# Patient Record
Sex: MALE | Race: BLACK OR AFRICAN AMERICAN | Employment: PART TIME | URBAN - METROPOLITAN AREA
[De-identification: names, ages, dates, MRNs, and addresses within clinical notes are randomized per-mention and may not be internally consistent; named-entity substitution may affect disease eponyms.]

---

## 2017-01-04 ENCOUNTER — TELEPHONE (OUTPATIENT)
Dept: INTERNAL MEDICINE CLINIC | Age: 57
End: 2017-01-04

## 2017-01-04 NOTE — TELEPHONE ENCOUNTER
Natalia Hay with [facility] office called and stated that they need a referral placed.  Please fax completed referral.  Dr. Hernandez Rang [phone]  Appointment January 11, 2017  DX: N52.9, E29.1, E11.9, I10  CPT 60323  Fax: [phone]  Phone: [phone]

## 2017-01-11 RX ORDER — BLOOD SUGAR DIAGNOSTIC
STRIP MISCELLANEOUS
Qty: 750 STRIP | Refills: 3 | Status: SHIPPED | OUTPATIENT
Start: 2017-01-11 | End: 2022-03-21 | Stop reason: ALTCHOICE

## 2017-01-11 RX ORDER — TRAMADOL HYDROCHLORIDE 50 MG/1
TABLET ORAL
Qty: 180 TAB | Refills: 2 | Status: SHIPPED | OUTPATIENT
Start: 2017-01-11 | End: 2017-07-12 | Stop reason: SDUPTHER

## 2017-01-17 RX ORDER — TRAMADOL HYDROCHLORIDE 50 MG/1
TABLET ORAL
Qty: 180 TAB | Refills: 2 | OUTPATIENT
Start: 2017-01-17

## 2017-01-17 NOTE — TELEPHONE ENCOUNTER
Spoke with patient he states [facility], and he needs a prior authorization for [employer]. PA submitted and [facility].

## 2017-01-17 NOTE — TELEPHONE ENCOUNTER
Script was printed a week ago. Do I need to reprint it ?     He has to change to strips that [facility] cover

## 2017-01-20 ENCOUNTER — TELEPHONE (OUTPATIENT)
Dept: INTERNAL MEDICINE CLINIC | Age: 57
End: 2017-01-20

## 2017-01-20 NOTE — TELEPHONE ENCOUNTER
Spoke with patient he is aware Contour Test strips are not covered by [facility] any longer. Patient states he needs to have this brand because they are linked with his insulin pump. Patient states he will call [employer]. Patient was advised to call [facility] [employer] and make them aware. Patient was advised have [employer] start an appeal.  Patient verbalizes understanding.

## 2017-02-13 ENCOUNTER — TELEPHONE (OUTPATIENT)
Dept: INTERNAL MEDICINE CLINIC | Age: 57
End: 2017-02-13

## 2017-02-17 ENCOUNTER — OFFICE VISIT (OUTPATIENT)
Dept: INTERNAL MEDICINE CLINIC | Age: 57
End: 2017-02-17

## 2017-02-17 ENCOUNTER — HOSPITAL ENCOUNTER (EMERGENCY)
Age: 57
Discharge: HOME | End: 2017-02-17
Attending: EMERGENCY MEDICINE | Admitting: EMERGENCY MEDICINE
Payer: MEDICARE

## 2017-02-17 VITALS
SYSTOLIC BLOOD PRESSURE: 143 MMHG | TEMPERATURE: 97.2 F | OXYGEN SATURATION: 98 % | HEIGHT: 73 IN | BODY MASS INDEX: 24.78 KG/M2 | WEIGHT: 187 LBS | RESPIRATION RATE: 16 BRPM | DIASTOLIC BLOOD PRESSURE: 82 MMHG | HEART RATE: 78 BPM

## 2017-02-17 VITALS
RESPIRATION RATE: 16 BRPM | TEMPERATURE: 97.6 F | OXYGEN SATURATION: 100 % | HEIGHT: 73 IN | WEIGHT: 187 LBS | DIASTOLIC BLOOD PRESSURE: 72 MMHG | HEART RATE: 90 BPM | SYSTOLIC BLOOD PRESSURE: 155 MMHG | BODY MASS INDEX: 24.78 KG/M2

## 2017-02-17 DIAGNOSIS — H11.31 SUBCONJUNCTIVAL HEMORRHAGE OF RIGHT EYE: Primary | ICD-10-CM

## 2017-02-17 DIAGNOSIS — S05.01XA CORNEAL ABRASION, RIGHT, INITIAL ENCOUNTER: ICD-10-CM

## 2017-02-17 PROCEDURE — 99283 EMERGENCY DEPT VISIT LOW MDM: CPT

## 2017-02-17 PROCEDURE — 74011000250 HC RX REV CODE- 250: Performed by: EMERGENCY MEDICINE

## 2017-02-17 RX ORDER — TOBRAMYCIN 3 MG/ML
1 SOLUTION/ DROPS OPHTHALMIC EVERY 4 HOURS
Qty: 1 BOTTLE | Refills: 0 | Status: SHIPPED | OUTPATIENT
Start: 2017-02-17 | End: 2017-02-22

## 2017-02-17 RX ORDER — TOBRAMYCIN 3 MG/ML
1 SOLUTION/ DROPS OPHTHALMIC EVERY 4 HOURS
Qty: 1 BOTTLE | Refills: 0 | Status: SHIPPED | OUTPATIENT
Start: 2017-02-17 | End: 2017-02-17

## 2017-02-17 RX ORDER — PROPARACAINE HYDROCHLORIDE 5 MG/ML
1 SOLUTION/ DROPS OPHTHALMIC
Status: COMPLETED | OUTPATIENT
Start: 2017-02-17 | End: 2017-02-17

## 2017-02-17 RX ORDER — TETRACAINE HYDROCHLORIDE 5 MG/ML
1 SOLUTION OPHTHALMIC
Status: DISCONTINUED | OUTPATIENT
Start: 2017-02-17 | End: 2017-02-17 | Stop reason: SDUPTHER

## 2017-02-17 RX ORDER — CIPROFLOXACIN 500 MG/1
500 TABLET ORAL 2 TIMES DAILY
COMMUNITY
Start: 2017-02-13 | End: 2017-03-22 | Stop reason: ALTCHOICE

## 2017-02-17 RX ADMIN — FLUORESCEIN SODIUM 1 STRIP: 1 STRIP OPHTHALMIC at 21:18

## 2017-02-17 RX ADMIN — PROPARACAINE HYDROCHLORIDE 1 DROP: 5 SOLUTION/ DROPS OPHTHALMIC at 21:26

## 2017-02-17 NOTE — MR AVS SNAPSHOT
Visit Information Date & Time Provider Department Dept. Phone Encounter #  
 2/17/2017  1:45 PM Damaris Saenz DO [facility]  Follow-up Instructions Return if symptoms worsen or fail to improve. Your Appointments 3/15/2017  8:10 AM  
LAB with Rubina Goldstein MD  
[facility] [employer] (--) Appt Note: 3 mo f/u lab [address] [phone]  
  
   
 [address] [phone]  
  
    
 3/22/2017  8:15 AM  
ROUTINE CARE with Rubina Goldstein MD  
[facility] [employer] (--) Appt Note: 3 mo f/u  
 [address] [phone]  
  
   
 [address] [phone]  
  
    
 2/1/2018  8:30 AM  
Nurse Visit with [facility] DARRYL NURSE [facility] ([facility]) Appt Note: PSA  
 [address] 
[address] [place] [address]  
[address] [facility] [address] [facility] [address]  
  
    
  
 2/9/2018  9:30 AM  
Any with Walt Odonnell MD  
[facility] ([facility]) Appt Note: 1 yr fu  
 [address] 
[address] [place] [address]  
[address] [facility] [address] [facility] [address] [facility] [address] Upcoming Health Maintenance Date Due Hepatitis C Screening 1960 EYE EXAM RETINAL OR DILATED Q1 12/8/2016 HEMOGLOBIN A1C Q6M 6/7/2017 MICROALBUMIN Q1 12/7/2017 LIPID PANEL Q1 12/7/2017 FOOT EXAM Q1 1/30/2018 COLONOSCOPY 2/24/2019 DTaP/Tdap/Td series (2 - Td) 10/25/2025 Allergies as of 2/17/2017  Review Complete On: 2/17/2017 By: Alejandro Munoz LPN No Known Allergies Current Immunizations  Reviewed on 11/13/2015 Name Date Influenza Vaccine (Quad) PF 12/21/2016, 11/13/2015 Pneumococcal Polysaccharide (PPSV-23) 11/13/2015  Tdap 10/25/2015  7:48 PM  
  
 Not reviewed this visit You Were Diagnosed With   
  
 Codes Comments Subconjunctival hemorrhage of right eye    -  Primary ICD-10-CM: H11.31 
ICD-9-CM: 372.72 Vitals BP Pulse Temp Resp Height(growth percentile) Weight(growth percentile) 143/82 (BP 1 Location: Left arm) 78 97.2 °F (36.2 °C) (Oral) 16 6' 1\" (1.854 m) 187 lb (84.8 kg) SpO2 BMI Smoking Status 98% 24.67 kg/m2 Never Smoker Vitals History BMI and BSA Data Body Mass Index Body Surface Area  
 24.67 kg/m 2 2.09 m 2 Preferred Pharmacy Pharmacy Name Phone [facility] [address] [phone] Your Updated Medication List  
  
   
This list is accurate as of: 2/17/17  2:58 PM.  Always use your most recent med list.  
  
  
  
  
 aspirin delayed-release 81 mg tablet Take  by mouth daily. atorvastatin 20 mg tablet Commonly known as:  LIPITOR Take 1 Tab by mouth daily. avanafil 200 mg Tab tablet Commonly known as:  ERMTMIC Take 1 Tab by mouth as needed for Other. Indications: Erectile Dysfunction  
  
 ciprofloxacin HCl 500 mg tablet Commonly known as:  CIPRO Take 500 mg by mouth two (2) times a day. 20 tabs      2/13/17 CONTOUR NEXT STRIPS strip Generic drug:  glucose blood VI test strips CHECK BLOOD SUGAR 8 TIMES A DAY DUE TO INSULIN PUMP AND LABILE BLOOD SUGAR  
  
 FISH OIL 1,000 mg Cap Generic drug:  omega-3 fatty acids-vitamin e Take 1 Cap by mouth. HUMALOG SC  
by SubCUTAneous route. Patient has insulin pump  
  
 ketoconazole 2 % topical cream  
Commonly known as:  NIZORAL  
  
 lisinopril 5 mg tablet Commonly known as:  Iknjal Snyder Take 1 Tab by mouth daily. [facility] Generic drug:  Lancets  
  
 multivitamin tablet Commonly known as:  ONE A DAY Take 1 Tab by mouth daily. omeprazole 20 mg capsule Commonly known as:  PRILOSEC Take 20 mg by mouth daily. oxybutynin 5 mg tablet Commonly known as:  HCMNNKIQ Take 1 Tab by mouth two (2) times a day. sildenafil citrate 100 mg tablet Commonly known as:  VIAGRA Take 1 tablet by mouth as needed. traMADol 50 mg tablet Commonly known as:  ULTRAM  
TAKE 1 TABLET EVERY 6 HOURS AS NEEDED FOR PAIN Follow-up Instructions Return if symptoms worsen or fail to improve. Patient Instructions Subconjunctival Hemorrhage: Care Instructions Your Care Instructions Sometimes small blood vessels in the white of the eye can break, causing a red spot or speck. This is called a subconjunctival hemorrhage. The blood vessels may break when you sneeze, cough, vomit, strain, or bend over. Sometimes there is no clear cause. The blood may look alarming, especially if the spot is large. If there is no pain or vision change, there is usually no reason to worry, and the blood slowly will go away on its own in 2 to 3 weeks. Follow-up care is a key part of your treatment and safety. Be sure to make and go to all appointments, and call your doctor if you are having problems. Its also a good idea to know your test results and keep a list of the medicines you take. How can you care for yourself at home? · Watch for changes in your eye. It is normal for the red spot on your eyeball to change color as it heals. Just like a bruise on your skin, it may change from red to brown to purple to yellow. · Do not take aspirin or products that contain aspirin, which can increase bleeding. Use acetaminophen (Tylenol) if you need pain relief for another problem. · Do not take two or more pain medicines at the same time unless the doctor told you to. Many pain medicines have acetaminophen, which is Tylenol. Too much acetaminophen (Tylenol) can be harmful. When should you call for help? Call your doctor now or seek immediate medical care if: 
· You see blood over the black part of your eye (pupil). · You have any changes or problems in your vision. · You have any pain in your eye. · You have any discharge from your eye. Watch closely for changes in your health, and be sure to contact your doctor if: 
· Your eye color is not steadily returning to normal. 
· The blood has not gone away after 2 to 3 weeks. · You develop bruising or bleeding elsewhere, such as the gums or the skin, or you have nosebleeds. Where can you learn more? Go to http://freida-khloe.info/. Enter R891 in the search box to learn more about \"Subconjunctival Hemorrhage: Care Instructions. \" Current as of: May 23, 2016 Content Version: 11.1 © 5334-5038 [employer]. Care instructions adapted under license by [employer] (which disclaims liability or warranty for this information). If you have questions about a medical condition or this instruction, always ask your healthcare professional. [address] any warranty or liability for your use of this information. Introducing [facility] HE[facility]! Dear Paty Mayberry: Thank you for requesting a Evolver account. Our records indicate that you already have an active Evolver account. You can access your account anytime at [email or web address] Did you know that you can access your hospital and ER discharge instructions at any time in Evolver? You can also review all of your test results from your hospital stay or ER visit. Additional Information If you have questions, please visit the Frequently Asked Questions section of the Evolver website at [email or web address] Remember, Evolver is NOT to be used for urgent needs. For medical emergencies, dial 911. Now available from your iPhone and Android! Please provide this summary of care documentation to your next provider. Your primary care clinician is listed as EMORY ZLEAYA.  If you have any questions after today's visit, please call [phone].

## 2017-02-17 NOTE — PROGRESS NOTES
Pt is here for eye problem. Pt woke up this morning with R eye red. Pt started taking cipro on 2/13/17 given by Dr Keyur Mcclelland and took a sample tablet of avanafil on 2/15/17    Do you have an advance directive no  Request Pt bring a copy of advance directive for scanning. Do you want information on an advance directive no      1. Have you been to the ER, urgent care clinic since your last visit? Hospitalized since your last visit? No    2. Have you seen or consulted any other health care providers outside of the [address] [place] [address] since your last visit? Include any pap smears or colon screening.  Yes Dr Yasmin Robles

## 2017-02-17 NOTE — PATIENT INSTRUCTIONS
Subconjunctival Hemorrhage: Care Instructions  Your Care Instructions    Sometimes small blood vessels in the white of the eye can break, causing a red spot or speck. This is called a subconjunctival hemorrhage. The blood vessels may break when you sneeze, cough, vomit, strain, or bend over. Sometimes there is no clear cause. The blood may look alarming, especially if the spot is large. If there is no pain or vision change, there is usually no reason to worry, and the blood slowly will go away on its own in 2 to 3 weeks. Follow-up care is a key part of your treatment and safety. Be sure to make and go to all appointments, and call your doctor if you are having problems. Its also a good idea to know your test results and keep a list of the medicines you take. How can you care for yourself at home? · Watch for changes in your eye. It is normal for the red spot on your eyeball to change color as it heals. Just like a bruise on your skin, it may change from red to brown to purple to yellow. · Do not take aspirin or products that contain aspirin, which can increase bleeding. Use acetaminophen (Tylenol) if you need pain relief for another problem. · Do not take two or more pain medicines at the same time unless the doctor told you to. Many pain medicines have acetaminophen, which is Tylenol. Too much acetaminophen (Tylenol) can be harmful. When should you call for help? Call your doctor now or seek immediate medical care if:  · You see blood over the black part of your eye (pupil). · You have any changes or problems in your vision. · You have any pain in your eye. · You have any discharge from your eye. Watch closely for changes in your health, and be sure to contact your doctor if:  · Your eye color is not steadily returning to normal.  · The blood has not gone away after 2 to 3 weeks. · You develop bruising or bleeding elsewhere, such as the gums or the skin, or you have nosebleeds.   Where can you learn more?  Go to http://freida-khloe.info/. Enter I569 in the search box to learn more about \"Subconjunctival Hemorrhage: Care Instructions. \"  Current as of: May 23, 2016  Content Version: 11.1  © 5782-4593 [employer]. Care instructions adapted under license by [employer] (which disclaims liability or warranty for this information). If you have questions about a medical condition or this instruction, always ask your healthcare professional. [address] any warranty or liability for your use of this information.

## 2017-02-18 NOTE — ED TRIAGE NOTES
Reports he saw PCP today for redness in R eye and was dx with \"broken blood vessel. \"  Pt states his PCP stated if eye redness got worse he should go to ER. Denies blurred vision. Denies injury.
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
## 2017-02-18 NOTE — DISCHARGE INSTRUCTIONS
Subconjunctival Hemorrhage: Care Instructions  Your Care Instructions    Sometimes small blood vessels in the white of the eye can break, causing a red spot or speck. This is called a subconjunctival hemorrhage. The blood vessels may break when you sneeze, cough, vomit, strain, or bend over. Sometimes there is no clear cause. The blood may look alarming, especially if the spot is large. If there is no pain or vision change, there is usually no reason to worry, and the blood slowly will go away on its own in 2 to 3 weeks. Follow-up care is a key part of your treatment and safety. Be sure to make and go to all appointments, and call your doctor if you are having problems. Its also a good idea to know your test results and keep a list of the medicines you take. How can you care for yourself at home? · Watch for changes in your eye. It is normal for the red spot on your eyeball to change color as it heals. Just like a bruise on your skin, it may change from red to brown to purple to yellow. · Do not take aspirin or products that contain aspirin, which can increase bleeding. Use acetaminophen (Tylenol) if you need pain relief for another problem. · Do not take two or more pain medicines at the same time unless the doctor told you to. Many pain medicines have acetaminophen, which is Tylenol. Too much acetaminophen (Tylenol) can be harmful. When should you call for help? Call your doctor now or seek immediate medical care if:  · You see blood over the black part of your eye (pupil). · You have any changes or problems in your vision. · You have any pain in your eye. · You have any discharge from your eye. Watch closely for changes in your health, and be sure to contact your doctor if:  · Your eye color is not steadily returning to normal.  · The blood has not gone away after 2 to 3 weeks. · You develop bruising or bleeding elsewhere, such as the gums or the skin, or you have nosebleeds.   Where can you learn more? Go to http://freida-khloe.info/. Enter S669 in the search box to learn more about \"Subconjunctival Hemorrhage: Care Instructions. \"  Current as of: May 23, 2016  Content Version: 11.1  © 7639-4797 [employer]. Care instructions adapted under license by [employer] (which disclaims liability or warranty for this information). If you have questions about a medical condition or this instruction, always ask your healthcare professional. [place] [address] any warranty or liability for your use of this information. Corneal Scratches: Care Instructions  Your Care Instructions    The cornea is the clear surface that covers the front of the eye. When a speck of dirt, a wood chip, an insect, or another object flies into your eye, it can cause a painful scratch on the cornea. Wearing contact lenses too long or rubbing your eyes can also scratch the cornea. Small scratches usually heal in a day or two. Deeper scratches may take longer. If you have had a foreign object removed from your eye or you have a corneal scratch, you will need to watch for infection and vision problems while your eye heals. Follow-up care is a key part of your treatment and safety. Be sure to make and go to all appointments, and call your doctor if you are having problems. Its also a good idea to know your test results and keep a list of the medicines you take. How can you care for yourself at home? · The doctor probably used a medicine during your exam to numb your eye. When it wears off in 30 to 60 minutes, your eye pain may come back. Take pain medicines exactly as directed. ¨ If the doctor gave you a prescription medicine for pain, take it as prescribed. ¨ If you are not taking a prescription pain medicine, ask your doctor if you can take an over-the-counter medicine. ¨ Do not take two or more pain medicines at the same time unless the doctor told you to.  Many pain medicines have acetaminophen, which is Tylenol. Too much acetaminophen (Tylenol) can be harmful. · Do not rub your injured eye. Rubbing can make it worse. · Use the prescribed eyedrops or ointment as directed. Be sure the dropper or bottle tip is clean. To put in eyedrops or ointment:  ¨ Tilt your head back, and pull your lower eyelid down with one finger. ¨ Drop or squirt the medicine inside the lower lid. ¨ Close your eye for 30 to 60 seconds to let the drops or ointment move around. ¨ Do not touch the ointment or dropper tip to your eyelashes or any other surface. · Do not use your contact lens in your hurt eye until your doctor says you can. Also, do not wear eye makeup until your eye has healed. · Do not drive if you have blurred vision. · Bright light may hurt. Sunglasses can help. · To prevent eye injuries in the future, wear safety glasses or goggles when you work with machines or tools, mow the lawn, or ride a bike or motorcycle. When should you call for help? Call your doctor now or seek immediate medical care if:  · You have any changes in your vision, flashes of light, or floaters (shadows or dark objects that float across your field of vision). · Pain or redness gets worse, or there is yellow, green, or bloody pus coming from the eye. · You have a fever. Watch closely for changes in your health, and be sure to contact your doctor if you have any problems. Where can you learn more? Go to http://freida-khloe.info/. Enter Y051 in the search box to learn more about \"Corneal Scratches: Care Instructions. \"  Current as of: May 23, 2016  Content Version: 11.1  © 7710-9202 [employer]. Care instructions adapted under license by [employer] (which disclaims liability or warranty for this information).  If you have questions about a medical condition or this instruction, always ask your healthcare professional. [place] [address] any warranty or liability for your use of this information.

## 2017-02-18 NOTE — ED NOTES
I have reviewed discharge instructions with the patient. The patient verbalized understanding. Medication teaching given, to include name, dose, action, and side effects. Patient verbalized understanding of medications. Encouraged patient to voice any concerns with reassurance provided. Instructed not to drive or use heavy machinery while taking this medication. Patient states they have someone to drive them home. Patient armband removed and shredded    Patient Discharged in stable condition. Patient is awake, alert and oriented x 4.

## 2017-02-20 ENCOUNTER — TELEPHONE (OUTPATIENT)
Dept: INTERNAL MEDICINE CLINIC | Age: 57
End: 2017-02-20

## 2017-02-20 NOTE — TELEPHONE ENCOUNTER
Patient called in and stated that he need to have a referral placed. Please fax referral.  Appointment: 02/21/2107  [facility]  Dr. Ashley Owusu  DX: Busted blood vessel in eye per patient.
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
## 2017-02-23 ENCOUNTER — TELEPHONE (OUTPATIENT)
Dept: INTERNAL MEDICINE CLINIC | Age: 57
End: 2017-02-23

## 2017-02-23 NOTE — TELEPHONE ENCOUNTER
Marianne with [facility] request referral     appt 02/27/2017    Dr Charis Garcia NPI [id number]  [address], [address]  Ph [phone]  Fax [phone]  DX H26.40  [place] [address] [phone]

## 2017-02-27 RX ORDER — INSULIN LISPRO 100 [IU]/ML
INJECTION, SOLUTION INTRAVENOUS; SUBCUTANEOUS
Qty: 1 VIAL | Status: CANCELLED
Start: 2017-02-27

## 2017-02-27 NOTE — TELEPHONE ENCOUNTER
Patient is aware he will need to get this from his Endocrinologist.  Patient verbalizes understanding.

## 2017-02-27 NOTE — TELEPHONE ENCOUNTER
Requested Prescriptions     Pending Prescriptions Disp Refills    insulin lispro (HUMALOG) 100 unit/mL injection 1 Vial        Last office visit was  2/17/17  Next office visit is     3/22/17    Please assist.

## 2017-02-27 NOTE — TELEPHONE ENCOUNTER
Patient stated that he need two vials for one month. Please advise.     Pt called in requesting refill of his   Requested Prescriptions     Pending Prescriptions Disp Refills    insulin lispro (HUMALOG) 100 unit/mL injection 1 Vial    .

## 2017-03-15 ENCOUNTER — HOSPITAL ENCOUNTER (OUTPATIENT)
Dept: LAB | Age: 57
Discharge: HOME | End: 2017-03-15

## 2017-03-15 PROCEDURE — 99001 SPECIMEN HANDLING PT-LAB: CPT | Performed by: INTERNAL MEDICINE

## 2017-03-22 ENCOUNTER — OFFICE VISIT (OUTPATIENT)
Dept: INTERNAL MEDICINE CLINIC | Age: 57
End: 2017-03-22

## 2017-03-22 VITALS
OXYGEN SATURATION: 100 % | SYSTOLIC BLOOD PRESSURE: 105 MMHG | BODY MASS INDEX: 24.52 KG/M2 | HEIGHT: 73 IN | DIASTOLIC BLOOD PRESSURE: 58 MMHG | HEART RATE: 79 BPM | WEIGHT: 185 LBS | RESPIRATION RATE: 18 BRPM | TEMPERATURE: 98.1 F

## 2017-03-22 DIAGNOSIS — E10.621: ICD-10-CM

## 2017-03-22 DIAGNOSIS — I11.9 BENIGN HYPERTENSIVE HEART DISEASE WITHOUT HEART FAILURE: ICD-10-CM

## 2017-03-22 DIAGNOSIS — E78.5 DYSLIPIDEMIA: ICD-10-CM

## 2017-03-22 DIAGNOSIS — L97.509: ICD-10-CM

## 2017-03-22 DIAGNOSIS — E10.42 TYPE 1 DIABETES MELLITUS WITH DIABETIC POLYNEUROPATHY (HCC): Primary | ICD-10-CM

## 2017-03-22 RX ORDER — INSULIN LISPRO 100 [IU]/ML
INJECTION, SOLUTION INTRAVENOUS; SUBCUTANEOUS
Qty: 3 VIAL | Refills: 2 | Status: SHIPPED | OUTPATIENT
Start: 2017-03-22 | End: 2017-03-27 | Stop reason: SDUPTHER

## 2017-03-22 NOTE — PROGRESS NOTES
Subjective:       Chief Complaint  The patient presents for follow up of diabetes, hypertension and high cholesterol. HPI  Dane Brooks. is a 64 y.o. male seen for follow up of diabetes. Healso has hypertension and hyperlipidemia. Diabetes no significant medication side effects noted, poorly controlled, pt continues on insulin pump and is followed by Endo for management he has not been sen recently however, he does not bolus insulin before he eats which is one reason it is uncontrolled, he is afraid of getting low BS and his insurance does not pay for continuous BS monitoring currently, will refer him to diabetic teaching/nutrition,  hypertension well controlled on lisinopril 5 mg, hyperlipidemia well controlled, no significant medication side effects noted, on Lipitor     Diet and Lifestyle: generally follows a low fat low cholesterol diet, does not rigorously follow a diabetic diet, exercises sporadically    Home BP Monitoring: is not measured at home. Diabetic Review of Systems - home glucose monitoring: is performed regularly, 6x/day. Other symptoms and concerns: pt will try to exercise more. He has a diabetic foot ulcer that is slowly healing. Pt is candidate for diabetic shoes due to neuropathy and ulcerative callus. Current Outpatient Prescriptions   Medication Sig    insulin lispro (HUMALOG) 100 unit/mL injection For insulin pump    aspirin delayed-release 81 mg tablet Take  by mouth daily.  CONTOUR NEXT STRIPS strip CHECK BLOOD SUGAR 8 TIMES A DAY DUE TO INSULIN PUMP AND LABILE BLOOD SUGAR    atorvastatin (LIPITOR) 20 mg tablet Take 1 Tab by mouth daily.  oxybutynin (DITROPAN) 5 mg tablet Take 1 Tab by mouth two (2) times a day.  MICROLET LANCET misc     lisinopril (PRINIVIL, ZESTRIL) 5 mg tablet Take 1 Tab by mouth daily.  omeprazole (PRILOSEC) 20 mg capsule Take 20 mg by mouth daily.     omega-3 fatty acids-vitamin e (FISH OIL) 1,000 mg cap Take 1 Cap by mouth.    multivitamin (ONE A DAY) tablet Take 1 Tab by mouth daily.  sildenafil citrate (VIAGRA) 100 mg tablet Take 1/2 tab as needed 2 hrs after last solid food.  traMADol (ULTRAM) 50 mg tablet TAKE 1 TABLET EVERY 6 HOURS AS NEEDED FOR PAIN    ketoconazole (NIZORAL) 2 % topical cream      No current facility-administered medications for this visit.               Review of Systems  Respiratory: negative for dyspnea on exertion  Cardiovascular: negative for chest pain    Objective:     Visit Vitals    /58 (BP 1 Location: Left arm, BP Patient Position: Sitting)    Pulse 79    Temp 98.1 °F (36.7 °C) (Oral)    Resp 18    Ht 6' 1\" (1.854 m)    Wt 185 lb (83.9 kg)    SpO2 100%    BMI 24.41 kg/m2        General appearance - alert, well appearing, and in no distress  Chest - clear to auscultation, no wheezes, rales or rhonchi, symmetric air entry  Heart - normal rate, regular rhythm, normal S1, S2, no murmurs, rubs, clicks or gallops  Ext contracture of the lesser toes is noted on the right foot and left foot    Labs:   Lab Results   Component Value Date/Time    Hemoglobin A1c 9.6 03/15/2017 08:14 AM    Hemoglobin A1c 10.8 12/07/2016 07:59 AM    Hemoglobin A1c 9.8 06/03/2016 08:13 AM    Glucose 118 03/15/2017 08:14 AM    Glucose (POC) 133 02/24/2016 12:46 PM    Microalbumin/Creat ratio (mg/g creat) 46 12/04/2015 08:40 AM    Microalb/Creat ratio (ug/mg creat.) 23.4 12/07/2016 07:59 AM    Microalbumin,urine random 4.10 12/04/2015 08:40 AM    LDL, calculated 66 03/15/2017 08:14 AM    Creatinine 1.11 03/15/2017 08:14 AM    Hemoglobin A1c, External 9.3 12/10/2015    Hemoglobin A1c, External 8.7% 08/20/2015    Hemoglobin A1c, External 9.5 05/22/2015 07:43 PM      Lab Results   Component Value Date/Time    Cholesterol, total 126 03/15/2017 08:14 AM    HDL Cholesterol 50 03/15/2017 08:14 AM    LDL, calculated 66 03/15/2017 08:14 AM    Triglyceride 48 03/15/2017 08:14 AM    CHOL/HDL Ratio 1.7 12/04/2015 08:40 AM     Lab Results   Component Value Date/Time    ALT (SGPT) 16 03/15/2017 08:14 AM    AST (SGOT) 11 03/15/2017 08:14 AM    Alk. phosphatase 85 03/15/2017 08:14 AM    Bilirubin, total 0.6 03/15/2017 08:14 AM     Lab Results   Component Value Date/Time    GFR est AA 85 03/15/2017 08:14 AM    GFR est non-AA 74 03/15/2017 08:14 AM    Creatinine 1.11 03/15/2017 08:14 AM    BUN 16 03/15/2017 08:14 AM    Sodium 141 03/15/2017 08:14 AM    Potassium 4.1 03/15/2017 08:14 AM    Chloride 102 03/15/2017 08:14 AM    CO2 26 03/15/2017 08:14 AM      Lab Results   Component Value Date/Time    Prostate Specific Ag 0.3 12/21/2016 09:54 AM    Prostate Specific Ag 0.293 03/11/2015 11:12 AM    Prostate Specific Ag 0.3 10/03/2014 02:32 PM    Prostate Specific Ag 0.251 12/17/2012 03:42 PM    PSA 0.3 09/30/2010 10:09 AM    PSA, FREE 0.2 09/30/2010 10:09 AM    % FREE PSA 67 09/30/2010 10:09 AM     Lab Results   Component Value Date/Time    Glucose 118 03/15/2017 08:14 AM    Glucose (POC) 133 02/24/2016 12:46 PM            Assessment / Plan     Diabetes poorly controlled, pt remains on insulin pump and will f/u with Endo/nutritionist  for further management of diet and bolusing insulin  Hypertension well controlled, on lisinopril 5 mg  Hyperlipidemia well controlled on Lipitor       ICD-10-CM ICD-9-CM    1. Type 1 diabetes mellitus with diabetic polyneuropathy (HCC) E10.42 250.61 HEMOGLOBIN A1C W/O EAG     357.2    2. Hypertension P85.2 790.92 METABOLIC PANEL, COMPREHENSIVE   3. Dyslipidemia E78.5 272.4 LIPID PANEL   4. Diabetic foot ulcer associated with type 1 diabetes mellitus, unspecified laterality ([facility] E10.621 250.81 Pt is candidate for diabetic shoes     L97.509 707.15                 Diabetic issues reviewed with him: diabetic diet discussed in detail, and low cholesterol diet, weight control and daily exercise discussed. Follow-up Disposition:  Return in about 3 months (around 6/22/2017) for labs 1 week before.       Reviewed plan of care. Patient has provided input and agrees with goals.

## 2017-03-22 NOTE — PATIENT INSTRUCTIONS

## 2017-03-22 NOTE — MR AVS SNAPSHOT
Visit Information Date & Time Provider Department Dept. Phone Encounter #  
 3/22/2017  8:15 AM Leonidas Middleton MD [facility] at [facility] [id number] Follow-up Instructions Return in about 3 months (around 6/22/2017) for labs 1 week before. Your Appointments 2/1/2018  8:30 AM  
Nurse Visit with [facility] NURSE [facility] ([facility]) Appt Note: PSA  
 [address] 
[address] [facility] [address]  
[address] [facility] [address] [facility] [address]  
  
    
  
 2/9/2018  9:30 AM  
Any with Zhang Middleton MD  
[facility] ([facility]) Appt Note: 1 yr fu  
 [address] 
[address] [facility] [address]  
[address] [facility] [address] [facility] [address] [facility] [address] Upcoming Health Maintenance Date Due Hepatitis C Screening 1960 EYE EXAM RETINAL OR DILATED Q1 12/8/2016 HEMOGLOBIN A1C Q6M 9/15/2017 MICROALBUMIN Q1 12/7/2017 FOOT EXAM Q1 1/30/2018 LIPID PANEL Q1 3/15/2018 COLONOSCOPY 2/24/2019 DTaP/Tdap/Td series (2 - Td) 10/25/2025 Allergies as of 3/22/2017  Review Complete On: 3/22/2017 By: Adán Snyder LPN No Known Allergies Current Immunizations  Reviewed on 11/13/2015 Name Date Influenza Vaccine (Quad) PF 12/21/2016, 11/13/2015 Pneumococcal Polysaccharide (PPSV-23) 11/13/2015 Tdap 10/25/2015  7:48 PM  
  
 Not reviewed this visit You Were Diagnosed With   
  
 Codes Comments Type 1 diabetes mellitus with diabetic polyneuropathy (HCC)    -  Primary ICD-10-CM: E10.42 
ICD-9-CM: 250.61, 357.2 Benign hypertensive heart disease without heart failure     ICD-10-CM: I11.9 ICD-9-CM: 402.10 Dyslipidemia     ICD-10-CM: E78.5 ICD-9-CM: 272.4 Vitals BP Pulse Temp Resp Height(growth percentile) Weight(growth percentile)  105/58 (BP 1 Location: Left arm, BP Patient Position: Sitting) 79 98.1 °F (36.7 °C) (Oral) 18 6' 1\" (1.854 m) 185 lb (83.9 kg) SpO2 BMI Smoking Status 100% 24.41 kg/m2 Never Smoker Vitals History BMI and BSA Data Body Mass Index Body Surface Area  
 24.41 kg/m 2 2.08 m 2 Preferred Pharmacy Pharmacy Name Phone [facility] [address] [phone] Your Updated Medication List  
  
   
This list is accurate as of: 3/22/17  8:38 AM.  Always use your most recent med list.  
  
  
  
  
 aspirin delayed-release 81 mg tablet Take  by mouth daily. atorvastatin 20 mg tablet Commonly known as:  LIPITOR Take 1 Tab by mouth daily. CONTOUR NEXT STRIPS strip Generic drug:  glucose blood VI test strips CHECK BLOOD SUGAR 8 TIMES A DAY DUE TO INSULIN PUMP AND LABILE BLOOD SUGAR  
  
 FISH OIL 1,000 mg Cap Generic drug:  omega-3 fatty acids-vitamin e Take 1 Cap by mouth. insulin lispro 100 unit/mL injection Commonly known as:  HumaLOG For insulin pump  
  
 ketoconazole 2 % topical cream  
Commonly known as:  NIZORAL  
  
 lisinopril 5 mg tablet Commonly known as:  Annalisa Prieto Take 1 Tab by mouth daily. [facility] Generic drug:  Lancets  
  
 multivitamin tablet Commonly known as:  ONE A DAY Take 1 Tab by mouth daily. omeprazole 20 mg capsule Commonly known as:  PRILOSEC Take 20 mg by mouth daily. oxybutynin 5 mg tablet Commonly known as:  EUDHTFGH Take 1 Tab by mouth two (2) times a day. sildenafil citrate 100 mg tablet Commonly known as:  VIAGRA Take 1/2 tab as needed 2 hrs after last solid food. traMADol 50 mg tablet Commonly known as:  ULTRAM  
TAKE 1 TABLET EVERY 6 HOURS AS NEEDED FOR PAIN Prescriptions Sent to Pharmacy Refills  
 insulin lispro (HUMALOG) 100 unit/mL injection 2 Sig: For insulin pump  Class: Normal  
 Pharmacy: [address] [place], [address] [place] [address] #: [phone] Follow-up Instructions Return in about 3 months (around 6/22/2017) for labs 1 week before. Patient Instructions Nutrition Tips for Diabetes: After Your Visit Your Care Instructions A healthy diet is important to manage diabetes. It helps you lose weight (if you need to) and keep it off. It gives you the nutrition and energy your body needs and helps prevent heart disease. But a diet for diabetes does not mean that you have to eat special foods. You can eat what your family eats, including occasional sweets and other favorites. But you do have to pay attention to how often you eat and how much you eat of certain foods. The right plan for you will give you meals that help you keep your blood sugar at healthy levels. Try to eat a variety of foods and to spread carbohydrate throughout the day. Carbohydrate raises blood sugar higher and more quickly than any other nutrient does. Carbohydrate is found in sugar, breads and cereals, fruit, starchy vegetables such as potatoes and corn, and milk and yogurt. You may want to work with a dietitian or diabetes educator to help you plan meals and snacks. A dietitian or diabetes educator also can help you lose weight if that is one of your goals. The following tips can help you enjoy your meals and stay healthy. Follow-up care is a key part of your treatment and safety. Be sure to make and go to all appointments, and call your doctor if you are having problems. Its also a good idea to know your test results and keep a list of the medicines you take. How can you care for yourself at home? · Learn which foods have carbohydrate and how much carbohydrate to eat. A dietitian or diabetes educator can help you learn to keep track of how much carbohydrate you eat. · Spread carbohydrate throughout the day.  Eat some carbohydrate at all meals, but do not eat too much at any one time. · Plan meals to include food from all the food groups. These are the food groups and some example portion sizes: ¨ Grains: 1 slice of bread (1 ounce), ½ cup of cooked cereal, and 1/3 cup of cooked pasta or rice. These have about 15 grams of carbohydrate in a serving. Choose whole grains such as whole wheat bread or crackers, oatmeal, and brown rice more often than refined grains. ¨ Fruit: 1 small fresh fruit, such as an apple or orange; ½ of a banana; ½ cup of chopped, cooked, or canned fruit; ½ cup of fruit juice; 1 cup of melon or raspberries; and 2 tablespoons of dried fruit. These have about 15 grams of carbohydrate in a serving. ¨ Dairy: 1 cup of nonfat or low-fat milk and 2/3 cup of plain yogurt. These have about 15 grams of carbohydrate in a serving. ¨ Protein foods: Beef, chicken, turkey, fish, eggs, tofu, cheese, cottage cheese, and peanut butter. A serving size of meat is 3 ounces, which is about the size of a deck of cards. Examples of meat substitute serving sizes (equal to 1 ounce of meat) are 1/4 cup of cottage cheese, 1 egg, 1 tablespoon of peanut butter, and ½ cup of tofu. These have very little or no carbohydrate per serving. ¨ Vegetables: Starchy vegetables such as ½ cup of cooked dried beans, peas, potatoes, or corn have about 15 grams of carbohydrate. Nonstarchy vegetables have very little carbohydrate, such as 1 cup of raw leafy vegetables (such as spinach), ½ cup of other vegetables (cooked or chopped), and 3/4 cup of vegetable juice. · Use the plate format to plan meals. It is a good, quick way to make sure that you have a balanced meal. It also helps you spread carbohydrate throughout the day. You divide your plate by types of foods.  Put vegetables on half the plate, meat or meat substitutes on one-quarter of the plate, and a grain or starchy vegetable (such as brown rice or a potato) in the final quarter of the plate. To this you can add a small piece of fruit and 1 cup of milk or yogurt, depending on how much carbohydrate you are supposed to eat at a meal. 
· Talk to your dietitian or diabetes educator about ways to add limited amounts of sweets into your meal plan. You can eat these foods now and then, as long as you include the amount of carbohydrate they have in your daily carbohydrate allowance. · If you drink alcohol, limit it to no more than 1 drink a day for women and 2 drinks a day for men. If you are pregnant, no amount of alcohol is known to be safe. · Protein, fat, and fiber do not raise blood sugar as much as carbohydrate does. If you eat a lot of these nutrients in a meal, your blood sugar will rise more slowly than it would otherwise. · Limit saturated fats, such as those from meat and dairy products. Try to replace it with monounsaturated fat, such as olive oil. This is a healthier choice because people who have diabetes are at higher-than-average risk of heart disease. But use a modest amount of olive oil. A tablespoon of olive oil has 14 grams of fat and 120 calories. · Exercise lowers blood sugar. If you take insulin by shots or pump, you can use less than you would if you were not exercising. Keep in mind that timing matters. If you exercise within 1 hour after a meal, your body may need less insulin for that meal than it would if you exercised 3 hours after the meal. Test your blood sugar to find out how exercise affects your need for insulin. · Exercise on most days of the week. Aim for at least 30 minutes. Exercise helps you stay at a healthy weight and helps your body use insulin. Walking is an easy way to get exercise. Gradually increase the amount you walk every day. You also may want to swim, bike, or do other activities. When you eat out · Learn to estimate the serving sizes of foods that have carbohydrate.  If you measure food at home, it will be easier to estimate the amount in a serving of restaurant food. · If the meal you order has too much carbohydrate (such as potatoes, corn, or baked beans), ask to have a low-carbohydrate food instead. Ask for a salad or green vegetables. · If you use insulin, check your blood sugar before and after eating out to help you plan how much to eat in the future. · If you eat more carbohydrate at a meal than you had planned, take a walk or do other exercise. This will help lower your blood sugar. Where can you learn more? Go to [address].be Enter T262 in the search box to learn more about \"Nutrition Tips for Diabetes: After Your Visit. \"  
© 7646-0012 Healthwise, Incorporated. Care instructions adapted under license by Christiana Kaminski (which disclaims liability or warranty for this information). This care instruction is for use with your licensed healthcare professional. If you have questions about a medical condition or this instruction, always ask your healthcare professional. [address] any warranty or liability for your use of this information. Content Version: 72.0.514940; Current as of: June 4, 2014 Introducing [facility]! Dear Frances Neri: Thank you for requesting a News in Shorts account. Our records indicate that you already have an active News in Shorts account. You can access your account anytime at [email or web address] Did you know that you can access your hospital and ER discharge instructions at any time in News in Shorts? You can also review all of your test results from your hospital stay or ER visit. Additional Information If you have questions, please visit the Frequently Asked Questions section of the News in Shorts website at [email or web address] Remember, News in Shorts is NOT to be used for urgent needs. For medical emergencies, dial 911. Now available from your iPhone and Android! Please provide this summary of care documentation to your next provider. Your primary care clinician is listed as EMORY ZELAYA. If you have any questions after today's visit, please call [phone].

## 2017-03-22 NOTE — PROGRESS NOTES
Patient is in the office today for a 3 month follow up. Do you have an Advance Directive no  Do you want more information declined    1. Have you been to the ER, urgent care clinic since your last visit? Hospitalized since your last visit? Yes, HV    2. Have you seen or consulted any other health care providers outside of the [address] [place] [address] since your last visit? Include any pap smears or colon screening. yes, Dr. Aure Putnam.

## 2017-03-27 RX ORDER — INSULIN LISPRO 100 [IU]/ML
INJECTION, SOLUTION INTRAVENOUS; SUBCUTANEOUS
Qty: 3 VIAL | Refills: 2 | Status: SHIPPED | OUTPATIENT
Start: 2017-03-27 | End: 2017-05-03 | Stop reason: SDUPTHER

## 2017-03-27 NOTE — TELEPHONE ENCOUNTER
Called the pt to ask him how many units of humalog he uses in a 24 hr period. No answer. Left him a message requesting a call back.

## 2017-03-30 RX ORDER — LISINOPRIL 5 MG/1
TABLET ORAL
Qty: 90 TAB | Refills: 3 | Status: SHIPPED | OUTPATIENT
Start: 2017-03-30 | End: 2017-12-27 | Stop reason: SDUPTHER

## 2017-04-05 ENCOUNTER — TELEPHONE (OUTPATIENT)
Dept: INTERNAL MEDICINE CLINIC | Age: 57
End: 2017-04-05

## 2017-04-05 RX ORDER — PANTOPRAZOLE SODIUM 40 MG/1
40 TABLET, DELAYED RELEASE ORAL DAILY
Qty: 30 TAB | Refills: 2 | Status: SHIPPED | OUTPATIENT
Start: 2017-04-05 | End: 2017-10-23 | Stop reason: SDUPTHER

## 2017-04-05 NOTE — TELEPHONE ENCOUNTER
Pt calling re: 2 items    1. Request medication for acid reflux, said Prilosec is no helping. Or referral to gastroenterologist    Pharmacy is [address]    2. Per pt [facility] was to call or fax request for Quick Set yesterday.  Please advise of status

## 2017-04-05 NOTE — TELEPHONE ENCOUNTER
Left message with patients wife she is aware medication was sent to pharmacy and Diabetic form was faxed. Ms. Broderick Ramos states she will let her  know.

## 2017-05-03 ENCOUNTER — TELEPHONE (OUTPATIENT)
Dept: INTERNAL MEDICINE CLINIC | Age: 57
End: 2017-05-03

## 2017-05-03 RX ORDER — INSULIN LISPRO 100 [IU]/ML
INJECTION, SOLUTION INTRAVENOUS; SUBCUTANEOUS
Qty: 3 VIAL | Refills: 3 | Status: SHIPPED | OUTPATIENT
Start: 2017-05-03 | End: 2017-06-26 | Stop reason: CLARIF

## 2017-05-03 NOTE — TELEPHONE ENCOUNTER
Hunter Console with [address] [facility] request ins referral    appt 05/04/2017 (she said they will have to cancel pts appt if they don't have referral by today    She said pt was referred 03/23/2017.  She also said she left voice mail yesterday requesting referral    [facility]  [address] [facility], [address] [place] [address] [phone]  Fax [phone]  DX pt is diabetic (nutrition referral)  [facility] [id number]

## 2017-05-03 NOTE — TELEPHONE ENCOUNTER
Pt request change pharmacy, also request 3 month supply with refills:     insulin lispro (HUMALOG) 100 unit/mL injection         Pt request change to [facility]

## 2017-05-03 NOTE — TELEPHONE ENCOUNTER
Referral faxed. Spoke with [facility] office is not in [facility] records. Spoke with [facility] rep she stated to fax referral to PT. Referral Faxed.

## 2017-05-08 ENCOUNTER — TELEPHONE (OUTPATIENT)
Dept: INTERNAL MEDICINE CLINIC | Age: 57
End: 2017-05-08

## 2017-05-08 DIAGNOSIS — E10.42 TYPE 1 DIABETES MELLITUS WITH DIABETIC POLYNEUROPATHY (HCC): Primary | ICD-10-CM

## 2017-05-08 NOTE — TELEPHONE ENCOUNTER
Sheila with Dr. Celina nUger office called and would like to get a referral placed.  Please fax referral.    Dr. Rowan Nugent  Appointment: 05/08/2017  Dx: Diabetes E11.9  Phone: [phone]  Fax: [phone]

## 2017-05-09 ENCOUNTER — TELEPHONE (OUTPATIENT)
Dept: INTERNAL MEDICINE CLINIC | Age: 57
End: 2017-05-09

## 2017-05-09 NOTE — TELEPHONE ENCOUNTER
[facility] referral faxed. Auth # [id number]  Expires 5/8/2018  99 visits.

## 2017-05-09 NOTE — TELEPHONE ENCOUNTER
Jad Cancer with Dr Nicole Latif office request [facility] authorization for diabetic shoes and inserts.  Dina Knowles comes in units    Request diabetic shoes:   2 units of extra depth shoes #     Request inserts:  6 units heat molded inserts # [id number]    DX E10.42     [phone]  Fax [phone]

## 2017-06-15 ENCOUNTER — LAB ONLY (OUTPATIENT)
Dept: INTERNAL MEDICINE CLINIC | Age: 57
End: 2017-06-15

## 2017-06-15 ENCOUNTER — HOSPITAL ENCOUNTER (OUTPATIENT)
Dept: LAB | Age: 57
Discharge: HOME | End: 2017-06-15

## 2017-06-15 DIAGNOSIS — E11.9 TYPE 2 DIABETES MELLITUS WITHOUT COMPLICATION, UNSPECIFIED LONG TERM INSULIN USE STATUS: Primary | ICD-10-CM

## 2017-06-15 PROCEDURE — 99001 SPECIMEN HANDLING PT-LAB: CPT | Performed by: INTERNAL MEDICINE

## 2017-06-16 LAB
ALBUMIN SERPL-MCNC: 3.9 G/DL (ref 3.5–5.5)
ALBUMIN/GLOB SERPL: 1.4 {RATIO} (ref 1.2–2.2)
ALP SERPL-CCNC: 103 IU/L (ref 39–117)
ALT SERPL-CCNC: 16 IU/L (ref 0–44)
AST SERPL-CCNC: 10 IU/L (ref 0–40)
BILIRUB SERPL-MCNC: 0.7 MG/DL (ref 0–1.2)
BUN SERPL-MCNC: 24 MG/DL (ref 6–24)
BUN/CREAT SERPL: 19 (ref 9–20)
CALCIUM SERPL-MCNC: 9.8 MG/DL (ref 8.7–10.2)
CHLORIDE SERPL-SCNC: 98 MMOL/L (ref 96–106)
CHOLEST SERPL-MCNC: 114 MG/DL (ref 100–199)
CO2 SERPL-SCNC: 25 MMOL/L (ref 18–29)
CREAT SERPL-MCNC: 1.24 MG/DL (ref 0.76–1.27)
GLOBULIN SER CALC-MCNC: 2.7 G/DL (ref 1.5–4.5)
GLUCOSE SERPL-MCNC: 106 MG/DL (ref 65–99)
HBA1C MFR BLD: 10.4 % (ref 4.8–5.6)
HDLC SERPL-MCNC: 45 MG/DL
INTERPRETATION, 910389: NORMAL
LDLC SERPL CALC-MCNC: 58 MG/DL (ref 0–99)
Lab: NORMAL
POTASSIUM SERPL-SCNC: 4.7 MMOL/L (ref 3.5–5.2)
PROT SERPL-MCNC: 6.6 G/DL (ref 6–8.5)
SODIUM SERPL-SCNC: 138 MMOL/L (ref 134–144)
TRIGL SERPL-MCNC: 57 MG/DL (ref 0–149)
VLDLC SERPL CALC-MCNC: 11 MG/DL (ref 5–40)

## 2017-06-21 ENCOUNTER — TELEPHONE (OUTPATIENT)
Dept: INTERNAL MEDICINE CLINIC | Age: 57
End: 2017-06-21

## 2017-06-21 NOTE — TELEPHONE ENCOUNTER
Pt request referral for 2nd opinion   re: surgery and differing opinions (said Dr Stephanie Motta is aware)   1.  [address] [facility] suggesting possible bone shaved  2. Dr Jose Alvares cut half the bone or whole bone     Requesting appt with Dr Chiquis Clark Specialist  Wants to go to the:[facility] [address]  [address] [address] [facility], [address]   phone [phone].   [facility] [employer] [id number]

## 2017-06-26 ENCOUNTER — TELEPHONE (OUTPATIENT)
Dept: INTERNAL MEDICINE CLINIC | Age: 57
End: 2017-06-26

## 2017-06-26 RX ORDER — INSULIN ASPART 100 [IU]/ML
INJECTION, SOLUTION INTRAVENOUS; SUBCUTANEOUS
Qty: 3 VIAL | Refills: 5 | Status: SHIPPED | OUTPATIENT
Start: 2017-06-26 | End: 2017-12-27 | Stop reason: SDUPTHER

## 2017-06-26 NOTE — TELEPHONE ENCOUNTER
Patient called in and stated that he would like to get the medication Humalog changed to novolog. Insurance will no longer cover Humalog. Please call patient when this is done.

## 2017-06-26 NOTE — TELEPHONE ENCOUNTER
I called Dr Elin Connors office to schedule referral. Pt has an appt already on July 10th at 9:50 am [address]. I called Pt to inform Pt about his appt. LM on  in regards to appt. Date/Time and Location.

## 2017-06-27 NOTE — TELEPHONE ENCOUNTER
I called Pt in regards to Medication. Informed pt that the Novolog was sent to the pharmacy. Pt verbalized understanding.

## 2017-06-28 ENCOUNTER — OFFICE VISIT (OUTPATIENT)
Dept: INTERNAL MEDICINE CLINIC | Age: 57
End: 2017-06-28

## 2017-06-28 VITALS
OXYGEN SATURATION: 98 % | HEART RATE: 76 BPM | RESPIRATION RATE: 16 BRPM | BODY MASS INDEX: 24.78 KG/M2 | DIASTOLIC BLOOD PRESSURE: 79 MMHG | WEIGHT: 187 LBS | HEIGHT: 73 IN | TEMPERATURE: 98 F | SYSTOLIC BLOOD PRESSURE: 134 MMHG

## 2017-06-28 DIAGNOSIS — E10.42 TYPE 1 DIABETES MELLITUS WITH DIABETIC POLYNEUROPATHY (HCC): Primary | ICD-10-CM

## 2017-06-28 DIAGNOSIS — Z11.59 NEED FOR HEPATITIS C SCREENING TEST: ICD-10-CM

## 2017-06-28 DIAGNOSIS — I11.9 BENIGN HYPERTENSIVE HEART DISEASE WITHOUT HEART FAILURE: ICD-10-CM

## 2017-06-28 DIAGNOSIS — L97.512 RIGHT FOOT ULCER, WITH FAT LAYER EXPOSED (HCC): ICD-10-CM

## 2017-06-28 DIAGNOSIS — E78.5 DYSLIPIDEMIA: ICD-10-CM

## 2017-06-28 DIAGNOSIS — M79.671 RIGHT FOOT PAIN: ICD-10-CM

## 2017-06-28 RX ORDER — OMEPRAZOLE 20 MG/1
CAPSULE, DELAYED RELEASE ORAL 2 TIMES DAILY
COMMUNITY
Start: 2017-06-02 | End: 2018-01-17 | Stop reason: ALTCHOICE

## 2017-06-28 NOTE — PATIENT INSTRUCTIONS
Hemoglobin A1c: About This Test  What is it? Hemoglobin A1c is a blood test that checks your average blood sugar level over the past 2 to 3 months. This test also is called a glycohemoglobin test or an A1c test.  Why is this test done? The A1c test is done to check how well your diabetes has been controlled over the past 2 to 3 months. Your doctor can use this information to adjust your medicine and diabetes treatment, if needed. How can you prepare for the test?  You do not need to stop eating before you have an A1c test. This test can be done at any time during the day, even after a meal.  What happens during the test?  The health professional taking a sample of your blood will:  · Wrap an elastic band around your upper arm. This makes the veins below the band larger so it is easier to put a needle into the vein. · Clean the needle site with alcohol. · Put the needle into the vein. · Attach a tube to the needle to fill it with blood. · Remove the band from your arm when enough blood is collected. · Put a gauze pad or cotton ball over the needle site as the needle is removed. · Put pressure on the site and then put on a bandage. What else should you know about the test?  The test result is usually given as a percentage. The normal A1c is less than 5.7%. The A1c test result also can be used to find your estimated average glucose, or eAG. Your eAG and A1c show the same thing in two different ways. They both help you learn more about your average blood sugar range over the past 2 to 3 months. Where can you learn more? Go to http://freida-khloe.info/. Enter U216 in the search box to learn more about \"Hemoglobin A1c: About This Test.\"  Current as of: March 13, 2017  Content Version: 11.3  © 0673-2516 [employer]. Care instructions adapted under license by [facility] (which disclaims liability or warranty for this information).  If you have questions about a medical condition or this instruction, always ask your healthcare professional. [place] [address] any warranty or liability for your use of this information.

## 2017-06-28 NOTE — MR AVS SNAPSHOT
Visit Information Date & Time Provider Department Dept. Phone Encounter #  
 6/28/2017  3:30 PM Kevin Boudreaux MD [facility] at [facility] 8 [id number] Follow-up Instructions Return in about 3 months (around 9/28/2017) for labs 1 week before. Your Appointments 2/1/2018  8:30 AM  
Nurse Visit with [facility]SE [facility] ([address]) Appt Note: PSA  
 [address] 
[address] [place] [address]  
14[address] [facility] 3[address] [facility] [address]  
  
    
  
 2/9/2018  9:30 AM  
Any with Clover Gu MD  
[facility] ([address]) Appt Note: 1 yr fu  
 [address] 
[address] [place] [address]  
[address] [facility] 301 [facility] [address] [place] [address] Upcoming Health Maintenance Date Due Hepatitis C Screening 1960 INFLUENZA AGE 9 TO ADULT 8/1/2017 MICROALBUMIN Q1 12/7/2017 HEMOGLOBIN A1C Q6M 12/15/2017 FOOT EXAM Q1 5/8/2018 EYE EXAM RETINAL OR DILATED Q1 6/15/2018 LIPID PANEL Q1 6/15/2018 COLONOSCOPY 2/24/2019 DTaP/Tdap/Td series (2 - Td) 10/25/2025 Allergies as of 6/28/2017  Review Complete On: 6/28/2017 By: Kevin Boudreaux MD  
 No Known Allergies Current Immunizations  Reviewed on 11/13/2015 Name Date Influenza Vaccine (Quad) PF 12/21/2016, 11/13/2015 Pneumococcal Polysaccharide (PPSV-23) 11/13/2015 Tdap 10/25/2015  7:48 PM  
  
 Not reviewed this visit You Were Diagnosed With   
  
 Codes Comments Type 1 diabetes mellitus with diabetic polyneuropathy (HCC)    -  Primary ICD-10-CM: E10.42 
ICD-9-CM: 250.61, 357.2 Benign hypertensive heart disease without heart failure     ICD-10-CM: I11.9 ICD-9-CM: 402.10 Right foot pain     ICD-10-CM: M79.671 ICD-9-CM: 729.5 Dyslipidemia     ICD-10-CM: E78.5 ICD-9-CM: 272.4 Vitals BP Pulse Temp Resp Height(growth percentile) Weight(growth percentile) 134/79 76 98 °F (36.7 °C) (Oral) 16 6' 1\" (1.854 m) 187 lb (84.8 kg) SpO2 BMI Smoking Status 98% 24.67 kg/m2 Never Smoker Vitals History BMI and BSA Data Body Mass Index Body Surface Area  
 24.67 kg/m 2 2.09 m 2 Preferred Pharmacy Pharmacy Name Phone [facility] [address] [place] [address] [phone] Your Updated Medication List  
  
   
This list is accurate as of: 6/28/17  4:20 PM.  Always use your most recent med list.  
  
  
  
  
 aspirin delayed-release 81 mg tablet Take  by mouth daily. atorvastatin 20 mg tablet Commonly known as:  LIPITOR Take 1 Tab by mouth daily. CONTOUR NEXT STRIPS strip Generic drug:  glucose blood VI test strips CHECK BLOOD SUGAR 8 TIMES A DAY DUE TO INSULIN PUMP AND LABILE BLOOD SUGAR  
  
 FISH OIL 1,000 mg Cap Generic drug:  omega-3 fatty acids-vitamin e Take 1 Cap by mouth. insulin aspart 100 unit/mL injection Commonly known as:  Wilma Germán 38 units daily for insulin pump  
  
 ketoconazole 2 % topical cream  
Commonly known as:  NIZORAL  
  
 lisinopril 5 mg tablet Commonly known as:  PRINIVIL, ZESTRIL  
TAKE 1 [address] Generic drug:  Lancets  
  
 multivitamin tablet Commonly known as:  ONE A DAY Take 1 Tab by mouth daily. omeprazole 20 mg capsule Commonly known as:  PRILOSEC Take  by mouth two (2) times a day. oxybutynin 5 mg tablet Commonly known as:  SYPZFEFQ Take 1 Tab by mouth two (2) times a day. pantoprazole 40 mg tablet Commonly known as:  PROTONIX Take 1 Tab by mouth daily. sildenafil citrate 100 mg tablet Commonly known as:  VIAGRA Take 1/2 tab as needed 2 hrs after last solid food. traMADol 50 mg tablet Commonly known as:  ULTRAM  
TAKE 1 TABLET EVERY 6 HOURS AS NEEDED FOR PAIN  
  
  
  
  
 We Performed the Following REFERRAL TO ORTHOPEDICS [MOL049 Custom] Comments:  
 Please evaluate for right foot pain Follow-up Instructions Return in about 3 months (around 9/28/2017) for labs 1 week before. Referral Information Referral ID Referred By Referred To  
  
 [id number] ROGELIO ZELAYAEvelio Hunt [address], [address] Phone: [phone] Fax: [phone] Visits Status Start Date End Date 1 New Request 6/28/17 6/28/18 If your referral has a status of pending review or denied, additional information will be sent to support the outcome of this decision. Patient Instructions Hemoglobin A1c: About This Test 
What is it? Hemoglobin A1c is a blood test that checks your average blood sugar level over the past 2 to 3 months. This test also is called a glycohemoglobin test or an A1c test. 
Why is this test done? The A1c test is done to check how well your diabetes has been controlled over the past 2 to 3 months. Your doctor can use this information to adjust your medicine and diabetes treatment, if needed. How can you prepare for the test? 
You do not need to stop eating before you have an A1c test. This test can be done at any time during the day, even after a meal. 
What happens during the test? 
The health professional taking a sample of your blood will: · Wrap an elastic band around your upper arm. This makes the veins below the band larger so it is easier to put a needle into the vein. · Clean the needle site with alcohol. · Put the needle into the vein. · Attach a tube to the needle to fill it with blood. · Remove the band from your arm when enough blood is collected. · Put a gauze pad or cotton ball over the needle site as the needle is removed. · Put pressure on the site and then put on a bandage.  
What else should you know about the test? 
 The test result is usually given as a percentage. The normal A1c is less than 5.7%. The A1c test result also can be used to find your estimated average glucose, or eAG. Your eAG and A1c show the same thing in two different ways. They both help you learn more about your average blood sugar range over the past 2 to 3 months. Where can you learn more? Go to http://freida-khloe.info/. Enter U216 in the search box to learn more about \"Hemoglobin A1c: About This Test.\" Current as of: March 13, 2017 Content Version: 11.3 © 7530-0990 Nellix. Care instructions adapted under license by [employer] (which disclaims liability or warranty for this information). If you have questions about a medical condition or this instruction, always ask your healthcare professional. [address] any warranty or liability for your use of this information. Introducing hospitals & HEALTH SERVICES! Dear Janeen Sterling: Thank you for requesting a ElectraTherm account. Our records indicate that you already have an active ElectraTherm account. You can access your account anytime at [email or web address] Did you know that you can access your hospital and ER discharge instructions at any time in ElectraTherm? You can also review all of your test results from your hospital stay or ER visit. Additional Information If you have questions, please visit the Frequently Asked Questions section of the ElectraTherm website at [email or web address] Remember, ElectraTherm is NOT to be used for urgent needs. For medical emergencies, dial 911. Now available from your iPhone and Android! Please provide this summary of care documentation to your next provider. Your primary care clinician is listed as EMORY ZELAYA. If you have any questions after today's visit, please call [phone].

## 2017-06-28 NOTE — PROGRESS NOTES
Pt is here for 3 month  follow up HTN, Diabetes , Cholesterol. Had labs    . 1. Have you been to the ER, urgent care clinic since your last visit? Hospitalized since your last visit? No    2. Have you seen or consulted any other health care providers outside of the [address] [place] [address] since your last visit? Include any pap smears or colon screening.  No

## 2017-06-29 RX ORDER — INSULIN LISPRO 100 [IU]/ML
INJECTION, SOLUTION INTRAVENOUS; SUBCUTANEOUS
Qty: 4 VIAL | Refills: 0 | Status: SHIPPED | COMMUNITY
Start: 2017-06-29 | End: 2017-06-29 | Stop reason: SDUPTHER

## 2017-06-29 RX ORDER — INSULIN LISPRO 100 [IU]/ML
INJECTION, SOLUTION INTRAVENOUS; SUBCUTANEOUS
Qty: 4 VIAL | Refills: 0 | Status: SHIPPED | COMMUNITY
Start: 2017-06-29 | End: 2017-12-27

## 2017-06-30 NOTE — PROGRESS NOTES
Subjective:       Chief Complaint  The patient presents for follow up of diabetes, hypertension and high cholesterol. HPI  Karyna Urbina. is a 64 y.o. male seen for follow up of diabetes. Healso has hypertension and hyperlipidemia. Diabetes no significant medication side effects noted, poorly controlled, pt continues on insulin pump and is followed by Endo for management he has not been seen in the last year however due to financial reasons, he does not bolus insulin before he eats which is one reason it is uncontrolled, he is afraid of getting low BS and his insurance does not pay for continuous BS monitoring currently, will refer him to diabetic teaching/nutrition,  hypertension well controlled on lisinopril 5 mg, hyperlipidemia well controlled, no significant medication side effects noted, on Lipitor     Diet and Lifestyle: generally follows a low fat low cholesterol diet, does not rigorously follow a diabetic diet, exercises sporadically    Home BP Monitoring: is not measured at home. Diabetic Review of Systems - home glucose monitoring: is performed regularly, 6x/day. Other symptoms and concerns: pt will try to exercise more. He has a diabetic foot ulcer that is slowly healing. Pt is candidate for diabetic shoes due to neuropathy and ulcerative callus. He will be referred to orthopedic foot and ankle for further evaluation       Current Outpatient Prescriptions   Medication Sig    omeprazole (PRILOSEC) 20 mg capsule Take  by mouth two (2) times a day.  insulin aspart (NOVOLOG) 100 unit/mL injection 38 units daily for insulin pump    pantoprazole (PROTONIX) 40 mg tablet Take 1 Tab by mouth daily.  lisinopril (PRINIVIL, ZESTRIL) 5 mg tablet TAKE 1 TABLET EVERY DAY    aspirin delayed-release 81 mg tablet Take  by mouth daily.     CONTOUR NEXT STRIPS strip CHECK BLOOD SUGAR 8 TIMES A DAY DUE TO INSULIN PUMP AND LABILE BLOOD SUGAR    atorvastatin (LIPITOR) 20 mg tablet Take 1 Tab by mouth daily.  oxybutynin (DITROPAN) 5 mg tablet Take 1 Tab by mouth two (2) times a day.  ketoconazole (NIZORAL) 2 % topical cream     MICROLET LANCET misc     omega-3 fatty acids-vitamin e (FISH OIL) 1,000 mg cap Take 1 Cap by mouth.  multivitamin (ONE A DAY) tablet Take 1 Tab by mouth daily.  insulin lispro (HUMALOG) 100 unit/mL injection Patient given samples until he can get his Novalog    sildenafil citrate (VIAGRA) 100 mg tablet Take 1/2 tab as needed 2 hrs after last solid food.  traMADol (ULTRAM) 50 mg tablet TAKE 1 TABLET EVERY 6 HOURS AS NEEDED FOR PAIN     No current facility-administered medications for this visit.               Review of Systems  Respiratory: negative for dyspnea on exertion  Cardiovascular: negative for chest pain    Objective:     Visit Vitals    /79    Pulse 76    Temp 98 °F (36.7 °C) (Oral)    Resp 16    Ht 6' 1\" (1.854 m)    Wt 187 lb (84.8 kg)    SpO2 98%    BMI 24.67 kg/m2        General appearance - alert, well appearing, and in no distress  Chest - clear to auscultation, no wheezes, rales or rhonchi, symmetric air entry  Heart - normal rate, regular rhythm, normal S1, S2, no murmurs, rubs, clicks or gallops  Ext contracture of the lesser toes is noted on the right foot and left foot    Labs:   Lab Results   Component Value Date/Time    Hemoglobin A1c 10.4 06/15/2017 08:05 AM    Hemoglobin A1c 9.6 03/15/2017 08:14 AM    Hemoglobin A1c 10.8 12/07/2016 07:59 AM    Glucose 106 06/15/2017 08:05 AM    Glucose (POC) 133 02/24/2016 12:46 PM    Microalbumin/Creat ratio (mg/g creat) 46 12/04/2015 08:40 AM    Microalb/Creat ratio (ug/mg creat.) 23.4 12/07/2016 07:59 AM    Microalbumin,urine random 4.10 12/04/2015 08:40 AM    LDL, calculated 58 06/15/2017 08:05 AM    Creatinine 1.24 06/15/2017 08:05 AM    Hemoglobin A1c, External 9.3 12/10/2015    Hemoglobin A1c, External 8.7% 08/20/2015    Hemoglobin A1c, External 9.5 05/22/2015 07:43 PM      Lab Results Component Value Date/Time    Cholesterol, total 114 06/15/2017 08:05 AM    HDL Cholesterol 45 06/15/2017 08:05 AM    LDL, calculated 58 06/15/2017 08:05 AM    Triglyceride 57 06/15/2017 08:05 AM    CHOL/HDL Ratio 1.7 12/04/2015 08:40 AM     Lab Results   Component Value Date/Time    ALT (SGPT) 16 06/15/2017 08:05 AM    AST (SGOT) 10 06/15/2017 08:05 AM    Alk. phosphatase 103 06/15/2017 08:05 AM    Bilirubin, total 0.7 06/15/2017 08:05 AM     Lab Results   Component Value Date/Time    GFR est AA 75 06/15/2017 08:05 AM    GFR est non-AA 65 06/15/2017 08:05 AM    Creatinine 1.24 06/15/2017 08:05 AM    BUN 24 06/15/2017 08:05 AM    Sodium 138 06/15/2017 08:05 AM    Potassium 4.7 06/15/2017 08:05 AM    Chloride 98 06/15/2017 08:05 AM    CO2 25 06/15/2017 08:05 AM      Lab Results   Component Value Date/Time    Prostate Specific Ag 0.3 12/21/2016 09:54 AM    Prostate Specific Ag 0.293 03/11/2015 11:12 AM    Prostate Specific Ag 0.3 10/03/2014 02:32 PM    Prostate Specific Ag 0.251 12/17/2012 03:42 PM    PSA 0.3 09/30/2010 10:09 AM    PSA, FREE 0.2 09/30/2010 10:09 AM    % FREE PSA 67 09/30/2010 10:09 AM     Lab Results   Component Value Date/Time    Glucose 106 06/15/2017 08:05 AM    Glucose (POC) 133 02/24/2016 12:46 PM            Assessment / Plan     Diabetes poorly controlled, pt remains on insulin pump and will f/u with Endo/nutritionist  for further management of diet and bolusing insulin  Hypertension well controlled, on lisinopril 5 mg  Hyperlipidemia well controlled on Lipitor       ICD-10-CM ICD-9-CM    1. Type 1 diabetes mellitus with diabetic polyneuropathy (HCC) E10.42 250.61 HEMOGLOBIN A1C W/O EAG     357.2    2. Hypertension B65.2 017.60 METABOLIC PANEL, COMPREHENSIVE   3. Dyslipidemia E78.5 272.4 LIPID PANEL   4. Right foot pain M79.671 729.5 REFERRAL TO ORTHOPEDICS   5. Need for hepatitis C screening test Z11.59 V73.89 HEPATITIS C AB   6.  Right foot ulcer, with fat layer exposed L97.152 707.28 Diabetic issues reviewed with him: diabetic diet discussed in detail, and low cholesterol diet, weight control and daily exercise discussed. Follow-up Disposition:  Return in about 3 months (around 9/28/2017) for labs 1 week before. Reviewed plan of care. Patient has provided input and agrees with goals.

## 2017-07-10 NOTE — TELEPHONE ENCOUNTER
Pt calling from Dr Herve Reyna office re: they don't have referral. He is at the appt now. Need asap    See notes below    Pt has [address] ins.   Dr Xavier Francis fax [phone]

## 2017-07-10 NOTE — TELEPHONE ENCOUNTER
Spoke with  at Dr. Eva Tamayo office she states ok to use [place] office address. Referral faxed.

## 2017-07-12 ENCOUNTER — OFFICE VISIT (OUTPATIENT)
Dept: INTERNAL MEDICINE CLINIC | Age: 57
End: 2017-07-12

## 2017-07-12 VITALS
HEIGHT: 73 IN | TEMPERATURE: 98 F | BODY MASS INDEX: 24.65 KG/M2 | HEART RATE: 77 BPM | RESPIRATION RATE: 18 BRPM | OXYGEN SATURATION: 99 % | DIASTOLIC BLOOD PRESSURE: 56 MMHG | WEIGHT: 186 LBS | SYSTOLIC BLOOD PRESSURE: 104 MMHG

## 2017-07-12 DIAGNOSIS — E10.42 TYPE 1 DIABETES MELLITUS WITH DIABETIC POLYNEUROPATHY (HCC): Primary | ICD-10-CM

## 2017-07-12 DIAGNOSIS — L97.512 RIGHT FOOT ULCER, WITH FAT LAYER EXPOSED (HCC): ICD-10-CM

## 2017-07-12 DIAGNOSIS — I11.9 BENIGN HYPERTENSIVE HEART DISEASE WITHOUT HEART FAILURE: ICD-10-CM

## 2017-07-12 RX ORDER — TRAMADOL HYDROCHLORIDE 50 MG/1
TABLET ORAL
Qty: 180 TAB | Refills: 2 | Status: SHIPPED | OUTPATIENT
Start: 2017-07-12 | End: 2017-12-12 | Stop reason: SDUPTHER

## 2017-07-12 NOTE — MR AVS SNAPSHOT
Visit Information Date & Time Provider Department Dept. Phone Encounter #  
 7/12/2017  4:30 PM Breanne Luna MD [facility] at [facility] 8 [id number] Follow-up Instructions Return if symptoms worsen or fail to improve. Your Appointments 7/25/2017  7:50 AM  
New Patient with Claudia Simon MD  
[address]14 [facility], [address] and [facility] [place] ([address]) Appt Note: rt foot pain, ref'd by [facility] [place] [facility], arrive early w/ins & id  
 [address] [facility], [address] [facility] [address]  
[phone] 23[address] [facility], [facility] [address]  
  
    
 9/20/2017  8:05 AM  
LAB with Breanne Luna MD  
[facility]ists at [facility] (--) Appt Note: 3 mo f/u lab [address] [id number] [phone]  
  
   
 [address],[facility] [address] [phone]  
  
    
 9/27/2017  8:30 AM  
ROUTINE CARE with Breanne Luna MD  
[facility]ists at [facility] (--) Appt Note: 3 mo f/u  
 [address] [id number] [phone]  
  
   
 [address],[facility] [address] [phone]  
  
    
 2/1/2018  8:30 AM  
Nurse Visit with [facility] NURSE [facility] ([address] [place] [address]) Appt Note: PSA  
 [address] 
[address] [place] [address]  
1400 [facility] [address] [facility] [address]  
  
    
  
 2/9/2018  9:30 AM  
Any with Ladonna Canales MD  
[facility] ([address] [place] [address]) Appt Note: 1 yr fu  
 [address] 
[address] [place] [address]  
[address] [facility] [address] [place] [address] Upcoming Health Maintenance Date Due Hepatitis C Screening 1960 INFLUENZA AGE 9 TO ADULT 8/1/2017 MICROALBUMIN Q1 12/7/2017 HEMOGLOBIN A1C Q6M 12/15/2017 FOOT EXAM Q1 5/8/2018 EYE EXAM RETINAL OR DILATED Q1 6/15/2018 LIPID PANEL Q1 6/15/2018 COLONOSCOPY 2/24/2019 DTaP/Tdap/Td series (2 - Td) 10/25/2025 Allergies as of 7/12/2017  Review Complete On: 7/12/2017 By: Maxi Diaz MD  
 No Known Allergies Current Immunizations  Reviewed on 11/13/2015 Name Date Influenza Vaccine (Quad) PF 12/21/2016, 11/13/2015 Pneumococcal Polysaccharide (PPSV-23) 11/13/2015 Tdap 10/25/2015  7:48 PM  
  
 Not reviewed this visit You Were Diagnosed With   
  
 Codes Comments Type 1 diabetes mellitus with diabetic polyneuropathy (HCC)    -  Primary ICD-10-CM: E10.42 
ICD-9-CM: 250.61, 357.2 Benign hypertensive heart disease without heart failure     ICD-10-CM: I11.9 ICD-9-CM: 402.10 Right foot ulcer, with fat layer exposed ([facility]     ICD-10-CM: K73.493 ICD-9-CM: 707.15 Vitals BP Pulse Temp Resp Height(growth percentile) Weight(growth percentile) 104/56 (BP 1 Location: Left arm, BP Patient Position: Sitting) 77 98 °F (36.7 °C) (Oral) 18 6' 1\" (1.854 m) 186 lb (84.4 kg) SpO2 BMI Smoking Status 99% 24.54 kg/m2 Never Smoker Vitals History BMI and BSA Data Body Mass Index Body Surface Area 24.54 kg/m 2 2.08 m 2 Preferred Pharmacy Pharmacy Name Phone [address] [phone] Your Updated Medication List  
  
   
This list is accurate as of: 7/12/17  4:57 PM.  Always use your most recent med list.  
  
  
  
  
 aspirin delayed-release 81 mg tablet Take  by mouth daily. atorvastatin 20 mg tablet Commonly known as:  LIPITOR Take 1 Tab by mouth daily. CONTOUR NEXT STRIPS strip Generic drug:  glucose blood VI test strips CHECK BLOOD SUGAR 8 TIMES A DAY DUE TO INSULIN PUMP AND LABILE BLOOD SUGAR  
  
 FISH OIL 1,000 mg Cap Generic drug:  omega-3 fatty acids-vitamin e Take 1 Cap by mouth. insulin aspart 100 unit/mL injection Commonly known as:  Christie Ferrari 38 units daily for insulin pump insulin lispro 100 unit/mL injection Commonly known as:  HUMALOG Patient given samples until he can get his Novalog  
  
 ketoconazole 2 % topical cream  
Commonly known as:  NIZORAL  
  
 lisinopril 5 mg tablet Commonly known as:  PRINIVIL, ZESTRIL  
TAKE 1 [address] Generic drug:  Lancets  
  
 multivitamin tablet Commonly known as:  ONE A DAY Take 1 Tab by mouth daily. omeprazole 20 mg capsule Commonly known as:  PRILOSEC Take  by mouth two (2) times a day. oxybutynin 5 mg tablet Commonly known as:  JHECHEBX Take 1 Tab by mouth two (2) times a day. pantoprazole 40 mg tablet Commonly known as:  PROTONIX Take 1 Tab by mouth daily. sildenafil citrate 100 mg tablet Commonly known as:  VIAGRA Take 1/2 tab as needed 2 hrs after last solid food. traMADol 50 mg tablet Commonly known as:  ULTRAM  
TAKE 1 TABLET EVERY 6 HOURS AS NEEDED FOR PAIN Prescriptions Printed Refills  
 traMADol (ULTRAM) 50 mg tablet 2 Sig: TAKE 1 TABLET EVERY 6 HOURS AS NEEDED FOR PAIN Class: Print Follow-up Instructions Return if symptoms worsen or fail to improve. Patient Instructions Advance Directives: Care Instructions Your Care Instructions An advance directive is a legal way to state your wishes at the end of your life. It tells your family and your doctor what to do if you can no longer say what you want. There are two main types of advance directives. You can change them any time that your wishes change. · A living will tells your family and your doctor your wishes about life support and other treatment. · A durable power of  for health care lets you name a person to make treatment decisions for you when you can't speak for yourself. This person is called a health care agent.  
If you do not have an advance directive, decisions about your medical care may be made by a doctor or a  who doesn't know you. It may help to think of an advance directive as a gift to the people who care for you. If you have one, they won't have to make tough decisions by themselves. Follow-up care is a key part of your treatment and safety. Be sure to make and go to all appointments, and call your doctor if you are having problems. It's also a good idea to know your test results and keep a list of the medicines you take. How can you care for yourself at home? · Discuss your wishes with your loved ones and your doctor. This way, there are no surprises. · Many states have a unique form. Or you might use a universal form that has been approved by many states. This kind of form can sometimes be completed and stored online. Your electronic copy will then be available wherever you have a connection to the Internet. In most cases, doctors will respect your wishes even if you have a form from a different state. · You don't need a  to do an advance directive. But you may want to get legal advice. · Think about these questions when you prepare an advance directive: ¨ Who do you want to make decisions about your medical care if you are not able to? Many people choose a family member or close friend. ¨ Do you know enough about life support methods that might be used? If not, talk to your doctor so you understand. ¨ What are you most afraid of that might happen? You might be afraid of having pain, losing your independence, or being kept alive by machines. ¨ Where would you prefer to die? Choices include your home, a hospital, or a nursing home. ¨ Would you like to have information about hospice care to support you and your family? ¨ Do you want to donate organs when you die? ¨ Do you want certain Muslim practices performed before you die? If so, put your wishes in the advance directive. · Read your advance directive every year, and make changes as needed. When should you call for help? Be sure to contact your doctor if you have any questions. Where can you learn more? Go to http://freida-khloe.info/. Enter R264 in the search box to learn more about \"Advance Directives: Care Instructions. \" Current as of: November 17, 2016 Content Version: 11.3 © 5456-0931 RxCost Containment. Care instructions adapted under license by [employer] (which disclaims liability or warranty for this information). If you have questions about a medical condition or this instruction, always ask your healthcare professional. [address] any warranty or liability for your use of this information. Introducing [facility] [place] & HEALTH SERVICES! Dear Rox Garland: Thank you for requesting a Nutmeg Education account. Our records indicate that you already have an active Nutmeg Education account. You can access your account anytime at [email or web address] Did you know that you can access your hospital and ER discharge instructions at any time in Nutmeg Education? You can also review all of your test results from your hospital stay or ER visit. Additional Information If you have questions, please visit the Frequently Asked Questions section of the Nutmeg Education website at [email or web address] Remember, Nutmeg Education is NOT to be used for urgent needs. For medical emergencies, dial 911. Now available from your iPhone and Android! Please provide this summary of care documentation to your next provider. Your primary care clinician is listed as EMORY ZELAYA. If you have any questions after today's visit, please call [phone].

## 2017-07-12 NOTE — PROGRESS NOTES
Patient is in the office today for a pre op clearance. 1. Have you been to the ER, urgent care clinic since your last visit? Hospitalized since your last visit? No    2. Have you seen or consulted any other health care providers outside of the [address] [place] [address] since your last visit? Include any pap smears or colon screening. yes, Dr. Delores Ruiz.      Surgeon : Dr. Delores Ruiz  Procedure : Debridement of right toe with gastrocnemius lengthening   Location : [facility]  Surgery Date: July 19,2017

## 2017-07-12 NOTE — PATIENT INSTRUCTIONS
Advance Directives: Care Instructions  Your Care Instructions  An advance directive is a legal way to state your wishes at the end of your life. It tells your family and your doctor what to do if you can no longer say what you want. There are two main types of advance directives. You can change them any time that your wishes change. · A living will tells your family and your doctor your wishes about life support and other treatment. · A durable power of  for health care lets you name a person to make treatment decisions for you when you can't speak for yourself. This person is called a health care agent. If you do not have an advance directive, decisions about your medical care may be made by a doctor or a  who doesn't know you. It may help to think of an advance directive as a gift to the people who care for you. If you have one, they won't have to make tough decisions by themselves. Follow-up care is a key part of your treatment and safety. Be sure to make and go to all appointments, and call your doctor if you are having problems. It's also a good idea to know your test results and keep a list of the medicines you take. How can you care for yourself at home? · Discuss your wishes with your loved ones and your doctor. This way, there are no surprises. · Many states have a unique form. Or you might use a universal form that has been approved by many states. This kind of form can sometimes be completed and stored online. Your electronic copy will then be available wherever you have a connection to the Internet. In most cases, doctors will respect your wishes even if you have a form from a different state. · You don't need a  to do an advance directive. But you may want to get legal advice. · Think about these questions when you prepare an advance directive:  ¨ Who do you want to make decisions about your medical care if you are not able to?  Many people choose a family member or close friend. ¨ Do you know enough about life support methods that might be used? If not, talk to your doctor so you understand. ¨ What are you most afraid of that might happen? You might be afraid of having pain, losing your independence, or being kept alive by machines. ¨ Where would you prefer to die? Choices include your home, a hospital, or a nursing home. ¨ Would you like to have information about hospice care to support you and your family? ¨ Do you want to donate organs when you die? ¨ Do you want certain Yazdanism practices performed before you die? If so, put your wishes in the advance directive. · Read your advance directive every year, and make changes as needed. When should you call for help? Be sure to contact your doctor if you have any questions. Where can you learn more? Go to http://freida-khloe.info/. Enter R264 in the search box to learn more about \"Advance Directives: Care Instructions. \"  Current as of: November 17, 2016  Content Version: 11.3  © 6770-9947 [employer], [employer]. Care instructions adapted under license by [employer] (which disclaims liability or warranty for this information). If you have questions about a medical condition or this instruction, always ask your healthcare professional. [address] any warranty or liability for your use of this information.

## 2017-07-13 ENCOUNTER — TELEPHONE (OUTPATIENT)
Dept: INTERNAL MEDICINE CLINIC | Age: 57
End: 2017-07-13

## 2017-07-13 NOTE — TELEPHONE ENCOUNTER
Ceil from Dr. Aimee Wilson ([facility]) office is calling to see if the clearance notes for patient upcoming surgery is complete. Please complete and fax to [phone]    If the note will be completed today or tomorrow please fax to  [phone], this will allow Ceil to get the note directly.
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
## 2017-09-20 ENCOUNTER — HOSPITAL ENCOUNTER (OUTPATIENT)
Dept: PHYSICAL THERAPY | Age: 57
Discharge: HOME | End: 2017-09-20
Payer: MEDICARE

## 2017-09-20 PROCEDURE — 97162 PT EVAL MOD COMPLEX 30 MIN: CPT

## 2017-09-20 PROCEDURE — 97112 NEUROMUSCULAR REEDUCATION: CPT

## 2017-09-20 PROCEDURE — G8979 MOBILITY GOAL STATUS: HCPCS

## 2017-09-20 PROCEDURE — G8978 MOBILITY CURRENT STATUS: HCPCS

## 2017-09-20 NOTE — PROGRESS NOTES
[facility]  [address]  [place], [address]  [phone] fax    Plan of Care/ Statement of Necessity for Physical Therapy Services    Patient name: Joi Chapman Start of Care: 2017   Referral source: Kimberlyn Collins : 1960    Medical Diagnosis: Right leg pain [M79.604]   Onset Date:2017    Treatment Diagnosis: right gastroc ressection   Prior Hospitalization: see medical history Provider#: [id number]   Medications: Verified on Patient summary List    Comorbidities: DM, multiple foot surgeries secondary to complication from diabetic neuropathy   Prior Level of Function: Able to ambulate without gait deficits or balance difficulty despite having neuropathy. The Plan of Care and following information is based on the information from the initial evaluation. Assessment/ key information: Pt presents s/p right gastroc resection performed to improve gastroc length and improve weight bearing over right hallux which was causing a chronic ulcer. Continue PT to address effusion, ROM, strength, gait and balance to increase ease of ADLs  Evaluation Complexity History MEDIUM  Complexity : 1-2 comorbidities / personal factors will impact the outcome/ POC ; Examination MEDIUM Complexity : 3 Standardized tests and measures addressing body structure, function, activity limitation and / or participation in recreation  ;Presentation MEDIUM Complexity : Evolving with changing characteristics  ; Clinical Decision Making MEDIUM Complexity : FOTO score of 26-74  Overall Complexity Rating: MEDIUM  Problem List: pain affecting function, decrease ROM, decrease strength, impaired gait/ balance, decrease ADL/ functional abilitiies and decrease activity tolerance   Treatment Plan may include any combination of the following: Therapeutic exercise, Therapeutic activities, Neuromuscular re-education, Physical agent/modality, Gait/balance training, Manual therapy and Patient education  Patient / Family readiness to learn indicated by: asking questions, trying to perform skills and interest  Persons(s) to be included in education: patient (P)  Barriers to Learning/Limitations: None  Patient Goal (s): To be able to walk  Patient Self Reported Health Status: fair  Rehabilitation Potential: good    Short Term Goals: To be accomplished in 2 weeks:  1. Pt will be compliant with HEP to increase ankle ROM for ADLs  2. Pt will improve right ankle figure 8 measurement to <60 cm to improve effusion for better ankle ROM  Long Term Goals: To be accomplished in 4 weeks:  1. Pt will improve FOTO to 58 to increase ease of ADLs  2. Pt will increase right ankle DF, EV and PF strength to 3+/5 to improve gait and balance  3. Pt will hold rhomberg on foam with EC for 30 seconds without LOB outside of [place]O to increase ease of ADLs    Frequency / Duration: Patient to be seen 2 times per week for 4 weeks. Patient/ Caregiver education and instruction: Diagnosis, prognosis, self care, activity modification and exercises   [x]  Plan of care has been reviewed with PTA    G-Codes (GP)  Mobility   Current  CL= 60-79%   Goal  CK= 40-59%    The severity rating is based on clinical judgment and the FOTO score. Certification Period: 9/20/2017 - 11/19/2017  [place], PT 9/20/2017 12:58 PM    ________________________________________________________________________    I certify that the above Therapy Services are being furnished while the patient is under my care. I agree with the treatment plan and certify that this therapy is necessary.     [de-identified] Signature:____________________  Date:____________Time: _________    Please sign and return to [facility]  [address]  [place], [address]  [phone] fax

## 2017-09-20 NOTE — PROGRESS NOTES
PT DAILY TREATMENT NOTE - [facility] 3-16    Patient Name: Rox Marroquin. Date:2017  : 1960  [x]  Patient  Verified  Payor: Abimbola Lainez / Plan: [address] [place] [address] HMO / Product Type: Managed Care Medicare /    In time:1212  Out time:1246  Total Treatment Time (min): 34  Total Timed Codes (min): 8  1:1 Treatment Time ( [address] only): 34   Visit #: 1 of 8    Treatment Area: Right leg pain [M79.604]    SUBJECTIVE  Pain Level (0-10 scale): 3  Any medication changes, allergies to medications, adverse drug reactions, diagnosis change, or new procedure performed?: [x] No    [] Yes (see summary sheet for update)  Subjective functional status/changes:   [] No changes reported  S/p gastroc resection and complaining of a foot slap and decreased balance. OBJECTIVE  8 min Neuromuscular Re-education:  [x]  See flow sheet :   Rationale: increase strength, improve coordination, improve balance and increase proprioception  to improve the patients ability to improve gait           With   [] TE   [] TA   [] neuro   [] other: Patient Education: [x] Review HEP    [] Progressed/Changed HEP based on:   [] positioning   [] body mechanics   [] transfers   [] heat/ice application    [] other:      Other Objective/Functional Measures: refer to eval     Pain Level (0-10 scale) post treatment: 0    ASSESSMENT/Changes in Function: Siginificant ankle weakness and proprioceptive loss    Patient will continue to benefit from skilled PT services to modify and progress therapeutic interventions, address functional mobility deficits, address ROM deficits, address strength deficits, analyze and address soft tissue restrictions, analyze and cue movement patterns, analyze and modify body mechanics/ergonomics and assess and modify postural abnormalities to attain remaining goals.      []  See Plan of Care  []  See progress note/recertification  []  See Discharge Summary         Progress towards goals / Updated goals:  Short Term Goals: To be accomplished in 2 weeks:  1. Pt will be compliant with HEP to increase ankle ROM for ADLs  2. Pt will improve right ankle figure 8 measurement to <60 cm to improve effusion for better ankle ROM  Long Term Goals: To be accomplished in 4 weeks:  1. Pt will improve FOTO to 58 to increase ease of ADLs  2. Pt will increase right ankle DF, EV and PF strength to 3+/5 to improve gait and balance  3.   Pt will hold rhomberg on foam with EC for 30 seconds without LOB outside of NAVARRO to increase ease of ADLs    PLAN  []  Upgrade activities as tolerated     [x]  Continue plan of care  []  Update interventions per flow sheet       []  Discharge due to:_  []  Other:_      Antonella Polanco, PT 9/20/2017  1:54 PM    Future Appointments  Date Time Provider Enriqueta Mylesi   9/27/2017 8:30 AM Eva Sampson MD [facility] None   10/4/2017 8:30 AM Zully Fna MD [address]   2/1/2018 8:30 AM [facility]  [facility]   2/9/2018 9:30 AM Lisandra Macias MD [address] [place] [address]

## 2017-09-21 ENCOUNTER — HOSPITAL ENCOUNTER (OUTPATIENT)
Dept: LAB | Age: 57
Discharge: HOME | End: 2017-09-21

## 2017-09-21 LAB
ALBUMIN SERPL-MCNC: 3.6 G/DL (ref 3.5–5.5)
ALBUMIN/GLOB SERPL: 1.2 {RATIO} (ref 1.2–2.2)
ALP SERPL-CCNC: 135 IU/L (ref 39–117)
ALT SERPL-CCNC: 16 IU/L (ref 0–44)
AST SERPL-CCNC: 15 IU/L (ref 0–40)
BILIRUB SERPL-MCNC: 0.4 MG/DL (ref 0–1.2)
BUN SERPL-MCNC: 11 MG/DL (ref 6–24)
BUN/CREAT SERPL: 10 (ref 9–20)
CALCIUM SERPL-MCNC: 9.7 MG/DL (ref 8.7–10.2)
CHLORIDE SERPL-SCNC: 101 MMOL/L (ref 96–106)
CHOLEST SERPL-MCNC: 133 MG/DL (ref 100–199)
CO2 SERPL-SCNC: 25 MMOL/L (ref 18–29)
CREAT SERPL-MCNC: 1.12 MG/DL (ref 0.76–1.27)
GLOBULIN SER CALC-MCNC: 2.9 G/DL (ref 1.5–4.5)
GLUCOSE SERPL-MCNC: 159 MG/DL (ref 65–99)
HBA1C MFR BLD: 10.7 % (ref 4.8–5.6)
HCV AB S/CO SERPL IA: <0.1 S/CO RATIO (ref 0–0.9)
HDLC SERPL-MCNC: 49 MG/DL
INTERPRETATION, 910389: NORMAL
LDLC SERPL CALC-MCNC: 71 MG/DL (ref 0–99)
Lab: NORMAL
POTASSIUM SERPL-SCNC: 4.5 MMOL/L (ref 3.5–5.2)
PROT SERPL-MCNC: 6.5 G/DL (ref 6–8.5)
SODIUM SERPL-SCNC: 141 MMOL/L (ref 134–144)
TRIGL SERPL-MCNC: 66 MG/DL (ref 0–149)
VLDLC SERPL CALC-MCNC: 13 MG/DL (ref 5–40)

## 2017-09-27 ENCOUNTER — OFFICE VISIT (OUTPATIENT)
Dept: INTERNAL MEDICINE CLINIC | Age: 57
End: 2017-09-27

## 2017-09-27 VITALS
BODY MASS INDEX: 24.25 KG/M2 | WEIGHT: 183 LBS | TEMPERATURE: 98.9 F | RESPIRATION RATE: 20 BRPM | DIASTOLIC BLOOD PRESSURE: 56 MMHG | SYSTOLIC BLOOD PRESSURE: 97 MMHG | HEART RATE: 75 BPM | OXYGEN SATURATION: 99 % | HEIGHT: 73 IN

## 2017-09-27 DIAGNOSIS — E78.5 DYSLIPIDEMIA: ICD-10-CM

## 2017-09-27 DIAGNOSIS — E10.42 TYPE 1 DIABETES MELLITUS WITH DIABETIC POLYNEUROPATHY (HCC): Primary | ICD-10-CM

## 2017-09-27 DIAGNOSIS — I11.9 BENIGN HYPERTENSIVE HEART DISEASE WITHOUT HEART FAILURE: ICD-10-CM

## 2017-09-27 DIAGNOSIS — Z23 ENCOUNTER FOR IMMUNIZATION: ICD-10-CM

## 2017-09-27 NOTE — PATIENT INSTRUCTIONS
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
## 2017-09-27 NOTE — PROGRESS NOTES
Subjective:       Chief Complaint  The patient presents for follow up of diabetes, hypertension and high cholesterol. JANESSA Andujar is a 62 y.o. male seen for follow up of diabetes. Healso has hypertension and hyperlipidemia. Diabetes no significant medication side effects noted, poorly controlled, pt continues on insulin pump and is followed by Endo for management he has not been seen in the last year however due to financial reasons, he does not bolus insulin before he eats which is one reason it is uncontrolled, he has low BS because he skips meals, he is afraid of getting low BS and his insurance does not pay for continuous BS monitoring currently, will refer him to diabetic teaching/nutrition,  hypertension well controlled on lisinopril 5 mg, hyperlipidemia well controlled, no significant medication side effects noted, on Lipitor     Diet and Lifestyle: generally follows a low fat low cholesterol diet, does not rigorously follow a diabetic diet, exercises sporadically    Home BP Monitoring: is not measured at home. Diabetic Review of Systems - home glucose monitoring: is performed regularly, 6x/day. Other symptoms and concerns: pt will try to exercise more. He has a diabetic foot ulcer that is slowly healing. He had left foot surgery a few  Months ago. He continues to slowly heal ad will f/u with PT which he was referred to by Dr Kimberly Medrano. Pt is candidate for diabetic shoes due to neuropathy and ulcerative callus. He is followed by Podiatry     Current Outpatient Prescriptions   Medication Sig    sildenafil (REVATIO) 20 mg tablet Take 2 to  5 tablets 1 hour prior to intercourse    omeprazole (PRILOSEC) 20 mg capsule Take  by mouth two (2) times a day.  insulin aspart (NOVOLOG) 100 unit/mL injection 38 units daily for insulin pump    pantoprazole (PROTONIX) 40 mg tablet Take 1 Tab by mouth daily.     lisinopril (PRINIVIL, ZESTRIL) 5 mg tablet TAKE 1 TABLET EVERY DAY    aspirin delayed-release 81 mg tablet Take  by mouth daily.  CONTOUR NEXT STRIPS strip CHECK BLOOD SUGAR 8 TIMES A DAY DUE TO INSULIN PUMP AND LABILE BLOOD SUGAR    atorvastatin (LIPITOR) 20 mg tablet Take 1 Tab by mouth daily.  oxybutynin (DITROPAN) 5 mg tablet Take 1 Tab by mouth two (2) times a day.  MICROLET LANCET misc     omega-3 fatty acids-vitamin e (FISH OIL) 1,000 mg cap Take 1 Cap by mouth.  multivitamin (ONE A DAY) tablet Take 1 Tab by mouth daily.  traMADol (ULTRAM) 50 mg tablet TAKE 1 TABLET EVERY 6 HOURS AS NEEDED FOR PAIN    insulin lispro (HUMALOG) 100 unit/mL injection Patient given samples until he can get his Novalog    ketoconazole (NIZORAL) 2 % topical cream      No current facility-administered medications for this visit.               Review of Systems  Respiratory: negative for dyspnea on exertion  Cardiovascular: negative for chest pain    Objective:     Visit Vitals    BP 97/56 (BP 1 Location: Left arm, BP Patient Position: Sitting)    Pulse 75    Temp 98.9 °F (37.2 °C) (Oral)    Resp 20    Ht 6' 1\" (1.854 m)    Wt 183 lb (83 kg)    SpO2 99%    BMI 24.14 kg/m2        General appearance - alert, well appearing, and in no distress  Chest - clear to auscultation, no wheezes, rales or rhonchi, symmetric air entry  Heart - normal rate, regular rhythm, normal S1, S2, no murmurs, rubs, clicks or gallops      Labs:   Lab Results   Component Value Date/Time    Hemoglobin A1c 10.7 09/20/2017 08:27 AM    Hemoglobin A1c 10.4 06/15/2017 08:05 AM    Hemoglobin A1c 9.6 03/15/2017 08:14 AM    Glucose 159 09/20/2017 08:27 AM    Glucose (POC) 133 02/24/2016 12:46 PM    Microalbumin/Creat ratio (mg/g creat) 46 12/04/2015 08:40 AM    Microalb/Creat ratio (ug/mg creat.) 23.4 12/07/2016 07:59 AM    Microalbumin,urine random 4.10 12/04/2015 08:40 AM    LDL, calculated 71 09/20/2017 08:27 AM    Creatinine 1.12 09/20/2017 08:27 AM    Hemoglobin A1c, External 9.3 12/10/2015    Hemoglobin A1c, External 8.7% 08/20/2015    Hemoglobin A1c, External 9.5 05/22/2015 07:43 PM      Lab Results   Component Value Date/Time    Cholesterol, total 133 09/20/2017 08:27 AM    HDL Cholesterol 49 09/20/2017 08:27 AM    LDL, calculated 71 09/20/2017 08:27 AM    Triglyceride 66 09/20/2017 08:27 AM    CHOL/HDL Ratio 1.7 12/04/2015 08:40 AM     Lab Results   Component Value Date/Time    ALT (SGPT) 16 09/20/2017 08:27 AM    AST (SGOT) 15 09/20/2017 08:27 AM    Alk. phosphatase 135 09/20/2017 08:27 AM    Bilirubin, total 0.4 09/20/2017 08:27 AM     Lab Results   Component Value Date/Time    GFR est AA 84 09/20/2017 08:27 AM    GFR est non-AA 73 09/20/2017 08:27 AM    Creatinine 1.12 09/20/2017 08:27 AM    BUN 11 09/20/2017 08:27 AM    Sodium 141 09/20/2017 08:27 AM    Potassium 4.5 09/20/2017 08:27 AM    Chloride 101 09/20/2017 08:27 AM    CO2 25 09/20/2017 08:27 AM      Lab Results   Component Value Date/Time    Prostate Specific Ag 0.3 12/21/2016 09:54 AM    Prostate Specific Ag 0.293 03/11/2015 11:12 AM    Prostate Specific Ag 0.3 10/03/2014 02:32 PM    Prostate Specific Ag 0.251 12/17/2012 03:42 PM    PSA 0.3 09/30/2010 10:09 AM    PSA, FREE 0.2 09/30/2010 10:09 AM    % FREE PSA 67 09/30/2010 10:09 AM     Lab Results   Component Value Date/Time    Glucose 159 09/20/2017 08:27 AM    Glucose (POC) 133 02/24/2016 12:46 PM            Assessment / Plan     Diabetes poorly controlled, pt remains on insulin pump and will be referred to nutritionist  for further management of diet and bolusing insulin  Hypertension well controlled, on lisinopril 5 mg  Hyperlipidemia well controlled on Lipitor       ICD-10-CM ICD-9-CM    1. Type 1 diabetes mellitus with diabetic polyneuropathy (HCC) E10.42 250.61 REFERRAL TO NUTRITION     357.2 MICROALBUMIN, UR, RAND W/ MICROALBUMIN/CREA RATIO      HEMOGLOBIN A1C W/O EAG. Management as above    2. Hypertension S35.6 514.65 METABOLIC PANEL, COMPREHENSIVE   3. Dyslipidemia E78.5 272.4 LIPID PANEL   4. Encounter for immunization Z23 V03.89 INFLUENZA VIRUS VAC QUAD,SPLIT,PRESV FREE SYRINGE IM      ADMIN INFLUENZA VIRUS VAC              Diabetic issues reviewed with him: diabetic diet discussed in detail, and low cholesterol diet, weight control and daily exercise discussed. Follow-up Disposition:  Return in about 3 months (around 12/27/2017) for OV, and Medicare Wellness Visit, labs 1 week before. Reviewed plan of care. Patient has provided input and agrees with goals.

## 2017-09-27 NOTE — MR AVS SNAPSHOT
Visit Information Date & Time Provider Department Dept. Phone Encounter #  
 9/27/2017  8:30 AM Ramses Barba MD [facility] 8 [id number] Follow-up Instructions Return in about 3 months (around 12/27/2017) for OV, and Medicare Wellness Visit, labs 1 week before. Your Appointments 10/4/2017  8:30 AM  
New Patient with Ml Denise MD  
[address] [facility], [address] and [facility] ([address]) Appt Note: t foot pain, ref'd by Harmony Lara adv HV office, arrive early w/ins & id  
 [address], [address] [facility]  
[phone] [address], [address]  
  
    
 2/1/2018  8:30 AM  
Nurse Visit with [facility] NURSE [facility] ([address]) Appt Note: PSA  
 [address] 
[address] [place] [address]  
[address] [facility] [address] [facility] [address]  
  
    
  
 2/9/2018  9:30 AM  
Any with Patel Ayala MD  
[facility] ([address]) Appt Note: 1 yr fu  
 [address] 
[address] [place] [address]  
[address] [place] [address] [facility] [address] Upcoming Health Maintenance Date Due INFLUENZA AGE 9 TO ADULT 8/1/2017 MICROALBUMIN Q1 12/7/2017 HEMOGLOBIN A1C Q6M 3/20/2018 FOOT EXAM Q1 5/8/2018 EYE EXAM RETINAL OR DILATED Q1 6/15/2018 LIPID PANEL Q1 9/20/2018 COLONOSCOPY 2/24/2019 DTaP/Tdap/Td series (2 - Td) 10/25/2025 Allergies as of 9/27/2017  Review Complete On: 9/27/2017 By: Ramses Barba MD  
 No Known Allergies Current Immunizations  Reviewed on 9/27/2017 Name Date Influenza Vaccine (Quad) PF 9/27/2017, 12/21/2016, 11/13/2015 Pneumococcal Polysaccharide (PPSV-23) 11/13/2015 Tdap 10/25/2015  7:48 PM  
  
 Reviewed by Jose Juan Vigil LPN on 5/39/4277 at  8:57 AM  
You Were Diagnosed With   
  
 Codes Comments Type 1 diabetes mellitus with diabetic polyneuropathy (HCC)    -  Primary ICD-10-CM: E10.42 
ICD-9-CM: 250.61, 357.2 Benign hypertensive heart disease without heart failure     ICD-10-CM: I11.9 ICD-9-CM: 402.10 Dyslipidemia     ICD-10-CM: E78.5 ICD-9-CM: 272.4 Encounter for immunization     ICD-10-CM: N27 ICD-9-CM: V03.89 Vitals BP Pulse Temp Resp Height(growth percentile) Weight(growth percentile) 97/56 (BP 1 Location: Left arm, BP Patient Position: Sitting) 75 98.9 °F (37.2 °C) (Oral) 20 6' 1\" (1.854 m) 183 lb (83 kg) SpO2 BMI Smoking Status 99% 24.14 kg/m2 Never Smoker Vitals History BMI and BSA Data Body Mass Index Body Surface Area  
 24.14 kg/m 2 2.07 m 2 Preferred Pharmacy Pharmacy Name Phone Lesley [facility] [address] [facility], [address] [place] [address] [phone] Your Updated Medication List  
  
   
This list is accurate as of: 9/27/17  9:01 AM.  Always use your most recent med list.  
  
  
  
  
 aspirin delayed-release 81 mg tablet Take  by mouth daily. atorvastatin 20 mg tablet Commonly known as:  LIPITOR Take 1 Tab by mouth daily. CONTOUR NEXT STRIPS strip Generic drug:  glucose blood VI test strips CHECK BLOOD SUGAR 8 TIMES A DAY DUE TO INSULIN PUMP AND LABILE BLOOD SUGAR  
  
 FISH OIL 1,000 mg Cap Generic drug:  omega-3 fatty acids-vitamin e Take 1 Cap by mouth. insulin aspart 100 unit/mL injection Commonly known as:  Seng Peer 38 units daily for insulin pump  
  
 insulin lispro 100 unit/mL injection Commonly known as:  HUMALOG Patient given samples until he can get his Novalog  
  
 ketoconazole 2 % topical cream  
Commonly known as:  NIZORAL  
  
 lisinopril 5 mg tablet Commonly known as:  PRINIVIL, ZESTRIL  
TAKE 1 [address] Generic drug:  Lancets  
  
 multivitamin tablet Commonly known as:  ONE A DAY Take 1 Tab by mouth daily. omeprazole 20 mg capsule Commonly known as:  PRILOSEC Take  by mouth two (2) times a day. oxybutynin 5 mg tablet Commonly known as:  HAQECTYP Take 1 Tab by mouth two (2) times a day. pantoprazole 40 mg tablet Commonly known as:  PROTONIX Take 1 Tab by mouth daily. sildenafil 20 mg tablet Commonly known as:  REVATIO Take 2 to  5 tablets 1 hour prior to intercourse  
  
 traMADol 50 mg tablet Commonly known as:  ULTRAM  
TAKE 1 TABLET EVERY 6 HOURS AS NEEDED FOR PAIN We Performed the Following ADMIN INFLUENZA VIRUS VAC [ HCP] INFLUENZA VIRUS VAC QUAD,SPLIT,PRESV FREE SYRINGE IM [id number] CPT(R)] REFERRAL TO NUTRITION [REF50 Custom] Follow-up Instructions Return in about 3 months (around 12/27/2017) for OV, and Medicare Wellness Visit, labs 1 week before. Referral Information Referral ID Referred By Referred To  
  
 [id number] EMORY ZELAYA Not Available Visits Status Start Date End Date 1 New Request 9/27/17 9/27/18 If your referral has a status of pending review or denied, additional information will be sent to support the outcome of this decision. Patient Instructions Learning About Diabetes Food Guidelines Your Care Instructions Meal planning is important to manage diabetes. It helps keep your blood sugar at a target level (which you set with your doctor). You don't have to eat special foods. You can eat what your family eats, including sweets once in a while. But you do have to pay attention to how often you eat and how much you eat of certain foods. You may want to work with a dietitian or a certified diabetes educator (CDE) to help you plan meals and snacks. A dietitian or CDE can also help you lose weight if that is one of your goals. What should you know about eating carbs?  
Managing the amount of carbohydrate (carbs) you eat is an important part of healthy meals when you have diabetes. Carbohydrate is found in many foods. · Learn which foods have carbs. And learn the amounts of carbs in different foods. ¨ Bread, cereal, pasta, and rice have about 15 grams of carbs in a serving. A serving is 1 slice of bread (1 ounce), ½ cup of cooked cereal, or 1/3 cup of cooked pasta or rice. ¨ Fruits have 15 grams of carbs in a serving. A serving is 1 small fresh fruit, such as an apple or orange; ½ of a banana; ½ cup of cooked or canned fruit; ½ cup of fruit juice; 1 cup of melon or raspberries; or 2 tablespoons of dried fruit. ¨ Milk and no-sugar-added yogurt have 15 grams of carbs in a serving. A serving is 1 cup of milk or 2/3 cup of no-sugar-added yogurt. ¨ Starchy vegetables have 15 grams of carbs in a serving. A serving is ½ cup of mashed potatoes or sweet potato; 1 cup winter squash; ½ of a small baked potato; ½ cup of cooked beans; or ½ cup cooked corn or green peas. · Learn how much carbs to eat each day and at each meal. A dietitian or CDE can teach you how to keep track of the amount of carbs you eat. This is called carbohydrate counting. · If you are not sure how to count carbohydrate grams, use the Plate Method to plan meals. It is a good, quick way to make sure that you have a balanced meal. It also helps you spread carbs throughout the day. ¨ Divide your plate by types of foods. Put non-starchy vegetables on half the plate, meat or other protein food on one-quarter of the plate, and a grain or starchy vegetable in the final quarter of the plate. To this you can add a small piece of fruit and 1 cup of milk or yogurt, depending on how many carbs you are supposed to eat at a meal. 
· Try to eat about the same amount of carbs at each meal. Do not \"save up\" your daily allowance of carbs to eat at one meal. 
· Proteins have very little or no carbs per serving.  Examples of proteins are beef, chicken, turkey, fish, eggs, tofu, cheese, cottage cheese, and peanut butter. A serving size of meat is 3 ounces, which is about the size of a deck of cards. Examples of meat substitute serving sizes (equal to 1 ounce of meat) are 1/4 cup of cottage cheese, 1 egg, 1 tablespoon of peanut butter, and ½ cup of tofu. How can you eat out and still eat healthy? · Learn to estimate the serving sizes of foods that have carbohydrate. If you measure food at home, it will be easier to estimate the amount in a serving of restaurant food. · If the meal you order has too much carbohydrate (such as potatoes, corn, or baked beans), ask to have a low-carbohydrate food instead. Ask for a salad or green vegetables. · If you use insulin, check your blood sugar before and after eating out to help you plan how much to eat in the future. · If you eat more carbohydrate at a meal than you had planned, take a walk or do other exercise. This will help lower your blood sugar. What else should you know? · Limit saturated fat, such as the fat from meat and dairy products. This is a healthy choice because people who have diabetes are at higher risk of heart disease. So choose lean cuts of meat and nonfat or low-fat dairy products. Use olive or canola oil instead of butter or shortening when cooking. · Don't skip meals. Your blood sugar may drop too low if you skip meals and take insulin or certain medicines for diabetes. · Check with your doctor before you drink alcohol. Alcohol can cause your blood sugar to drop too low. Alcohol can also cause a bad reaction if you take certain diabetes medicines. Follow-up care is a key part of your treatment and safety. Be sure to make and go to all appointments, and call your doctor if you are having problems. It's also a good idea to know your test results and keep a list of the medicines you take. Where can you learn more? Go to http://freida-khloe.info/.  
Enter K918 in the search box to learn more about \"Learning About Diabetes Food Guidelines. \" Current as of: March 13, 2017 Content Version: 11.3 © 7322-5995 [employer]. Care instructions adapted under license by [employer] (which disclaims liability or warranty for this information). If you have questions about a medical condition or this instruction, always ask your healthcare professional. [address] any warranty or liability for your use of this information. Introducing [facility]! Dear Josee Whyte: Thank you for requesting a [employer] account. Our records indicate that you already have an active [employer] account. You can access your account anytime at [email or web address] Did you know that you can access your hospital and ER discharge instructions at any time in [employer]? You can also review all of your test results from your hospital stay or ER visit. Additional Information If you have questions, please visit the Frequently Asked Questions section of the [employer] website at [email or web address] Remember, [employer] is NOT to be used for urgent needs. For medical emergencies, dial 911. Now available from your iPhone and Android! Please provide this summary of care documentation to your next provider. Your primary care clinician is listed as EMORY ZELAYA. If you have any questions after today's visit, please call [phone].

## 2017-10-03 ENCOUNTER — HOSPITAL ENCOUNTER (OUTPATIENT)
Dept: PHYSICAL THERAPY | Age: 57
Discharge: HOME | End: 2017-10-03
Payer: MEDICARE

## 2017-10-03 PROCEDURE — 97110 THERAPEUTIC EXERCISES: CPT

## 2017-10-03 PROCEDURE — 97016 VASOPNEUMATIC DEVICE THERAPY: CPT

## 2017-10-03 PROCEDURE — 97112 NEUROMUSCULAR REEDUCATION: CPT

## 2017-10-03 NOTE — PROGRESS NOTES
PT DAILY TREATMENT NOTE - [facility]     Patient Name: Bennie Ward. Date:10/3/2017  : 1960  [x]  Patient  Verified  Payor: Vania Avelar / Plan: [address] [place] [address] HMO / Product Type: Managed Care Medicare /    In time:9:00am  Out time:9:43am  Total Treatment Time (min): 43  Total Timed Codes (min): 43  1:1 Treatment Time (Sayda Ruffer only): 28   Visit #: 2 of 8    Treatment Area: Right leg pain [M79.604]    SUBJECTIVE  Pain Level (0-10 scale): 3  Any medication changes, allergies to medications, adverse drug reactions, diagnosis change, or new procedure performed?: [x] No    [] Yes (see summary sheet for update)  Subjective functional status/changes:   [] No changes reported  Pt reports difficulty \"walking\". Reports some HEP performance. OBJECTIVE  21 min Therapeutic Exercise:  [x] See flow sheet :   Rationale: increase ROM and increase strength to improve the patients ability to improve ease of ambulation. 12 min Neuromuscular Re-education:  [x]  See flow sheet :   Rationale: increase strength, improve coordination, improve balance and increase proprioception  to improve the patients ability to improve stability during ambulation. Modality rationale: decrease edema, decrease inflammation and decrease pain to improve the patients ability to improve ease of ADLs.    Min Type Additional Details    [] Estim:  []Unatt       []IFC  []Premod                        []Other:  []w/ice   []w/heat  Position:  Location:    [] Estim: []Att    []TENS instruct  []NMES                    []Other:  []w/US   []w/ice   []w/heat  Position:  Location:    []  Traction: [] Cervical       []Lumbar                       [] Prone          []Supine                       []Intermittent   []Continuous Lbs:  [] before manual  [] after manual    []  Ultrasound: []Continuous   [] Pulsed                           []1MHz   []3MHz Location:  W/cm2:    []  Iontophoresis with dexamethasone         Location: [] Take home patch   [] In clinic    []  Ice     []  heat  []  Ice massage  []  Laser   []  Anodyne Position:  Location:    []  Laser with stim  []  Other: Position:  Location:   10 [x]  Vasopneumatic Device Pressure:       [x] lo [] med [] hi   Temperature: [x] lo [] med [] hi   [] Skin assessment post-treatment:  []intact []redness- no adverse reaction    []redness - adverse reaction:              With   [] TE   [] TA   [] neuro   [] other: Patient Education: [x] Review HEP    [] Progressed/Changed HEP based on:   [] positioning   [] body mechanics   [] transfers   [] heat/ice application    [] other:      Other Objective/Functional Measures:      Pain Level (0-10 scale) post treatment: 4    ASSESSMENT/Changes in Function: Pt demonstrates instability with dynamic gait activities on the involved extremity, but demonstrates fair static balance. Unable to perform PF in WB with ease. Continue per POC. Patient will continue to benefit from skilled PT services to modify and progress therapeutic interventions, address functional mobility deficits, address ROM deficits, address strength deficits, analyze and address soft tissue restrictions, analyze and cue movement patterns, analyze and modify body mechanics/ergonomics and assess and modify postural abnormalities to attain remaining goals. []  See Plan of Care  []  See progress note/recertification  []  See Discharge Summary         Progress towards goals / Updated goals:  Short Term Goals: To be accomplished in 2 weeks:  1.  Pt will be compliant with HEP to increase ankle ROM for ADLs  2.  Pt will improve right ankle figure 8 measurement to <60 cm to improve effusion for better ankle ROM  Long Term Goals: To be accomplished in 4 weeks:  1.  Pt will improve FOTO to 58 to increase ease of ADLs  2.  Pt will increase right ankle DF, EV and PF strength to 3+/5 to improve gait and balance  3.  Pt will hold rhomberg on foam with EC for 30 seconds without LOB outside of NAVARRO to increase ease of ADLs    PLAN  []  Upgrade activities as tolerated     [x]  Continue plan of care  []  Update interventions per flow sheet       []  Discharge due to:_  []  Other:_      Jaycee Sevilla, PT, DPT, ATC, CSCS 10/3/2017  12:30 PM    Future Appointments  Date Time Provider Enriqueta Quynh   10/4/2017 8:30 AM Jamarcus Arredondo MD [facility] 69   12/27/2017 9:30 AM Trinidad Arteaga MD [facility] KARLA SCHED   2/1/2018 8:30 AM [facility] NURSE [facility] KARLA SCHED   2/9/2018 9:30 AM Jessica Renteria MD [facility]

## 2017-10-10 ENCOUNTER — HOSPITAL ENCOUNTER (OUTPATIENT)
Dept: PHYSICAL THERAPY | Age: 57
Discharge: HOME | End: 2017-10-10
Payer: MEDICARE

## 2017-10-10 PROCEDURE — 97110 THERAPEUTIC EXERCISES: CPT

## 2017-10-10 PROCEDURE — 97112 NEUROMUSCULAR REEDUCATION: CPT

## 2017-10-10 NOTE — PROGRESS NOTES
PT DAILY TREATMENT NOTE - [facility]    Patient Name: Parish Griggs. Date:10/10/2017  : 1960  [x]  Patient  Verified  Payor: Kimberlyn Maria Eugenia / Plan: [address] [place] [address] HMO / Product Type: Managed Care Medicare /    In time:9:32  Out time:10:20  Total Treatment Time (min): 48  Total Timed Codes (min): 38  1:1 Treatment Time ( [address] only): 38   Visit #: 3 of 8    Treatment Area: Right leg pain [M79.604]    SUBJECTIVE  Pain Level (0-10 scale): 0  Any medication changes, allergies to medications, adverse drug reactions, diagnosis change, or new procedure performed?: [x] No    [] Yes (see summary sheet for update)  Subjective functional status/changes:   [] No changes reported  \"I have a hard time going up on my right toes. \"    OBJECTIVE  Modality rationale: decrease edema and decrease pain to improve the patients ability to ease ADL tolerance   Min Type Additional Details    [] Estim:  []Unatt       []IFC  []Premod                        []Other:  []w/ice   []w/heat  Position:  Location:    [] Estim: []Att    []TENS instruct  []NMES                    []Other:  []w/US   []w/ice   []w/heat  Position:  Location:    []  Traction: [] Cervical       []Lumbar                       [] Prone          []Supine                       []Intermittent   []Continuous Lbs:  [] before manual  [] after manual    []  Ultrasound: []Continuous   [] Pulsed                           []1MHz   []3MHz Location:  W/cm2:    []  Iontophoresis with dexamethasone         Location: [] Take home patch   [] In clinic    []  Ice     []  heat  []  Ice massage  []  Laser   []  Anodyne Position:  Location:    []  Laser with stim  []  Other: Position:  Location:   10 [x]  Vasopneumatic Device Pressure:       [x] lo [] med [] hi   Temperature: [x] lo [] med [] hi   [x] Skin assessment post-treatment:  [x]intact []redness- no adverse reaction    []redness - adverse reaction:     23 min Therapeutic Exercise:  [x] See flow sheet :   Rationale: increase ROM, increase strength and improve coordination to improve the patients ability to improve ease with ambulation    15 min Neuromuscular Re-education:  [x]  See flow sheet : dynamic gait activities, balance activities   Rationale: increase strength, improve coordination, improve balance and increase proprioception  to improve the patients ability to improve stability with ADLs     With   [] TE   [] TA   [] neuro   [] other: Patient Education: [x] Review HEP    [] Progressed/Changed HEP based on:   [] positioning   [] body mechanics   [] transfers   [] heat/ice application    [] other:      Other Objective/Functional Measures:   Cues to slow catia with tandem walk--patient presents with improved stability    Cues to widen NAVARRO to avoid scissoring with retro ambulation    Minimal ankle instability with all static activities     Pain Level (0-10 scale) post treatment: 0    ASSESSMENT/Changes in Function:   Patient continues to be challenged mostly with dynamic gait activities, requiring Tory for trunk stability occasionally. Patient with decreased intrinsic foot strength as evidenced by only able to  large marble. Patient education provided on wearing seamless socks and to elevate RLE throughout his day to decrease effusion at right ankle. Continue to practice balance activities for improved stability and ease with ambulation. Patient will continue to benefit from skilled PT services to modify and progress therapeutic interventions, address functional mobility deficits, address ROM deficits, address strength deficits, analyze and address soft tissue restrictions, analyze and cue movement patterns, analyze and modify body mechanics/ergonomics and assess and modify postural abnormalities to attain remaining goals.      []  See Plan of Care  []  See progress note/recertification  []  See Discharge Summary         Progress towards goals / Updated goals:  Short Term Goals: To be accomplished in 2 weeks:  1.  Pt will be compliant with HEP to increase ankle ROM for ADLs  2.  Pt will improve right ankle figure 8 measurement to <60 cm to improve effusion for better ankle ROM  Long Term Goals: To be accomplished in 4 weeks:  1.  Pt will improve FOTO to 58 to increase ease of ADLs  2.  Pt will increase right ankle DF, EV and PF strength to 3+/5 to improve gait and balance  3.  Pt will hold rhomberg on foam with EC for 30 seconds without LOB outside of NAVARRO to increase ease of ADLs    PLAN  []  Upgrade activities as tolerated     [x]  Continue plan of care  []  Update interventions per flow sheet       []  Discharge due to:_  []  Other:_      Rayleen Nones 10/10/2017  9:35 AM    Future Appointments  Date Time Provider Enriqueta Beauchamp   12/20/2017 7:25 AM [facility] 65 22   12/27/2017 9:30 AM Boo Gutiérrez MD [facility]   2/1/2018 8:30 AM [facility] NURSE [place]   2/9/2018 9:30 AM Dot Guevara  hospitals

## 2017-10-12 ENCOUNTER — APPOINTMENT (OUTPATIENT)
Dept: PHYSICAL THERAPY | Age: 57
End: 2017-10-12
Payer: MEDICARE

## 2017-10-18 ENCOUNTER — HOSPITAL ENCOUNTER (OUTPATIENT)
Dept: PHYSICAL THERAPY | Age: 57
Discharge: HOME | End: 2017-10-18
Payer: MEDICARE

## 2017-10-18 PROCEDURE — 97112 NEUROMUSCULAR REEDUCATION: CPT

## 2017-10-18 PROCEDURE — 97110 THERAPEUTIC EXERCISES: CPT

## 2017-10-18 PROCEDURE — 97016 VASOPNEUMATIC DEVICE THERAPY: CPT

## 2017-10-18 NOTE — PROGRESS NOTES
PT DAILY TREATMENT NOTE - [facility]     Patient Name: Lisa Francisco. Date:10/18/2017  : 1960  [x]  Patient  Verified  Payor: Dung Chon / Plan: [address] [place] [address] HMO / Product Type: Managed Care Medicare /    In time:12:00  Out time:12:42  Total Treatment Time (min): 42  Total Timed Codes (min): 32  1:1 Treatment Time ( only): 32   Visit #: 4 of 8    Treatment Area: Right leg pain [M79.604]    SUBJECTIVE  Pain Level (0-10 scale): 3/10  Any medication changes, allergies to medications, adverse drug reactions, diagnosis change, or new procedure performed?: [x] No    [] Yes (see summary sheet for update)  Subjective functional status/changes:   [] No changes reported  \"Still can't go on my toes on my right leg. \"    OBJECTIVE    Modality rationale: decrease inflammation and decrease pain to improve the patients ability to perform ADL's.    Min Type Additional Details    [] Estim:  []Unatt       []IFC  []Premod                        []Other:  []w/ice   []w/heat  Position:  Location:    [] Estim: []Att    []TENS instruct  []NMES                    []Other:  []w/US   []w/ice   []w/heat  Position:  Location:    []  Traction: [] Cervical       []Lumbar                       [] Prone          []Supine                       []Intermittent   []Continuous Lbs:  [] before manual  [] after manual    []  Ultrasound: []Continuous   [] Pulsed                           []1MHz   []3MHz W/cm2:  Location:    []  Iontophoresis with dexamethasone         Location: [] Take home patch   [] In clinic    []  Ice     []  heat  []  Ice massage  []  Laser   []  Anodyne Position:  Location:    []  Laser with stim  []  Other:  Position:  Location:   10 [x]  Vasopneumatic Device Pressure:       [x] lo [] med [] hi   Temperature: [x] lo [] med [] hi   [] Skin assessment post-treatment:  []intact []redness- no adverse reaction    []redness - adverse reaction:     22 min Therapeutic Exercise:  [x] See flow sheet : Rationale: increase ROM and increase strength to improve the patients ability to perform ADL's. 10 min Neuromuscular Re-education:  [x]  See flow sheet :   Rationale: increase strength, improve balance and increase proprioception  to improve the patients ability to perform functional activities. With   [x] TE   [] TA   [] neuro   [] other: Patient Education: [x] Review HEP    [] Progressed/Changed HEP based on:   [] positioning   [] body mechanics   [] transfers   [] heat/ice application    [] other:      Other Objective/Functional Measures: Rhomberg on airex foam EC for 30\" without LOB. Pain Level (0-10 scale) post treatment: 0/10    ASSESSMENT/Changes in Function: Cueing to decrease speed of ambulation during tandem and marching gait. Pt given orange t-band for home use. Patient will continue to benefit from skilled PT services to modify and progress therapeutic interventions, address functional mobility deficits, address ROM deficits, address strength deficits, analyze and cue movement patterns and address imbalance/dizziness to attain remaining goals. [x]  See Plan of Care  []  See progress note/recertification  []  See Discharge Summary         Progress towards goals / Updated goals:  Short Term Goals: To be accomplished in 2 weeks:  1. Pt will be compliant with HEP to increase ankle ROM for ADLs - Pt reports HEP compliance. 10/18/2017  2.  Pt will improve right ankle figure 8 measurement to <60 cm to improve effusion for better ankle ROM  Long Term Goals: To be accomplished in 4 weeks:  1.  Pt will improve FOTO to 58 to increase ease of ADLs  2.  Pt will increase right ankle DF, EV and PF strength to 3+/5 to improve gait and balance  3.  Pt will hold rhomberg on foam with EC for 30 seconds without LOB outside of NAVARRO to increase ease of ADLs - Rhomberg on airex foam EC for 30\" without LOB.  10/18/2017    PLAN  []  Upgrade activities as tolerated     [x]  Continue plan of care  []  Update interventions per flow sheet       []  Discharge due to:_  []  Other:_      Shilpa Haile, PTA 10/18/2017  12:00 PM    Future Appointments  Date Time Provider Enriqueta Beauchamp   10/20/2017 12:00 PM Mateus Null PTA [facility]   10/25/2017 10:30 AM Mateus Null PTA [facility]   10/27/2017 10:30 AM Mateus Null PTA [facility]   12/20/2017 7:25 AM [facility] NURSE [facility] SCHED   12/27/2017 9:30 AM Raegan Adorno MD [facility] SCHED   2/1/2018 8:30 AM [facility]URSE [facility] SCHED   2/9/2018 9:30 AM Richard Harris MD [facility]

## 2017-10-27 ENCOUNTER — APPOINTMENT (OUTPATIENT)
Dept: PHYSICAL THERAPY | Age: 57
End: 2017-10-27
Payer: MEDICARE

## 2017-11-17 ENCOUNTER — APPOINTMENT (OUTPATIENT)
Dept: PHYSICAL THERAPY | Age: 57
End: 2017-11-17
Payer: MEDICARE

## 2017-11-22 ENCOUNTER — HOSPITAL ENCOUNTER (OUTPATIENT)
Dept: PHYSICAL THERAPY | Age: 57
Discharge: HOME | End: 2017-11-22
Payer: MEDICARE

## 2017-11-22 PROCEDURE — G8979 MOBILITY GOAL STATUS: HCPCS

## 2017-11-22 PROCEDURE — 97016 VASOPNEUMATIC DEVICE THERAPY: CPT

## 2017-11-22 PROCEDURE — 97112 NEUROMUSCULAR REEDUCATION: CPT

## 2017-11-22 PROCEDURE — G8980 MOBILITY D/C STATUS: HCPCS

## 2017-11-22 NOTE — PROGRESS NOTES
PT DAILY TREATMENT NOTE - [facility]     Patient Name: Henry Varela.   Date:2017  : 1960  [x]  Patient  Verified  Payor: Anjelica Claude / Plan: [address] [place] [address] HMO / Product Type: Managed Care Medicare /    In time:9:42  Out time:10:12  Total Treatment Time (min): 40  Total Timed Codes (min): 30  1:1 Treatment Time ( only): 18  Visit #: 5 of 8    Treatment Area: Right leg pain [M79.604]    SUBJECTIVE  Pain Level (0-10 scale): 0  Any medication changes, allergies to medications, adverse drug reactions, diagnosis change, or new procedure performed?: [x] No    [] Yes (see summary sheet for update)  Subjective functional status/changes:   [] No changes reported  Pt reports still having difficulty performing heel raises    OBJECTIVE    Modality rationale: decrease inflammation and decrease pain to improve the patients ability to perform daily tasks   Min Type Additional Details    [] Estim:  []Unatt       []IFC  []Premod                        []Other:  []w/ice   []w/heat  Position:  Location:    [] Estim: []Att    []TENS instruct  []NMES                    []Other:  []w/US   []w/ice   []w/heat  Position:  Location:    []  Traction: [] Cervical       []Lumbar                       [] Prone          []Supine                       []Intermittent   []Continuous Lbs:  [] before manual  [] after manual    []  Ultrasound: []Continuous   [] Pulsed                           []1MHz   []3MHz W/cm2:  Location:    []  Iontophoresis with dexamethasone         Location: [] Take home patch   [] In clinic    []  Ice     []  heat  []  Ice massage  []  Laser   []  Anodyne Position:  Location:    []  Laser with stim  []  Other:  Position:  Location:   10 [x]  Vasopneumatic Device Pressure:       [x] lo [] med [] hi   Temperature: [x] lo [] med [] hi   [] Skin assessment post-treatment:  []intact []redness- no adverse reaction    []redness  adverse reaction:       20 min Therapeutic Exercise:  [] See flow sheet :   Rationale: increase ROM and increase strength to improve the patients ability to perform daily tasks and ADLs     10 min Neuromuscular Re-education:  []  See flow sheet :   Rationale: improve coordination, improve balance and increase proprioception  to improve the patients ability to perform daily tasks with improved stability            With   [] TE   [] TA   [] neuro   [] other: Patient Education: [x] Review HEP    [] Progressed/Changed HEP based on:   [] positioning   [] body mechanics   [] transfers   [] heat/ice application    [] other:      Other Objective/Functional Measures: added heel raises with 1/2 foam today with good tolerance, limited gastroc strength     Pain Level (0-10 scale) post treatment: 0    ASSESSMENT/Changes in Function: Pt still reports poor gastroc strength at this time. He has good pain tolerance at this time and attempted to gradually progress standing activities. Limited by pt arriving late today, continue per POC    Patient will continue to benefit from skilled PT services to modify and progress therapeutic interventions, address functional mobility deficits, address ROM deficits, address strength deficits, analyze and address soft tissue restrictions, analyze and cue movement patterns, analyze and modify body mechanics/ergonomics and address imbalance/dizziness to attain remaining goals. []  See Plan of Care  []  See progress note/recertification  []  See Discharge Summary         Progress towards goals / Updated goals:  Short Term Goals: To be accomplished in 2 weeks:  1. Pt will be compliant with HEP to increase ankle ROM for ADLs - Pt reports HEP compliance.  10/18/2017  2.  Pt will improve right ankle figure 8 measurement to <60 cm to improve effusion for better ankle ROM  Long Term Goals: To be accomplished in 4 weeks:  1.  Pt will improve FOTO to 58 to increase ease of ADLs progressed to 56 11/22/17  2.  Pt will increase right ankle DF, EV and PF strength to 3+/5 to improve gait and balance  3.  Pt will hold rhomberg on foam with EC for 30 seconds without LOB outside of NAVARRO to increase ease of ADLs - Rhomberg on airex foam EC for 30\" without LOB.  10/18/2017    PLAN  [x]  Upgrade activities as tolerated     []  Continue plan of care  []  Update interventions per flow sheet       []  Discharge due to:_  []  Other:_      Nadir Payment, DPT, CMTPT 11/22/2017  9:51 AM    Future Appointments  Date Time Provider Enriqueta Beauchamp   12/20/2017 7:25 AM [facility] NURSE [facility] SCHED   12/27/2017 9:30 AM Carlos Eduardo Rangel MD [facility] SCHED   2/1/2018 8:30 AM [facility] NURSE [facility] SCHED   2/9/2018 9:30 AM Kisha Pacheco MD [address] [place] [address]

## 2017-12-04 ENCOUNTER — APPOINTMENT (OUTPATIENT)
Dept: PHYSICAL THERAPY | Age: 57
End: 2017-12-04

## 2017-12-12 RX ORDER — TRAMADOL HYDROCHLORIDE 50 MG/1
TABLET ORAL
Qty: 90 TAB | Refills: 0 | Status: SHIPPED | OUTPATIENT
Start: 2017-12-12 | End: 2018-01-19 | Stop reason: SDUPTHER

## 2017-12-12 NOTE — TELEPHONE ENCOUNTER
Please notify patient that the refill has been approved and script is available in the office for .      check, NRF

## 2017-12-23 LAB
ALBUMIN SERPL-MCNC: 4.1 G/DL (ref 3.5–5.5)
ALBUMIN/CREAT UR: 17.9 MG/G CREAT (ref 0–30)
ALBUMIN/GLOB SERPL: 1.6 {RATIO} (ref 1.2–2.2)
ALP SERPL-CCNC: 110 IU/L (ref 39–117)
ALT SERPL-CCNC: 19 IU/L (ref 0–44)
AST SERPL-CCNC: 13 IU/L (ref 0–40)
BILIRUB SERPL-MCNC: 0.4 MG/DL (ref 0–1.2)
BUN SERPL-MCNC: 10 MG/DL (ref 6–24)
BUN/CREAT SERPL: 9 (ref 9–20)
CALCIUM SERPL-MCNC: 10.3 MG/DL (ref 8.7–10.2)
CHLORIDE SERPL-SCNC: 103 MMOL/L (ref 96–106)
CHOLEST SERPL-MCNC: 132 MG/DL (ref 100–199)
CO2 SERPL-SCNC: 29 MMOL/L (ref 18–29)
CREAT SERPL-MCNC: 1.12 MG/DL (ref 0.76–1.27)
CREAT UR-MCNC: 105.1 MG/DL
GLOBULIN SER CALC-MCNC: 2.5 G/DL (ref 1.5–4.5)
GLUCOSE SERPL-MCNC: 158 MG/DL (ref 65–99)
HBA1C MFR BLD: 10.3 % (ref 4.8–5.6)
HDLC SERPL-MCNC: 51 MG/DL
LDLC SERPL CALC-MCNC: 67 MG/DL (ref 0–99)
MICROALBUMIN UR-MCNC: 18.8 UG/ML
POTASSIUM SERPL-SCNC: 5.2 MMOL/L (ref 3.5–5.2)
PROT SERPL-MCNC: 6.6 G/DL (ref 6–8.5)
SODIUM SERPL-SCNC: 143 MMOL/L (ref 134–144)
TRIGL SERPL-MCNC: 68 MG/DL (ref 0–149)
VLDLC SERPL CALC-MCNC: 14 MG/DL (ref 5–40)

## 2017-12-27 ENCOUNTER — OFFICE VISIT (OUTPATIENT)
Dept: FAMILY MEDICINE CLINIC | Age: 57
End: 2017-12-27

## 2017-12-27 VITALS
TEMPERATURE: 97.4 F | RESPIRATION RATE: 18 BRPM | HEIGHT: 73 IN | OXYGEN SATURATION: 100 % | BODY MASS INDEX: 26.16 KG/M2 | SYSTOLIC BLOOD PRESSURE: 161 MMHG | WEIGHT: 197.4 LBS | HEART RATE: 75 BPM | DIASTOLIC BLOOD PRESSURE: 85 MMHG

## 2017-12-27 DIAGNOSIS — Z00.00 MEDICARE ANNUAL WELLNESS VISIT, SUBSEQUENT: Primary | ICD-10-CM

## 2017-12-27 DIAGNOSIS — Z12.5 SCREENING PSA (PROSTATE SPECIFIC ANTIGEN): ICD-10-CM

## 2017-12-27 DIAGNOSIS — E78.5 DYSLIPIDEMIA: ICD-10-CM

## 2017-12-27 DIAGNOSIS — I11.9 BENIGN HYPERTENSIVE HEART DISEASE WITHOUT HEART FAILURE: ICD-10-CM

## 2017-12-27 DIAGNOSIS — E10.42 TYPE 1 DIABETES MELLITUS WITH DIABETIC POLYNEUROPATHY (HCC): ICD-10-CM

## 2017-12-27 RX ORDER — LISINOPRIL 5 MG/1
TABLET ORAL
Qty: 90 TAB | Refills: 3 | Status: SHIPPED | COMMUNITY
Start: 2017-12-27 | End: 2018-01-17 | Stop reason: SDUPTHER

## 2017-12-27 RX ORDER — INSULIN ASPART 100 [IU]/ML
INJECTION, SOLUTION INTRAVENOUS; SUBCUTANEOUS
Qty: 1 VIAL | Refills: 5 | Status: SHIPPED | OUTPATIENT
Start: 2017-12-27 | End: 2018-01-17 | Stop reason: SDUPTHER

## 2017-12-27 RX ORDER — INSULIN PUMP SYRINGE, 3 ML
EACH MISCELLANEOUS
Qty: 1 KIT | Refills: 0 | Status: SHIPPED | OUTPATIENT
Start: 2017-12-27 | End: 2018-03-07 | Stop reason: SDUPTHER

## 2017-12-27 NOTE — PATIENT INSTRUCTIONS
Learning About Diabetes Food Guidelines  Your Care Instructions    Meal planning is important to manage diabetes. It helps keep your blood sugar at a target level (which you set with your doctor). You don't have to eat special foods. You can eat what your family eats, including sweets once in a while. But you do have to pay attention to how often you eat and how much you eat of certain foods. You may want to work with a dietitian or a certified diabetes educator (CDE) to help you plan meals and snacks. A dietitian or CDE can also help you lose weight if that is one of your goals. What should you know about eating carbs? Managing the amount of carbohydrate (carbs) you eat is an important part of healthy meals when you have diabetes. Carbohydrate is found in many foods. · Learn which foods have carbs. And learn the amounts of carbs in different foods. ¨ Bread, cereal, pasta, and rice have about 15 grams of carbs in a serving. A serving is 1 slice of bread (1 ounce), ½ cup of cooked cereal, or 1/3 cup of cooked pasta or rice. ¨ Fruits have 15 grams of carbs in a serving. A serving is 1 small fresh fruit, such as an apple or orange; ½ of a banana; ½ cup of cooked or canned fruit; ½ cup of fruit juice; 1 cup of melon or raspberries; or 2 tablespoons of dried fruit. ¨ Milk and no-sugar-added yogurt have 15 grams of carbs in a serving. A serving is 1 cup of milk or 2/3 cup of no-sugar-added yogurt. ¨ Starchy vegetables have 15 grams of carbs in a serving. A serving is ½ cup of mashed potatoes or sweet potato; 1 cup winter squash; ½ of a small baked potato; ½ cup of cooked beans; or ½ cup cooked corn or green peas. · Learn how much carbs to eat each day and at each meal. A dietitian or CDE can teach you how to keep track of the amount of carbs you eat. This is called carbohydrate counting. · If you are not sure how to count carbohydrate grams, use the Plate Method to plan meals.  It is a good, quick way to make sure that you have a balanced meal. It also helps you spread carbs throughout the day. ¨ Divide your plate by types of foods. Put non-starchy vegetables on half the plate, meat or other protein food on one-quarter of the plate, and a grain or starchy vegetable in the final quarter of the plate. To this you can add a small piece of fruit and 1 cup of milk or yogurt, depending on how many carbs you are supposed to eat at a meal.  · Try to eat about the same amount of carbs at each meal. Do not \"save up\" your daily allowance of carbs to eat at one meal.  · Proteins have very little or no carbs per serving. Examples of proteins are beef, chicken, turkey, fish, eggs, tofu, cheese, cottage cheese, and peanut butter. A serving size of meat is 3 ounces, which is about the size of a deck of cards. Examples of meat substitute serving sizes (equal to 1 ounce of meat) are 1/4 cup of cottage cheese, 1 egg, 1 tablespoon of peanut butter, and ½ cup of tofu. How can you eat out and still eat healthy? · Learn to estimate the serving sizes of foods that have carbohydrate. If you measure food at home, it will be easier to estimate the amount in a serving of restaurant food. · If the meal you order has too much carbohydrate (such as potatoes, corn, or baked beans), ask to have a low-carbohydrate food instead. Ask for a salad or green vegetables. · If you use insulin, check your blood sugar before and after eating out to help you plan how much to eat in the future. · If you eat more carbohydrate at a meal than you had planned, take a walk or do other exercise. This will help lower your blood sugar. What else should you know? · Limit saturated fat, such as the fat from meat and dairy products. This is a healthy choice because people who have diabetes are at higher risk of heart disease. So choose lean cuts of meat and nonfat or low-fat dairy products.  Use olive or canola oil instead of butter or shortening when cooking. · Don't skip meals. Your blood sugar may drop too low if you skip meals and take insulin or certain medicines for diabetes. · Check with your doctor before you drink alcohol. Alcohol can cause your blood sugar to drop too low. Alcohol can also cause a bad reaction if you take certain diabetes medicines. Follow-up care is a key part of your treatment and safety. Be sure to make and go to all appointments, and call your doctor if you are having problems. It's also a good idea to know your test results and keep a list of the medicines you take. Where can you learn more? Go to http://freida-khloe.info/. Enter C086 in the search box to learn more about \"Learning About Diabetes Food Guidelines. \"  Current as of: March 13, 2017  Content Version: 11.4  © 3621-8621 [employer]. Care instructions adapted under license by [employer] (which disclaims liability or warranty for this information). If you have questions about a medical condition or this instruction, always ask your healthcare professional. [place] [address] any warranty or liability for your use of this information. Medicare Wellness Visit, Male    The best way to live healthy is to have a healthy lifestyle by eating a well-balanced diet, exercising regularly, limiting alcohol and stopping smoking. Regular physical exams and screening tests are another way to keep healthy. Preventive exams provided by your health care provider can find health problems before they become diseases or illnesses. Preventive services including immunizations, screening tests, monitoring and exams can help you take care of your own health. All people over age 72 should have a pneumovax  and and a prevnar shot to prevent pneumonia. These are once in a lifetime unless you and your provider decide differently.     All people over 65 should have a yearly flu shot and a tetanus vaccine every 10 years.    Screening for diabetes mellitus with a blood sugar test should be done every year. Glaucoma is a disease of the eye due to increased ocular pressure that can lead to blindness and it should be done every year by an eye professional.    Cardiovascular screening tests that check for elevated lipids (fatty part of blood) which can lead to heart disease and strokes should be done every 5 years. Colorectal screening that evaluates for blood or polyps in your colon should be done yearly as a stool test or every five years as a flexible sigmoidoscope or every 10 years as a colonoscopy up to age 76. Men up to age 76 may need a screening blood test for prostate cancer at certain intervals, depending on their personal and family history. This decision is between the patient and his provider. If you have been a smoker or had family history of abdominal aortic aneurysms, you and your provider may decide to schedule an ultrasound test of your aorta. Hepatitis C screening is also recommended for anyone born between [birth year] through [address]. A shingles vaccine is also recommended once in a lifetime after age 61. Your Medicare Wellness Exam is recommended annually. Here is a list of your current Health Maintenance items with a due date:  Health Maintenance Due   Topic Date Due    Annual Well Visit  12/22/2017       Medicare Part B Preventive Services Limitations Recommendation Scheduled   Bone Mass Measurement  (age 72 & older, biennial) Requires diagnosis related to osteoporosis or estrogen deficiency.  Biennial benefit unless patient has history of long-term glucocorticoid tx or baseline is needed because initial test was by other method  NA   Cardiovascular Screening Blood Tests (every 5 years)  Total cholesterol, HDL, Triglycerides Order as a panel if possible  12/2017   Colorectal Cancer Screening  -Fecal occult blood test (annual)  -Flexible sigmoidoscopy (5y)  -Screening colonoscopy (10y)  -Barium Enema   2/2016   Counseling to Prevent Tobacco Use (up to 8 sessions per year)  - Counseling greater than 3 and up to 10 minutes  - Counseling greater than 10 minutes Patients must be asymptomatic of tobacco-related conditions to receive as preventive service  NA   Diabetes Screening Tests (at least every 3 years, Medicare covers annually or at 6-month intervals for prediabetic patients)    Fasting blood sugar (FBS) or glucose tolerance test (GTT) Patient must be diagnosed with one of the following:  -Hypertension, Dyslipidemia, obesity, previous impaired FBS or GTT  Or any two of the following: overweight, FH of diabetes, age ? 72, history of gestational diabetes, birth of baby weighing more than 9 pounds  12/2017   Diabetes Self-Management Training (DSMT) (no USPSTF recommendation) Requires referral by treating physician for patient with diabetes or renal disease. 10 hours of initial DSMT session of no less than 30 minutes each in a continuous 12-month period. 2 hours of follow-up DSMT in subsequent years. 9/2017   Glaucoma Screening (no USPSTF recommendation) Diabetes mellitus, family history, , age 48 or over,  American, age 72 or over  Dr. Pablo Hardwick (HIV) Screening (annually for increased risk patients)  HIV-1 and HIV-2 by EIA, BERNADINE, rapid antibody test, or oral mucosa transudate Patient must be at increased risk for HIV infection per USPSTF guidelines or pregnant. Tests covered annually for patients at increased risk. Pregnant patients may receive up to 3 test during pregnancy. NA   Medical Nutrition Therapy (MNT) (for diabetes or renal disease not recommended schedule) Requires referral by treating physician for patient with diabetes or renal disease. Can be provided in same year as diabetes self-management training (DSMT), and CMS recommends medical nutrition therapy take place after DSMT.   Up to 3 hours for initial year and 2 hours in subsequent years.  NA   Prostate Cancer Screening (annually up to age 76)  - Digital rectal exam (KRISTOPHER)  - Prostate specific antigen (PSA) Annually (age 48 or over), KRISTOPHER not paid separately when covered E/M service is provided on same date  Men up to age 76 may need a screening blood test for prostate cancer at certain intervals, depending on their personal and family history. This decision is between the patient and his provider. Dr. Bradley Travis Vaccination (annually)   Done      Pneumococcal Vaccination (once after 72)      Hepatitis B Vaccinations (if medium/high risk) Medium/high risk factors:  End-stage renal disease,  Hemophiliacs who received Factor VIII or IX concentrates, Clients of institutions for the mentally retarded, Persons who live in the same house as a HepB virus carrier, Homosexual men, Illicit injectable drug abusers. NA   Shingles Vaccination A shingles vaccine is also recommended once in a lifetime after age 61  Deferred     Ultrasound Screening for Abdominal Aortic Aneurysm (AAA) (once) Patient must be referred through [facility] and not have had a screening for abdominal aortic aneurysm before under Medicare.   Limited to patients who meet one of the following criteria:  - Men who are 73-68 years old and have smoked more than 100 cigarettes in their lifetime.  -Anyone with a FH of AAA  -Anyone recommended for screening by USPSTF  YULY

## 2017-12-27 NOTE — PROGRESS NOTES
Subjective:       Chief Complaint  The patient presents for follow up of diabetes, hypertension and high cholesterol. HPI  Leslie Hurt. is a 62 y.o. male seen for follow up of diabetes. Hunter has hypertension and hyperlipidemia. Diabetes no significant medication side effects noted, poorly controlled, pt continues on insulin pump and will f/u with Endo for better management he has not been seen in the last year however due to financial reasons, he does not bolus insulin before he eats which is one reason it is uncontrolled, he has low BS because he skips meals, he is afraid of getting low BS and his insurance does not pay for continuous BS monitoring currently, will try again to refer to diabetic teaching/nutrition,  hypertension well controlled on lisinopril 5 mg, hyperlipidemia well controlled, no significant medication side effects noted, on Lipitor     Diet and Lifestyle: generally follows a low fat low cholesterol diet, does not rigorously follow a diabetic diet, exercises sporadically    Home BP Monitoring: is not measured at home. Diabetic Review of Systems - home glucose monitoring: is performed regularly, 6x/day. Other symptoms and concerns: pt will try to exercise more. He has a diabetic foot ulcer that is slowly healing. He had left foot surgery a few  Months ago. He continues to slowly heal and will f/u with Dr Gardenia Hatfield. Pt is candidate for diabetic shoes due to neuropathy and ulcerative callus.  He is followed by Podiatry     Current Outpatient Prescriptions   Medication Sig    insulin aspart (NOVOLOG) 100 unit/mL injection 38 units daily for insulin pump    lisinopril (PRINIVIL, ZESTRIL) 5 mg tablet TAKE 1 TABLET EVERY DAY    Blood-Glucose Meter monitoring kit Glucometer of choice E10.42    sildenafil (REVATIO) 20 mg tablet TAKE TWO TO FIVE TABLETS BY MOUTH 1 HOUR PRIOR TO INTERCOURSE    pantoprazole (PROTONIX) 40 mg tablet TAKE ONE TABLET BY MOUTH ONCE DAILY    omeprazole (PRILOSEC) 20 mg capsule Take  by mouth two (2) times a day.  aspirin delayed-release 81 mg tablet Take  by mouth daily.  CONTOUR NEXT STRIPS strip CHECK BLOOD SUGAR 8 TIMES A DAY DUE TO INSULIN PUMP AND LABILE BLOOD SUGAR    atorvastatin (LIPITOR) 20 mg tablet Take 1 Tab by mouth daily.  oxybutynin (DITROPAN) 5 mg tablet Take 1 Tab by mouth two (2) times a day.  MICROLET LANCET misc     omega-3 fatty acids-vitamin e (FISH OIL) 1,000 mg cap Take 1 Cap by mouth.  multivitamin (ONE A DAY) tablet Take 1 Tab by mouth daily.  traMADol (ULTRAM) 50 mg tablet TAKE 1 TABLET EVERY 6 HOURS AS NEEDED FOR PAIN     No current facility-administered medications for this visit.               Review of Systems  Respiratory: negative for dyspnea on exertion  Cardiovascular: negative for chest pain    Objective:     Visit Vitals    /85 (BP 1 Location: Left arm, BP Patient Position: Sitting)    Pulse 75    Temp 97.4 °F (36.3 °C) (Oral)    Resp 18    Ht 6' 1\" (1.854 m)    Wt 197 lb 6.4 oz (89.5 kg)    SpO2 100%    BMI 26.04 kg/m2        General appearance - alert, well appearing, and in no distress  Chest - clear to auscultation, no wheezes, rales or rhonchi, symmetric air entry  Heart - normal rate, regular rhythm, normal S1, S2, no murmurs, rubs, clicks or gallops      Labs:   Lab Results   Component Value Date/Time    Hemoglobin A1c 10.3 12/22/2017 12:00 AM    Hemoglobin A1c 10.7 09/20/2017 08:27 AM    Hemoglobin A1c 10.4 06/15/2017 08:05 AM    Glucose 158 12/22/2017 12:00 AM    Glucose (POC) 133 02/24/2016 12:46 PM    Microalbumin/Creat ratio (mg/g creat) 46 12/04/2015 08:40 AM    Microalb/Creat ratio (ug/mg creat.) 17.9 12/22/2017 12:00 AM    Microalbumin,urine random 4.10 12/04/2015 08:40 AM    LDL, calculated 67 12/22/2017 12:00 AM    Creatinine 1.12 12/22/2017 12:00 AM    Hemoglobin A1c, External 9.3 12/10/2015    Hemoglobin A1c, External 8.7% 08/20/2015    Hemoglobin A1c, External 9.5 05/22/2015 07:43 PM      Lab Results   Component Value Date/Time    Cholesterol, total 132 12/22/2017 12:00 AM    HDL Cholesterol 51 12/22/2017 12:00 AM    LDL, calculated 67 12/22/2017 12:00 AM    Triglyceride 68 12/22/2017 12:00 AM    CHOL/HDL Ratio 1.7 12/04/2015 08:40 AM     Lab Results   Component Value Date/Time    ALT (SGPT) 19 12/22/2017 12:00 AM    AST (SGOT) 13 12/22/2017 12:00 AM    Alk. phosphatase 110 12/22/2017 12:00 AM    Bilirubin, total 0.4 12/22/2017 12:00 AM     Lab Results   Component Value Date/Time    GFR est AA 84 12/22/2017 12:00 AM    GFR est non-AA 73 12/22/2017 12:00 AM    Creatinine 1.12 12/22/2017 12:00 AM    BUN 10 12/22/2017 12:00 AM    Sodium 143 12/22/2017 12:00 AM    Potassium 5.2 12/22/2017 12:00 AM    Chloride 103 12/22/2017 12:00 AM    CO2 29 12/22/2017 12:00 AM      Lab Results   Component Value Date/Time    Prostate Specific Ag 0.3 12/21/2016 09:54 AM    Prostate Specific Ag 0.293 03/11/2015 11:12 AM    Prostate Specific Ag 0.3 10/03/2014 02:32 PM    Prostate Specific Ag 0.251 12/17/2012 03:42 PM    PSA 0.3 09/30/2010 10:09 AM    PSA, FREE 0.2 09/30/2010 10:09 AM    % FREE PSA 67 09/30/2010 10:09 AM     Lab Results   Component Value Date/Time    Glucose 158 12/22/2017 12:00 AM    Glucose (POC) 133 02/24/2016 12:46 PM            Assessment / Plan     Diabetes poorly controlled, pt remains on insulin pump and will be referred to nutritionist  for further management of diet and bolusing insulin  Hypertension well controlled, on lisinopril 5 mg  Hyperlipidemia well controlled on Lipitor       ICD-10-CM ICD-9-CM    1. Medicare annual wellness visit, subsequent Z00.00 V70.0    2. Type 1 diabetes mellitus with diabetic polyneuropathy (HCC) E10.42 250.61 REFERRAL TO NUTRITION     357.2 HEMOGLOBIN A1C W/O EAG   3. Hypertension W26.3 535.50 METABOLIC PANEL, COMPREHENSIVE   4.  Dyslipidemia E78.5 272.4 LIPID PANEL   5. Screening PSA (prostate specific antigen) Z12.5 V76.44 PSA SCREENING (SCREENING)              Diabetic issues reviewed with him: diabetic diet discussed in detail, and low cholesterol diet, weight control and daily exercise discussed. Follow-up Disposition:  Return in about 3 months (around 3/27/2018) for labs 1 week before. Reviewed plan of care. Patient has provided input and agrees with goals.

## 2017-12-27 NOTE — MR AVS SNAPSHOT
Visit Information Date & Time Provider Department Dept. Phone Encounter #  
 12/27/2017  9:30 AM Boo Gutiérrez, [address] [place] [address] [facility] [id number] Follow-up Instructions Return in about 3 months (around 3/27/2018) for labs 1 week before. Your Appointments 2/1/2018  8:30 AM  
Nurse Visit with [facility] NURSE [facility] ([facility] Appt Note: PSA  
 [address] 
[address] [facility] [address]  
[address] [facility] [address] [facility] [address]  
  
    
  
 2/9/2018  9:30 AM  
Any with Dot Guevara MD  
[facility] ([facility]) Appt Note: 1 yr fu  
 [address] 
[address] [facility] [address]  
[address] [facility] [address] [facility] [address] Upcoming Health Maintenance Date Due  
 MEDICARE YEARLY EXAM 12/22/2017 FOOT EXAM Q1 5/8/2018 EYE EXAM RETINAL OR DILATED Q1 6/15/2018 HEMOGLOBIN A1C Q6M 6/22/2018 MICROALBUMIN Q1 12/22/2018 LIPID PANEL Q1 12/22/2018 COLONOSCOPY 2/24/2019 DTaP/Tdap/Td series (2 - Td) 10/25/2025 Allergies as of 12/27/2017  Review Complete On: 12/27/2017 By: Boo Gutiérrez MD  
 No Known Allergies Current Immunizations  Reviewed on 9/27/2017 Name Date Influenza Vaccine (Quad) PF 9/27/2017, 12/21/2016, 11/13/2015 Pneumococcal Polysaccharide (PPSV-23) 11/13/2015 Tdap 10/25/2015  7:48 PM  
  
 Not reviewed this visit You Were Diagnosed With   
  
 Codes Comments Medicare annual wellness visit, subsequent    -  Primary ICD-10-CM: Z00.00 ICD-9-CM: V70.0 Type 1 diabetes mellitus with diabetic polyneuropathy (HCC)     ICD-10-CM: E10.42 
ICD-9-CM: 250.61, 357.2 Benign hypertensive heart disease without heart failure     ICD-10-CM: I11.9 ICD-9-CM: 402.10 Dyslipidemia     ICD-10-CM: E78.5 ICD-9-CM: 272.4 Vitals BP Pulse Temp Resp Height(growth percentile) Weight(growth percentile) 161/85 (BP 1 Location: Left arm, BP Patient Position: Sitting) 75 97.4 °F (36.3 °C) (Oral) 18 6' 1\" (1.854 m) 197 lb 6.4 oz (89.5 kg) SpO2 BMI Smoking Status 100% 26.04 kg/m2 Never Smoker Vitals History BMI and BSA Data Body Mass Index Body Surface Area 26.04 kg/m 2 2.15 m 2 Preferred Pharmacy Pharmacy Name Phone [facility] [address] [place] [address] Avenue [phone] Your Updated Medication List  
  
   
This list is accurate as of: 12/27/17 10:24 AM.  Always use your most recent med list.  
  
  
  
  
 aspirin delayed-release 81 mg tablet Take  by mouth daily. atorvastatin 20 mg tablet Commonly known as:  LIPITOR Take 1 Tab by mouth daily. Blood-Glucose Meter monitoring kit Glucometer of choice E10.42 CONTOUR NEXT STRIPS strip Generic drug:  glucose blood VI test strips CHECK BLOOD SUGAR 8 TIMES A DAY DUE TO INSULIN PUMP AND LABILE BLOOD SUGAR  
  
 FISH OIL 1,000 mg Cap Generic drug:  omega-3 fatty acids-vitamin e Take 1 Cap by mouth. insulin aspart 100 unit/mL injection Commonly known as:  Ayesha Merrick 38 units daily for insulin pump  
  
 lisinopril 5 mg tablet Commonly known as:  PRINIVIL, ZESTRIL  
TAKE 1 [address] Generic drug:  Lancets  
  
 multivitamin tablet Commonly known as:  ONE A DAY Take 1 Tab by mouth daily. omeprazole 20 mg capsule Commonly known as:  PRILOSEC Take  by mouth two (2) times a day. oxybutynin 5 mg tablet Commonly known as:  HWGILNNZ Take 1 Tab by mouth two (2) times a day. pantoprazole 40 mg tablet Commonly known as:  PROTONIX  
TAKE ONE TABLET BY MOUTH ONCE DAILY  
  
 sildenafil (antihypertensive) 20 mg tablet Commonly known as:  REVATIO  
TAKE TWO TO FIVE TABLETS BY MOUTH 1 HOUR PRIOR TO INTERCOURSE  
  
 traMADol 50 mg tablet Commonly known as:  ULTRAM  
TAKE 1 TABLET EVERY 6 HOURS AS NEEDED FOR PAIN Prescriptions Sent to Mail Order Refills  
 lisinopril (PRINIVIL, ZESTRIL) 5 mg tablet 3 Sig: TAKE 1 TABLET EVERY DAY Class: Mail Order Pharmacy: [address] [place] [address], [address] Ph #: [phone] Prescriptions Sent to Pharmacy Refills  
 insulin aspart (NOVOLOG) 100 unit/mL injection 5 Si units daily for insulin pump Class: Normal  
 Pharmacy: [facility] [address] Ph #: [phone] Blood-Glucose Meter monitoring kit 0 Sig: Glucometer of choice E10.42 Class: Normal  
 Pharmacy: [facility] [address] Ph #: [phone] Follow-up Instructions Return in about 3 months (around 3/27/2018) for labs 1 week before. Patient Instructions Learning About Diabetes Food Guidelines Your Care Instructions Meal planning is important to manage diabetes. It helps keep your blood sugar at a target level (which you set with your doctor). You don't have to eat special foods. You can eat what your family eats, including sweets once in a while. But you do have to pay attention to how often you eat and how much you eat of certain foods. You may want to work with a dietitian or a certified diabetes educator (CDE) to help you plan meals and snacks. A dietitian or CDE can also help you lose weight if that is one of your goals. What should you know about eating carbs? Managing the amount of carbohydrate (carbs) you eat is an important part of healthy meals when you have diabetes. Carbohydrate is found in many foods. · Learn which foods have carbs. And learn the amounts of carbs in different foods. ¨ Bread, cereal, pasta, and rice have about 15 grams of carbs in a serving.  A serving is 1 slice of bread (1 ounce), ½ cup of cooked cereal, or 1/3 cup of cooked pasta or rice. ¨ Fruits have 15 grams of carbs in a serving. A serving is 1 small fresh fruit, such as an apple or orange; ½ of a banana; ½ cup of cooked or canned fruit; ½ cup of fruit juice; 1 cup of melon or raspberries; or 2 tablespoons of dried fruit. ¨ Milk and no-sugar-added yogurt have 15 grams of carbs in a serving. A serving is 1 cup of milk or 2/3 cup of no-sugar-added yogurt. ¨ Starchy vegetables have 15 grams of carbs in a serving. A serving is ½ cup of mashed potatoes or sweet potato; 1 cup winter squash; ½ of a small baked potato; ½ cup of cooked beans; or ½ cup cooked corn or green peas. · Learn how much carbs to eat each day and at each meal. A dietitian or CDE can teach you how to keep track of the amount of carbs you eat. This is called carbohydrate counting. · If you are not sure how to count carbohydrate grams, use the Plate Method to plan meals. It is a good, quick way to make sure that you have a balanced meal. It also helps you spread carbs throughout the day. ¨ Divide your plate by types of foods. Put non-starchy vegetables on half the plate, meat or other protein food on one-quarter of the plate, and a grain or starchy vegetable in the final quarter of the plate. To this you can add a small piece of fruit and 1 cup of milk or yogurt, depending on how many carbs you are supposed to eat at a meal. 
· Try to eat about the same amount of carbs at each meal. Do not \"save up\" your daily allowance of carbs to eat at one meal. 
· Proteins have very little or no carbs per serving. Examples of proteins are beef, chicken, turkey, fish, eggs, tofu, cheese, cottage cheese, and peanut butter. A serving size of meat is 3 ounces, which is about the size of a deck of cards. Examples of meat substitute serving sizes (equal to 1 ounce of meat) are 1/4 cup of cottage cheese, 1 egg, 1 tablespoon of peanut butter, and ½ cup of tofu. How can you eat out and still eat healthy? · Learn to estimate the serving sizes of foods that have carbohydrate. If you measure food at home, it will be easier to estimate the amount in a serving of restaurant food. · If the meal you order has too much carbohydrate (such as potatoes, corn, or baked beans), ask to have a low-carbohydrate food instead. Ask for a salad or green vegetables. · If you use insulin, check your blood sugar before and after eating out to help you plan how much to eat in the future. · If you eat more carbohydrate at a meal than you had planned, take a walk or do other exercise. This will help lower your blood sugar. What else should you know? · Limit saturated fat, such as the fat from meat and dairy products. This is a healthy choice because people who have diabetes are at higher risk of heart disease. So choose lean cuts of meat and nonfat or low-fat dairy products. Use olive or canola oil instead of butter or shortening when cooking. · Don't skip meals. Your blood sugar may drop too low if you skip meals and take insulin or certain medicines for diabetes. · Check with your doctor before you drink alcohol. Alcohol can cause your blood sugar to drop too low. Alcohol can also cause a bad reaction if you take certain diabetes medicines. Follow-up care is a key part of your treatment and safety. Be sure to make and go to all appointments, and call your doctor if you are having problems. It's also a good idea to know your test results and keep a list of the medicines you take. Where can you learn more? Go to http://freida-khloe.info/. Enter Z037 in the search box to learn more about \"Learning About Diabetes Food Guidelines. \" Current as of: March 13, 2017 Content Version: 11.4 © 9016-5939 [employer]. Care instructions adapted under license by [employer] (which disclaims liability or warranty for this information).  If you have questions about a medical condition or this instruction, always ask your healthcare professional. [place] [address] any warranty or liability for your use of this information. Medicare Wellness Visit, Male The best way to live healthy is to have a healthy lifestyle by eating a well-balanced diet, exercising regularly, limiting alcohol and stopping smoking. Regular physical exams and screening tests are another way to keep healthy. Preventive exams provided by your health care provider can find health problems before they become diseases or illnesses. Preventive services including immunizations, screening tests, monitoring and exams can help you take care of your own health. All people over age 72 should have a pneumovax  and and a prevnar shot to prevent pneumonia. These are once in a lifetime unless you and your provider decide differently. All people over 65 should have a yearly flu shot and a tetanus vaccine every 10 years. Screening for diabetes mellitus with a blood sugar test should be done every year. Glaucoma is a disease of the eye due to increased ocular pressure that can lead to blindness and it should be done every year by an eye professional. 
 
Cardiovascular screening tests that check for elevated lipids (fatty part of blood) which can lead to heart disease and strokes should be done every 5 years. Colorectal screening that evaluates for blood or polyps in your colon should be done yearly as a stool test or every five years as a flexible sigmoidoscope or every 10 years as a colonoscopy up to age 76. Men up to age 76 may need a screening blood test for prostate cancer at certain intervals, depending on their personal and family history. This decision is between the patient and his provider. If you have been a smoker or had family history of abdominal aortic aneurysms, you and your provider may decide to schedule an ultrasound test of your aorta. Hepatitis C screening is also recommended for anyone born between [birth year] through [address]. A shingles vaccine is also recommended once in a lifetime after age 61. Your Medicare Wellness Exam is recommended annually. Here is a list of your current Health Maintenance items with a due date: 
Health Maintenance Due Topic Date Due  
 Annual Well Visit  12/22/2017 Medicare Part B Preventive Services Limitations Recommendation Scheduled Bone Mass Measurement 
(age 72 & older, biennial) Requires diagnosis related to osteoporosis or estrogen deficiency. Biennial benefit unless patient has history of long-term glucocorticoid tx or baseline is needed because initial test was by other method  NA Cardiovascular Screening Blood Tests (every 5 years) Total cholesterol, HDL, Triglycerides Order as a panel if possible  12/2017 Colorectal Cancer Screening 
-Fecal occult blood test (annual) -Flexible sigmoidoscopy (5y) 
-Screening colonoscopy (10y) -Barium Enema   2/2016 Counseling to Prevent Tobacco Use (up to 8 sessions per year) - Counseling greater than 3 and up to 10 minutes - Counseling greater than 10 minutes Patients must be asymptomatic of tobacco-related conditions to receive as preventive service  NA Diabetes Screening Tests (at least every 3 years, Medicare covers annually or at 6-month intervals for prediabetic patients) Fasting blood sugar (FBS) or glucose tolerance test (GTT) Patient must be diagnosed with one of the following: 
-Hypertension, Dyslipidemia, obesity, previous impaired FBS or GTT 
Or any two of the following: overweight, FH of diabetes, age ? 72, history of gestational diabetes, birth of baby weighing more than 9 pounds  12/2017 Diabetes Self-Management Training (DSMT) (no USPSTF recommendation) Requires referral by treating physician for patient with diabetes or renal disease.  10 hours of initial DSMT session of no less than 30 minutes each in a continuous 12-month period. 2 hours of follow-up DSMT in subsequent years. 9/2017 Glaucoma Screening (no USPSTF recommendation) Diabetes mellitus, family history, , age 48 or over,  American, age 72 or over  Dr. Jessee Blankenship Human Immunodeficiency Virus (HIV) Screening (annually for increased risk patients) HIV-1 and HIV-2 by EIA, BERNADINE, rapid antibody test, or oral mucosa transudate Patient must be at increased risk for HIV infection per USPSTF guidelines or pregnant. Tests covered annually for patients at increased risk. Pregnant patients may receive up to 3 test during pregnancy. NA Medical Nutrition Therapy (MNT) (for diabetes or renal disease not recommended schedule) Requires referral by treating physician for patient with diabetes or renal disease. Can be provided in same year as diabetes self-management training (DSMT), and CMS recommends medical nutrition therapy take place after DSMT. Up to 3 hours for initial year and 2 hours in subsequent years. NA Prostate Cancer Screening (annually up to age 76) - Digital rectal exam (KRISTOPHER) - Prostate specific antigen (PSA) Annually (age 48 or over), KRISTOPHER not paid separately when covered E/M service is provided on same date Men up to age 76 may need a screening blood test for prostate cancer at certain intervals, depending on their personal and family history. This decision is between the patient and his provider. Dr. Luc Hylton Seasonal Influenza Vaccination (annually)   Done Pneumococcal Vaccination (once after 65) Hepatitis B Vaccinations (if medium/high risk) Medium/high risk factors:  End-stage renal disease, Hemophiliacs who received Factor VIII or IX concentrates, Clients of institutions for the mentally retarded, Persons who live in the same house as a HepB virus carrier, Homosexual men, Illicit injectable drug abusers. NA Shingles Vaccination A shingles vaccine is also recommended once in a lifetime after age 61  Deferred Ultrasound Screening for Abdominal Aortic Aneurysm (AAA) (once) Patient must be referred through IPPE and not have had a screening for abdominal aortic aneurysm before under Medicare. Limited to patients who meet one of the following criteria: 
- Men who are 73-68 years old and have smoked more than 100 cigarettes in their lifetime. 
-Anyone with a FH of AAA 
-Anyone recommended for screening by USPSTF  NA [facility] SERVICES! Dear Paty Mayberry: Thank you for requesting a [employer] account. Our records indicate that you already have an active [employer] account. You can access your account anytime at [email or web address] Did you know that you can access your hospital and ER discharge instructions at any time in [employer]? You can also review all of your test results from your hospital stay or ER visit. Additional Information If you have questions, please visit the Frequently Asked Questions section of the [employer] website at [email or web address] Remember, [employer] is NOT to be used for urgent needs. For medical emergencies, dial 911. Now available from your iPhone and Android! Please provide this summary of care documentation to your next provider. Your primary care clinician is listed as EMORY ZELAYA. If you have any questions after today's visit, please call [phone].
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
## 2017-12-29 ENCOUNTER — TELEPHONE (OUTPATIENT)
Dept: FAMILY MEDICINE CLINIC | Age: 57
End: 2017-12-29

## 2017-12-29 DIAGNOSIS — E78.00 HIGH CHOLESTEROL: ICD-10-CM

## 2017-12-29 RX ORDER — OXYBUTYNIN CHLORIDE 5 MG/1
5 TABLET ORAL 2 TIMES DAILY
Qty: 180 TAB | Refills: 3 | Status: SHIPPED | OUTPATIENT
Start: 2017-12-29 | End: 2018-01-17 | Stop reason: SDUPTHER

## 2017-12-29 RX ORDER — ATORVASTATIN CALCIUM 20 MG/1
20 TABLET, FILM COATED ORAL DAILY
Qty: 90 TAB | Refills: 3 | Status: SHIPPED | OUTPATIENT
Start: 2017-12-29 | End: 2018-01-17 | Stop reason: SDUPTHER

## 2017-12-29 NOTE — TELEPHONE ENCOUNTER
Pt states that his [facility] said that they faxed over a form yesterday in regards to four of his medications. The form is hindering pt from getting his medication and the form need to be completed today because his insurance will not be active in 2018.

## 2017-12-29 NOTE — TELEPHONE ENCOUNTER
Pt spouse was notified that the form was faxed to [facility] yesterday.

## 2017-12-29 NOTE — TELEPHONE ENCOUNTER
LM for Pt that [facility] faxed a form asking for only atorvastion & oxybutynn & these were was sent to [facility] by Dr Sin Ortega today.

## 2017-12-29 NOTE — TELEPHONE ENCOUNTER
Pt called back to state that he talked with z[employer] this morning and they stated that they have not received the form and that the office could call them to make it a faster process.

## 2018-01-03 DIAGNOSIS — G63 POLYNEUROPATHY ASSOCIATED WITH UNDERLYING DISEASE (HCC): Primary | ICD-10-CM

## 2018-01-03 RX ORDER — TRAMADOL HYDROCHLORIDE 50 MG/1
TABLET ORAL
Qty: 180 TAB | Refills: 2 | Status: SHIPPED | OUTPATIENT
Start: 2018-01-03 | End: 2018-01-19 | Stop reason: SDUPTHER

## 2018-01-08 RX ORDER — TRAMADOL HYDROCHLORIDE 50 MG/1
TABLET ORAL
Qty: 90 TAB | Refills: 0 | Status: CANCELLED | OUTPATIENT
Start: 2018-01-08

## 2018-01-16 ENCOUNTER — TELEPHONE (OUTPATIENT)
Dept: FAMILY MEDICINE CLINIC | Age: 58
End: 2018-01-16

## 2018-01-16 NOTE — TELEPHONE ENCOUNTER
Kennedy with [facility], pt ins co re: Novolog     Said this is being billed by the pharmacy under pt part B ins and they need a DX code to process the refill request    Request is to contact [employer] with DX

## 2018-01-17 ENCOUNTER — TELEPHONE (OUTPATIENT)
Dept: FAMILY MEDICINE CLINIC | Age: 58
End: 2018-01-17

## 2018-01-17 DIAGNOSIS — E78.00 HIGH CHOLESTEROL: ICD-10-CM

## 2018-01-17 RX ORDER — ATORVASTATIN CALCIUM 20 MG/1
20 TABLET, FILM COATED ORAL DAILY
Qty: 90 TAB | Refills: 3 | Status: SHIPPED | OUTPATIENT
Start: 2018-01-17 | End: 2018-09-11 | Stop reason: SDUPTHER

## 2018-01-17 RX ORDER — INSULIN LISPRO 100 [IU]/ML
INJECTION, SOLUTION INTRAVENOUS; SUBCUTANEOUS
Qty: 1 VIAL | Refills: 5 | Status: SHIPPED | OUTPATIENT
Start: 2018-01-17 | End: 2019-06-27 | Stop reason: SDUPTHER

## 2018-01-17 RX ORDER — LISINOPRIL 5 MG/1
TABLET ORAL
Qty: 90 TAB | Refills: 3 | Status: SHIPPED | OUTPATIENT
Start: 2018-01-17 | End: 2018-03-07 | Stop reason: SDUPTHER

## 2018-01-17 RX ORDER — PANTOPRAZOLE SODIUM 40 MG/1
TABLET, DELAYED RELEASE ORAL
Qty: 90 TAB | Refills: 3 | Status: SHIPPED | OUTPATIENT
Start: 2018-01-17 | End: 2018-04-12 | Stop reason: SDUPTHER

## 2018-01-17 RX ORDER — OXYBUTYNIN CHLORIDE 5 MG/1
5 TABLET ORAL 2 TIMES DAILY
Qty: 180 TAB | Refills: 3 | Status: SHIPPED | OUTPATIENT
Start: 2018-01-17 | End: 2018-09-11 | Stop reason: SDUPTHER

## 2018-01-17 RX ORDER — INSULIN ASPART 100 [IU]/ML
INJECTION, SOLUTION INTRAVENOUS; SUBCUTANEOUS
Qty: 1 VIAL | Refills: 5 | Status: SHIPPED | OUTPATIENT
Start: 2018-01-17 | End: 2018-01-17 | Stop reason: ALTCHOICE

## 2018-01-17 NOTE — TELEPHONE ENCOUNTER
Pt calling re novolog and notes below. I called the pharmacy spoke with Lily and she said for part B the DX must be on the face of the prescription (cannot take verbally)    Request re send epresrcibe to include DX     Pt out of medication.  Need asap    Pt request return call when complete

## 2018-01-17 NOTE — TELEPHONE ENCOUNTER
Last OV 12/27/2017  Last OV 3/28/2018  Patient states he needs to have his medication sent tot [facility] because he now has [facility].

## 2018-01-17 NOTE — TELEPHONE ENCOUNTER
Pt is completely out of insulin he has none please advise [phone].  Pharmacy is [facility] dr. Pranay Pedro states he has a pump so he needs asap

## 2018-01-17 NOTE — TELEPHONE ENCOUNTER
Pharmacy called stating insurance will only cover Humalog for pt  They wont cover Novolog please advise

## 2018-01-17 NOTE — TELEPHONE ENCOUNTER
Insurance company called on this patient stating they don't do novolog they will cover humalog please send a rx over for that rx.  Pt wants it to [facility] to [facility]

## 2018-01-19 DIAGNOSIS — G63 POLYNEUROPATHY ASSOCIATED WITH UNDERLYING DISEASE (HCC): ICD-10-CM

## 2018-01-19 RX ORDER — TRAMADOL HYDROCHLORIDE 50 MG/1
TABLET ORAL
Qty: 180 TAB | Refills: 2 | Status: SHIPPED | OUTPATIENT
Start: 2018-01-19 | End: 2018-09-13 | Stop reason: SDUPTHER

## 2018-02-06 NOTE — PROGRESS NOTES
[facility]  [address]  [place], [address]  [phone] fax    Physical Therapy Discharge Summary    Patient name: Santa Alvares Start of Care: 2017   Referral source: Leonor Miranda : 1960                         Medical Diagnosis: Right leg pain [M79.604] Onset Date:2017                         Treatment Diagnosis: right gastroc ressection   Prior Hospitalization: see medical history Provider#: [id number]   Medications: Verified on Patient summary List    Comorbidities: DM, multiple foot surgeries secondary to complication from diabetic neuropathy   Prior Level of Function: Able to ambulate without gait deficits or balance difficulty despite having neuropathy. Visits from Start of Care: 5    Missed Visits: 3  Reporting Period : 2017 to 2017    Summary of Care:  Pt still reports poor gastroc strength at this time. He has good pain tolerance at this time and attempted to gradually progress standing activities    Progress towards goals  Short Term Goals: To be accomplished in 2 weeks:  1. Pt will be compliant with HEP to increase ankle ROM for ADLs - Pt reports HEP compliance. 10/18/2017  2.  Pt will improve right ankle figure 8 measurement to <60 cm to improve effusion for better ankle ROM  Long Term Goals: To be accomplished in 4 weeks:  1.  Pt will improve FOTO to 58 to increase ease of ADLs progressed to 56 17  2.  Pt will increase right ankle DF, EV and PF strength to 3+/5 to improve gait and balance  3.  Pt will hold rhomberg on foam with EC for 30 seconds without LOB outside of NAVARRO to increase ease of ADLs - Rhomberg on airex foam EC for 30\" without LOB. 10/18/2017    G-Codes (GP)  Mobility    Goal  CK= 40-59%  D/C  CK= 40-59%      The severity rating is based on clinical judgment and the FOTO score.     ASSESSMENT/RECOMMENDATIONS:  [x]Discontinue therapy: [x]Patient has reached or is progressing toward set goals      []Patient is non-compliant or has abdicated      []Due to lack of appreciable progress towards set [address] Car, PT 2/6/2018 12:57 PM

## 2018-02-08 ENCOUNTER — OFFICE VISIT (OUTPATIENT)
Dept: FAMILY MEDICINE CLINIC | Age: 58
End: 2018-02-08

## 2018-02-08 DIAGNOSIS — E10.42 TYPE 1 DIABETES MELLITUS WITH DIABETIC POLYNEUROPATHY (HCC): Primary | ICD-10-CM
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
## 2018-02-08 NOTE — Clinical Note
Thanks for the referral and let me know if I can be any help moving forward.  He mentioned he was going to follow up with his endocrinologist to hopefully work on adjusting the pump settings

## 2018-02-09 ENCOUNTER — HOSPITAL ENCOUNTER (OUTPATIENT)
Dept: NUTRITION | Age: 58
Discharge: HOME | End: 2018-02-09
Payer: MEDICARE

## 2018-02-09 PROCEDURE — 97802 MEDICAL NUTRITION INDIV IN: CPT

## 2018-02-09 NOTE — PROGRESS NOTES
[address] [place] [address], [address] [place] [address], [address] [facility] [address]  Phone: [phone]  Fax: [phone]   Nutrition Assessment  Medical Nutrition Therapy   Outpatient Initial Evaluation         Patient Name: Dionicio Randall : 1960   Treatment Diagnosis: T1DM   Referral Source: Kayla Salcedo MD [facility]): 2018     Gender: male Age: 62 y.o. Ht: 73 In Wt: 195 lb  kg   BMI: 25.7 BMR   Male 200 BMR Female    Anthropometrics Assessment: Moderate abdominal adiposity is evident based on visual observation     Past Medical History includes: Pt was diagnosed with T1DM at age 25. Pertinent Medications:   Humalog, Lipitor, MVI, Fish Oil      Biochemical Data:   Lab Results   Component Value Date/Time    Hemoglobin A1c 10.3 (H) 2017 12:00 AM    Hemoglobin A1c (POC) 9.1 2015 09:54 AM    Hemoglobin A1c, External 9.3 12/10/2015     Lab Results   Component Value Date/Time    Cholesterol, total 132 2017 12:00 AM    HDL Cholesterol 51 2017 12:00 AM    LDL, calculated 67 2017 12:00 AM    VLDL, calculated 14 2017 12:00 AM    Triglyceride 68 2017 12:00 AM    CHOL/HDL Ratio 1.7 2015 08:40 AM     Lab Results   Component Value Date/Time    ALT (SGPT) 19 2017 12:00 AM    AST (SGOT) 13 2017 12:00 AM    Alk. phosphatase 110 2017 12:00 AM    Bilirubin, total 0.4 2017 12:00 AM     Lab Results   Component Value Date/Time    Creatinine 1.12 2017 12:00 AM     Lab Results   Component Value Date/Time    BUN 10 2017 12:00 AM     Lab Results   Component Value Date/Time    Microalbumin/Creat ratio (mg/g creat) 46 2015 08:40 AM    Microalb/Creat ratio (ug/mg creat.) 17.9 2017 12:00 AM    Microalbumin,urine random 4.10 2015 08:40 AM        Subjective/Assessment:   Pt being seen today for BG management.  He is on an insulin pump, which matches his CHO intake to units of insulin. A1c 10.3 indicates uncontrolled BG. Pt wanted to discuss CHO counting and portion control. He has been on disability for the past 4 years and lives at home with his wife. Pt does the majority of the cooking/meal prep. He has not been consistent with accurately counting his CHO intake, which has effected his insulin dosage, causing it to be too high or low. No current exercise regime, although he has a gym membership. Pt was receptive to education; verbalized understanding, expect compliance. Will follow up in one month to address questions/concerns. Current Eating Patterns: Pt often eats out at fast-food restaurants, 3-4 times/week. He currently drinks regular soda but wants to cut this out completely, replacing it with either diet soda or water. He drinks 1 16 fl oz bottle of water daily. B: eggs, marlow, honey wheat toast w/ butter, coffee (creamer, agave syrup, Splenda)  L: fast-food, double stack burger and [employer] fries  D: varies, depending on how he feels; spaghetti, pizza, etc.      Estimate Needs   Calories: 1900  Protein: 171 Carbs: 162 Fat: 63   Kcal/day  g/day  g/day  g/day        percent: 36  34  30               Education & Recommendations provided: Reviewed carbohydrate food sources, counting carbohydrates, label reading, meal timing, and appropriate serving sizes. Encouraged pt to avoid sugary beverages and replace with non-caloric beverages.     Handouts Provided: [x]  Carbohydrates  []  Protein  []  Fiber  [x]  Serving Sizes  []  Meal and Snack Ideas  []  Food Journals []  Diabetes  []  Cholesterol  []  Sodium  []  Gen Nutr Guidelines  []  SBGM Guidelines  []  Others:   Information Reviewed with: Pt   Readiness to Change Stage: []  Pre-contemplative    []  Contemplative  [x]  Preparation               []  Action                  []  Maintenance   Potential Barriers to Learning: []  Decline in memory    []  Language barrier   []  Other:  []  Emotional                  []  Limited mobility  []  Lack of motivation     [] Vision, hearing or cognitive impairment   Expected Compliance: Good     Nutritional Goal - To promote lifestyle changes to result in:    []  Weight loss  [x]  Improved diabetic control  []  Decreased cholesterol levels  []  Decreased blood pressure  []  Weight maintenance []  Preventing any interactions associated with food allergies  []  Adequate weight gain toward goal weight  []  Other:        Patient Goals:  SMART goals 1. Carb Counting: Use Nutrition Facts Label, CHO Counting list, and online menus to track CHO amount for accuracy. 2. Replace regular soda with water or diet soda. Goal of drinking 3, 16 fl oz water bottles daily.       Dietitian Signature: Berenice Alvarez RDN Date: 2/9/2018   Follow-up: March 8 at 0830 Time: 8:27 AM

## 2018-02-13 ENCOUNTER — TELEPHONE (OUTPATIENT)
Dept: FAMILY MEDICINE CLINIC | Age: 58
End: 2018-02-13

## 2018-02-13 NOTE — TELEPHONE ENCOUNTER
Patient states that [facility] was sending over a script for his Insulin Pump Quik set. He wants to make sure they have and it has been sent back as he is running low. Please advis [phone].

## 2018-02-13 NOTE — TELEPHONE ENCOUNTER
Patient also needs to have the nurse call [facility] in regard to his reservoir size and his frequency of using the [facility] set. Per patient he changes it every 3 days. [facility] [phone].

## 2018-02-13 NOTE — TELEPHONE ENCOUNTER
Spoke with pharmacist and he is aware ok to fill order for reservoir and will send order for Insulin pump Quick set.

## 2018-02-14 DIAGNOSIS — G63 POLYNEUROPATHY ASSOCIATED WITH UNDERLYING DISEASE (HCC): ICD-10-CM

## 2018-02-14 PROBLEM — N52.9 ED (ERECTILE DYSFUNCTION) OF ORGANIC ORIGIN: Status: ACTIVE | Noted: 2018-02-14

## 2018-02-14 RX ORDER — TRAMADOL HYDROCHLORIDE 50 MG/1
TABLET ORAL
Qty: 180 TAB | Refills: 2 | Status: CANCELLED | OUTPATIENT
Start: 2018-02-14

## 2018-02-14 NOTE — TELEPHONE ENCOUNTER
Left message for patient prescription was sent to [facility]. Any questions call the office.

## 2018-02-14 NOTE — TELEPHONE ENCOUNTER
Pt called and said that he requested a refill on Tramadol which was confirmed to be completed on 1/19 but he refuses to  from office because Dr. Vj Walker always sends them through mail order. Pt would like to speak with Musa Sanchez.

## 2018-02-15 ENCOUNTER — TELEPHONE (OUTPATIENT)
Dept: FAMILY MEDICINE CLINIC | Age: 58
End: 2018-02-15

## 2018-02-15 NOTE — TELEPHONE ENCOUNTER
Received fax from [employer] asking for medical clarification on RX faxed 2/14/18 for Tramadol. Order# [id number]  Tramadol 50mg 1 tablet every 6 hours as needed for pain. 180tabs/2 refill.   Verified RX with [facility].

## 2018-02-16 ENCOUNTER — TELEPHONE (OUTPATIENT)
Dept: FAMILY MEDICINE CLINIC | Age: 58
End: 2018-02-16

## 2018-02-16 NOTE — TELEPHONE ENCOUNTER
Patient called in to check on the status of his quickset refill and stated that he really need this done today. Patient is asking that someone call him back. Please advise.

## 2018-03-05 ENCOUNTER — TELEPHONE (OUTPATIENT)
Dept: FAMILY MEDICINE CLINIC | Age: 58
End: 2018-03-05

## 2018-03-05 NOTE — TELEPHONE ENCOUNTER
Left message for patient an appointment is needed. Patient has been scheduled for 3/7/2018 at 0:30 am with Dr. Abel Banuelos if patient is unable to make the appointment please call the office.

## 2018-03-05 NOTE — TELEPHONE ENCOUNTER
He needs to come in to see Dr. Qasim Macias and then his BP meds can be adjusted if warranted during that visit.

## 2018-03-07 ENCOUNTER — OFFICE VISIT (OUTPATIENT)
Dept: FAMILY MEDICINE CLINIC | Age: 58
End: 2018-03-07

## 2018-03-07 VITALS
HEART RATE: 86 BPM | HEIGHT: 73 IN | DIASTOLIC BLOOD PRESSURE: 79 MMHG | OXYGEN SATURATION: 100 % | RESPIRATION RATE: 20 BRPM | SYSTOLIC BLOOD PRESSURE: 162 MMHG | WEIGHT: 193 LBS | TEMPERATURE: 98.7 F | BODY MASS INDEX: 25.58 KG/M2

## 2018-03-07 DIAGNOSIS — I10 ESSENTIAL HYPERTENSION: Primary | ICD-10-CM

## 2018-03-07 RX ORDER — INFUSION SET FOR INSULIN PUMP
INFUSION SETS-PARAPHERNALIA MISCELLANEOUS
COMMUNITY
Start: 2018-02-19 | End: 2018-09-11 | Stop reason: SDUPTHER

## 2018-03-07 RX ORDER — LISINOPRIL 20 MG/1
20 TABLET ORAL DAILY
Qty: 90 TAB | Refills: 3 | Status: SHIPPED | OUTPATIENT
Start: 2018-03-07 | End: 2019-01-02 | Stop reason: SDUPTHER

## 2018-03-07 NOTE — PATIENT INSTRUCTIONS
High Blood Pressure: Care Instructions  Your Care Instructions    If your blood pressure is usually above 140/90, you have high blood pressure, or hypertension. That means the top number is 140 or higher or the bottom number is 90 or higher, or both. Despite what a lot of people think, high blood pressure usually doesn't cause headaches or make you feel dizzy or lightheaded. It usually has no symptoms. But it does increase your risk for heart attack, stroke, and kidney or eye damage. The higher your blood pressure, the more your risk increases. Your doctor will give you a goal for your blood pressure. Your goal will be based on your health and your age. An example of a goal is to keep your blood pressure below 140/90. Lifestyle changes, such as eating healthy and being active, are always important to help lower blood pressure. You might also take medicine to reach your blood pressure goal.  Follow-up care is a key part of your treatment and safety. Be sure to make and go to all appointments, and call your doctor if you are having problems. It's also a good idea to know your test results and keep a list of the medicines you take. How can you care for yourself at home? Medical treatment  · If you stop taking your medicine, your blood pressure will go back up. You may take one or more types of medicine to lower your blood pressure. Be safe with medicines. Take your medicine exactly as prescribed. Call your doctor if you think you are having a problem with your medicine. · Talk to your doctor before you start taking aspirin every day. Aspirin can help certain people lower their risk of a heart attack or stroke. But taking aspirin isn't right for everyone, because it can cause serious bleeding. · See your doctor regularly. You may need to see the doctor more often at first or until your blood pressure comes down.   · If you are taking blood pressure medicine, talk to your doctor before you take decongestants or anti-inflammatory medicine, such as ibuprofen. Some of these medicines can raise blood pressure. · Learn how to check your blood pressure at home. Lifestyle changes  · Stay at a healthy weight. This is especially important if you put on weight around the waist. Losing even 10 pounds can help you lower your blood pressure. · If your doctor recommends it, get more exercise. Walking is a good choice. Bit by bit, increase the amount you walk every day. Try for at least 30 minutes on most days of the week. You also may want to swim, bike, or do other activities. · Avoid or limit alcohol. Talk to your doctor about whether you can drink any alcohol. · Try to limit how much sodium you eat to less than 2,300 milligrams (mg) a day. Your doctor may ask you to try to eat less than 1,500 mg a day. · Eat plenty of fruits (such as bananas and oranges), vegetables, legumes, whole grains, and low-fat dairy products. · Lower the amount of saturated fat in your diet. Saturated fat is found in animal products such as milk, cheese, and meat. Limiting these foods may help you lose weight and also lower your risk for heart disease. · Do not smoke. Smoking increases your risk for heart attack and stroke. If you need help quitting, talk to your doctor about stop-smoking programs and medicines. These can increase your chances of quitting for good. When should you call for help? Call 911 anytime you think you may need emergency care. This may mean having symptoms that suggest that your blood pressure is causing a serious heart or blood vessel problem. Your blood pressure may be over 180/110. ? For example, call 911 if:  ? · You have symptoms of a heart attack. These may include:  ¨ Chest pain or pressure, or a strange feeling in the chest.  ¨ Sweating. ¨ Shortness of breath. ¨ Nausea or vomiting.   ¨ Pain, pressure, or a strange feeling in the back, neck, jaw, or upper belly or in one or both shoulders or arms.  ¨ Lightheadedness or sudden weakness. ¨ A fast or irregular heartbeat. ? · You have symptoms of a stroke. These may include:  ¨ Sudden numbness, tingling, weakness, or loss of movement in your face, arm, or leg, especially on only one side of your body. ¨ Sudden vision changes. ¨ Sudden trouble speaking. ¨ Sudden confusion or trouble understanding simple statements. ¨ Sudden problems with walking or balance. ¨ A sudden, severe headache that is different from past headaches. ? · You have severe back or belly pain. ?Do not wait until your blood pressure comes down on its own. Get help right away. ?Call your doctor now or seek immediate care if:  ? · Your blood pressure is much higher than normal (such as 180/110 or higher), but you don't have symptoms. ? · You think high blood pressure is causing symptoms, such as:  ¨ Severe headache. ¨ Blurry vision. ? Watch closely for changes in your health, and be sure to contact your doctor if:  ? · Your blood pressure measures 140/90 or higher at least 2 times. That means the top number is 140 or higher or the bottom number is 90 or higher, or both. ? · You think you may be having side effects from your blood pressure medicine. ? · Your blood pressure is usually normal, but it goes above normal at least 2 times. Where can you learn more? Go to http://freida-khloe.info/. Enter D831 in the search box to learn more about \"High Blood Pressure: Care Instructions. \"  Current as of: September 21, 2016  Content Version: 11.4  © 8995-1707 [employer]. Care instructions adapted under license by [employer] (which disclaims liability or warranty for this information). If you have questions about a medical condition or this instruction, always ask your healthcare professional. [place] [address] any warranty or liability for your use of this information.

## 2018-03-07 NOTE — MR AVS SNAPSHOT
[facility] 
 
 
 [address] [place] [id number] [address] 
[phone] Patient: Bladimir Babin. MRN: [id number] Elidia Wyatt Visit Information Date & Time Provider Department Dept. Phone Encounter #  
 3/7/2018  1:45 PM Rachel Macias, [address] [place] [address] [phone] [id number] Follow-up Instructions Return if symptoms worsen or fail to improve. Your Appointments 3/7/2018  1:45 PM  
Office Visit with Rachel Macias MD  
[facility]) Appt Note: elevated blood pressure; .  
 [address], [address] [address]  
[phone]  
  
   
 [address], [facility] [place] [address]  
  
    
 3/21/2018  7:20 AM  
LAB with [facility] ([facility]) Appt Note: 3 mo f/u lab [address] [place] [address] [address] [address]  
[phone]  
  
   
 [address] [facility]  
  
    
 3/28/2018  9:15 AM  
Office Visit with Rachel Macias MD  
[facility]) Appt Note: 3 mo f/u  
 [address], [address] [address]  
[phone] Upcoming Health Maintenance Date Due  
 FOOT EXAM Q1 5/8/2018 EYE EXAM RETINAL OR DILATED Q1 6/15/2018 HEMOGLOBIN A1C Q6M 6/22/2018 MICROALBUMIN Q1 12/22/2018 LIPID PANEL Q1 12/22/2018 MEDICARE YEARLY EXAM 12/28/2018 COLONOSCOPY 2/24/2019 DTaP/Tdap/Td series (2 - Td) 10/25/2025 Allergies as of 3/7/2018  Review Complete On: 3/7/2018 By: Arturo Flores LPN No Known Allergies Current Immunizations  Reviewed on 9/27/2017 Name Date Influenza Vaccine (Quad) PF 9/27/2017, 12/21/2016, 11/13/2015 Pneumococcal Polysaccharide (PPSV-23) 11/13/2015 Tdap 10/25/2015  7:48 PM  
  
 Not reviewed this visit You Were Diagnosed With   
  
 Codes Comments Essential hypertension    -  Primary ICD-10-CM: I10 
ICD-9-CM: 401.9 Vitals BP Pulse Temp Resp Height(growth percentile) Weight(growth percentile) 162/79 (BP 1 Location: Left arm, BP Patient Position: Sitting) 86 98.7 °F (37.1 °C) (Oral) 20 6' 1\" (1.854 m) 193 lb (87.5 kg) SpO2 BMI Smoking Status 100% 25.46 kg/m2 Never Smoker Vitals History BMI and BSA Data Body Mass Index Body Surface Area  
 25.46 kg/m 2 2.12 m 2 Preferred Pharmacy Pharmacy Name Phone [facility], [address] [place] [address] [address]aires Your Updated Medication List  
  
   
This list is accurate as of 3/7/18  1:44 PM.  Always use your most recent med list.  
  
  
  
  
 aspirin delayed-release 81 mg tablet Take  by mouth daily. atorvastatin 20 mg tablet Commonly known as:  LIPITOR Take 1 Tab by mouth daily. CONTOUR NEXT STRIPS strip Generic drug:  glucose blood VI test strips CHECK BLOOD SUGAR 8 TIMES A DAY DUE TO INSULIN PUMP AND LABILE BLOOD SUGAR  
  
 FISH OIL 1,000 mg Cap Generic drug:  omega-3 fatty acids-vitamin e Take 1 Cap by mouth. insulin lispro 100 unit/mL injection Commonly known as:  HUMALOG 38 units daily for insulin pump. DX E10.42  
  
 lisinopril 20 mg tablet Commonly known as:  [employer] Take 1 Tab by mouth daily. [facility] Generic drug:  Lancets MINIMED INFUSION SET Iset Generic drug:  Infusion Set for Insulin Pump  
  
 multivitamin tablet Commonly known as:  ONE A DAY Take 1 Tab by mouth daily. oxybutynin 5 mg tablet Commonly known as:  GDTAIEWF Take 1 Tab by mouth two (2) times a day. pantoprazole 40 mg tablet Commonly known as:  PROTONIX  
TAKE ONE TABLET BY MOUTH ONCE DAILY PARADIGM RESERVOIR 3 mL Misc Generic drug:  Insulin Pump Syringe * sildenafil (antihypertensive) 20 mg tablet Commonly known as:  REVATIO  
TAKE TWO TO FIVE TABLETS BY MOUTH 1 HOUR PRIOR TO INTERCOURSE  
  
 * sildenafil (antihypertensive) 20 mg tablet Commonly known as:  REVATIO Take 1 Tab by mouth as needed. traMADol 50 mg tablet Commonly known as:  ULTRAM  
TAKE 1 TABLET EVERY 6 HOURS AS NEEDED FOR PAIN  
  
 * Notice: This list has 2 medication(s) that are the same as other medications prescribed for you. Read the directions carefully, and ask your doctor or other care provider to review them with you. Prescriptions Sent to Pharmacy Refills  
 lisinopril (PRINIVIL, ZESTRIL) 20 mg tablet 3 Sig: Take 1 Tab by mouth daily. Class: Normal  
 Pharmacy: [address]  #: [phone] Route: Oral  
  
Follow-up Instructions Return if symptoms worsen or fail to improve. To-Do List   
 03/08/2018 8:30 AM  
  Appointment with Jewel Delgadillo at [facility] Patient Instructions High Blood Pressure: Care Instructions Your Care Instructions If your blood pressure is usually above 140/90, you have high blood pressure, or hypertension. That means the top number is 140 or higher or the bottom number is 90 or higher, or both. Despite what a lot of people think, high blood pressure usually doesn't cause headaches or make you feel dizzy or lightheaded. It usually has no symptoms. But it does increase your risk for heart attack, stroke, and kidney or eye damage. The higher your blood pressure, the more your risk increases. Your doctor will give you a goal for your blood pressure. Your goal will be based on your health and your age. An example of a goal is to keep your blood pressure below 140/90. Lifestyle changes, such as eating healthy and being active, are always important to help lower blood pressure. You might also take medicine to reach your blood pressure goal. 
Follow-up care is a key part of your treatment and safety.  Be sure to make and go to all appointments, and call your doctor if you are having problems. It's also a good idea to know your test results and keep a list of the medicines you take. How can you care for yourself at home? Medical treatment · If you stop taking your medicine, your blood pressure will go back up. You may take one or more types of medicine to lower your blood pressure. Be safe with medicines. Take your medicine exactly as prescribed. Call your doctor if you think you are having a problem with your medicine. · Talk to your doctor before you start taking aspirin every day. Aspirin can help certain people lower their risk of a heart attack or stroke. But taking aspirin isn't right for everyone, because it can cause serious bleeding. · See your doctor regularly. You may need to see the doctor more often at first or until your blood pressure comes down. · If you are taking blood pressure medicine, talk to your doctor before you take decongestants or anti-inflammatory medicine, such as ibuprofen. Some of these medicines can raise blood pressure. · Learn how to check your blood pressure at home. Lifestyle changes · Stay at a healthy weight. This is especially important if you put on weight around the waist. Losing even 10 pounds can help you lower your blood pressure. · If your doctor recommends it, get more exercise. Walking is a good choice. Bit by bit, increase the amount you walk every day. Try for at least 30 minutes on most days of the week. You also may want to swim, bike, or do other activities. · Avoid or limit alcohol. Talk to your doctor about whether you can drink any alcohol. · Try to limit how much sodium you eat to less than 2,300 milligrams (mg) a day. Your doctor may ask you to try to eat less than 1,500 mg a day. · Eat plenty of fruits (such as bananas and oranges), vegetables, legumes, whole grains, and low-fat dairy products. · Lower the amount of saturated fat in your diet.  Saturated fat is found in animal products such as milk, cheese, and meat. Limiting these foods may help you lose weight and also lower your risk for heart disease. · Do not smoke. Smoking increases your risk for heart attack and stroke. If you need help quitting, talk to your doctor about stop-smoking programs and medicines. These can increase your chances of quitting for good. When should you call for help? Call 911 anytime you think you may need emergency care. This may mean having symptoms that suggest that your blood pressure is causing a serious heart or blood vessel problem. Your blood pressure may be over 180/110. ? For example, call 911 if: 
? · You have symptoms of a heart attack. These may include: ¨ Chest pain or pressure, or a strange feeling in the chest. 
¨ Sweating. ¨ Shortness of breath. ¨ Nausea or vomiting. ¨ Pain, pressure, or a strange feeling in the back, neck, jaw, or upper belly or in one or both shoulders or arms. ¨ Lightheadedness or sudden weakness. ¨ A fast or irregular heartbeat. ? · You have symptoms of a stroke. These may include: 
¨ Sudden numbness, tingling, weakness, or loss of movement in your face, arm, or leg, especially on only one side of your body. ¨ Sudden vision changes. ¨ Sudden trouble speaking. ¨ Sudden confusion or trouble understanding simple statements. ¨ Sudden problems with walking or balance. ¨ A sudden, severe headache that is different from past headaches. ? · You have severe back or belly pain. ?Do not wait until your blood pressure comes down on its own. Get help right away. ?Call your doctor now or seek immediate care if: 
? · Your blood pressure is much higher than normal (such as 180/110 or higher), but you don't have symptoms. ? · You think high blood pressure is causing symptoms, such as: ¨ Severe headache. ¨ Blurry vision. ? Watch closely for changes in your health, and be sure to contact your doctor if: ? · Your blood pressure measures 140/90 or higher at least 2 times. That means the top number is 140 or higher or the bottom number is 90 or higher, or both. ? · You think you may be having side effects from your blood pressure medicine. ? · Your blood pressure is usually normal, but it goes above normal at least 2 times. Where can you learn more? Go to http://freida-khloe.info/. Enter D094 in the search box to learn more about \"High Blood Pressure: Care Instructions. \" Current as of: September 21, 2016 Content Version: 11.4 © 6977-6201 [employer]. Care instructions adapted under license by [employer] (which disclaims liability or warranty for this information). If you have questions about a medical condition or this instruction, always ask your healthcare professional. [address] any warranty or liability for your use of this information. Introducing [facility]! Dear Noelle Zamoraon: Thank you for requesting a [employer] account. Our records indicate that you already have an active [employer]ions account. You can access your account anytime at [email or web address] Did you know that you can access your hospital and ER discharge instructions at any time in Legions? You can also review all of your test results from your hospital stay or ER visit. Additional Information If you have questions, please visit the Frequently Asked Questions section of the Legions website at [email or web address] Remember, Legions is NOT to be used for urgent needs. For medical emergencies, dial 911. Now available from your iPhone and Android! Please provide this summary of care documentation to your next provider. Your primary care clinician is listed as EMORY ZELAYA. If you have any questions after today's visit, please call [phone].

## 2018-03-07 NOTE — PROGRESS NOTES
Patient is in the office today for elevated blood pressure. 1. Have you been to the ER, urgent care clinic since your last visit? Hospitalized since your last visit? No    2. Have you seen or consulted any other health care providers outside of the [address] [place] [address] since your last visit? Include any pap smears or colon screening.  No

## 2018-03-18 NOTE — PROGRESS NOTES
Jaleesa García is a 62 y.o.  male and presents with Hypertension      SUBJECTIVE:  Pt's HTN has been uncontrolled over he last few weeks on lisinopril 5 mg. Pt was on Norvasc in the past. She denies any dizziness or headaches. Respiratory ROS: negative for - shortness of breath  Cardiovascular ROS: negative for - chest pain    Current Outpatient Prescriptions   Medication Sig    MINIMED INFUSION SET iset     lisinopril (PRINIVIL, ZESTRIL) 20 mg tablet Take 1 Tab by mouth daily.  PARADIGM RESERVOIR 3 mL misc     sildenafil, antihypertensive, (REVATIO) 20 mg tablet Take 1 Tab by mouth as needed.  atorvastatin (LIPITOR) 20 mg tablet Take 1 Tab by mouth daily.  oxybutynin (DITROPAN) 5 mg tablet Take 1 Tab by mouth two (2) times a day.  pantoprazole (PROTONIX) 40 mg tablet TAKE ONE TABLET BY MOUTH ONCE DAILY    insulin lispro (HUMALOG) 100 unit/mL injection 38 units daily for insulin pump. DX E10.42    sildenafil, antihypertensive, (REVATIO) 20 mg tablet TAKE TWO TO FIVE TABLETS BY MOUTH 1 HOUR PRIOR TO INTERCOURSE    aspirin delayed-release 81 mg tablet Take  by mouth daily.  CONTOUR NEXT STRIPS strip CHECK BLOOD SUGAR 8 TIMES A DAY DUE TO INSULIN PUMP AND LABILE BLOOD SUGAR    MICROLET LANCET misc     omega-3 fatty acids-vitamin e (FISH OIL) 1,000 mg cap Take 1 Cap by mouth.  multivitamin (ONE A DAY) tablet Take 1 Tab by mouth daily.  traMADol (ULTRAM) 50 mg tablet TAKE 1 TABLET EVERY 6 HOURS AS NEEDED FOR PAIN     No current facility-administered medications for this visit.           OBJECTIVE:  alert, well appearing, and in no distress  Visit Vitals    /79 (BP 1 Location: Left arm, BP Patient Position: Sitting)    Pulse 86    Temp 98.7 °F (37.1 °C) (Oral)    Resp 20    Ht 6' 1\" (1.854 m)    Wt 193 lb (87.5 kg)    SpO2 100%    BMI 25.46 kg/m2      well developed and well nourished      Labs:   Lab Results   Component Value Date/Time    Sodium 143 12/22/2017 12:00 AM    Potassium 5.2 12/22/2017 12:00 AM    Chloride 103 12/22/2017 12:00 AM    CO2 29 12/22/2017 12:00 AM    Anion gap 6 12/04/2015 08:40 AM    Glucose 158 (H) 12/22/2017 12:00 AM    BUN 10 12/22/2017 12:00 AM    Creatinine 1.12 12/22/2017 12:00 AM    BUN/Creatinine ratio 9 12/22/2017 12:00 AM    GFR est AA 84 12/22/2017 12:00 AM    GFR est non-AA 73 12/22/2017 12:00 AM    Calcium 10.3 (H) 12/22/2017 12:00 AM    Bilirubin, total 0.4 12/22/2017 12:00 AM    ALT (SGPT) 19 12/22/2017 12:00 AM    AST (SGOT) 13 12/22/2017 12:00 AM    Alk. phosphatase 110 12/22/2017 12:00 AM    Protein, total 6.6 12/22/2017 12:00 AM    Albumin 4.1 12/22/2017 12:00 AM    Globulin 3.2 12/04/2015 08:40 AM    A-G Ratio 1.6 12/22/2017 12:00 AM        Assessment/Plan      ICD-10-CM ICD-9-CM    1. Essential hypertension I10 401.9 Uncontrolled. Will increaseto lisinopril (PRINIVIL, ZESTRIL) 20 mg tablet     Follow-up Disposition:  Return if symptoms worsen or fail to improve. Reviewed plan of care. Patient has provided input and agrees with goals.

## 2018-03-21 ENCOUNTER — HOSPITAL ENCOUNTER (OUTPATIENT)
Dept: LAB | Age: 58
Discharge: HOME | End: 2018-03-21
Payer: MEDICARE

## 2018-03-21 DIAGNOSIS — E78.5 DYSLIPIDEMIA: ICD-10-CM

## 2018-03-21 DIAGNOSIS — I11.9 BENIGN HYPERTENSIVE HEART DISEASE WITHOUT HEART FAILURE: ICD-10-CM

## 2018-03-21 DIAGNOSIS — E10.42 TYPE 1 DIABETES MELLITUS WITH DIABETIC POLYNEUROPATHY (HCC): ICD-10-CM

## 2018-03-21 DIAGNOSIS — Z12.5 SCREENING PSA (PROSTATE SPECIFIC ANTIGEN): ICD-10-CM

## 2018-03-21 LAB
ALBUMIN SERPL-MCNC: 3.7 G/DL (ref 3.4–5)
ALBUMIN/GLOB SERPL: 1.2 {RATIO} (ref 0.8–1.7)
ALP SERPL-CCNC: 113 U/L (ref 45–117)
ALT SERPL-CCNC: 24 U/L (ref 16–61)
ANION GAP SERPL CALC-SCNC: 4 MMOL/L (ref 3–18)
AST SERPL-CCNC: 13 U/L (ref 15–37)
BILIRUB SERPL-MCNC: 0.5 MG/DL (ref 0.2–1)
BUN SERPL-MCNC: 15 MG/DL (ref 7–18)
BUN/CREAT SERPL: 12 (ref 12–20)
CALCIUM SERPL-MCNC: 10 MG/DL (ref 8.5–10.1)
CHLORIDE SERPL-SCNC: 106 MMOL/L (ref 100–108)
CHOLEST SERPL-MCNC: 136 MG/DL
CO2 SERPL-SCNC: 31 MMOL/L (ref 21–32)
CREAT SERPL-MCNC: 1.24 MG/DL (ref 0.6–1.3)
GLOBULIN SER CALC-MCNC: 3.2 G/DL (ref 2–4)
GLUCOSE SERPL-MCNC: 202 MG/DL (ref 74–99)
HBA1C MFR BLD: 9.7 % (ref 4.2–5.6)
HDLC SERPL-MCNC: 64 MG/DL (ref 40–60)
HDLC SERPL: 2.1 {RATIO} (ref 0–5)
LDLC SERPL CALC-MCNC: 61 MG/DL (ref 0–100)
LIPID PROFILE,FLP: ABNORMAL
POTASSIUM SERPL-SCNC: 5 MMOL/L (ref 3.5–5.5)
PROT SERPL-MCNC: 6.9 G/DL (ref 6.4–8.2)
PSA SERPL-MCNC: 0.3 NG/ML (ref 0–4)
SODIUM SERPL-SCNC: 141 MMOL/L (ref 136–145)
TRIGL SERPL-MCNC: 55 MG/DL (ref ?–150)
VLDLC SERPL CALC-MCNC: 11 MG/DL

## 2018-03-21 PROCEDURE — 80061 LIPID PANEL: CPT | Performed by: INTERNAL MEDICINE

## 2018-03-21 PROCEDURE — 36415 COLL VENOUS BLD VENIPUNCTURE: CPT | Performed by: INTERNAL MEDICINE

## 2018-03-21 PROCEDURE — 80053 COMPREHEN METABOLIC PANEL: CPT | Performed by: INTERNAL MEDICINE

## 2018-03-21 PROCEDURE — 83036 HEMOGLOBIN GLYCOSYLATED A1C: CPT | Performed by: INTERNAL MEDICINE

## 2018-03-21 PROCEDURE — 84153 ASSAY OF PSA TOTAL: CPT | Performed by: INTERNAL MEDICINE

## 2018-03-28 ENCOUNTER — OFFICE VISIT (OUTPATIENT)
Dept: FAMILY MEDICINE CLINIC | Age: 58
End: 2018-03-28

## 2018-03-28 ENCOUNTER — TELEPHONE (OUTPATIENT)
Dept: FAMILY MEDICINE CLINIC | Age: 58
End: 2018-03-28

## 2018-03-28 VITALS
OXYGEN SATURATION: 98 % | TEMPERATURE: 98.9 F | BODY MASS INDEX: 25.98 KG/M2 | HEIGHT: 73 IN | WEIGHT: 196 LBS | HEART RATE: 75 BPM | SYSTOLIC BLOOD PRESSURE: 171 MMHG | DIASTOLIC BLOOD PRESSURE: 68 MMHG | RESPIRATION RATE: 18 BRPM

## 2018-03-28 DIAGNOSIS — I11.9 BENIGN HYPERTENSIVE HEART DISEASE WITHOUT HEART FAILURE: ICD-10-CM

## 2018-03-28 DIAGNOSIS — E78.5 DYSLIPIDEMIA: ICD-10-CM

## 2018-03-28 DIAGNOSIS — E10.42 TYPE 1 DIABETES MELLITUS WITH DIABETIC POLYNEUROPATHY (HCC): Primary | ICD-10-CM

## 2018-03-28 RX ORDER — AMLODIPINE BESYLATE 5 MG/1
5 TABLET ORAL DAILY
Qty: 90 TAB | Refills: 3 | Status: SHIPPED | OUTPATIENT
Start: 2018-03-28 | End: 2019-01-02 | Stop reason: SDUPTHER

## 2018-03-28 NOTE — PROGRESS NOTES
Subjective:       Chief Complaint  The patient presents for follow up of diabetes, hypertension and high cholesterol. HPI  Dionicio Limon. is a 62 y.o. male seen for follow up of diabetes. Healso has hypertension and hyperlipidemia. Diabetes no significant medication side effects noted, poorly controlled, pt continues on insulin pump and will f/u with Endo for better management he has not been seen in the last year however due to financial reasons, he does not bolus insulin before he eats which is one reason it is uncontrolled, he has low BS because he skips meals, he is afraid of getting low BS and his insurance does not pay for continuous BS monitoring currently, will try again to refer to another Endo which will give diabetic teaching/nutrition,  hypertension borderline controlled on lisinopril 20 mg, will add Norvasc 5 mg to try and get better BP control,  hyperlipidemia well controlled, no significant medication side effects noted, on Lipitor     Diet and Lifestyle: generally follows a low fat low cholesterol diet, does not rigorously follow a diabetic diet, exercises sporadically    Home BP Monitoring: is not measured at home. Diabetic Review of Systems - home glucose monitoring: is performed regularly, 6x/day. Other symptoms and concerns: pt will try to exercise more. He has a diabetic foot ulcer that is slowly healing. He had left foot surgery a few  Months ago and needs o have further surgery. . Pt is candidate for diabetic shoes due to neuropathy and ulcerative callus. He is followed by Podiatry     Current Outpatient Prescriptions   Medication Sig    amLODIPine (NORVASC) 5 mg tablet Take 1 Tab by mouth daily.  MINIMED INFUSION SET iset     lisinopril (PRINIVIL, ZESTRIL) 20 mg tablet Take 1 Tab by mouth daily.  PARADIGM RESERVOIR 3 mL misc     atorvastatin (LIPITOR) 20 mg tablet Take 1 Tab by mouth daily.     oxybutynin (DITROPAN) 5 mg tablet Take 1 Tab by mouth two (2) times a day.  pantoprazole (PROTONIX) 40 mg tablet TAKE ONE TABLET BY MOUTH ONCE DAILY    insulin lispro (HUMALOG) 100 unit/mL injection 38 units daily for insulin pump. DX E10.42    sildenafil, antihypertensive, (REVATIO) 20 mg tablet TAKE TWO TO FIVE TABLETS BY MOUTH 1 HOUR PRIOR TO INTERCOURSE    aspirin delayed-release 81 mg tablet Take  by mouth daily.  CONTOUR NEXT STRIPS strip CHECK BLOOD SUGAR 8 TIMES A DAY DUE TO INSULIN PUMP AND LABILE BLOOD SUGAR    MICROLET LANCET misc     omega-3 fatty acids-vitamin e (FISH OIL) 1,000 mg cap Take 1 Cap by mouth.  multivitamin (ONE A DAY) tablet Take 1 Tab by mouth daily.  sildenafil, antihypertensive, (REVATIO) 20 mg tablet Take 1 Tab by mouth as needed.  traMADol (ULTRAM) 50 mg tablet TAKE 1 TABLET EVERY 6 HOURS AS NEEDED FOR PAIN     No current facility-administered medications for this visit.               Review of Systems  Respiratory: negative for dyspnea on exertion  Cardiovascular: negative for chest pain    Objective:     Visit Vitals    /68 (BP 1 Location: Left arm, BP Patient Position: Sitting)    Pulse 75    Temp 98.9 °F (37.2 °C) (Oral)    Resp 18    Ht 6' 1\" (1.854 m)    Wt 196 lb (88.9 kg)    SpO2 98%    BMI 25.86 kg/m2        General appearance - alert, well appearing, and in no distress  Chest - clear to auscultation, no wheezes, rales or rhonchi, symmetric air entry  Heart - normal rate, regular rhythm, normal S1, S2, no murmurs, rubs, clicks or gallops      Labs:   Lab Results   Component Value Date/Time    Hemoglobin A1c 9.7 (H) 03/21/2018 07:54 AM    Hemoglobin A1c 10.3 (H) 12/22/2017 12:00 AM    Hemoglobin A1c 10.7 (H) 09/20/2017 08:27 AM    Glucose 202 (H) 03/21/2018 07:54 AM    Glucose (POC) 133 (H) 02/24/2016 12:46 PM    Microalbumin/Creat ratio (mg/g creat) 46 12/04/2015 08:40 AM    Microalb/Creat ratio (ug/mg creat.) 17.9 12/22/2017 12:00 AM    Microalbumin,urine random 4.10 12/04/2015 08:40 AM    LDL, calculated 61 03/21/2018 07:54 AM    Creatinine 1.24 03/21/2018 07:54 AM    Hemoglobin A1c, External 9.3 12/10/2015    Hemoglobin A1c, External 8.7% 08/20/2015    Hemoglobin A1c, External 9.5 05/22/2015 07:43 PM      Lab Results   Component Value Date/Time    Cholesterol, total 136 03/21/2018 07:54 AM    HDL Cholesterol 64 (H) 03/21/2018 07:54 AM    LDL, calculated 61 03/21/2018 07:54 AM    Triglyceride 55 03/21/2018 07:54 AM    CHOL/HDL Ratio 2.1 03/21/2018 07:54 AM     Lab Results   Component Value Date/Time    ALT (SGPT) 24 03/21/2018 07:54 AM    AST (SGOT) 13 (L) 03/21/2018 07:54 AM    Alk. phosphatase 113 03/21/2018 07:54 AM    Bilirubin, total 0.5 03/21/2018 07:54 AM     Lab Results   Component Value Date/Time    GFR est AA >60 03/21/2018 07:54 AM    GFR est non-AA >60 03/21/2018 07:54 AM    Creatinine 1.24 03/21/2018 07:54 AM    BUN 15 03/21/2018 07:54 AM    Sodium 141 03/21/2018 07:54 AM    Potassium 5.0 03/21/2018 07:54 AM    Chloride 106 03/21/2018 07:54 AM    CO2 31 03/21/2018 07:54 AM      Lab Results   Component Value Date/Time    Prostate Specific Ag 0.3 03/21/2018 07:54 AM    Prostate Specific Ag 0.4 02/01/2018 10:00 AM    Prostate Specific Ag 0.3 12/21/2016 09:54 AM    Prostate Specific Ag 0.293 03/11/2015 11:12 AM    Prostate Specific Ag 0.251 12/17/2012 03:42 PM    PSA 0.3 09/30/2010 10:09 AM    PSA, FREE 0.2 09/30/2010 10:09 AM    % FREE PSA 67 09/30/2010 10:09 AM     Lab Results   Component Value Date/Time    Glucose 202 (H) 03/21/2018 07:54 AM    Glucose (POC) 133 (H) 02/24/2016 12:46 PM            Assessment / Plan     Diabetes poorly controlled, pt remains on insulin pump and will be referred to back to Endo  Hypertension poorly controlled, on lisinopril 20 mg, will add Norvasc 5 mg  Hyperlipidemia well controlled on Lipitor       ICD-10-CM ICD-9-CM    1.  Type 1 diabetes mellitus with diabetic polyneuropathy (HCC) E10.42 250.61 MICROALBUMIN, UR, RAND W/ MICROALBUMIN/CREA RATIO     357.2 HEMOGLOBIN A1C W/O EAG      REFERRAL TO ENDOCRINOLOGY      HEMOGLOBIN A1C W/O EAG   2. Hypertension N30.1 502.92 METABOLIC PANEL, COMPREHENSIVE   3. Dyslipidemia E78.5 272.4 LIPID PANEL              Diabetic issues reviewed with him: diabetic diet discussed in detail, and low cholesterol diet, weight control and daily exercise discussed. Follow-up Disposition:  Return in about 3 months (around 6/28/2018) for labs 1 week before. Reviewed plan of care. Patient has provided input and agrees with goals.

## 2018-03-28 NOTE — PATIENT INSTRUCTIONS
High Blood Pressure: Care Instructions  Your Care Instructions    If your blood pressure is usually above 140/90, you have high blood pressure, or hypertension. That means the top number is 140 or higher or the bottom number is 90 or higher, or both. Despite what a lot of people think, high blood pressure usually doesn't cause headaches or make you feel dizzy or lightheaded. It usually has no symptoms. But it does increase your risk for heart attack, stroke, and kidney or eye damage. The higher your blood pressure, the more your risk increases. Your doctor will give you a goal for your blood pressure. Your goal will be based on your health and your age. An example of a goal is to keep your blood pressure below 140/90. Lifestyle changes, such as eating healthy and being active, are always important to help lower blood pressure. You might also take medicine to reach your blood pressure goal.  Follow-up care is a key part of your treatment and safety. Be sure to make and go to all appointments, and call your doctor if you are having problems. It's also a good idea to know your test results and keep a list of the medicines you take. How can you care for yourself at home? Medical treatment  · If you stop taking your medicine, your blood pressure will go back up. You may take one or more types of medicine to lower your blood pressure. Be safe with medicines. Take your medicine exactly as prescribed. Call your doctor if you think you are having a problem with your medicine. · Talk to your doctor before you start taking aspirin every day. Aspirin can help certain people lower their risk of a heart attack or stroke. But taking aspirin isn't right for everyone, because it can cause serious bleeding. · See your doctor regularly. You may need to see the doctor more often at first or until your blood pressure comes down.   · If you are taking blood pressure medicine, talk to your doctor before you take decongestants or anti-inflammatory medicine, such as ibuprofen. Some of these medicines can raise blood pressure. · Learn how to check your blood pressure at home. Lifestyle changes  · Stay at a healthy weight. This is especially important if you put on weight around the waist. Losing even 10 pounds can help you lower your blood pressure. · If your doctor recommends it, get more exercise. Walking is a good choice. Bit by bit, increase the amount you walk every day. Try for at least 30 minutes on most days of the week. You also may want to swim, bike, or do other activities. · Avoid or limit alcohol. Talk to your doctor about whether you can drink any alcohol. · Try to limit how much sodium you eat to less than 2,300 milligrams (mg) a day. Your doctor may ask you to try to eat less than 1,500 mg a day. · Eat plenty of fruits (such as bananas and oranges), vegetables, legumes, whole grains, and low-fat dairy products. · Lower the amount of saturated fat in your diet. Saturated fat is found in animal products such as milk, cheese, and meat. Limiting these foods may help you lose weight and also lower your risk for heart disease. · Do not smoke. Smoking increases your risk for heart attack and stroke. If you need help quitting, talk to your doctor about stop-smoking programs and medicines. These can increase your chances of quitting for good. When should you call for help? Call 911 anytime you think you may need emergency care. This may mean having symptoms that suggest that your blood pressure is causing a serious heart or blood vessel problem. Your blood pressure may be over 180/110. ? For example, call 911 if:  ? · You have symptoms of a heart attack. These may include:  ¨ Chest pain or pressure, or a strange feeling in the chest.  ¨ Sweating. ¨ Shortness of breath. ¨ Nausea or vomiting.   ¨ Pain, pressure, or a strange feeling in the back, neck, jaw, or upper belly or in one or both shoulders or arms.  ¨ Lightheadedness or sudden weakness. ¨ A fast or irregular heartbeat. ? · You have symptoms of a stroke. These may include:  ¨ Sudden numbness, tingling, weakness, or loss of movement in your face, arm, or leg, especially on only one side of your body. ¨ Sudden vision changes. ¨ Sudden trouble speaking. ¨ Sudden confusion or trouble understanding simple statements. ¨ Sudden problems with walking or balance. ¨ A sudden, severe headache that is different from past headaches. ? · You have severe back or belly pain. ?Do not wait until your blood pressure comes down on its own. Get help right away. ?Call your doctor now or seek immediate care if:  ? · Your blood pressure is much higher than normal (such as 180/110 or higher), but you don't have symptoms. ? · You think high blood pressure is causing symptoms, such as:  ¨ Severe headache. ¨ Blurry vision. ? Watch closely for changes in your health, and be sure to contact your doctor if:  ? · Your blood pressure measures 140/90 or higher at least 2 times. That means the top number is 140 or higher or the bottom number is 90 or higher, or both. ? · You think you may be having side effects from your blood pressure medicine. ? · Your blood pressure is usually normal, but it goes above normal at least 2 times. Where can you learn more? Go to http://freida-khloe.info/. Enter R152 in the search box to learn more about \"High Blood Pressure: Care Instructions. \"  Current as of: September 21, 2016  Content Version: 11.4  © 6661-1592 [employer]. Care instructions adapted under license by [employer] (which disclaims liability or warranty for this information). If you have questions about a medical condition or this instruction, always ask your healthcare professional. [place] [id number] any warranty or liability for your use of this information.

## 2018-03-28 NOTE — MR AVS SNAPSHOT
Mandy Hamlin 
 
 
 [address] [address] [address] [facility] [address] 
[phone] Patient: Bladimir Babin. MRN: [id number] Gary Griggs Visit Information Date & Time Provider Department Dept. Phone Encounter #  
 3/28/2018  9:15 AM Rachel Macias, [facility] [address] [id number] Follow-up Instructions Return in about 3 months (around 6/28/2018) for labs 1 week before. Your Appointments 4/11/2018  2:45 PM  
Follow Up with Milana De Jesus MD  
Cardio Specialist at [facility]) Appt Note: Juris patient wants to get re-established [id number] [place] [address] [address] [facility] 83 [address] 1/14/2019 10:10 AM  
Nurse Visit with Elio Mccall [facility] ([facility]) Appt Note: PSA free and total 1 month prior. [address] [place] [address] [address]  
  
    
  
 2/14/2019 11:00 AM  
Any with Kaylin Pavon MD  
[facility] ([facility]) Appt Note: Return in about 1 year (around 2/14/2019) for follow up, PSA free and total 1 month prior. [address] [place] [address] [address]  
  
   
 [address] [facility] Isis Covert [id number] Upcoming Health Maintenance Date Due  
 FOOT EXAM Q1 5/8/2018 EYE EXAM RETINAL OR DILATED Q1 6/15/2018 HEMOGLOBIN A1C Q6M 9/21/2018 MICROALBUMIN Q1 12/22/2018 MEDICARE YEARLY EXAM 12/28/2018 COLONOSCOPY 2/24/2019 LIPID PANEL Q1 3/21/2019 DTaP/Tdap/Td series (2 - Td) 10/25/2025 Allergies as of 3/28/2018  Review Complete On: 3/28/2018 By: Rachel Macias MD  
 No Known Allergies Current Immunizations  Reviewed on 9/27/2017 Name Date Influenza Vaccine (Quad) PF 9/27/2017, 12/21/2016, 11/13/2015 Pneumococcal Polysaccharide (PPSV-23) 11/13/2015 Tdap 10/25/2015  7:48 PM  
  
 Not reviewed this visit You Were Diagnosed With   
  
 Codes Comments Type 1 diabetes mellitus with diabetic polyneuropathy (HCC)    -  Primary ICD-10-CM: E10.42 
ICD-9-CM: 250.61, 357.2 Benign hypertensive heart disease without heart failure     ICD-10-CM: I11.9 ICD-9-CM: 402.10 Dyslipidemia     ICD-10-CM: E78.5 ICD-9-CM: 272.4 Vitals BP Pulse Temp Resp Height(growth percentile) Weight(growth percentile) 171/68 (BP 1 Location: Left arm, BP Patient Position: Sitting) 75 98.9 °F (37.2 °C) (Oral) 18 6' 1\" (1.854 m) 196 lb (88.9 kg) SpO2 BMI Smoking Status 98% 25.86 kg/m2 Never Smoker BMI and BSA Data Body Mass Index Body Surface Area  
 25.86 kg/m 2 2.14 m 2 Preferred Pharmacy Pharmacy Name Phone 305 [facility], [address] [place] [address] [address] Barajas 134 Your Updated Medication List  
  
   
This list is accurate as of 3/28/18 10:31 AM.  Always use your most recent med list. amLODIPine 5 mg tablet Commonly known as:  Eleuterio Dobbs Take 1 Tab by mouth daily. aspirin delayed-release 81 mg tablet Take  by mouth daily. atorvastatin 20 mg tablet Commonly known as:  LIPITOR Take 1 Tab by mouth daily. CONTOUR NEXT STRIPS strip Generic drug:  glucose blood VI test strips CHECK BLOOD SUGAR 8 TIMES A DAY DUE TO INSULIN PUMP AND LABILE BLOOD SUGAR  
  
 FISH OIL 1,000 mg Cap Generic drug:  omega-3 fatty acids-vitamin e Take 1 Cap by mouth. insulin lispro 100 unit/mL injection Commonly known as:  HUMALOG 38 units daily for insulin pump. DX E10.42  
  
 lisinopril 20 mg tablet Commonly known as:  Ej Qureshi Take 1 Tab by mouth daily. [facility] Generic drug:  Lancets MINIMED INFUSION SET Iset Generic drug:  Infusion Set for Insulin Pump  
  
 multivitamin tablet Commonly known as:  ONE A DAY Take 1 Tab by mouth daily. oxybutynin 5 mg tablet Commonly known as:  CTCXLLWN Take 1 Tab by mouth two (2) times a day. pantoprazole 40 mg tablet Commonly known as:  PROTONIX  
TAKE ONE TABLET BY MOUTH ONCE DAILY PARADIGM RESERVOIR 3 mL Misc Generic drug:  Insulin Pump Syringe * sildenafil (antihypertensive) 20 mg tablet Commonly known as:  REVATIO  
TAKE TWO TO FIVE TABLETS BY MOUTH 1 HOUR PRIOR TO INTERCOURSE  
  
 * sildenafil (antihypertensive) 20 mg tablet Commonly known as:  REVATIO Take 1 Tab by mouth as needed. traMADol 50 mg tablet Commonly known as:  ULTRAM  
TAKE 1 TABLET EVERY 6 HOURS AS NEEDED FOR PAIN  
  
 * Notice: This list has 2 medication(s) that are the same as other medications prescribed for you. Read the directions carefully, and ask your doctor or other care provider to review them with you. Prescriptions Sent to Pharmacy Refills  
 amLODIPine (NORVASC) 5 mg tablet 3 Sig: Take 1 Tab by mouth daily. Class: Normal  
 Pharmacy: [facility] [address]  #: [phone] Route: Oral  
  
We Performed the Following HEMOGLOBIN A1C W/O EAG [62014 CPT(R)] MICROALBUMIN, UR, RAND W/ MICROALBUMIN/CREA RATIO [id number] CPT(R)] REFERRAL TO ENDOCRINOLOGY [YRD67 Custom] Comments:  
 Refer to Dr Ismael Bedolla for uncontrolled DM Follow-up Instructions Return in about 3 months (around 6/28/2018) for labs 1 week before. Referral Information Referral ID Referred By Referred To  
  
 [id number] NATHALIE, 31 MD DANNY [address] [place], [address] [place] [address] Road Phone: [phone] Fax: [phone] Visits Status Start Date End Date 1 New Request 3/28/18 3/28/19 If your referral has a status of pending review or denied, additional information will be sent to support the outcome of this decision. Patient Instructions High Blood Pressure: Care Instructions Your Care Instructions If your blood pressure is usually above 140/90, you have high blood pressure, or hypertension. That means the top number is 140 or higher or the bottom number is 90 or higher, or both. Despite what a lot of people think, high blood pressure usually doesn't cause headaches or make you feel dizzy or lightheaded. It usually has no symptoms. But it does increase your risk for heart attack, stroke, and kidney or eye damage. The higher your blood pressure, the more your risk increases. Your doctor will give you a goal for your blood pressure. Your goal will be based on your health and your age. An example of a goal is to keep your blood pressure below 140/90. Lifestyle changes, such as eating healthy and being active, are always important to help lower blood pressure. You might also take medicine to reach your blood pressure goal. 
Follow-up care is a key part of your treatment and safety. Be sure to make and go to all appointments, and call your doctor if you are having problems. It's also a good idea to know your test results and keep a list of the medicines you take. How can you care for yourself at home? Medical treatment · If you stop taking your medicine, your blood pressure will go back up. You may take one or more types of medicine to lower your blood pressure. Be safe with medicines. Take your medicine exactly as prescribed. Call your doctor if you think you are having a problem with your medicine. · Talk to your doctor before you start taking aspirin every day. Aspirin can help certain people lower their risk of a heart attack or stroke. But taking aspirin isn't right for everyone, because it can cause serious bleeding. · See your doctor regularly. You may need to see the doctor more often at first or until your blood pressure comes down. · If you are taking blood pressure medicine, talk to your doctor before you take decongestants or anti-inflammatory medicine, such as ibuprofen. Some of these medicines can raise blood pressure. · Learn how to check your blood pressure at home. Lifestyle changes · Stay at a healthy weight. This is especially important if you put on weight around the waist. Losing even 10 pounds can help you lower your blood pressure. · If your doctor recommends it, get more exercise. Walking is a good choice. Bit by bit, increase the amount you walk every day. Try for at least 30 minutes on most days of the week. You also may want to swim, bike, or do other activities. · Avoid or limit alcohol. Talk to your doctor about whether you can drink any alcohol. · Try to limit how much sodium you eat to less than 2,300 milligrams (mg) a day. Your doctor may ask you to try to eat less than 1,500 mg a day. · Eat plenty of fruits (such as bananas and oranges), vegetables, legumes, whole grains, and low-fat dairy products. · Lower the amount of saturated fat in your diet. Saturated fat is found in animal products such as milk, cheese, and meat. Limiting these foods may help you lose weight and also lower your risk for heart disease. · Do not smoke. Smoking increases your risk for heart attack and stroke. If you need help quitting, talk to your doctor about stop-smoking programs and medicines. These can increase your chances of quitting for good. When should you call for help? Call 911 anytime you think you may need emergency care. This may mean having symptoms that suggest that your blood pressure is causing a serious heart or blood vessel problem. Your blood pressure may be over 180/110. ? For example, call 911 if: 
? · You have symptoms of a heart attack. These may include: ¨ Chest pain or pressure, or a strange feeling in the chest. 
¨ Sweating. ¨ Shortness of breath. ¨ Nausea or vomiting. ¨ Pain, pressure, or a strange feeling in the back, neck, jaw, or upper belly or in one or both shoulders or arms. ¨ Lightheadedness or sudden weakness. ¨ A fast or irregular heartbeat. ? · You have symptoms of a stroke. These may include: 
¨ Sudden numbness, tingling, weakness, or loss of movement in your face, arm, or leg, especially on only one side of your body. ¨ Sudden vision changes. ¨ Sudden trouble speaking. ¨ Sudden confusion or trouble understanding simple statements. ¨ Sudden problems with walking or balance. ¨ A sudden, severe headache that is different from past headaches. ? · You have severe back or belly pain. ?Do not wait until your blood pressure comes down on its own. Get help right away. ?Call your doctor now or seek immediate care if: 
? · Your blood pressure is much higher than normal (such as 180/110 or higher), but you don't have symptoms. ? · You think high blood pressure is causing symptoms, such as: ¨ Severe headache. ¨ Blurry vision. ? Watch closely for changes in your health, and be sure to contact your doctor if: 
? · Your blood pressure measures 140/90 or higher at least 2 times. That means the top number is 140 or higher or the bottom number is 90 or higher, or both. ? · You think you may be having side effects from your blood pressure medicine. ? · Your blood pressure is usually normal, but it goes above normal at least 2 times. Where can you learn more? Go to http://freida-khloe.info/. Enter H530 in the search box to learn more about \"High Blood Pressure: Care Instructions. \" Current as of: September 21, 2016 Content Version: 11.4 © 6721-0995 [employer].  Care instructions adapted under license by [employer] (which disclaims liability or warranty for this information). If you have questions about a medical condition or this instruction, always ask your healthcare professional. [address] any warranty or liability for your use of this information. Introducing [facility]! Dear William Clayton: Thank you for requesting a ServiceFrame account. Our records indicate that you already have an active ServiceFrame account. You can access your account anytime at [email or web address] Did you know that you can access your hospital and ER discharge instructions at any time in ServiceFrame? You can also review all of your test results from your hospital stay or ER visit. Additional Information If you have questions, please visit the Frequently Asked Questions section of the ServiceFrame website at [email or web address] Remember, ServiceFrame is NOT to be used for urgent needs. For medical emergencies, dial 911. Now available from your iPhone and Android! Please provide this summary of care documentation to your next provider. Your primary care clinician is listed as EMORY ZELAYA. If you have any questions after today's visit, please call [phone].

## 2018-03-28 NOTE — PROGRESS NOTES
Patient is in the office today for  a 3 month follow up. 1. Have you been to the ER, urgent care clinic since your last visit? Hospitalized since your last visit? No    2. Have you seen or consulted any other health care providers outside of the [address] [place] [address] since your last visit? Include any pap smears or colon screening.  No

## 2018-03-28 NOTE — TELEPHONE ENCOUNTER
He was not taking the 10 mg everyday. I want to see how his good his BP is with him taking 5 mg daily for 2 weeks.  WIll increase to 10 mg if it is still elevated at that time

## 2018-03-29 NOTE — TELEPHONE ENCOUNTER
"              After Visit Summary   3/29/2018    Virgen Mcbride    MRN: [id number]           Patient Information     Date Of Birth          1946        Visit Information        Provider Department      3/29/2018 11:00 AM Erika Phelan,  [facility]        Today's Diagnoses     Encounter for gynecological examination without abnormal finding    -  1       Follow-ups after your visit        Follow-up notes from your care team     Return in about 2 years (around 3/29/2020).      Who to contact     If you have questions or need follow up information about today's clinic visit or your schedule please contact [facility] directly at [phone].  Normal or non-critical lab and imaging results will be communicated to you by MyChart, letter or phone within 4 business days after the clinic has received the results. If you do not hear from us within 7 days, please contact the clinic through MyChart or phone. If you have a critical or abnormal lab result, we will notify you by phone as soon as possible.  Submit refill requests through [email or web address] or call your pharmacy and they will forward the refill request to us. Please allow 3 business days for your refill to be completed.          Additional Information About Your Visit        MyChart Information     [email or web address] lets you send messages to your doctor, view your test results, renew your prescriptions, schedule appointments and more. To sign up, go to [email or web address] . Click on \"Log in\" on the left side of the screen, which will take you to the Welcome page. Then click on \"Sign up Now\" on the right side of the page.     You will be asked to enter the access code listed below, as well as some personal information. Please follow the directions to create your username and password.     Your access code is: [email or web address]  Expires: 2018 11:36 AM     Your access code will  in 90 days. If you need help or a new code, " Med sent to [facility] "please call your [place] clinic or [phone].        Care EveryWhere ID     This is your Care EveryWhere ID. This could be used by other organizations to access your [place] medical records  [id number]        Your Vitals Were     Height Breastfeeding? BMI (Body Mass Index)             5' 1.25\" (1.556 m) No 18.93 kg/m2          Blood Pressure from Last 3 Encounters:   03/29/18 122/72   01/25/16 104/64   11/14/13 112/64    Weight from Last 3 Encounters:   03/29/18 101 lb (45.8 kg)   01/25/16 101 lb (45.8 kg)   11/14/13 101 lb (45.8 kg)              We Performed the Following     Medicare Limited Visit        Primary Care Provider Fax #    Provider Not In System [phone]                Equal Access to Services     [facility] : Araceli Pimentel, jermain carranza, denita colón, padma lozada . So [facility] [phone].    ATENCIÓN: Si habla español, tiene a jackman disposición servicios gratuitos de asistencia lingüística. Amber al [phone].    We comply with applicable federal civil rights laws and [place] laws. We do not discriminate on the basis of race, color, national origin, age, disability, sex, sexual orientation, or gender identity.            Thank you!     Thank you for choosing [facility]  for your care. Our goal is always to provide you with excellent care. Hearing back from our patients is one way we can continue to improve our services. Please take a few minutes to complete the written survey that you may receive in the mail after your visit with us. Thank you!             Your Updated Medication List - Protect others around you: Learn how to safely use, store and throw away your medicines at www.disposemymeds.org.          This list is accurate as of 3/29/18 11:36 AM.  Always use your most recent med list.                   Brand Name Dispense Instructions for use Diagnosis    VITAMIN D3 PO      Take by mouth daily        "

## 2018-03-30 ENCOUNTER — PATIENT MESSAGE (OUTPATIENT)
Dept: FAMILY MEDICINE CLINIC | Age: 58
End: 2018-03-30

## 2018-03-30 NOTE — TELEPHONE ENCOUNTER
Left detailed message for patient Dr. Abel Banuelos wants to see how his BP is on Norvasc 5 mg daily for 2 weeks and then if BP is still elevated he will increase Norvasc to 10 mg daily.

## 2018-04-02 ENCOUNTER — OFFICE VISIT (OUTPATIENT)
Dept: ORTHOPEDIC SURGERY | Age: 58
End: 2018-04-02

## 2018-04-02 ENCOUNTER — PATIENT MESSAGE (OUTPATIENT)
Dept: FAMILY MEDICINE CLINIC | Age: 58
End: 2018-04-02

## 2018-04-02 ENCOUNTER — TELEPHONE (OUTPATIENT)
Dept: ORTHOPEDIC SURGERY | Age: 58
End: 2018-04-02

## 2018-04-02 VITALS
OXYGEN SATURATION: 96 % | HEIGHT: 73 IN | DIASTOLIC BLOOD PRESSURE: 67 MMHG | BODY MASS INDEX: 25.84 KG/M2 | TEMPERATURE: 97 F | HEART RATE: 80 BPM | SYSTOLIC BLOOD PRESSURE: 138 MMHG | WEIGHT: 195 LBS | RESPIRATION RATE: 16 BRPM

## 2018-04-02 DIAGNOSIS — M76.61 ACHILLES TENDINITIS OF RIGHT LOWER EXTREMITY: Primary | ICD-10-CM

## 2018-04-02 DIAGNOSIS — M25.571 ACUTE RIGHT ANKLE PAIN: ICD-10-CM

## 2018-04-02 DIAGNOSIS — M67.88 ACHILLES TENDINOSIS: ICD-10-CM

## 2018-04-02 DIAGNOSIS — E11.42 DIABETIC POLYNEUROPATHY ASSOCIATED WITH TYPE 2 DIABETES MELLITUS (HCC): ICD-10-CM

## 2018-04-02 NOTE — LETTER
NOTIFICATION RETURN TO WORK / SCHOOL 
 
4/2/2018 4:11 PM 
 
Mr. Madison Garcia. 
[address] [place] [id number] To Whom It May Concern: 
 
Madison Rodríguez is currently under the care of [address] [place] [address] Roger Car. He will return to work/school on: 4-3-18 with the following restrictions: 
 No lifting over 20 pounds No pushing over 20 pounds Restrictions in place until follow up appointment. If there are questions or concerns please have the patient contact our office.  
 
 
 
Sincerely, 
 
 
Severa Rasp, MD
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
## 2018-04-02 NOTE — TELEPHONE ENCOUNTER
Pt wants to discuss the worknote provided. Pt does not understand what limited lifting and pushing of heavy objects means. Please call pt to discuss.   Pt D#[phone]

## 2018-04-02 NOTE — PATIENT INSTRUCTIONS
Please follow up in 4 weeks. You are advised to contact us if your condition worsens. The provider has ordered a custom brace/orthotic for you. This will be customized and made for you by an outside facility. Please contact the orthotist at one of the below offices for your custom fitting and follow up in the office with the doctor or his physicians assistant 3 weeks after you have been wearing the brace. Kirby Santos at [facility]:     [address] [place]   Phone: [phone] Kevin Mercado. Jagdish [facility]  Phone: [phone]    Achilles Tendon: Exercises  Your Care Instructions  Here are some examples of exercises for your achilles tendon. Start each exercise slowly. Ease off the exercise if you start to have pain. Your doctor or physical therapist will tell you when you can start these exercises and which ones will work best for you. How to do the exercises  Toe stretch    1. Sit in a chair, and extend your affected leg so that your heel is on the floor. 2. With your hand, reach down and pull your big toe up and back. Pull toward your ankle and away from the floor. 3. Hold the position for at least 15 to 30 seconds. 4. Repeat 2 to 4 times a session, several times a day. Calf-plantar fascia stretch    1. Sit with your legs extended and knees straight. 2. Place a towel around your foot just under the toes. 3. Hold each end of the towel in each hand, with your hands above your knees. 4. Pull back with the towel so that your foot stretches toward you. 5. Hold the position for at least 15 to 30 seconds. 6. Repeat 2 to 4 times a session, up to 5 sessions a day. Floor stretch    1. Stand about 2 feet from a wall, and place your hands on the wall at about shoulder height. Or you can stand behind a chair, placing your hands on the back of it for balance. 2. Step back with the leg you want to stretch.  Keep the leg straight, and press your heel into the floor with your toe turned slightly in.  3. Lean forward, and bend your other leg slightly. Feel the stretch in the Achilles tendon of your back leg. Hold for at least 15 to 30 seconds. 4. Repeat 2 to 4 times a session, up to 5 sessions a day. Stair stretch    1. Stand with the balls of both feet on the edge of a step or curb (or a medium-sized phone book). With at least one hand, hold onto something solid for balance, such as a banister or handrail. 2. Keeping your affected leg straight, slowly let that heel hang down off of the step or curb until you feel a stretch in the back of your calf and/or Achilles area. Some of your weight should still be on the other leg. 3. Hold this position for at least 15 to 30 seconds. 4. Repeat 2 to 4 times a session, up to 5 times a day or whenever your Achilles tendon starts to feel tight. This stretch can also be done with your knee slightly bent. Strength exercise    1. This exercise will get you started on building strength after an Achilles tendon injury. Your doctor or physical therapist can help you move on to more challenging exercises as you heal and get stronger. 2. Stand on a step with your heel off the edge of the step. Hold on to a handrail or wall for balance. 3. Push up on your toes, then slowly count to 10 as you lower yourself back down until your heel is below the step. If it hurts to push up on your toes, try putting most of your weight on your other foot as you push up, or try using your arms to help you. If you can't do this exercise without causing pain, stop the exercise and talk to your doctor. 4. Repeat the exercise 8 to 12 times, half with the knee straight and half with the knee bent. Follow-up care is a key part of your treatment and safety. Be sure to make and go to all appointments, and call your doctor if you are having problems.  It's also a good idea to know your test results and keep a list of the medicines you take.  Where can you learn more? Go to http://freida-hkloe.info/. Enter H099 in the search box to learn more about \"Achilles Tendon: Exercises. \"  Current as of: March 21, 2017  Content Version: 11.4  © 3083-1989 [employer], [employer]. Care instructions adapted under license by [employer] (which disclaims liability or warranty for this information). If you have questions about a medical condition or this instruction, always ask your healthcare professional. [place] [address] any warranty or liability for your use of this information.

## 2018-04-02 NOTE — TELEPHONE ENCOUNTER
Patient should not lift, carry, push, or pull any weight (items) heavier than 15 lbs until his follow up in the office with Dr. Lilly Gutierrez. Also, he should limit stair and ladder climbing.     Wendy Lanier PA-C  4/2/2018   4:18 PM

## 2018-04-02 NOTE — TELEPHONE ENCOUNTER
Left message on patient voice mail with Rowena Mai PA-C message below. Also advised him to call us back if he has any questions.

## 2018-04-02 NOTE — MR AVS SNAPSHOT
[address], [address] 200 [facility] 
[phone] Patient: Madison Garcia. MRN: [id number] Denveramanda Henry Visit Information Date & Time Provider Department Dept. Phone Encounter #  
 4/2/2018  9:00 AM Severa Rasp, 27 [facility]  Your Appointments 4/11/2018  2:45 PM  
Follow Up with Nancy Bryan MD  
Cardio Specialist at [facility] Appt Note: Darron patient wants to get re-established [address]  
  
   
 [address]  
  
    
 6/21/2018  7:05 AM  
LAB with [facility] (KARLA [facility]) Appt Note: labs 1 week before 129 [facility] [address] [address]  
[phone]  
  
   
 [address] [facility]  
  
    
 6/28/2018  9:00 AM  
Office Visit with Dharmesh Rosa MD  
[address] [place] [address] [facility] Appt Note: Return In 3 months 129 [facility] [address] [address]  
[phone]  
  
   
 [address] [facility]  
  
    
 1/14/2019 10:10 AM  
Nurse Visit with Dionisio Kayser NURSE [facility] ([facility]) Appt Note: PSA free and total 1 month prior. 709 [facility] [address] [address] [facility] [address]  
  
    
  
 2/14/2019 11:00 AM  
Any with Lissette Lira MD  
[facility] Appt Note: Return in about 1 year (around 2/14/2019) for follow up, PSA free and total 1 month prior. 709 [facility] [address]  
  
   
 709 [facility] [address] [address] [address] Upcoming Health Maintenance  Date Due  
 FOOT EXAM Q1 5/8/2018 EYE EXAM RETINAL OR DILATED Q1 6/15/2018 HEMOGLOBIN A1C Q6M 9/21/2018 MICROALBUMIN Q1 12/22/2018 MEDICARE YEARLY EXAM 12/28/2018 COLONOSCOPY 2/24/2019 LIPID PANEL Q1 3/21/2019 DTaP/Tdap/Td series (2 - Td) 10/25/2025 Allergies as of 4/2/2018  Review Complete On: 4/2/2018 By: Gordan Ormond No Known Allergies Current Immunizations  Reviewed on 9/27/2017 Name Date Influenza Vaccine (Quad) PF 9/27/2017, 12/21/2016, 11/13/2015 Pneumococcal Polysaccharide (PPSV-23) 11/13/2015 Tdap 10/25/2015  7:48 PM  
  
 Not reviewed this visit You Were Diagnosed With   
  
 Codes Comments Achilles tendinosis    -  Primary ICD-10-CM: M76.60 ICD-9-CM: 726.71 SEVERE RIGHT NONINSERTIONAL ACHILLES TEDINOPATHY Achilles tendinitis of right lower extremity     ICD-10-CM: M76.61 
ICD-9-CM: 726.71 Acute right ankle pain     ICD-10-CM: M25.571 ICD-9-CM: 719.47, 338.19 Diabetic polyneuropathy associated with type 2 diabetes mellitus ([facility]     ICD-10-CM: E11.42 
ICD-9-CM: 250.60, 357.2 Vitals BP Pulse Temp Resp Height(growth percentile) Weight(growth percentile)  
 138/67 80 97 °F (36.1 °C) (Oral) 16 6' 1\" (1.854 m) 195 lb (88.5 kg) SpO2 BMI Smoking Status 96% 25.73 kg/m2 Former Smoker BMI and BSA Data Body Mass Index Body Surface Area 25.73 kg/m 2 2.14 m 2 Preferred Pharmacy Pharmacy Name Phone Madeline Saco [address], [address] [phone] Your Updated Medication List  
  
   
This list is accurate as of 4/2/18 10:16 AM.  Always use your most recent med list. amLODIPine 5 mg tablet Commonly known as:  Latrice Raddle Take 1 Tab by mouth daily. aspirin delayed-release 81 mg tablet Take  by mouth daily. atorvastatin 20 mg tablet Commonly known as:  LIPITOR Take 1 Tab by mouth daily. CONTOUR NEXT STRIPS strip Generic drug:  glucose blood VI test strips CHECK BLOOD SUGAR 8 TIMES A DAY DUE TO INSULIN PUMP AND LABILE BLOOD SUGAR  
  
 FISH OIL 1,000 mg Cap Generic drug:  omega-3 fatty acids-vitamin e Take 1 Cap by mouth. insulin lispro 100 unit/mL injection Commonly known as:  HUMALOG 38 units daily for insulin pump. DX E10.42  
  
 lisinopril 20 mg tablet Commonly known as:  Valene Pencil Take 1 Tab by mouth daily. [facility] Generic drug:  Lancets MINIMED INFUSION SET Iset Generic drug:  Infusion Set for Insulin Pump  
  
 multivitamin tablet Commonly known as:  ONE A DAY Take 1 Tab by mouth daily. oxybutynin 5 mg tablet Commonly known as:  BIIGMHLM Take 1 Tab by mouth two (2) times a day. pantoprazole 40 mg tablet Commonly known as:  PROTONIX  
TAKE ONE TABLET BY MOUTH ONCE DAILY PARADIGM RESERVOIR 3 mL Misc Generic drug:  Insulin Pump Syringe  
  
 sildenafil (antihypertensive) 20 mg tablet Commonly known as:  REVATIO  
TAKE TWO TO FIVE TABLETS BY MOUTH 1 HOUR PRIOR TO INTERCOURSE  
  
 traMADol 50 mg tablet Commonly known as:  ULTRAM  
TAKE 1 TABLET EVERY 6 HOURS AS NEEDED FOR PAIN We Performed the Following AMB POC XRAY, ANKLE; COMPLETE, 3+ VIE [76922 CPT(R)] AMB SUPPLY ORDER [[id number] Custom] Comments:  
 [employer]amed's; Rockerbottom style extra-depth diabetic shoe. AMB SUPPLY ORDER [[id number] Custom] Comments:  
 Right Hinged AFO brace Patient Instructions Please follow up in 4 weeks. You are advised to contact us if your condition worsens. The provider has ordered a custom brace/orthotic for you. This will be customized and made for you by an outside facility. Please contact the orthotist at one of the below offices for your custom fitting and follow up in the office with the doctor or his physicians assistant 3 weeks after you have been wearing the brace. Sha Tobias at [facility]:  
 
[address] [place] [address] [address] Phone: [phone] Or  
 
[address] [facility] Phone: [phone] Achilles Tendon: Exercises Your Care Instructions Here are some examples of exercises for your achilles tendon. Start each exercise slowly. Ease off the exercise if you start to have pain. Your doctor or physical therapist will tell you when you can start these exercises and which ones will work best for you. How to do the exercises Toe stretch 1. Sit in a chair, and extend your affected leg so that your heel is on the floor. 2. With your hand, reach down and pull your big toe up and back. Pull toward your ankle and away from the floor. 3. Hold the position for at least 15 to 30 seconds. 4. Repeat 2 to 4 times a session, several times a day. Calf-plantar fascia stretch 1. Sit with your legs extended and knees straight. 2. Place a towel around your foot just under the toes. 3. Hold each end of the towel in each hand, with your hands above your knees. 4. Pull back with the towel so that your foot stretches toward you. 5. Hold the position for at least 15 to 30 seconds. 6. Repeat 2 to 4 times a session, up to 5 sessions a day. Floor stretch 1. Stand about 2 feet from a wall, and place your hands on the wall at about shoulder height. Or you can stand behind a chair, placing your hands on the back of it for balance. 2. Step back with the leg you want to stretch. Keep the leg straight, and press your heel into the floor with your toe turned slightly in. 
3. Lean forward, and bend your other leg slightly. Feel the stretch in the Achilles tendon of your back leg. Hold for at least 15 to 30 seconds. 4. Repeat 2 to 4 times a session, up to 5 sessions a day. Stair stretch 1. Stand with the balls of both feet on the edge of a step or curb (or a medium-sized phone book). With at least one hand, hold onto something solid for balance, such as a banister or handrail. 2. Keeping your affected leg straight, slowly let that heel hang down off of the step or curb until you feel a stretch in the back of your calf and/or Achilles area. Some of your weight should still be on the other leg. 3. Hold this position for at least 15 to 30 seconds. 4. Repeat 2 to 4 times a session, up to 5 times a day or whenever your Achilles tendon starts to feel tight. This stretch can also be done with your knee slightly bent. Strength exercise 1. This exercise will get you started on building strength after an Achilles tendon injury. Your doctor or physical therapist can help you move on to more challenging exercises as you heal and get stronger. 2. Stand on a step with your heel off the edge of the step. Hold on to a handrail or wall for balance. 3. Push up on your toes, then slowly count to 10 as you lower yourself back down until your heel is below the step. If it hurts to push up on your toes, try putting most of your weight on your other foot as you push up, or try using your arms to help you. If you can't do this exercise without causing pain, stop the exercise and talk to your doctor. 4. Repeat the exercise 8 to 12 times, half with the knee straight and half with the knee bent. Follow-up care is a key part of your treatment and safety. Be sure to make and go to all appointments, and call your doctor if you are having problems. It's also a good idea to know your test results and keep a list of the medicines you take. Where can you learn more? Go to http://freida-khloe.info/. Enter Q697 in the search box to learn more about \"Achilles Tendon: Exercises. \" Current as of: March 21, 2017 Content Version: 11.4 © 9337-5677 Healthwise, Incorporated.  Care instructions adapted under license by [employer] (which disclaims liability or warranty for this information). If you have questions about a medical condition or this instruction, always ask your healthcare professional. [address] any warranty or liability for your use of this information. Introducing hospitals & HEALTH SERVICES! Dear Usha Paci: Thank you for requesting a [employer] account. Our records indicate that you already have an active [employer] account. You can access your account anytime at [email or web address] Did you know that you can access your hospital and ER discharge instructions at any time in [employer]? You can also review all of your test results from your hospital stay or ER visit. Additional Information If you have questions, please visit the Frequently Asked Questions section of the [employer] website at [email or web address] Remember, [employer] is NOT to be used for urgent needs. For medical emergencies, dial 911. Now available from your iPhone and Android! Please provide this summary of care documentation to your next provider. Your primary care clinician is listed as EMORY ZELAYA. If you have any questions after today's visit, please call [phone].

## 2018-04-02 NOTE — LETTER
NOTIFICATION RETURN TO WORK / SCHOOL 
 
4/2/2018 10:08 AM 
 
Mr. Luís Duff. 
[address] [place] [id number] To Whom It May Concern: 
 
Luís Urena is currently under the care of [address] [place] [address] Roger Car. Please limit lifting and pushing of heavy objects. If there are questions or concerns please have the patient contact our office.  
 
 
 
Sincerely, 
 
 
Lady Lilly MD

## 2018-04-11 ENCOUNTER — OFFICE VISIT (OUTPATIENT)
Dept: CARDIOLOGY CLINIC | Age: 58
End: 2018-04-11

## 2018-04-11 VITALS
SYSTOLIC BLOOD PRESSURE: 127 MMHG | HEIGHT: 73 IN | BODY MASS INDEX: 25.71 KG/M2 | HEART RATE: 84 BPM | DIASTOLIC BLOOD PRESSURE: 74 MMHG | WEIGHT: 194 LBS | OXYGEN SATURATION: 97 %

## 2018-04-11 DIAGNOSIS — E10.42 TYPE 1 DIABETES MELLITUS WITH DIABETIC POLYNEUROPATHY (HCC): ICD-10-CM

## 2018-04-11 DIAGNOSIS — I95.1 ORTHOSTATIC HYPOTENSION: ICD-10-CM

## 2018-04-11 DIAGNOSIS — E78.5 DYSLIPIDEMIA: ICD-10-CM

## 2018-04-11 DIAGNOSIS — R06.09 DYSPNEA ON EXERTION: ICD-10-CM

## 2018-04-11 DIAGNOSIS — I11.9 BENIGN HYPERTENSIVE HEART DISEASE WITHOUT HEART FAILURE: ICD-10-CM

## 2018-04-11 DIAGNOSIS — R06.09 DOE (DYSPNEA ON EXERTION): Primary | ICD-10-CM
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
## 2018-04-11 NOTE — MR AVS SNAPSHOT
[facility] 
 
 
 [address] [facility] [address] [address] 
[phone] Patient: Radha Flores. MRN: [id number] Edu  Visit Information Date & Time Provider Department Dept. Phone Encounter #  
 4/11/2018  2:45 PM Becca Owens MD [address] [place] [address] Specialist at [facility] [phone] [id number] Follow-up Instructions Return in about 4 weeks (around 5/9/2018). Your Appointments 4/30/2018 10:10 AM  
Follow Up with Patricia Wooten MD  
[facility] ([facility]) Appt Note: 4 wk f/u  
 [place] [address], [address] 200 [facility]  
[phone] 2300 [facility] 6000 [facility] [address], [address]  
  
    
 5/7/2018 10:15 AM  
Follow Up with Becca Owens MD  
Cardio Specialist at [facility] [facility]) Appt Note: after echo-if normal may cancel [address] [facility] [address]  
  
   
 [address] [facility] [address]  
  
    
 6/21/2018  7:05 AM  
LAB with [facility] (KARLA Rooney) Appt Note: labs 1 week before 129 [facility] [address] [address]  
[phone]  
  
   
 [address] [facility]  
  
    
 6/28/2018  9:00 AM  
Office Visit with Gaby Stokes MD  
[address] [place] [address]) Appt Note: Return In 3 months 129 [facility] [address] [address]  
[phone]  
  
   
 [address] [facility]  
  
    
 1/14/2019 10:10 AM  
Nurse Visit with Ama Hartman NURSE [facility] ([facility]) Appt Note: PSA free and total 1 month prior. 7[address] [facility] [place] [address] [facility] 300 [facility]  
  
    
  
 2/14/2019 11:00 AM  
Any with Shakila Guillory MD  
 [facility] ([facility]) Appt Note: Return in about 1 year (around 2/14/2019) for follow up, PSA free and total 1 month prior. 709 [facility] 1097 [facility]  
  
   
 709 [facility] [address] [address] Upcoming Health Maintenance Date Due  
 FOOT EXAM Q1 5/8/2018 EYE EXAM RETINAL OR DILATED Q1 6/15/2018 HEMOGLOBIN A1C Q6M 9/21/2018 MICROALBUMIN Q1 12/22/2018 MEDICARE YEARLY EXAM 12/28/2018 COLONOSCOPY 2/24/2019 LIPID PANEL Q1 3/21/2019 DTaP/Tdap/Td series (2 - Td) 10/25/2025 Allergies as of 4/11/2018  Review Complete On: 4/11/2018 By: Elsa Arreguin RN No Known Allergies Current Immunizations  Reviewed on 9/27/2017 Name Date Influenza Vaccine (Quad) PF 9/27/2017, 12/21/2016, 11/13/2015 Pneumococcal Polysaccharide (PPSV-23) 11/13/2015 Tdap 10/25/2015  7:48 PM  
  
 Not reviewed this visit You Were Diagnosed With   
  
 Codes Comments BURDEN (dyspnea on exertion)    -  Primary ICD-10-CM: R06.09 
ICD-9-CM: 786.09 Vitals BP Pulse Height(growth percentile) Weight(growth percentile) SpO2 BMI  
 127/74 84 6' 1\" (1.854 m) 194 lb (88 kg) 97% 25.6 kg/m2 Smoking Status Former Smoker Vitals History BMI and BSA Data Body Mass Index Body Surface Area  
 25.6 kg/m 2 2.13 m 2 Preferred Pharmacy Pharmacy Name Phone Nick Model [address], [facility] [phone] Your Updated Medication List  
  
   
This list is accurate as of 4/11/18  3:27 PM.  Always use your most recent med list. amLODIPine 5 mg tablet Commonly known as:  Harlon Doyne Take 1 Tab by mouth daily. aspirin delayed-release 81 mg tablet Take  by mouth daily. atorvastatin 20 mg tablet Commonly known as:  LIPITOR Take 1 Tab by mouth daily. CONTOUR NEXT STRIPS strip Generic drug:  glucose blood VI test strips CHECK BLOOD SUGAR 8 TIMES A DAY DUE TO INSULIN PUMP AND LABILE BLOOD SUGAR  
  
 FISH OIL 1,000 mg Cap Generic drug:  omega-3 fatty acids-vitamin e Take 1 Cap by mouth. insulin lispro 100 unit/mL injection Commonly known as:  HUMALOG 38 units daily for insulin pump. DX E10.42  
  
 lisinopril 20 mg tablet Commonly known as:  [place] Take 1 Tab by mouth daily. [facility] Generic drug:  Lancets MINIMED INFUSION SET Iset Generic drug:  Infusion Set for Insulin Pump  
  
 multivitamin tablet Commonly known as:  ONE A DAY Take 1 Tab by mouth daily. oxybutynin 5 mg tablet Commonly known as:  AYCMWRAM Take 1 Tab by mouth two (2) times a day. pantoprazole 40 mg tablet Commonly known as:  PROTONIX  
TAKE ONE TABLET BY MOUTH ONCE DAILY PARADIGM RESERVOIR 3 mL Misc Generic drug:  Insulin Pump Syringe  
  
 sildenafil (antihypertensive) 20 mg tablet Commonly known as:  REVATIO Take two to five tablets by mouth  1 hour prior to intercourse  
  
 traMADol 50 mg tablet Commonly known as:  ULTRAM  
TAKE 1 TABLET EVERY 6 HOURS AS NEEDED FOR PAIN Follow-up Instructions Return in about 4 weeks (around 5/9/2018). Introducing [facility] & HEALTH SERVICES! Dear Mick Garza: Thank you for requesting a [employer] account. Our records indicate that you already have an active [employer] account. You can access your account anytime at [email or web address] Did you know that you can access your hospital and ER discharge instructions at any time in [employer]? You can also review all of your test results from your hospital stay or ER visit. Additional Information If you have questions, please visit the Frequently Asked Questions section of the [employer] website at [email or web address] Remember, [employer] is NOT to be used for urgent needs.  For medical emergencies, dial 911. Now available from your iPhone and Android! Please provide this summary of care documentation to your next provider. Your primary care clinician is listed as EMORY ZELAYA. If you have any questions after today's visit, please call [phone].

## 2018-04-13 RX ORDER — PANTOPRAZOLE SODIUM 40 MG/1
TABLET, DELAYED RELEASE ORAL
Qty: 90 TAB | Refills: 3 | Status: SHIPPED | OUTPATIENT
Start: 2018-04-13 | End: 2019-03-26 | Stop reason: SDUPTHER

## 2018-04-18 PROBLEM — I95.1 ORTHOSTATIC HYPOTENSION: Status: ACTIVE | Noted: 2018-04-18

## 2018-04-24 ENCOUNTER — HOSPITAL ENCOUNTER (OUTPATIENT)
Dept: NON INVASIVE DIAGNOSTICS | Age: 58
Discharge: HOME | End: 2018-04-24
Attending: INTERNAL MEDICINE
Payer: MEDICARE

## 2018-04-24 DIAGNOSIS — R06.09 DOE (DYSPNEA ON EXERTION): ICD-10-CM

## 2018-04-24 PROCEDURE — 93306 TTE W/DOPPLER COMPLETE: CPT

## 2018-04-30 ENCOUNTER — TELEPHONE (OUTPATIENT)
Dept: CARDIOLOGY CLINIC | Age: 58
End: 2018-04-30

## 2018-05-24 ENCOUNTER — HOSPITAL ENCOUNTER (OUTPATIENT)
Dept: LAB | Age: 58
Discharge: HOME | End: 2018-05-24

## 2018-05-24 ENCOUNTER — HOSPITAL ENCOUNTER (OUTPATIENT)
Dept: LAB | Age: 58
Discharge: HOME | End: 2018-05-24
Payer: MEDICARE

## 2018-05-24 DIAGNOSIS — Z01.818 PRE-OP EVALUATION: ICD-10-CM

## 2018-05-24 LAB
ATRIAL RATE: 72 BPM
CALCULATED P AXIS, ECG09: 63 DEGREES
CALCULATED R AXIS, ECG10: 0 DEGREES
CALCULATED T AXIS, ECG11: 47 DEGREES
DIAGNOSIS, 93000: NORMAL
P-R INTERVAL, ECG05: 174 MS
Q-T INTERVAL, ECG07: 364 MS
QRS DURATION, ECG06: 88 MS
QTC CALCULATION (BEZET), ECG08: 398 MS
VENTRICULAR RATE, ECG03: 72 BPM

## 2018-05-24 PROCEDURE — 93005 ELECTROCARDIOGRAM TRACING: CPT

## 2018-05-24 PROCEDURE — 99001 SPECIMEN HANDLING PT-LAB: CPT | Performed by: PODIATRIST

## 2018-05-31 ENCOUNTER — OFFICE VISIT (OUTPATIENT)
Dept: FAMILY MEDICINE CLINIC | Age: 58
End: 2018-05-31

## 2018-05-31 VITALS
HEIGHT: 73 IN | WEIGHT: 195 LBS | RESPIRATION RATE: 20 BRPM | HEART RATE: 97 BPM | OXYGEN SATURATION: 95 % | DIASTOLIC BLOOD PRESSURE: 71 MMHG | BODY MASS INDEX: 25.84 KG/M2 | SYSTOLIC BLOOD PRESSURE: 131 MMHG | TEMPERATURE: 98.8 F

## 2018-05-31 NOTE — PROGRESS NOTES
Pt surgery was cancelled since Dr Opal Romero does not do surgery other than Vandana Jacobson  Once he knows when his surgery will be scheduled he will reschedule the preop.  No cchanrge for today

## 2018-05-31 NOTE — MR AVS SNAPSHOT
Cristian Ortega 
 
 
 [address] [place] [address] [address] [address] 
[phone] Patient: Radha Flores. MRN: [id number] Edu Pedraza Visit Information Date & Time Provider Department Dept. Phone Encounter #  
 5/31/2018  1:00 PM Gaby Stokes, [address] [place] [address] [phone] [id number] Your Appointments 5/31/2018  2:15 PM  
PROBLEM VISIT with Jabari Taveras MD  
[address] [facility], [address] [facility] [place] [address] [place] [address]) Appt Note: lt achilles tendon pain/ pt req Derek did not want to see Dr. Lobo Donahue  
 [address] [facility], [address] [place] [address]  
[phone]  
  
   
 [address] [facility], [address] [address]  
  
    
 6/21/2018  7:05 AM  
LAB with [facility] (KARLA Rooney) Appt Note: labs 1 week before [address] [facility] [address] [address]  
[phone]  
  
   
 [address] [facility]  
  
    
 6/28/2018  9:00 AM  
Office Visit with Gaby Stokes MD  
[address] [place] [address]) Appt Note: Return In 3 months 129 [facility] [address] [address]  
[phone]  
  
   
 [address] [facility] [facility]  
  
    
 1/14/2019 10:10 AM  
Nurse Visit with Ama Hartman NURSE [facility] ([facility]) Appt Note: PSA free and total 1 month prior. [address] [facility] [address] [facility] [address]  
  
   
 [address] [address]  
  
    
  
 2/14/2019 11:00 AM  
Any with Shakila Guillory MD  
[facility] ([facility]) Appt Note: Return in about 1 year (around 2/14/2019) for follow up, PSA free and total 1 month prior. [address] [facility] [address] [facility] 10[address] [facility]  
  
   
 [address] [facility] 23[address] [facility],[address] 706 [facility] [place] Upcoming Health Maintenance Date Due  
 FOOT EXAM Q1 5/8/2018 EYE EXAM RETINAL OR DILATED Q1 6/15/2018 Influenza Age 5 to Adult 8/1/2018 HEMOGLOBIN A1C Q6M 9/21/2018 MICROALBUMIN Q1 12/22/2018 MEDICARE YEARLY EXAM 12/28/2018 COLONOSCOPY 2/24/2019 LIPID PANEL Q1 3/21/2019 DTaP/Tdap/Td series (2 - Td) 10/25/2025 Allergies as of 5/31/2018  Review Complete On: 5/31/2018 By: Karolina Perry LPN No Known Allergies Current Immunizations  Reviewed on 9/27/2017 Name Date Influenza Vaccine (Quad) PF 9/27/2017, 12/21/2016, 11/13/2015 Pneumococcal Polysaccharide (PPSV-23) 11/13/2015 Tdap 10/25/2015  7:48 PM  
  
 Not reviewed this visit Vitals BP Pulse Temp Resp Height(growth percentile) Weight(growth percentile) 131/71 (BP 1 Location: Left arm, BP Patient Position: Sitting) 97 98.8 °F (37.1 °C) (Oral) 20 6' 1\" (1.854 m) 195 lb (88.5 kg) SpO2 BMI Smoking Status 95% 25.73 kg/m2 Former Smoker BMI and BSA Data Body Mass Index Body Surface Area 25.73 kg/m 2 2.14 m 2 Preferred Pharmacy Pharmacy Name Phone [address] [facility] [place], [address] [place] [address] [address] [facility] [place] [address] Your Updated Medication List  
  
   
This list is accurate as of 5/31/18  1:28 PM.  Always use your most recent med list. amLODIPine 5 mg tablet Commonly known as:  Cro[place]ell Bars Take 1 Tab by mouth daily. aspirin delayed-release 81 mg tablet Take  by mouth daily. atorvastatin 20 mg tablet Commonly known as:  LIPITOR Take 1 Tab by mouth daily. CONTOUR NEXT TEST STRIPS strip Generic drug:  glucose blood VI test strips CHECK BLOOD SUGAR 8 TIMES A DAY DUE TO INSULIN PUMP AND LABILE BLOOD SUGAR  
  
 FISH OIL 1,000 mg Cap Generic drug:  omega-3 fatty acids-vitamin e Take 1 Cap by mouth. insulin lispro 100 unit/mL injection Commonly known as:  HUMALOG 38 units daily for insulin pump. DX E10.42  
  
 lisinopril 20 mg tablet Commonly known as:  Therisa Semen Take 1 Tab by mouth daily. [facility] Generic drug:  Lancets MINIMED INFUSION SET Iset Generic drug:  Infusion Set for Insulin Pump  
  
 multivitamin tablet Commonly known as:  ONE A DAY Take 1 Tab by mouth daily. oxybutynin 5 mg tablet Commonly known as:  HMQQRNHV Take 1 Tab by mouth two (2) times a day. pantoprazole 40 mg tablet Commonly known as:  PROTONIX  
TAKE ONE TABLET BY MOUTH ONCE DAILY PARADIGM RESERVOIR 3 mL Misc Generic drug:  Insulin Pump Syringe  
  
 sildenafil (antihypertensive) 20 mg tablet Commonly known as:  REVATIO Take two to five tablets by mouth  1 hour prior to intercourse  
  
 traMADol 50 mg tablet Commonly known as:  ULTRAM  
TAKE 1 TABLET EVERY 6 HOURS AS NEEDED FOR PAIN Patient Instructions High Blood Pressure: Care Instructions Your Care Instructions If your blood pressure is usually above 140/90, you have high blood pressure, or hypertension. That means the top number is 140 or higher or the bottom number is 90 or higher, or both. Despite what a lot of people think, high blood pressure usually doesn't cause headaches or make you feel dizzy or lightheaded. It usually has no symptoms. But it does increase your risk for heart attack, stroke, and kidney or eye damage. The higher your blood pressure, the more your risk increases. Your doctor will give you a goal for your blood pressure. Your goal will be based on your health and your age. An example of a goal is to keep your blood pressure below 140/90. Lifestyle changes, such as eating healthy and being active, are always important to help lower blood pressure. You might also take medicine to reach your blood pressure goal. 
Follow-up care is a key part of your treatment and safety.  Be sure to make and go to all appointments, and call your doctor if you are having problems. It's also a good idea to know your test results and keep a list of the medicines you take. How can you care for yourself at home? Medical treatment · If you stop taking your medicine, your blood pressure will go back up. You may take one or more types of medicine to lower your blood pressure. Be safe with medicines. Take your medicine exactly as prescribed. Call your doctor if you think you are having a problem with your medicine. · Talk to your doctor before you start taking aspirin every day. Aspirin can help certain people lower their risk of a heart attack or stroke. But taking aspirin isn't right for everyone, because it can cause serious bleeding. · See your doctor regularly. You may need to see the doctor more often at first or until your blood pressure comes down. · If you are taking blood pressure medicine, talk to your doctor before you take decongestants or anti-inflammatory medicine, such as ibuprofen. Some of these medicines can raise blood pressure. · Learn how to check your blood pressure at home. Lifestyle changes · Stay at a healthy weight. This is especially important if you put on weight around the waist. Losing even 10 pounds can help you lower your blood pressure. · If your doctor recommends it, get more exercise. Walking is a good choice. Bit by bit, increase the amount you walk every day. Try for at least 30 minutes on most days of the week. You also may want to swim, bike, or do other activities. · Avoid or limit alcohol. Talk to your doctor about whether you can drink any alcohol. · Try to limit how much sodium you eat to less than 2,300 milligrams (mg) a day. Your doctor may ask you to try to eat less than 1,500 mg a day. · Eat plenty of fruits (such as bananas and oranges), vegetables, legumes, whole grains, and low-fat dairy products. · Lower the amount of saturated fat in your diet. Saturated fat is found in animal products such as milk, cheese, and meat. Limiting these foods may help you lose weight and also lower your risk for heart disease. · Do not smoke. Smoking increases your risk for heart attack and stroke. If you need help quitting, talk to your doctor about stop-smoking programs and medicines. These can increase your chances of quitting for good. When should you call for help? Call 911 anytime you think you may need emergency care. This may mean having symptoms that suggest that your blood pressure is causing a serious heart or blood vessel problem. Your blood pressure may be over 180/110. ? For example, call 911 if: 
? · You have symptoms of a heart attack. These may include: ¨ Chest pain or pressure, or a strange feeling in the chest. 
¨ Sweating. ¨ Shortness of breath. ¨ Nausea or vomiting. ¨ Pain, pressure, or a strange feeling in the back, neck, jaw, or upper belly or in one or both shoulders or arms. ¨ Lightheadedness or sudden weakness. ¨ A fast or irregular heartbeat. ? · You have symptoms of a stroke. These may include: 
¨ Sudden numbness, tingling, weakness, or loss of movement in your face, arm, or leg, especially on only one side of your body. ¨ Sudden vision changes. ¨ Sudden trouble speaking. ¨ Sudden confusion or trouble understanding simple statements. ¨ Sudden problems with walking or balance. ¨ A sudden, severe headache that is different from past headaches. ? · You have severe back or belly pain. ?Do not wait until your blood pressure comes down on its own. Get help right away. ?Call your doctor now or seek immediate care if: 
? · Your blood pressure is much higher than normal (such as 180/110 or higher), but you don't have symptoms. ? · You think high blood pressure is causing symptoms, such as: ¨ Severe headache. ¨ Blurry vision. ?Watch closely for changes in your health, and be sure to contact your doctor if: 
? · Your blood pressure measures 140/90 or higher at least 2 times. That means the top number is 140 or higher or the bottom number is 90 or higher, or both. ? · You think you may be having side effects from your blood pressure medicine. ? · Your blood pressure is usually normal, but it goes above normal at least 2 times. Where can you learn more? Go to http://fredia-khloe.info/. Enter E070 in the search box to learn more about \"High Blood Pressure: Care Instructions. \" Current as of: September 21, 2016 Content Version: 11.4 © 8004-4874 [employer]. Care instructions adapted under license by [employer] (which disclaims liability or warranty for this information). If you have questions about a medical condition or this instruction, always ask your healthcare professional. [address] any warranty or liability for your use of this information. Introducing [facility]! Dear Blanca Matthew: Thank you for requesting a [employer] account. Our records indicate that you already have an active [employer] account. You can access your account anytime at [email or web address] Did you know that you can access your hospital and ER discharge instructions at any time in [employer]? You can also review all of your test results from your hospital stay or ER visit. Additional Information If you have questions, please visit the Frequently Asked Questions section of the [employer] website at [email or web address] Remember, [employer] is NOT to be used for urgent needs. For medical emergencies, dial 911. Now available from your iPhone and Android! Please provide this summary of care documentation to your next provider. Your primary care clinician is listed as EMORY ZELAYA.  If you have any questions after today's visit, please call [phone].

## 2018-05-31 NOTE — PROGRESS NOTES
Patient is in the office today for pre-op clearance    1. Have you been to the ER, urgent care clinic since your last visit? Hospitalized since your last visit? No    2. Have you seen or consulted any other health care providers outside of the [facility] since your last visit? Include any pap smears or colon screening.  No

## 2018-05-31 NOTE — PATIENT INSTRUCTIONS
High Blood Pressure: Care Instructions  Your Care Instructions    If your blood pressure is usually above 140/90, you have high blood pressure, or hypertension. That means the top number is 140 or higher or the bottom number is 90 or higher, or both. Despite what a lot of people think, high blood pressure usually doesn't cause headaches or make you feel dizzy or lightheaded. It usually has no symptoms. But it does increase your risk for heart attack, stroke, and kidney or eye damage. The higher your blood pressure, the more your risk increases. Your doctor will give you a goal for your blood pressure. Your goal will be based on your health and your age. An example of a goal is to keep your blood pressure below 140/90. Lifestyle changes, such as eating healthy and being active, are always important to help lower blood pressure. You might also take medicine to reach your blood pressure goal.  Follow-up care is a key part of your treatment and safety. Be sure to make and go to all appointments, and call your doctor if you are having problems. It's also a good idea to know your test results and keep a list of the medicines you take. How can you care for yourself at home? Medical treatment  · If you stop taking your medicine, your blood pressure will go back up. You may take one or more types of medicine to lower your blood pressure. Be safe with medicines. Take your medicine exactly as prescribed. Call your doctor if you think you are having a problem with your medicine. · Talk to your doctor before you start taking aspirin every day. Aspirin can help certain people lower their risk of a heart attack or stroke. But taking aspirin isn't right for everyone, because it can cause serious bleeding. · See your doctor regularly. You may need to see the doctor more often at first or until your blood pressure comes down.   · If you are taking blood pressure medicine, talk to your doctor before you take decongestants or anti-inflammatory medicine, such as ibuprofen. Some of these medicines can raise blood pressure. · Learn how to check your blood pressure at home. Lifestyle changes  · Stay at a healthy weight. This is especially important if you put on weight around the waist. Losing even 10 pounds can help you lower your blood pressure. · If your doctor recommends it, get more exercise. Walking is a good choice. Bit by bit, increase the amount you walk every day. Try for at least 30 minutes on most days of the week. You also may want to swim, bike, or do other activities. · Avoid or limit alcohol. Talk to your doctor about whether you can drink any alcohol. · Try to limit how much sodium you eat to less than 2,300 milligrams (mg) a day. Your doctor may ask you to try to eat less than 1,500 mg a day. · Eat plenty of fruits (such as bananas and oranges), vegetables, legumes, whole grains, and low-fat dairy products. · Lower the amount of saturated fat in your diet. Saturated fat is found in animal products such as milk, cheese, and meat. Limiting these foods may help you lose weight and also lower your risk for heart disease. · Do not smoke. Smoking increases your risk for heart attack and stroke. If you need help quitting, talk to your doctor about stop-smoking programs and medicines. These can increase your chances of quitting for good. When should you call for help? Call 911 anytime you think you may need emergency care. This may mean having symptoms that suggest that your blood pressure is causing a serious heart or blood vessel problem. Your blood pressure may be over 180/110. ? For example, call 911 if:  ? · You have symptoms of a heart attack. These may include:  ¨ Chest pain or pressure, or a strange feeling in the chest.  ¨ Sweating. ¨ Shortness of breath. ¨ Nausea or vomiting.   ¨ Pain, pressure, or a strange feeling in the back, neck, jaw, or upper belly or in one or both shoulders or arms.  ¨ Lightheadedness or sudden weakness. ¨ A fast or irregular heartbeat. ? · You have symptoms of a stroke. These may include:  ¨ Sudden numbness, tingling, weakness, or loss of movement in your face, arm, or leg, especially on only one side of your body. ¨ Sudden vision changes. ¨ Sudden trouble speaking. ¨ Sudden confusion or trouble understanding simple statements. ¨ Sudden problems with walking or balance. ¨ A sudden, severe headache that is different from past headaches. ? · You have severe back or belly pain. ?Do not wait until your blood pressure comes down on its own. Get help right away. ?Call your doctor now or seek immediate care if:  ? · Your blood pressure is much higher than normal (such as 180/110 or higher), but you don't have symptoms. ? · You think high blood pressure is causing symptoms, such as:  ¨ Severe headache. ¨ Blurry vision. ? Watch closely for changes in your health, and be sure to contact your doctor if:  ? · Your blood pressure measures 140/90 or higher at least 2 times. That means the top number is 140 or higher or the bottom number is 90 or higher, or both. ? · You think you may be having side effects from your blood pressure medicine. ? · Your blood pressure is usually normal, but it goes above normal at least 2 times. Where can you learn more? Go to http://freida-khloe.info/. Enter W460 in the search box to learn more about \"High Blood Pressure: Care Instructions. \"  Current as of: September 21, 2016  Content Version: 11.4  © 0963-1504 [employer]. Care instructions adapted under license by [facility] (which disclaims liability or warranty for this information). If you have questions about a medical condition or this instruction, always ask your healthcare professional. [place] [address] any warranty or liability for your use of this information.

## 2018-06-21 ENCOUNTER — HOSPITAL ENCOUNTER (OUTPATIENT)
Dept: LAB | Age: 58
Discharge: HOME | End: 2018-06-21
Payer: MEDICARE

## 2018-06-21 DIAGNOSIS — E10.42 TYPE 1 DIABETES MELLITUS WITH DIABETIC POLYNEUROPATHY (HCC): ICD-10-CM

## 2018-06-21 DIAGNOSIS — I11.9 BENIGN HYPERTENSIVE HEART DISEASE WITHOUT HEART FAILURE: ICD-10-CM

## 2018-06-21 DIAGNOSIS — E78.5 DYSLIPIDEMIA: ICD-10-CM

## 2018-06-21 LAB
ALBUMIN SERPL-MCNC: 3.5 G/DL (ref 3.4–5)
ALBUMIN/GLOB SERPL: 1.1 {RATIO} (ref 0.8–1.7)
ALP SERPL-CCNC: 94 U/L (ref 45–117)
ALT SERPL-CCNC: 30 U/L (ref 16–61)
ANION GAP SERPL CALC-SCNC: 7 MMOL/L (ref 3–18)
AST SERPL-CCNC: 15 U/L (ref 15–37)
BILIRUB SERPL-MCNC: 0.4 MG/DL (ref 0.2–1)
BUN SERPL-MCNC: 14 MG/DL (ref 7–18)
BUN/CREAT SERPL: 12 (ref 12–20)
CALCIUM SERPL-MCNC: 9.4 MG/DL (ref 8.5–10.1)
CHLORIDE SERPL-SCNC: 108 MMOL/L (ref 100–108)
CHOLEST SERPL-MCNC: 118 MG/DL
CO2 SERPL-SCNC: 27 MMOL/L (ref 21–32)
CREAT SERPL-MCNC: 1.13 MG/DL (ref 0.6–1.3)
GLOBULIN SER CALC-MCNC: 3.2 G/DL (ref 2–4)
GLUCOSE SERPL-MCNC: 105 MG/DL (ref 74–99)
HBA1C MFR BLD: 8.5 % (ref 4.2–5.6)
HDLC SERPL-MCNC: 46 MG/DL (ref 40–60)
HDLC SERPL: 2.6 {RATIO} (ref 0–5)
LDLC SERPL CALC-MCNC: 53 MG/DL (ref 0–100)
LIPID PROFILE,FLP: NORMAL
POTASSIUM SERPL-SCNC: 4.3 MMOL/L (ref 3.5–5.5)
PROT SERPL-MCNC: 6.7 G/DL (ref 6.4–8.2)
SODIUM SERPL-SCNC: 142 MMOL/L (ref 136–145)
TRIGL SERPL-MCNC: 95 MG/DL (ref ?–150)
VLDLC SERPL CALC-MCNC: 19 MG/DL

## 2018-06-21 PROCEDURE — 36415 COLL VENOUS BLD VENIPUNCTURE: CPT | Performed by: INTERNAL MEDICINE

## 2018-06-21 PROCEDURE — 83036 HEMOGLOBIN GLYCOSYLATED A1C: CPT | Performed by: INTERNAL MEDICINE

## 2018-06-21 PROCEDURE — 80061 LIPID PANEL: CPT | Performed by: INTERNAL MEDICINE

## 2018-06-21 PROCEDURE — 80053 COMPREHEN METABOLIC PANEL: CPT | Performed by: INTERNAL MEDICINE

## 2018-06-28 ENCOUNTER — OFFICE VISIT (OUTPATIENT)
Dept: FAMILY MEDICINE CLINIC | Age: 58
End: 2018-06-28

## 2018-06-28 VITALS
HEIGHT: 73 IN | SYSTOLIC BLOOD PRESSURE: 158 MMHG | OXYGEN SATURATION: 98 % | TEMPERATURE: 98.7 F | DIASTOLIC BLOOD PRESSURE: 83 MMHG | HEART RATE: 83 BPM | RESPIRATION RATE: 18 BRPM | WEIGHT: 193 LBS | BODY MASS INDEX: 25.58 KG/M2

## 2018-06-28 DIAGNOSIS — E10.42 TYPE 1 DIABETES MELLITUS WITH DIABETIC POLYNEUROPATHY (HCC): Primary | ICD-10-CM

## 2018-06-28 DIAGNOSIS — I11.9 BENIGN HYPERTENSIVE HEART DISEASE WITHOUT HEART FAILURE: ICD-10-CM

## 2018-06-28 DIAGNOSIS — E78.5 DYSLIPIDEMIA: ICD-10-CM

## 2018-06-28 NOTE — MR AVS SNAPSHOT
[address] [facility], [place] 
 
 
 [address] [address] [address] [facility] [address] 
[phone] Patient: Elizabeth Alejandra. MRN: [id number] Gosia Lora Visit Information Date & Time Provider Department Dept. Phone Encounter #  
 6/28/2018  9:00 AM Wander Lou, [address] [place] [address] [phone] [id number] Follow-up Instructions Return in about 3 months (around 9/28/2018) for labs 1 week before. Your Appointments 1/14/2019 10:10 AM  
Nurse Visit with Kenya Doty [facility] Appt Note: PSA free and total 1 month prior. [address] [facility] [address] [place] [facility] 10[address] [facility]  
  
   
 [address] [address] [facility] [place]  
  
    
  
 2/14/2019 11:00 AM  
Any with Kristy Gao MD  
[facility] ([facility]) Appt Note: Return in about 1 year (around 2/14/2019) for follow up, PSA free and total 1 month prior. [address] [facility] [address] Melanie Pole 10[address] [facility]  
  
   
 [address] [facility] 2301 [facility] [address] [place] [address] Upcoming Health Maintenance Date Due  
 FOOT EXAM Q1 5/8/2018 EYE EXAM RETINAL OR DILATED Q1 6/15/2018 Influenza Age 5 to Adult 8/1/2018 HEMOGLOBIN A1C Q6M 12/21/2018 MICROALBUMIN Q1 12/22/2018 COLONOSCOPY 2/24/2019 LIPID PANEL Q1 6/21/2019 DTaP/Tdap/Td series (2 - Td) 10/25/2025 Allergies as of 6/28/2018  Review Complete On: 6/28/2018 By: Wander Lou MD  
 No Known Allergies Current Immunizations  Reviewed on 9/27/2017 Name Date Influenza Vaccine (Quad) PF 9/27/2017, 12/21/2016, 11/13/2015 Pneumococcal Polysaccharide (PPSV-23) 11/13/2015 Tdap 10/25/2015  7:48 PM  
  
 Not reviewed this visit You Were Diagnosed With   
  
 Codes Comments  Type 1 diabetes mellitus with diabetic polyneuropathy (HCC)    -  Primary ICD-10-CM: E10.42 
ICD-9-CM: 250.61, 357.2 Benign hypertensive heart disease without heart failure     ICD-10-CM: I11.9 ICD-9-CM: 402.10 Dyslipidemia     ICD-10-CM: E78.5 ICD-9-CM: 272.4 Vitals BP Pulse Temp Resp Height(growth percentile) Weight(growth percentile) 158/83 (BP 1 Location: Left arm, BP Patient Position: Sitting) 83 98.7 °F (37.1 °C) (Oral) 18 6' 1\" (1.854 m) 193 lb (87.5 kg) SpO2 BMI Smoking Status 98% 25.46 kg/m2 Former Smoker BMI and BSA Data Body Mass Index Body Surface Area  
 25.46 kg/m 2 2.12 m 2 Preferred Pharmacy Pharmacy Name Phone Deepali Peters [address], [address] [phone] Your Updated Medication List  
  
   
This list is accurate as of 6/28/18  9:47 AM.  Always use your most recent med list. amLODIPine 5 mg tablet Commonly known as:  [place] Cordial Take 1 Tab by mouth daily. aspirin delayed-release 81 mg tablet Take  by mouth daily. atorvastatin 20 mg tablet Commonly known as:  LIPITOR Take 1 Tab by mouth daily. CONTOUR NEXT TEST STRIPS strip Generic drug:  glucose blood VI test strips CHECK BLOOD SUGAR 8 TIMES A DAY DUE TO INSULIN PUMP AND LABILE BLOOD SUGAR  
  
 FISH OIL 1,000 mg Cap Generic drug:  omega-3 fatty acids-vitamin e Take 1 Cap by mouth. insulin lispro 100 unit/mL injection Commonly known as:  HUMALOG 38 units daily for insulin pump. DX E10.42  
  
 lisinopril 20 mg tablet Commonly known as:  Lizzie Fisher Take 1 Tab by mouth daily. [facility] Generic drug:  Lancets MINIMED INFUSION SET Iset Generic drug:  Infusion Set for Insulin Pump  
  
 multivitamin tablet Commonly known as:  ONE A DAY Take 1 Tab by mouth daily. oxybutynin 5 mg tablet Commonly known as:  SEKWVBUN Take 1 Tab by mouth two (2) times a day. pantoprazole 40 mg tablet Commonly known as:  PROTONIX TAKE ONE TABLET BY MOUTH ONCE DAILY PARADIGM RESERVOIR 3 mL Misc Generic drug:  Insulin Pump Syringe  
  
 sildenafil (antihypertensive) 20 mg tablet Commonly known as:  REVATIO  
TAKE TWO TO FIVE TABLETS BY MOUTH 1 HOUR PRIOR TO INTERCOURSE  
  
 traMADol 50 mg tablet Commonly known as:  ULTRAM  
TAKE 1 TABLET EVERY 6 HOURS AS NEEDED FOR PAIN Follow-up Instructions Return in about 3 months (around 9/28/2018) for labs 1 week before. Patient Instructions High Blood Pressure: Care Instructions Your Care Instructions If your blood pressure is usually above 140/90, you have high blood pressure, or hypertension. That means the top number is 140 or higher or the bottom number is 90 or higher, or both. Despite what a lot of people think, high blood pressure usually doesn't cause headaches or make you feel dizzy or lightheaded. It usually has no symptoms. But it does increase your risk for heart attack, stroke, and kidney or eye damage. The higher your blood pressure, the more your risk increases. Your doctor will give you a goal for your blood pressure. Your goal will be based on your health and your age. An example of a goal is to keep your blood pressure below 140/90. Lifestyle changes, such as eating healthy and being active, are always important to help lower blood pressure. You might also take medicine to reach your blood pressure goal. 
Follow-up care is a key part of your treatment and safety. Be sure to make and go to all appointments, and call your doctor if you are having problems. It's also a good idea to know your test results and keep a list of the medicines you take. How can you care for yourself at home? Medical treatment · If you stop taking your medicine, your blood pressure will go back up. You may take one or more types of medicine to lower your blood pressure. Be safe with medicines. Take your medicine exactly as prescribed.  Call your doctor if you think you are having a problem with your medicine. · Talk to your doctor before you start taking aspirin every day. Aspirin can help certain people lower their risk of a heart attack or stroke. But taking aspirin isn't right for everyone, because it can cause serious bleeding. · See your doctor regularly. You may need to see the doctor more often at first or until your blood pressure comes down. · If you are taking blood pressure medicine, talk to your doctor before you take decongestants or anti-inflammatory medicine, such as ibuprofen. Some of these medicines can raise blood pressure. · Learn how to check your blood pressure at home. Lifestyle changes · Stay at a healthy weight. This is especially important if you put on weight around the waist. Losing even 10 pounds can help you lower your blood pressure. · If your doctor recommends it, get more exercise. Walking is a good choice. Bit by bit, increase the amount you walk every day. Try for at least 30 minutes on most days of the week. You also may want to swim, bike, or do other activities. · Avoid or limit alcohol. Talk to your doctor about whether you can drink any alcohol. · Try to limit how much sodium you eat to less than 2,300 milligrams (mg) a day. Your doctor may ask you to try to eat less than 1,500 mg a day. · Eat plenty of fruits (such as bananas and oranges), vegetables, legumes, whole grains, and low-fat dairy products. · Lower the amount of saturated fat in your diet. Saturated fat is found in animal products such as milk, cheese, and meat. Limiting these foods may help you lose weight and also lower your risk for heart disease. · Do not smoke. Smoking increases your risk for heart attack and stroke. If you need help quitting, talk to your doctor about stop-smoking programs and medicines. These can increase your chances of quitting for good. When should you call for help? Call 911 anytime you think you may need emergency care. This may mean having symptoms that suggest that your blood pressure is causing a serious heart or blood vessel problem. Your blood pressure may be over 180/110. ? For example, call 911 if: 
? · You have symptoms of a heart attack. These may include: ¨ Chest pain or pressure, or a strange feeling in the chest. 
¨ Sweating. ¨ Shortness of breath. ¨ Nausea or vomiting. ¨ Pain, pressure, or a strange feeling in the back, neck, jaw, or upper belly or in one or both shoulders or arms. ¨ Lightheadedness or sudden weakness. ¨ A fast or irregular heartbeat. ? · You have symptoms of a stroke. These may include: 
¨ Sudden numbness, tingling, weakness, or loss of movement in your face, arm, or leg, especially on only one side of your body. ¨ Sudden vision changes. ¨ Sudden trouble speaking. ¨ Sudden confusion or trouble understanding simple statements. ¨ Sudden problems with walking or balance. ¨ A sudden, severe headache that is different from past headaches. ? · You have severe back or belly pain. ?Do not wait until your blood pressure comes down on its own. Get help right away. ?Call your doctor now or seek immediate care if: 
? · Your blood pressure is much higher than normal (such as 180/110 or higher), but you don't have symptoms. ? · You think high blood pressure is causing symptoms, such as: ¨ Severe headache. ¨ Blurry vision. ? Watch closely for changes in your health, and be sure to contact your doctor if: 
? · Your blood pressure measures 140/90 or higher at least 2 times. That means the top number is 140 or higher or the bottom number is 90 or higher, or both. ? · You think you may be having side effects from your blood pressure medicine. ? · Your blood pressure is usually normal, but it goes above normal at least 2 times. Where can you learn more? Go to http://freida-khloe.info/. Enter V996 in the search box to learn more about \"High Blood Pressure: Care Instructions. \" Current as of: September 21, 2016 Content Version: 11.4 © 3189-9961 Shahiya. Care instructions adapted under license by [employer] (which disclaims liability or warranty for this information). If you have questions about a medical condition or this instruction, always ask your healthcare professional. [address] any warranty or liability for your use of this information. Introducing [address]! Dear Usha Paci: Thank you for requesting a Loccie account. Our records indicate that you already have an active Loccie account. You can access your account anytime at [email or web address] Did you know that you can access your hospital and ER discharge instructions at any time in Loccie? You can also review all of your test results from your hospital stay or ER visit. Additional Information If you have questions, please visit the Frequently Asked Questions section of the Loccie website at [email or web address] Remember, Loccie is NOT to be used for urgent needs. For medical emergencies, dial 911. Now available from your iPhone and Android! Please provide this summary of care documentation to your next provider. Your primary care clinician is listed as EMORY ZELAYA. If you have any questions after today's visit, please call [phone].

## 2018-06-28 NOTE — PROGRESS NOTES
Subjective:       Chief Complaint  The patient presents for follow up of diabetes, hypertension and high cholesterol. JANESSA Watt is a 62 y.o. male seen for follow up of diabetes. Healadilson has hypertension and hyperlipidemia. Diabetes no significant medication side effects noted, poorly controlled but improved from last OV, pt continues on insulin pump and will f/u with Endo (Dr Clinton Pardo better management, he does not bolus insulin before he eats which is one reason it is uncontrolled, he is trying to do this better,he has low ,  hypertension borderline controlled on lisinopril 20 mg  And Norvasc 5 mg, will continue to monitor, hyperlipidemia well controlled, no significant medication side effects noted, on Lipitor     Diet and Lifestyle: generally follows a low fat low cholesterol diet, does not rigorously follow a diabetic diet, exercises sporadically    Home BP Monitoring: is not measured at home. Diabetic Review of Systems - home glucose monitoring: is performed regularly, 6x/day. Other symptoms and concerns: pt will try to exercise more. He has a diabetic foot ulcer that is slowly healing. He had left foot surgery a few  Months ago and needs o have further surgery. . Pt is candidate for diabetic shoes due to neuropathy and ulcerative callus. He is followed by Podiatry. He has actually seen multiple specialists and is having difficulty deciding how to proceed. He wanted to have Dr. Emely Zhu do it but he does not go to [facility] which seems to be required by his insurance. He did not agree with Dr. Nina Osborne opinion. [facility].  If he needs another name will give him Dr. Rao Darling. However doubt he goes to a itie 6.     Current Outpatient Prescriptions   Medication Sig    sildenafil, antihypertensive, (REVATIO) 20 mg tablet TAKE TWO TO FIVE TABLETS BY MOUTH 1 HOUR PRIOR TO INTERCOURSE    pantoprazole (PROTONIX) 40 mg tablet TAKE ONE TABLET BY MOUTH ONCE DAILY    amLODIPine (NORVASC) 5 mg tablet Take 1 Tab by mouth daily.  MINIMED INFUSION SET iset     lisinopril (PRINIVIL, ZESTRIL) 20 mg tablet Take 1 Tab by mouth daily.  PARADIGM RESERVOIR 3 mL misc     atorvastatin (LIPITOR) 20 mg tablet Take 1 Tab by mouth daily.  oxybutynin (DITROPAN) 5 mg tablet Take 1 Tab by mouth two (2) times a day.  insulin lispro (HUMALOG) 100 unit/mL injection 38 units daily for insulin pump. DX E10.42    aspirin delayed-release 81 mg tablet Take  by mouth daily.  CONTOUR NEXT STRIPS strip CHECK BLOOD SUGAR 8 TIMES A DAY DUE TO INSULIN PUMP AND LABILE BLOOD SUGAR    MICROLET LANCET misc     omega-3 fatty acids-vitamin e (FISH OIL) 1,000 mg cap Take 1 Cap by mouth.  multivitamin (ONE A DAY) tablet Take 1 Tab by mouth daily.  traMADol (ULTRAM) 50 mg tablet TAKE 1 TABLET EVERY 6 HOURS AS NEEDED FOR PAIN     No current facility-administered medications for this visit.               Review of Systems  Respiratory: negative for dyspnea on exertion  Cardiovascular: negative for chest pain    Objective:     Visit Vitals    /83 (BP 1 Location: Left arm, BP Patient Position: Sitting)    Pulse 83    Temp 98.7 °F (37.1 °C) (Oral)    Resp 18    Ht 6' 1\" (1.854 m)    Wt 193 lb (87.5 kg)    SpO2 98%    BMI 25.46 kg/m2        General appearance - alert, well appearing, and in no distress  Chest - clear to auscultation, no wheezes, rales or rhonchi, symmetric air entry  Heart - normal rate, regular rhythm, normal S1, S2, no murmurs, rubs, clicks or gallops      Labs:   Lab Results   Component Value Date/Time    Hemoglobin A1c 8.5 (H) 06/21/2018 07:10 AM    Hemoglobin A1c 9.7 (H) 03/21/2018 07:54 AM    Hemoglobin A1c 10.3 (H) 12/22/2017 12:00 AM    Glucose 105 (H) 06/21/2018 07:10 AM    Glucose (POC) 133 (H) 02/24/2016 12:46 PM    Microalbumin/Creat ratio (mg/g creat) 46 12/04/2015 08:40 AM    Microalb/Creat ratio (ug/mg creat.) 17.9 12/22/2017 12:00 AM    Microalbumin,urine random 4.10 12/04/2015 08:40 AM    LDL, calculated 53 06/21/2018 07:10 AM    Creatinine 1.13 06/21/2018 07:10 AM    Hemoglobin A1c, External 9.3 12/10/2015    Hemoglobin A1c, External 8.7% 08/20/2015    Hemoglobin A1c, External 9.5 05/22/2015 07:43 PM      Lab Results   Component Value Date/Time    Cholesterol, total 118 06/21/2018 07:10 AM    HDL Cholesterol 46 06/21/2018 07:10 AM    LDL, calculated 53 06/21/2018 07:10 AM    Triglyceride 95 06/21/2018 07:10 AM    CHOL/HDL Ratio 2.6 06/21/2018 07:10 AM     Lab Results   Component Value Date/Time    ALT (SGPT) 30 06/21/2018 07:10 AM    AST (SGOT) 15 06/21/2018 07:10 AM    Alk. phosphatase 94 06/21/2018 07:10 AM    Bilirubin, total 0.4 06/21/2018 07:10 AM     Lab Results   Component Value Date/Time    GFR est AA >60 06/21/2018 07:10 AM    GFR est non-AA >60 06/21/2018 07:10 AM    Creatinine 1.13 06/21/2018 07:10 AM    BUN 14 06/21/2018 07:10 AM    Sodium 142 06/21/2018 07:10 AM    Potassium 4.3 06/21/2018 07:10 AM    Chloride 108 06/21/2018 07:10 AM    CO2 27 06/21/2018 07:10 AM      Lab Results   Component Value Date/Time    Prostate Specific Ag 0.3 03/21/2018 07:54 AM    Prostate Specific Ag 0.4 02/01/2018 10:00 AM    Prostate Specific Ag 0.3 12/21/2016 09:54 AM    Prostate Specific Ag 0.293 03/11/2015 11:12 AM    Prostate Specific Ag 0.251 12/17/2012 03:42 PM    PSA 0.3 09/30/2010 10:09 AM    PSA, FREE 0.2 09/30/2010 10:09 AM    % FREE PSA 67 09/30/2010 10:09 AM     Lab Results   Component Value Date/Time    Glucose 105 (H) 06/21/2018 07:10 AM    Glucose (POC) 133 (H) 02/24/2016 12:46 PM            Assessment / Plan     Diabetes poorly controlled, pt remains on insulin pump and will follow up with Dr. Karuna Askew endocrine. Hypertension borderline controlled, on lisinopril 20 mg, and Norvasc 5 mg. Patient will try to be compliant with taking medications.   Hyperlipidemia well controlled on Lipitor ICD-10-CM ICD-9-CM    1. Type 1 diabetes mellitus with diabetic polyneuropathy (HCC) E10.42 250.61 HEMOGLOBIN A1C W/O EAG     357.2    2. Hypertension S90.4 529.85 METABOLIC PANEL, COMPREHENSIVE   3. Dyslipidemia E78.5 272.4 LIPID PANEL              Diabetic issues reviewed with him: diabetic diet discussed in detail, and low cholesterol diet, weight control and daily exercise discussed. Follow-up Disposition:  Return in about 3 months (around 9/28/2018) for labs 1 week before. Reviewed plan of care. Patient has provided input and agrees with goals.

## 2018-06-28 NOTE — PROGRESS NOTES
Patient is in the office today for a 3  month follow up. 1. Have you been to the ER, urgent care clinic since your last visit? Hospitalized since your last visit? No    2. Have you seen or consulted any other health care providers outside of the [address] [place] [address] since your last visit? Include any pap smears or colon screening.  No

## 2018-06-28 NOTE — PATIENT INSTRUCTIONS
High Blood Pressure: Care Instructions  Your Care Instructions    If your blood pressure is usually above 140/90, you have high blood pressure, or hypertension. That means the top number is 140 or higher or the bottom number is 90 or higher, or both. Despite what a lot of people think, high blood pressure usually doesn't cause headaches or make you feel dizzy or lightheaded. It usually has no symptoms. But it does increase your risk for heart attack, stroke, and kidney or eye damage. The higher your blood pressure, the more your risk increases. Your doctor will give you a goal for your blood pressure. Your goal will be based on your health and your age. An example of a goal is to keep your blood pressure below 140/90. Lifestyle changes, such as eating healthy and being active, are always important to help lower blood pressure. You might also take medicine to reach your blood pressure goal.  Follow-up care is a key part of your treatment and safety. Be sure to make and go to all appointments, and call your doctor if you are having problems. It's also a good idea to know your test results and keep a list of the medicines you take. How can you care for yourself at home? Medical treatment  · If you stop taking your medicine, your blood pressure will go back up. You may take one or more types of medicine to lower your blood pressure. Be safe with medicines. Take your medicine exactly as prescribed. Call your doctor if you think you are having a problem with your medicine. · Talk to your doctor before you start taking aspirin every day. Aspirin can help certain people lower their risk of a heart attack or stroke. But taking aspirin isn't right for everyone, because it can cause serious bleeding. · See your doctor regularly. You may need to see the doctor more often at first or until your blood pressure comes down.   · If you are taking blood pressure medicine, talk to your doctor before you take decongestants or anti-inflammatory medicine, such as ibuprofen. Some of these medicines can raise blood pressure. · Learn how to check your blood pressure at home. Lifestyle changes  · Stay at a healthy weight. This is especially important if you put on weight around the waist. Losing even 10 pounds can help you lower your blood pressure. · If your doctor recommends it, get more exercise. Walking is a good choice. Bit by bit, increase the amount you walk every day. Try for at least 30 minutes on most days of the week. You also may want to swim, bike, or do other activities. · Avoid or limit alcohol. Talk to your doctor about whether you can drink any alcohol. · Try to limit how much sodium you eat to less than 2,300 milligrams (mg) a day. Your doctor may ask you to try to eat less than 1,500 mg a day. · Eat plenty of fruits (such as bananas and oranges), vegetables, legumes, whole grains, and low-fat dairy products. · Lower the amount of saturated fat in your diet. Saturated fat is found in animal products such as milk, cheese, and meat. Limiting these foods may help you lose weight and also lower your risk for heart disease. · Do not smoke. Smoking increases your risk for heart attack and stroke. If you need help quitting, talk to your doctor about stop-smoking programs and medicines. These can increase your chances of quitting for good. When should you call for help? Call 911 anytime you think you may need emergency care. This may mean having symptoms that suggest that your blood pressure is causing a serious heart or blood vessel problem. Your blood pressure may be over 180/110. ? For example, call 911 if:  ? · You have symptoms of a heart attack. These may include:  ¨ Chest pain or pressure, or a strange feeling in the chest.  ¨ Sweating. ¨ Shortness of breath. ¨ Nausea or vomiting.   ¨ Pain, pressure, or a strange feeling in the back, neck, jaw, or upper belly or in one or both shoulders or arms.  ¨ Lightheadedness or sudden weakness. ¨ A fast or irregular heartbeat. ? · You have symptoms of a stroke. These may include:  ¨ Sudden numbness, tingling, weakness, or loss of movement in your face, arm, or leg, especially on only one side of your body. ¨ Sudden vision changes. ¨ Sudden trouble speaking. ¨ Sudden confusion or trouble understanding simple statements. ¨ Sudden problems with walking or balance. ¨ A sudden, severe headache that is different from past headaches. ? · You have severe back or belly pain. ?Do not wait until your blood pressure comes down on its own. Get help right away. ?Call your doctor now or seek immediate care if:  ? · Your blood pressure is much higher than normal (such as 180/110 or higher), but you don't have symptoms. ? · You think high blood pressure is causing symptoms, such as:  ¨ Severe headache. ¨ Blurry vision. ? Watch closely for changes in your health, and be sure to contact your doctor if:  ? · Your blood pressure measures 140/90 or higher at least 2 times. That means the top number is 140 or higher or the bottom number is 90 or higher, or both. ? · You think you may be having side effects from your blood pressure medicine. ? · Your blood pressure is usually normal, but it goes above normal at least 2 times. Where can you learn more? Go to http://freida-khloe.info/. Enter O722 in the search box to learn more about \"High Blood Pressure: Care Instructions. \"  Current as of: September 21, 2016  Content Version: 11.4  © 9443-9731 [employer]. Care instructions adapted under license by [employer] (which disclaims liability or warranty for this information). If you have questions about a medical condition or this instruction, always ask your healthcare professional. [address] [address] any warranty or liability for your use of this information.

## 2018-08-06 ENCOUNTER — HOSPITAL ENCOUNTER (OUTPATIENT)
Dept: LAB | Age: 58
Discharge: HOME | End: 2018-08-06

## 2018-08-06 PROCEDURE — 99001 SPECIMEN HANDLING PT-LAB: CPT | Performed by: INTERNAL MEDICINE

## 2018-09-11 DIAGNOSIS — E78.00 HIGH CHOLESTEROL: ICD-10-CM

## 2018-09-11 RX ORDER — ATORVASTATIN CALCIUM 20 MG/1
TABLET, FILM COATED ORAL
Qty: 90 TAB | Refills: 3 | Status: SHIPPED | OUTPATIENT
Start: 2018-09-11 | End: 2019-08-18 | Stop reason: SDUPTHER

## 2018-09-11 RX ORDER — OXYBUTYNIN CHLORIDE 5 MG/1
TABLET ORAL
Qty: 180 TAB | Refills: 3 | Status: SHIPPED | OUTPATIENT
Start: 2018-09-11 | End: 2019-08-18 | Stop reason: SDUPTHER

## 2018-09-11 RX ORDER — INFUSION SET FOR INSULIN PUMP
INFUSION SETS-PARAPHERNALIA MISCELLANEOUS
Qty: 30 EACH | Refills: 3 | Status: SHIPPED | OUTPATIENT
Start: 2018-09-11 | End: 2019-06-30 | Stop reason: SDUPTHER

## 2018-09-13 DIAGNOSIS — G63 POLYNEUROPATHY ASSOCIATED WITH UNDERLYING DISEASE (HCC): ICD-10-CM

## 2018-09-13 RX ORDER — TRAMADOL HYDROCHLORIDE 50 MG/1
TABLET ORAL
Qty: 180 TAB | Refills: 2 | Status: SHIPPED | OUTPATIENT
Start: 2018-09-13 | End: 2019-07-01 | Stop reason: SDUPTHER

## 2018-09-16 ENCOUNTER — APPOINTMENT (OUTPATIENT)
Dept: GENERAL RADIOLOGY | Age: 58
End: 2018-09-16
Attending: PHYSICIAN ASSISTANT
Payer: MEDICARE

## 2018-09-16 ENCOUNTER — HOSPITAL ENCOUNTER (EMERGENCY)
Age: 58
Discharge: HOME | End: 2018-09-16
Attending: EMERGENCY MEDICINE | Admitting: EMERGENCY MEDICINE
Payer: MEDICARE

## 2018-09-16 VITALS
BODY MASS INDEX: 25.84 KG/M2 | TEMPERATURE: 99 F | OXYGEN SATURATION: 96 % | WEIGHT: 195 LBS | SYSTOLIC BLOOD PRESSURE: 138 MMHG | HEIGHT: 73 IN | DIASTOLIC BLOOD PRESSURE: 61 MMHG | RESPIRATION RATE: 18 BRPM | HEART RATE: 90 BPM

## 2018-09-16 DIAGNOSIS — R05.9 COUGH: Primary | ICD-10-CM

## 2018-09-16 PROCEDURE — 71046 X-RAY EXAM CHEST 2 VIEWS: CPT

## 2018-09-16 PROCEDURE — 99281 EMR DPT VST MAYX REQ PHY/QHP: CPT

## 2018-09-16 NOTE — ED TRIAGE NOTES
Patient c/o productive cough and chest congestion. He states he has difficulty breathing when he lies down.

## 2018-09-16 NOTE — ED NOTES
Bluford Gosselin. is a 62 y.o. male that was discharged in stable condition. The patients diagnosis, condition and treatment were explained to  patient and aftercare instructions were given. The patient verbalized understanding. Patient armband removed and shredded.
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
## 2018-09-16 NOTE — DISCHARGE INSTRUCTIONS
Cough: Care Instructions  Your Care Instructions    A cough is your body's response to something that bothers your throat or airways. Many things can cause a cough. You might cough because of a cold or the flu, bronchitis, or asthma. Smoking, postnasal drip, allergies, and stomach acid that backs up into your throat also can cause coughs. A cough is a symptom, not a disease. Most coughs stop when the cause, such as a cold, goes away. You can take a few steps at home to cough less and feel better. Follow-up care is a key part of your treatment and safety. Be sure to make and go to all appointments, and call your doctor if you are having problems. It's also a good idea to know your test results and keep a list of the medicines you take. How can you care for yourself at home? · Drink lots of water and other fluids. This helps thin the mucus and soothes a dry or sore throat. Honey or lemon juice in hot water or tea may ease a dry cough. · Take cough medicine as directed by your doctor. · Prop up your head on pillows to help you breathe and ease a dry cough. · Try cough drops to soothe a dry or sore throat. Cough drops don't stop a cough. Medicine-flavored cough drops are no better than candy-flavored drops or hard candy. · Do not smoke. Avoid secondhand smoke. If you need help quitting, talk to your doctor about stop-smoking programs and medicines. These can increase your chances of quitting for good. When should you call for help? Call 911 anytime you think you may need emergency care.  For example, call if:    · You have severe trouble breathing.    Call your doctor now or seek immediate medical care if:    · You cough up blood.     · You have new or worse trouble breathing.     · You have a new or higher fever.     · You have a new rash.    Watch closely for changes in your health, and be sure to contact your doctor if:    · You cough more deeply or more often, especially if you notice more mucus or a change in the color of your mucus.     · You have new symptoms, such as a sore throat, an earache, or sinus pain.     · You do not get better as expected. Where can you learn more? Go to http://freida-khloe.info/. Enter D279 in the search box to learn more about \"Cough: Care Instructions. \"  Current as of: December 6, 2017  Content Version: 11.7  © 6138-5266 [employer]. Care instructions adapted under license by [employer] (which disclaims liability or warranty for this information). If you have questions about a medical condition or this instruction, always ask your healthcare professional. [address] any warranty or liability for your use of this information.

## 2018-09-18 ENCOUNTER — OFFICE VISIT (OUTPATIENT)
Dept: FAMILY MEDICINE CLINIC | Age: 58
End: 2018-09-18

## 2018-09-18 VITALS
BODY MASS INDEX: 26.51 KG/M2 | DIASTOLIC BLOOD PRESSURE: 77 MMHG | OXYGEN SATURATION: 95 % | WEIGHT: 200 LBS | TEMPERATURE: 98.1 F | HEART RATE: 99 BPM | SYSTOLIC BLOOD PRESSURE: 162 MMHG | HEIGHT: 73 IN | RESPIRATION RATE: 20 BRPM

## 2018-09-18 DIAGNOSIS — R05.9 COUGH: ICD-10-CM

## 2018-09-18 DIAGNOSIS — J40 BRONCHITIS: Primary | ICD-10-CM

## 2018-09-18 RX ORDER — MONTELUKAST SODIUM 10 MG/1
10 TABLET ORAL DAILY
Qty: 30 TAB | Refills: 5 | Status: SHIPPED | OUTPATIENT
Start: 2018-09-18 | End: 2020-08-27

## 2018-09-18 RX ORDER — CODEINE PHOSPHATE AND GUAIFENESIN 10; 100 MG/5ML; MG/5ML
10 SOLUTION ORAL
Qty: 120 ML | Refills: 0 | Status: SHIPPED | OUTPATIENT
Start: 2018-09-18 | End: 2018-09-28 | Stop reason: ALTCHOICE

## 2018-09-18 RX ORDER — AZITHROMYCIN 250 MG/1
TABLET, FILM COATED ORAL
Qty: 6 TAB | Refills: 0 | Status: SHIPPED | OUTPATIENT
Start: 2018-09-18 | End: 2018-09-23

## 2018-09-18 NOTE — PROGRESS NOTES
HISTORY OF PRESENT ILLNESS  Eladio Cardona is a 62 y.o. male. Cold Symptoms   The history is provided by the patient. This is a new problem. The current episode started more than 1 week ago. The problem occurs constantly. The problem has not changed since onset. The cough is productive of sputum. There has been no fever. Associated symptoms include rhinorrhea and wheezing. Treatments tried: tussin, mucinex, Pt went to ER and had normal CXR. The treatment provided no relief. Review of Systems   HENT: Positive for rhinorrhea. Respiratory: Positive for wheezing. Physical Exam   Constitutional: He appears well-developed and well-nourished. HENT:   Nose: Mucosal edema present. Mouth/Throat: Uvula is midline and oropharynx is clear and moist.       Cardiovascular: Normal rate, regular rhythm and normal heart sounds. Pulmonary/Chest: Effort normal and breath sounds normal.   Nursing note and vitals reviewed. ASSESSMENT and PLAN    ICD-10-CM ICD-9-CM    1. Bronchitis J40 490 azithromycin (ZITHROMAX) 250 mg tablet   2. Cough R05 786.2 montelukast (SINGULAIR) 10 mg tablet      guaiFENesin-codeine (CHERATUSSIN AC) 100-10 mg/5 mL solution     Reviewed plan of care. Patient has provided input and agrees with goals.

## 2018-09-18 NOTE — MR AVS SNAPSHOT
[facility] 
 
 
 [address]  [place], [place] [address] [address] 
[phone] Patient: Bhargavi Ramires. MRN: [id number] Ahmet Zambrano Visit Information Date & Time Provider Department Dept. Phone Encounter #  
 9/18/2018 11:45 AM Celso Anna [address] [place] [address] [phone] [id number] Follow-up Instructions Return if symptoms worsen or fail to improve. Your Appointments 9/20/2018  7:05 AM  
LAB with [facility] (KARLA Rooney) Appt Note: Labs 1 week before [address] [facility] [address] [address]  
[phone]  
  
   
 [address]  [place] [facility]  
  
    
 9/27/2018  9:30 AM  
Office Visit with Celso Anna MD  
[address] [facility] [address] [facility]) Appt Note: return in 3 months [address] [place] [address] [address]  
[phone]  
  
   
 [address]  [place] [facility]  
  
    
 1/14/2019 10:10 AM  
Nurse Visit with Moreno Knowles NURSE [facility] ([address]) Appt Note: PSA free and total 1 month prior. [address] [facility] [address] [id number] [address] 10[address] [facility]  
  
   
 [address] [facility] [address] [facility] 100 200 [facility] Upcoming Health Maintenance Date Due  
 FOOT EXAM Q1 5/8/2018 EYE EXAM RETINAL OR DILATED Q1 6/15/2018 Influenza Age 5 to Adult 8/1/2018 COLONOSCOPY 2/24/2019 HEMOGLOBIN A1C Q6M 12/21/2018 MICROALBUMIN Q1 12/22/2018 LIPID PANEL Q1 6/21/2019 DTaP/Tdap/Td series (2 - Td) 10/25/2025 Allergies as of 9/18/2018  Review Complete On: 9/18/2018 By: Celso Anna MD  
 No Known Allergies Current Immunizations  Reviewed on 9/27/2017 Name Date Influenza Vaccine (Quad) PF 9/27/2017, 12/21/2016, 11/13/2015 Pneumococcal Polysaccharide (PPSV-23) 11/13/2015 Tdap 10/25/2015  7:48 PM  
  
 Not reviewed this visit You Were Diagnosed With   
  
 Codes Comments Bronchitis    -  Primary ICD-10-CM: C20 ICD-9-CM: 535 Cough     ICD-10-CM: R05 ICD-9-CM: 928. 2 Vitals BP Pulse Temp Resp Height(growth percentile) Weight(growth percentile) 162/77 (BP 1 Location: Left arm, BP Patient Position: Sitting) 99 98.1 °F (36.7 °C) (Oral) 20 6' 1\" (1.854 m) 200 lb (90.7 kg) SpO2 BMI Smoking Status 95% 26.39 kg/m2 Former Smoker BMI and BSA Data Body Mass Index Body Surface Area  
 26.39 kg/m 2 2.16 m 2 Preferred Pharmacy Pharmacy Name Phone Madeline Ley [address] [facility], [address] [place] [address] Avenue [phone] Your Updated Medication List  
  
   
This list is accurate as of 9/18/18 12:12 PM.  Always use your most recent med list. amLODIPine 5 mg tablet Commonly known as:  Tad Daphne Take 1 Tab by mouth daily. aspirin delayed-release 81 mg tablet Take  by mouth daily. atorvastatin 20 mg tablet Commonly known as:  LIPITOR  
TAKE 1 TABLET BY MOUTH  DAILY  
  
 azithromycin 250 mg tablet Commonly known as:  Myrna Mariano Take 2 tablets today, then take 1 tablet daily CONTOUR NEXT TEST STRIPS strip Generic drug:  glucose blood VI test strips CHECK BLOOD SUGAR 8 TIMES A DAY DUE TO INSULIN PUMP AND LABILE BLOOD SUGAR  
  
 FISH OIL 1,000 mg Cap Generic drug:  omega-3 fatty acids-vitamin e Take 1 Cap by mouth.  
  
 guaiFENesin-codeine 100-10 mg/5 mL solution Commonly known as:  Dionicio Danger Take 10 mL by mouth four (4) times daily as needed for Cough. Max Daily Amount: 40 mL. insulin lispro 100 unit/mL injection Commonly known as:  HUMALOG 38 units daily for insulin pump. DX E10.42  
  
 lisinopril 20 mg tablet Commonly known as:  Zak Eldridgeenstein Take 1 Tab by mouth daily. [facility] Generic drug:  Lancets MINIMED INFUSION SET Iset Generic drug:  Infusion Set for Insulin Pump CHANGE EVERY 3 DAYS  
  
 montelukast 10 mg tablet Commonly known as:  SINGULAIR Take 1 Tab by mouth daily. multivitamin tablet Commonly known as:  ONE A DAY Take 1 Tab by mouth daily. oxybutynin 5 mg tablet Commonly known as:  DITROPAN  
TAKE 1 TABLET BY MOUTH TWO  TIMES DAILY pantoprazole 40 mg tablet Commonly known as:  PROTONIX  
TAKE ONE TABLET BY MOUTH ONCE DAILY PARADIGM RESERVOIR 3 mL Misc Generic drug:  Insulin Pump Syringe  
  
 sildenafil (antihypertensive) 20 mg tablet Commonly known as:  REVATIO  
TAKE TWO TO FIVE TABLETS BY MOUTH 1 HOUR PRIOR TO INTERCOURSE  
  
 traMADol 50 mg tablet Commonly known as:  ULTRAM  
TAKE 1 TABLET EVERY 6 HOURS AS NEEDED FOR PAIN Prescriptions Printed Refills  
 azithromycin (ZITHROMAX) 250 mg tablet 0 Sig: Take 2 tablets today, then take 1 tablet daily Class: Print  
 montelukast (SINGULAIR) 10 mg tablet 5 Sig: Take 1 Tab by mouth daily. Class: Print Route: Oral  
 guaiFENesin-codeine (CHERATUSSIN AC) 100-10 mg/5 mL solution 0 Sig: Take 10 mL by mouth four (4) times daily as needed for Cough. Max Daily Amount: 40 mL. Class: Print Route: Oral  
  
Follow-up Instructions Return if symptoms worsen or fail to improve. To-Do List   
 09/25/2018 9:00 AM  
(Arrive by 8:45 AM) Appointment with Rommel Rossi at [address] [place] [address] ([phone]) Please bring a glucose log and meter. If you do not have one, you will receive one and be shown how to use it when you meet with the nurse educator. Please arrive 15 minutes prior to appointment to register Introducing \[facility]\"" & HEALTH SERVICES! Dear Elisabet Alt: Thank you for requesting a [employer] account. Our records indicate that you already have an active [employer] account. You can access your account anytime at [email or web address] Did you know that you can access your hospital and ER discharge instructions at any time in Basha? You can also review all of your test results from your hospital stay or ER visit. Additional Information If you have questions, please visit the Frequently Asked Questions section of the Basha website at [email or web address] Remember, Basha is NOT to be used for urgent needs. For medical emergencies, dial 911. Now available from your iPhone and Android! Please provide this summary of care documentation to your next provider. Your primary care clinician is listed as EMORY ZELAYA. If you have any questions after today's visit, please call [phone].

## 2018-09-18 NOTE — PROGRESS NOTES
Patient is in the office today for ED follow up chest pain. 1. Have you been to the ER, urgent care clinic since your last visit? Hospitalized since your last visit? No    2. Have you seen or consulted any other health care providers outside of the [address] [place] [address] since your last visit? Include any pap smears or colon screening.  No

## 2018-09-20 ENCOUNTER — HOSPITAL ENCOUNTER (OUTPATIENT)
Dept: LAB | Age: 58
Discharge: HOME | End: 2018-09-20
Payer: MEDICARE

## 2018-09-20 DIAGNOSIS — E10.42 TYPE 1 DIABETES MELLITUS WITH DIABETIC POLYNEUROPATHY (HCC): ICD-10-CM

## 2018-09-20 DIAGNOSIS — E78.5 DYSLIPIDEMIA: ICD-10-CM

## 2018-09-20 DIAGNOSIS — I11.9 BENIGN HYPERTENSIVE HEART DISEASE WITHOUT HEART FAILURE: ICD-10-CM

## 2018-09-20 LAB
ALBUMIN SERPL-MCNC: 3.6 G/DL (ref 3.4–5)
ALBUMIN/GLOB SERPL: 1.2 {RATIO} (ref 0.8–1.7)
ALP SERPL-CCNC: 102 U/L (ref 45–117)
ALT SERPL-CCNC: 28 U/L (ref 16–61)
ANION GAP SERPL CALC-SCNC: 6 MMOL/L (ref 3–18)
AST SERPL-CCNC: 11 U/L (ref 15–37)
BILIRUB SERPL-MCNC: 0.6 MG/DL (ref 0.2–1)
BUN SERPL-MCNC: 14 MG/DL (ref 7–18)
BUN/CREAT SERPL: 12 (ref 12–20)
CALCIUM SERPL-MCNC: 9.5 MG/DL (ref 8.5–10.1)
CHLORIDE SERPL-SCNC: 106 MMOL/L (ref 100–108)
CHOLEST SERPL-MCNC: 134 MG/DL
CO2 SERPL-SCNC: 30 MMOL/L (ref 21–32)
CREAT SERPL-MCNC: 1.17 MG/DL (ref 0.6–1.3)
GLOBULIN SER CALC-MCNC: 3 G/DL (ref 2–4)
GLUCOSE SERPL-MCNC: 89 MG/DL (ref 74–99)
HBA1C MFR BLD: 8.3 % (ref 4.2–5.6)
HDLC SERPL-MCNC: 62 MG/DL (ref 40–60)
HDLC SERPL: 2.2 {RATIO} (ref 0–5)
LDLC SERPL CALC-MCNC: 62.4 MG/DL (ref 0–100)
LIPID PROFILE,FLP: ABNORMAL
POTASSIUM SERPL-SCNC: 4.4 MMOL/L (ref 3.5–5.5)
PROT SERPL-MCNC: 6.6 G/DL (ref 6.4–8.2)
SODIUM SERPL-SCNC: 142 MMOL/L (ref 136–145)
TRIGL SERPL-MCNC: 48 MG/DL (ref ?–150)
VLDLC SERPL CALC-MCNC: 9.6 MG/DL

## 2018-09-20 PROCEDURE — 80053 COMPREHEN METABOLIC PANEL: CPT | Performed by: INTERNAL MEDICINE

## 2018-09-20 PROCEDURE — 83036 HEMOGLOBIN GLYCOSYLATED A1C: CPT | Performed by: INTERNAL MEDICINE

## 2018-09-20 PROCEDURE — 36415 COLL VENOUS BLD VENIPUNCTURE: CPT | Performed by: INTERNAL MEDICINE

## 2018-09-20 PROCEDURE — 80061 LIPID PANEL: CPT | Performed by: INTERNAL MEDICINE

## 2018-09-28 ENCOUNTER — OFFICE VISIT (OUTPATIENT)
Dept: FAMILY MEDICINE CLINIC | Age: 58
End: 2018-09-28

## 2018-09-28 VITALS
HEIGHT: 73 IN | TEMPERATURE: 98.2 F | HEART RATE: 87 BPM | DIASTOLIC BLOOD PRESSURE: 57 MMHG | WEIGHT: 197.8 LBS | BODY MASS INDEX: 26.21 KG/M2 | OXYGEN SATURATION: 97 % | RESPIRATION RATE: 18 BRPM | SYSTOLIC BLOOD PRESSURE: 96 MMHG

## 2018-09-28 DIAGNOSIS — E78.5 DYSLIPIDEMIA: ICD-10-CM

## 2018-09-28 DIAGNOSIS — E10.42 TYPE 1 DIABETES MELLITUS WITH DIABETIC POLYNEUROPATHY (HCC): Primary | ICD-10-CM

## 2018-09-28 DIAGNOSIS — I11.9 BENIGN HYPERTENSIVE HEART DISEASE WITHOUT HEART FAILURE: ICD-10-CM

## 2018-09-28 RX ORDER — CHOLECALCIFEROL (VITAMIN D3) 125 MCG
1 CAPSULE ORAL DAILY
COMMUNITY
End: 2022-03-21 | Stop reason: ALTCHOICE

## 2018-09-28 RX ORDER — ALBUTEROL SULFATE 90 UG/1
2 AEROSOL, METERED RESPIRATORY (INHALATION)
Qty: 1 INHALER | Refills: 3 | Status: SHIPPED | OUTPATIENT
Start: 2018-09-28 | End: 2018-11-15 | Stop reason: SDUPTHER

## 2018-09-28 NOTE — PROGRESS NOTES
Patient is in the office today for a 3 month follow up. 1. Have you been to the ER, urgent care clinic since your last visit? Hospitalized since your last visit? Yes, HV for Bronchitis. 2. Have you seen or consulted any other health care providers outside of the [address] [place] [address] since your last visit? Include any pap smears or colon screening.  No

## 2018-09-28 NOTE — PROGRESS NOTES
Subjective: Chief Complaint The patient presents for follow up of diabetes, hypertension and high cholesterol. HPI Maria M Tan. is a 62 y.o. male seen for follow up of diabetes. Healso has hypertension and hyperlipidemia. Diabetes no significant medication side effects noted, poorly controlled, pt continues on insulin pump and will f/u with Endo (Dr Kassandra Buck better management, he does not bolus insulin before he eats which is one reason it is uncontrolled, he is trying to do this better ,  hypertension well controlled on lisinopril 20 mg  And Norvasc 5 mg, will continue to monitor, hyperlipidemia well controlled, no significant medication side effects noted, on Lipitor Diet and Lifestyle: generally follows a low fat low cholesterol diet, does not rigorously follow a diabetic diet, exercises sporadically Home BP Monitoring: is not measured at home. Diabetic Review of Systems - home glucose monitoring: is performed regularly, 6x/day. Other symptoms and concerns: pt will try to exercise more. He has a diabetic foot ulcer that is slowly healing. He had left foot surgery a few  Months ago and needs o have further surgery. . Pt is candidate for diabetic shoes due to neuropathy and ulcerative callus. He is followed by Podiatry. Current Outpatient Prescriptions Medication Sig  cholecalciferol, vitamin D3, (VITAMIN D3) 2,000 unit tab Take  by mouth.  albuterol (PROAIR HFA) 90 mcg/actuation inhaler Take 2 Puffs by inhalation every four (4) hours as needed for Wheezing or Shortness of Breath.  montelukast (SINGULAIR) 10 mg tablet Take 1 Tab by mouth daily.  oxybutynin (DITROPAN) 5 mg tablet TAKE 1 TABLET BY MOUTH TWO  TIMES DAILY  MINIMED INFUSION SET iset CHANGE EVERY 3 DAYS  atorvastatin (LIPITOR) 20 mg tablet TAKE 1 TABLET BY MOUTH  DAILY  sildenafil, antihypertensive, (REVATIO) 20 mg tablet TAKE TWO TO FIVE TABLETS BY MOUTH 1 HOUR PRIOR TO INTERCOURSE  pantoprazole (PROTONIX) 40 mg tablet TAKE ONE TABLET BY MOUTH ONCE DAILY  amLODIPine (NORVASC) 5 mg tablet Take 1 Tab by mouth daily.  lisinopril (PRINIVIL, ZESTRIL) 20 mg tablet Take 1 Tab by mouth daily.  PARADIGM RESERVOIR 3 mL misc  insulin lispro (HUMALOG) 100 unit/mL injection 38 units daily for insulin pump. DX E10.42  
 aspirin delayed-release 81 mg tablet Take  by mouth daily.  CONTOUR NEXT STRIPS strip CHECK BLOOD SUGAR 8 TIMES A DAY DUE TO INSULIN PUMP AND LABILE BLOOD SUGAR  
 [facility] LANCET misc  omega-3 fatty acids-vitamin e (FISH OIL) 1,000 mg cap Take 1 Cap by mouth.  multivitamin (ONE A DAY) tablet Take 1 Tab by mouth daily.  doxycycline (ADOXA) 100 mg tablet Take 100 mg by mouth two (2) times a day.  guaiFENesin-codeine (CHERATUSSIN AC) 100-10 mg/5 mL solution Take 10 mL by mouth four (4) times daily as needed for Cough. Max Daily Amount: 40 mL.  predniSONE (DELTASONE) 10 mg tablet 4 tabs daily for 5 days  traMADol (ULTRAM) 50 mg tablet TAKE 1 TABLET EVERY 6 HOURS AS NEEDED FOR PAIN No current facility-administered medications for this visit. Review of Systems Respiratory: negative for dyspnea on exertion Cardiovascular: negative for chest pain Objective:  
 
Visit Vitals  BP 96/57 (BP 1 Location: Left arm, BP Patient Position: Sitting)  Pulse 87  Temp 98.2 °F (36.8 °C) (Oral)  Resp 18  Ht 6' 1\" (1.854 m)  Wt 197 lb 12.8 oz (89.7 kg)  SpO2 97%  BMI 26.1 kg/m2 General appearance - alert, well appearing, and in no distress Chest - clear to auscultation, no wheezes, rales or rhonchi, symmetric air entry Heart - normal rate, regular rhythm, normal S1, S2, no murmurs, rubs, clicks or gallops Labs:  
Lab Results Component Value Date/Time  Hemoglobin A1c 8.3 (H) 09/20/2018 07:10 AM  
 Hemoglobin A1c 8.5 (H) 06/21/2018 07:10 AM  
 Hemoglobin A1c 9.7 (H) 03/21/2018 07:54 AM  
 Glucose 89 09/20/2018 07:10 AM  
 Glucose (POC) 133 (H) 02/24/2016 12:46 PM  
 Microalbumin/Creat ratio (mg/g creat) 46 12/04/2015 08:40 AM  
 Microalb/Creat ratio (ug/mg creat.) 17.9 12/22/2017 12:00 AM  
 Microalbumin,urine random 4.10 12/04/2015 08:40 AM  
 LDL, calculated 62.4 09/20/2018 07:10 AM  
 Creatinine 1.17 09/20/2018 07:10 AM  
 Hemoglobin A1c, External 9.3 12/10/2015 Hemoglobin A1c, External 8.7% 08/20/2015 Hemoglobin A1c, External 9.5 05/22/2015 07:43 PM  
  
Lab Results Component Value Date/Time Cholesterol, total 134 09/20/2018 07:10 AM  
 HDL Cholesterol 62 (H) 09/20/2018 07:10 AM  
 LDL, calculated 62.4 09/20/2018 07:10 AM  
 Triglyceride 48 09/20/2018 07:10 AM  
 CHOL/HDL Ratio 2.2 09/20/2018 07:10 AM  
 
Lab Results Component Value Date/Time ALT (SGPT) 28 09/20/2018 07:10 AM  
 AST (SGOT) 11 (L) 09/20/2018 07:10 AM  
 Alk. phosphatase 102 09/20/2018 07:10 AM  
 Bilirubin, total 0.6 09/20/2018 07:10 AM  
 
Lab Results Component Value Date/Time GFR est AA >60 09/20/2018 07:10 AM  
 GFR est non-AA >60 09/20/2018 07:10 AM  
 Creatinine 1.17 09/20/2018 07:10 AM  
 BUN 14 09/20/2018 07:10 AM  
 Sodium 142 09/20/2018 07:10 AM  
 Potassium 4.4 09/20/2018 07:10 AM  
 Chloride 106 09/20/2018 07:10 AM  
 CO2 30 09/20/2018 07:10 AM  
  
Lab Results Component Value Date/Time  
 Prostate Specific Ag 0.3 03/21/2018 07:54 AM  
 Prostate Specific Ag 0.4 02/01/2018 10:00 AM  
 Prostate Specific Ag 0.3 12/21/2016 09:54 AM  
 Prostate Specific Ag 0.293 03/11/2015 11:12 AM  
 Prostate Specific Ag 0.251 12/17/2012 03:42 PM  
 PSA 0.3 09/30/2010 10:09 AM  
 PSA, FREE 0.2 09/30/2010 10:09 AM  
 % FREE PSA 67 09/30/2010 10:09 AM  
 
Lab Results Component Value Date/Time Glucose 89 09/20/2018 07:10 AM  
 Glucose (POC) 133 (H) 02/24/2016 12:46 PM  
  
 
 
 
Assessment / Plan Diabetes poorly controlled, pt remains on insulin pump and will follow up with Dr. Claudia Clinton endocrine. Hypertension well controlled, on lisinopril 20 mg, and Norvasc 5 mg. Hyperlipidemia well controlled on Lipitor ICD-10-CM ICD-9-CM 1. Type 1 diabetes mellitus with diabetic polyneuropathy (HCC) E10.42 250.61 HEMOGLOBIN A1C W/O EAG  
  357.2 2. Hypertension B07.7 278.77 METABOLIC PANEL, COMPREHENSIVE 3. Dyslipidemia E78.5 272.4 LIPID PANEL Diabetic issues reviewed with him: diabetic diet discussed in detail, and low cholesterol diet, weight control and daily exercise discussed. Follow-up Disposition: 
Return in about 3 months (around 12/28/2018) for OV, and Medicare Wellness Visit, labs 1 week before. Reviewed plan of care. Patient has provided input and agrees with goals.

## 2018-09-28 NOTE — MR AVS SNAPSHOT
Ramses Julio 
 
 
 [address] [address] [address] [facility] [address] 
[phone] Patient: Maria M Tan. MRN: [id number] Ronniepascual Hernandez Visit Information Date & Time Provider Department Dept. Phone Encounter #  
 9/28/2018  1:00 PM Chris Vis, [address] [place] [address] [phone] [id number] Follow-up Instructions Return in about 3 months (around 12/28/2018) for OV, and Medicare Wellness Visit, labs 1 week before. Your Appointments 1/14/2019 10:10 AM  
[facility] Appt Note: PSA free and total 1 month prior. [address] [facility] [address] [id number] [address] [facility]  
  
   
 [address] [facility] [address] [facility] 100 200 [facility] Upcoming Health Maintenance Date Due Shingrix Vaccine Age 50> (1 of 2) 8/8/2010 FOOT EXAM Q1 5/8/2018 EYE EXAM RETINAL OR DILATED Q1 6/15/2018 Influenza Age 5 to Adult 8/1/2018 COLONOSCOPY 2/24/2019 MICROALBUMIN Q1 12/22/2018 HEMOGLOBIN A1C Q6M 3/20/2019 LIPID PANEL Q1 9/20/2019 DTaP/Tdap/Td series (2 - Td) 10/25/2025 Allergies as of 9/28/2018  Review Complete On: 9/28/2018 By: Chris Pack MD  
 No Known Allergies Current Immunizations  Reviewed on 9/27/2017 Name Date Influenza Vaccine (Quad) PF 9/27/2017, 12/21/2016, 11/13/2015 Pneumococcal Polysaccharide (PPSV-23) 11/13/2015 Tdap 10/25/2015  7:48 PM  
  
 Not reviewed this visit You Were Diagnosed With   
  
 Codes Comments Type 1 diabetes mellitus with diabetic polyneuropathy (HCC)    -  Primary ICD-10-CM: E10.42 
ICD-9-CM: 250.61, 357.2 Benign hypertensive heart disease without heart failure     ICD-10-CM: I11.9 ICD-9-CM: 402.10 Dyslipidemia     ICD-10-CM: E78.5 ICD-9-CM: 272.4 Vitals BP Pulse Temp Resp Height(growth percentile) Weight(growth percentile) 96/57 (BP 1 Location: Left arm, BP Patient Position: Sitting) 87 98.2 °F (36.8 °C) (Oral) 18 6' 1\" (1.854 m) 197 lb 12.8 oz (89.7 kg) SpO2 BMI Smoking Status 97% 26.1 kg/m2 Former Smoker BMI and BSA Data Body Mass Index Body Surface Area  
 26.1 kg/m 2 2.15 m 2 Preferred Pharmacy Pharmacy Name Phone [address] [place] [address] [phone] Your Updated Medication List  
  
   
This list is accurate as of 9/28/18  1:30 PM.  Always use your most recent med list.  
  
  
  
  
 albuterol 90 mcg/actuation inhaler Commonly known as:  PROAIR HFA Take 2 Puffs by inhalation every four (4) hours as needed for Wheezing or Shortness of Breath. amLODIPine 5 mg tablet Commonly known as:  Susan [place] Take 1 Tab by mouth daily. aspirin delayed-release 81 mg tablet Take  by mouth daily. atorvastatin 20 mg tablet Commonly known as:  LIPITOR  
TAKE 1 TABLET BY MOUTH  DAILY CONTOUR NEXT TEST STRIPS strip Generic drug:  glucose blood VI test strips CHECK BLOOD SUGAR 8 TIMES A DAY DUE TO INSULIN PUMP AND LABILE BLOOD SUGAR  
  
 FISH OIL 1,000 mg Cap Generic drug:  omega-3 fatty acids-vitamin e Take 1 Cap by mouth. insulin lispro 100 unit/mL injection Commonly known as:  HUMALOG 38 units daily for insulin pump. DX E10.42  
  
 lisinopril 20 mg tablet Commonly known as:  Charm Akbar Take 1 Tab by mouth daily. [facility] Generic drug:  Lancets MINIMED INFUSION SET Iset Generic drug:  Infusion Set for Insulin Pump CHANGE EVERY 3 DAYS  
  
 montelukast 10 mg tablet Commonly known as:  SINGULAIR Take 1 Tab by mouth daily. multivitamin tablet Commonly known as:  ONE A DAY Take 1 Tab by mouth daily. oxybutynin 5 mg tablet Commonly known as:  DITROPAN  
TAKE 1 TABLET BY MOUTH TWO  TIMES DAILY pantoprazole 40 mg tablet Commonly known as:  PROTONIX TAKE ONE TABLET BY MOUTH ONCE DAILY PARADIGM RESERVOIR 3 mL Misc Generic drug:  Insulin Pump Syringe  
  
 sildenafil (antihypertensive) 20 mg tablet Commonly known as:  REVATIO  
TAKE TWO TO FIVE TABLETS BY MOUTH 1 HOUR PRIOR TO INTERCOURSE  
  
 traMADol 50 mg tablet Commonly known as:  ULTRAM  
TAKE 1 TABLET EVERY 6 HOURS AS NEEDED FOR PAIN  
  
 VITAMIN D3 2,000 unit Tab Generic drug:  cholecalciferol (vitamin D3) Take  by mouth. Prescriptions Sent to Pharmacy Refills  
 albuterol (PROAIR HFA) 90 mcg/actuation inhaler 3 Sig: Take 2 Puffs by inhalation every four (4) hours as needed for Wheezing or Shortness of Breath. Class: Normal  
 Pharmacy: [address] [place] [address] #: [phone] Route: Inhalation Follow-up Instructions Return in about 3 months (around 12/28/2018) for OV, and Medicare Wellness Visit, labs 1 week before. Patient Instructions Advance Directives: Care Instructions Your Care Instructions An advance directive is a legal way to state your wishes at the end of your life. It tells your family and your doctor what to do if you can no longer say what you want. There are two main types of advance directives. You can change them any time that your wishes change. · A living will tells your family and your doctor your wishes about life support and other treatment. · A durable power of  for health care lets you name a person to make treatment decisions for you when you can't speak for yourself. This person is called a health care agent. If you do not have an advance directive, decisions about your medical care may be made by a doctor or a  who doesn't know you. It may help to think of an advance directive as a gift to the people who care for you. If you have one, they won't have to make tough decisions by themselves. Follow-up care is a key part of your treatment and safety. Be sure to make and go to all appointments, and call your doctor if you are having problems. It's also a good idea to know your test results and keep a list of the medicines you take. How can you care for yourself at home? · Discuss your wishes with your loved ones and your doctor. This way, there are no surprises. · Many states have a unique form. Or you might use a universal form that has been approved by many states. This kind of form can sometimes be completed and stored online. Your electronic copy will then be available wherever you have a connection to the Internet. In most cases, doctors will respect your wishes even if you have a form from a different state. · You don't need a  to do an advance directive. But you may want to get legal advice. · Think about these questions when you prepare an advance directive: ¨ Who do you want to make decisions about your medical care if you are not able to? Many people choose a family member or close friend. ¨ Do you know enough about life support methods that might be used? If not, talk to your doctor so you understand. ¨ What are you most afraid of that might happen? You might be afraid of having pain, losing your independence, or being kept alive by machines. ¨ Where would you prefer to die? Choices include your home, a hospital, or a nursing home. ¨ Would you like to have information about hospice care to support you and your family? ¨ Do you want to donate organs when you die? ¨ Do you want certain [place] practices performed before you die? If so, put your wishes in the advance directive. · Read your advance directive every year, and make changes as needed. When should you call for help? Be sure to contact your doctor if you have any questions. Where can you learn more? Go to http://freida-khloe.info/. Enter R264 in the search box to learn more about \"Advance Directives: Care Instructions. \" Current as of: October 6, 2017 Content Version: 11.7 © 0321-6423 [employer]. Care instructions adapted under license by [employer]T (which disclaims liability or warranty for this information). If you have questions about a medical condition or this instruction, always ask your healthcare professional. [address] any warranty or liability for your use of this information. Introducing [address]! Dear Paul Manzo: Thank you for requesting a [employer] account. Our records indicate that you already have an active [employer] account. You can access your account anytime at [email or web address] Did you know that you can access your hospital and ER discharge instructions at any time in Jint[employer]? You can also review all of your test results from your hospital stay or ER visit. Additional Information If you have questions, please visit the Frequently Asked Questions section of the [employer] website at [email or web address] Remember, Jint[employer]ix is NOT to be used for urgent needs. For medical emergencies, dial 911. Now available from your iPhone and Android! Please provide this summary of care documentation to your next provider. Your primary care clinician is listed as EMORY ZELAYA. If you have any questions after today's visit, please call [phone].

## 2018-10-11 ENCOUNTER — OFFICE VISIT (OUTPATIENT)
Dept: FAMILY MEDICINE CLINIC | Age: 58
End: 2018-10-11

## 2018-10-11 VITALS
HEART RATE: 85 BPM | SYSTOLIC BLOOD PRESSURE: 100 MMHG | DIASTOLIC BLOOD PRESSURE: 60 MMHG | TEMPERATURE: 98.5 F | RESPIRATION RATE: 20 BRPM | HEIGHT: 73 IN | WEIGHT: 193 LBS | OXYGEN SATURATION: 95 % | BODY MASS INDEX: 25.58 KG/M2

## 2018-10-11 DIAGNOSIS — J40 BRONCHITIS: Primary | ICD-10-CM

## 2018-10-11 DIAGNOSIS — R05.9 COUGH: ICD-10-CM

## 2018-10-11 RX ORDER — DOXYCYCLINE 100 MG/1
100 TABLET ORAL 2 TIMES DAILY
COMMUNITY
End: 2019-06-20

## 2018-10-11 RX ORDER — PREDNISONE 10 MG/1
TABLET ORAL
Qty: 20 TAB | Refills: 0 | Status: SHIPPED | OUTPATIENT
Start: 2018-10-11 | End: 2019-06-20

## 2018-10-11 RX ORDER — CODEINE PHOSPHATE AND GUAIFENESIN 10; 100 MG/5ML; MG/5ML
10 SOLUTION ORAL
Qty: 120 ML | Refills: 0 | Status: SHIPPED | OUTPATIENT
Start: 2018-10-11 | End: 2020-08-27

## 2018-10-11 NOTE — PROGRESS NOTES
HISTORY OF PRESENT ILLNESS Gurwinder Saldaña is a 62 y.o. male. Cold Symptoms The history is provided by the patient (pt was [facility] for possible [facility] and treated with doxycyline , Cheratussin and singuliar). This is a new problem. The current episode started more than 1 week ago. The problem occurs constantly. The problem has been gradually improving. The cough is productive of sputum. There has been no fever. Treatments tried: doxycycline, singulair. Review of Systems HENT: Positive for congestion. Respiratory: Positive for cough. Physical Exam  
Constitutional: He appears well-developed and well-nourished. HENT:  
Right Ear: Tympanic membrane and ear canal normal.  
Left Ear: Tympanic membrane and ear canal normal.  
Nose: Mucosal edema present. Mouth/Throat: Uvula is midline, oropharynx is clear and moist and mucous membranes are normal.  
Cardiovascular: Normal rate and regular rhythm. Pulmonary/Chest: Effort normal and breath sounds normal.  
Vitals reviewed. ASSESSMENT and PLAN 
  ICD-10-CM ICD-9-CM 1. Bronchitis J40 490 Will continue doxycycline (ADOXA) 100 mg tablet And add predniSONE (DELTASONE) 10 mg tablet 2. Cough R05 786.2 Will continue guaiFENesin-codeine (CHERATUSSIN AC) 100-10 mg/5 mL solution Reviewed plan of care. Patient has provided input and agrees with goals.

## 2018-10-11 NOTE — PROGRESS NOTES
Patient is in the office today for a productive cough with yellow sputum and chest congestion. 1. Have you been to the ER, urgent care clinic since your last visit? Hospitalized since your last visit? yes[facility]. 2. Have you seen or consulted any other health care providers outside of the [address] [place] [address] since your last visit? Include any pap smears or colon screening.  No

## 2018-10-11 NOTE — PATIENT INSTRUCTIONS
Cough: Care Instructions Your Care Instructions A cough is your body's response to something that bothers your throat or airways. Many things can cause a cough. You might cough because of a cold or the flu, bronchitis, or asthma. Smoking, postnasal drip, allergies, and stomach acid that backs up into your throat also can cause coughs. A cough is a symptom, not a disease. Most coughs stop when the cause, such as a cold, goes away. You can take a few steps at home to cough less and feel better. Follow-up care is a key part of your treatment and safety. Be sure to make and go to all appointments, and call your doctor if you are having problems. It's also a good idea to know your test results and keep a list of the medicines you take. How can you care for yourself at home? · Drink lots of water and other fluids. This helps thin the mucus and soothes a dry or sore throat. Honey or lemon juice in hot water or tea may ease a dry cough. · Take cough medicine as directed by your doctor. · Prop up your head on pillows to help you breathe and ease a dry cough. · Try cough drops to soothe a dry or sore throat. Cough drops don't stop a cough. Medicine-flavored cough drops are no better than candy-flavored drops or hard candy. · Do not smoke. Avoid secondhand smoke. If you need help quitting, talk to your doctor about stop-smoking programs and medicines. These can increase your chances of quitting for good. When should you call for help? Call 911 anytime you think you may need emergency care.  For example, call if: 
  · You have severe trouble breathing.  
 Call your doctor now or seek immediate medical care if: 
  · You cough up blood.  
  · You have new or worse trouble breathing.  
  · You have a new or higher fever.  
  · You have a new rash.  
 Watch closely for changes in your health, and be sure to contact your doctor if: 
  · You cough more deeply or more often, especially if you notice more mucus or a change in the color of your mucus.  
  · You have new symptoms, such as a sore throat, an earache, or sinus pain.  
  · You do not get better as expected. Where can you learn more? Go to http://freida-khloe.info/. Enter D279 in the search box to learn more about \"Cough: Care Instructions. \" Current as of: December 6, 2017 Content Version: 11.8 © 8963-0553 [employer]. Care instructions adapted under license by "[employer]" (which disclaims liability or warranty for this information). If you have questions about a medical condition or this instruction, always ask your healthcare professional. [address] [place] [address] any warranty or liability for your use of this information.

## 2018-10-11 NOTE — MR AVS SNAPSHOT
[facility] 
 
 
 [address]  [place], [place] [address] [address] 
[phone] Patient: Marco Antonio Quan. MRN: [id number] Robby Pry Visit Information Date & Time Provider Department Dept. Phone Encounter #  
 10/11/2018  3:45 PM Gita Zimmerman, [address] [place] [address] [phone] [id number] Follow-up Instructions Return if symptoms worsen or fail to improve. Your Appointments 12/21/2018  7:15 AM  
LAB with [facility] (KARLA Rooney) Appt Note: labs 1 week before [address] [address]  
[phone]  
  
   
 [address]  [place] [facility]  
  
    
 12/28/2018  9:00 AM  
Office Visit with Gita Zimmerman MD  
[address] [facility] Appt Note: Return in 3 months [address] [address]  
[phone]  
  
   
 [address]  [place] [facility]  
  
    
 1/14/2019 10:10 AM  
Nurse Visit with Dunia Mackenzie NURSE [facility] ([facility]) Appt Note: PSA free and total 1 month prior. [address] [facility] [address] [address] [address] 1097 [facility]  
  
   
 [address] [facility] [address] [facility] 100 200 [facility] Upcoming Health Maintenance Date Due Shingrix Vaccine Age 50> (1 of 2) 8/8/2010 FOOT EXAM Q1 5/8/2018 EYE EXAM RETINAL OR DILATED Q1 6/15/2018 Influenza Age 5 to Adult 8/1/2018 COLONOSCOPY 2/24/2019 MICROALBUMIN Q1 12/22/2018 HEMOGLOBIN A1C Q6M 3/20/2019 LIPID PANEL Q1 9/20/2019 DTaP/Tdap/Td series (2 - Td) 10/25/2025 Allergies as of 10/11/2018  Review Complete On: 10/11/2018 By: Gita Zimmerman MD  
 No Known Allergies Current Immunizations  Reviewed on 9/27/2017 Name Date Influenza Vaccine (Quad) PF 9/27/2017, 12/21/2016, 11/13/2015 Pneumococcal Polysaccharide (PPSV-23) 11/13/2015 Tdap 10/25/2015  7:48 PM  
  
 Not reviewed this visit You Were Diagnosed With   
  
 Codes Comments Bronchitis    -  Primary ICD-10-CM: T75 ICD-9-CM: 600 Vitals BP Pulse Temp Resp Height(growth percentile) Weight(growth percentile) 100/60 85 98.5 °F (36.9 °C) (Oral) 20 6' 1\" (1.854 m) 193 lb (87.5 kg) SpO2 BMI Smoking Status 95% 25.46 kg/m2 Former Smoker Vitals History BMI and BSA Data Body Mass Index Body Surface Area  
 25.46 kg/m 2 2.12 m 2 Preferred Pharmacy Pharmacy Name Phone Halina [facility] [address] [facility], [address] [place] [address] Avenue [phone] Your Updated Medication List  
  
   
This list is accurate as of 10/11/18  4:23 PM.  Always use your most recent med list.  
  
  
  
  
 albuterol 90 mcg/actuation inhaler Commonly known as:  PROAIR HFA Take 2 Puffs by inhalation every four (4) hours as needed for Wheezing or Shortness of Breath. amLODIPine 5 mg tablet Commonly known as:  Sara Dykes Take 1 Tab by mouth daily. aspirin delayed-release 81 mg tablet Take  by mouth daily. atorvastatin 20 mg tablet Commonly known as:  LIPITOR  
TAKE 1 TABLET BY MOUTH  DAILY CONTOUR NEXT TEST STRIPS strip Generic drug:  glucose blood VI test strips CHECK BLOOD SUGAR 8 TIMES A DAY DUE TO INSULIN PUMP AND LABILE BLOOD SUGAR  
  
 doxycycline 100 mg tablet Commonly known as:  ADOXA Take 100 mg by mouth two (2) times a day. FISH OIL 1,000 mg Cap Generic drug:  omega-3 fatty acids-vitamin e Take 1 Cap by mouth.  
  
 guaiFENesin-codeine 100-10 mg/5 mL solution Commonly known as:  Ramsey Grim Take 10 mL by mouth four (4) times daily as needed for Cough. Max Daily Amount: 40 mL. insulin lispro 100 unit/mL injection Commonly known as:  HUMALOG 38 units daily for insulin pump. DX E10.42  
  
 lisinopril 20 mg tablet Commonly known as:  Kassandra Bacon Take 1 Tab by mouth daily. [facility] Generic drug:  Lancets MINIMED INFUSION SET Iset Generic drug:  Infusion Set for Insulin Pump CHANGE EVERY 3 DAYS  
  
 montelukast 10 mg tablet Commonly known as:  SINGULAIR Take 1 Tab by mouth daily. multivitamin tablet Commonly known as:  ONE A DAY Take 1 Tab by mouth daily. oxybutynin 5 mg tablet Commonly known as:  DITROPAN  
TAKE 1 TABLET BY MOUTH TWO  TIMES DAILY pantoprazole 40 mg tablet Commonly known as:  PROTONIX  
TAKE ONE TABLET BY MOUTH ONCE DAILY PARADIGM RESERVOIR 3 mL Misc Generic drug:  Insulin Pump Syringe  
  
 predniSONE 10 mg tablet Commonly known as:  DELTASONE  
4 tabs daily for 5 days  
  
 sildenafil (antihypertensive) 20 mg tablet Commonly known as:  REVATIO  
TAKE TWO TO FIVE TABLETS BY MOUTH 1 HOUR PRIOR TO INTERCOURSE  
  
 traMADol 50 mg tablet Commonly known as:  ULTRAM  
TAKE 1 TABLET EVERY 6 HOURS AS NEEDED FOR PAIN  
  
 VITAMIN D3 2,000 unit Tab Generic drug:  cholecalciferol (vitamin D3) Take  by mouth. Prescriptions Printed Refills  
 guaiFENesin-codeine (CHERATUSSIN AC) 100-10 mg/5 mL solution 0 Sig: Take 10 mL by mouth four (4) times daily as needed for Cough. Max Daily Amount: 40 mL. Class: Print Route: Oral  
 predniSONE (DELTASONE) 10 mg tablet 0 Si tabs daily for 5 days Class: Print Follow-up Instructions Return if symptoms worsen or fail to improve. Patient Instructions Cough: Care Instructions Your Care Instructions A cough is your body's response to something that bothers your throat or airways. Many things can cause a cough. You might cough because of a cold or the flu, bronchitis, or asthma. Smoking, postnasal drip, allergies, and stomach acid that backs up into your throat also can cause coughs. A cough is a symptom, not a disease.  Most coughs stop when the cause, such as a cold, goes away. You can take a few steps at home to cough less and feel better. Follow-up care is a key part of your treatment and safety. Be sure to make and go to all appointments, and call your doctor if you are having problems. It's also a good idea to know your test results and keep a list of the medicines you take. How can you care for yourself at home? · Drink lots of water and other fluids. This helps thin the mucus and soothes a dry or sore throat. Honey or lemon juice in hot water or tea may ease a dry cough. · Take cough medicine as directed by your doctor. · Prop up your head on pillows to help you breathe and ease a dry cough. · Try cough drops to soothe a dry or sore throat. Cough drops don't stop a cough. Medicine-flavored cough drops are no better than candy-flavored drops or hard candy. · Do not smoke. Avoid secondhand smoke. If you need help quitting, talk to your doctor about stop-smoking programs and medicines. These can increase your chances of quitting for good. When should you call for help? Call 911 anytime you think you may need emergency care. For example, call if: 
  · You have severe trouble breathing.  
 Call your doctor now or seek immediate medical care if: 
  · You cough up blood.  
  · You have new or worse trouble breathing.  
  · You have a new or higher fever.  
  · You have a new rash.  
 Watch closely for changes in your health, and be sure to contact your doctor if: 
  · You cough more deeply or more often, especially if you notice more mucus or a change in the color of your mucus.  
  · You have new symptoms, such as a sore throat, an earache, or sinus pain.  
  · You do not get better as expected. Where can you learn more? Go to http://freida-khloe.info/. Enter D279 in the search box to learn more about \"Cough: Care Instructions. \" Current as of: December 6, 2017 Content Version: 11.8 © 5372-9666 Healthwise, Incorporated. Care instructions adapted under license by [employer] (which disclaims liability or warranty for this information). If you have questions about a medical condition or this instruction, always ask your healthcare professional. [address] any warranty or liability for your use of this information. Introducing [facility]! Dear Ami Arias: Thank you for requesting a [employer] account. Our records indicate that you already have an active [employer] account. You can access your account anytime at [email or web address] Did you know that you can access your hospital and ER discharge instructions at any time in [employer]? You can also review all of your test results from your hospital stay or ER visit. Additional Information If you have questions, please visit the Frequently Asked Questions section of the [employer] website at [email or web address] Remember, [employer] is NOT to be used for urgent needs. For medical emergencies, dial 911. Now available from your iPhone and Android! Please provide this summary of care documentation to your next provider. Your primary care clinician is listed as EMORY ZELAYA. If you have any questions after today's visit, please call [phone].

## 2018-10-31 NOTE — TELEPHONE ENCOUNTER
I have pt taking Humalog for his insulin pump.  Is he asking for med for his insulin pump or just Lantus insulin

## 2018-10-31 NOTE — TELEPHONE ENCOUNTER
Spoke with patient, he is asking for any fast acting insulin, patient is unable to afford the Humalog until his deductible is met.

## 2018-11-01 ENCOUNTER — TELEPHONE (OUTPATIENT)
Dept: FAMILY MEDICINE CLINIC | Age: 58
End: 2018-11-01

## 2018-11-01 NOTE — TELEPHONE ENCOUNTER
Pt stated that he received a call but did not receive a voicemail he was just giving a call back.      [phone]

## 2018-11-01 NOTE — TELEPHONE ENCOUNTER
He can get regular insulin from [facility] for 25 dollars a vial. Where does he want me to send it and how many vials does he want at a time

## 2018-11-01 NOTE — TELEPHONE ENCOUNTER
Patient returned phone call to the office and was given the message. Per patient he want to make sure that the vials are fast acting R insulin. Patient stated that the script can be sent to [facility] [place] [facility] and that he would like to have 2 vials sent in for now, to see what the cost will be. Please be advised.

## 2018-11-07 ENCOUNTER — HOSPITAL ENCOUNTER (OUTPATIENT)
Dept: LAB | Age: 58
Discharge: HOME | End: 2018-11-07

## 2018-11-07 LAB — XX-LABCORP SPECIMEN COL,LCBCF: NORMAL

## 2018-11-07 PROCEDURE — 99001 SPECIMEN HANDLING PT-LAB: CPT | Performed by: INTERNAL MEDICINE

## 2018-11-14 ENCOUNTER — TELEPHONE (OUTPATIENT)
Dept: FAMILY MEDICINE CLINIC | Age: 58
End: 2018-11-14

## 2018-11-14 DIAGNOSIS — E83.52 HYPERCALCEMIA: Primary | ICD-10-CM

## 2018-11-15 DIAGNOSIS — N52.9 ED (ERECTILE DYSFUNCTION) OF ORGANIC ORIGIN: ICD-10-CM

## 2018-11-15 RX ORDER — INSULIN PUMP SYRINGE, 3 ML
EACH MISCELLANEOUS
OUTPATIENT
Start: 2018-11-15

## 2018-11-15 RX ORDER — ALBUTEROL SULFATE 90 UG/1
2 AEROSOL, METERED RESPIRATORY (INHALATION)
Qty: 1 INHALER | Refills: 3 | Status: SHIPPED | OUTPATIENT
Start: 2018-11-15 | End: 2018-11-19 | Stop reason: SDUPTHER

## 2018-11-15 NOTE — TELEPHONE ENCOUNTER
Pt was told by Dr Theresa Lira his calcium level is elevated. He was advised to stop vitamin D and have calcium level rechecked with me or Dr Theresa Lira in a month.   Will also check PTH

## 2018-11-20 RX ORDER — ALBUTEROL SULFATE 90 UG/1
2 AEROSOL, METERED RESPIRATORY (INHALATION)
Qty: 3 INHALER | Refills: 2 | Status: SHIPPED | OUTPATIENT
Start: 2018-11-20 | End: 2020-09-08

## 2018-12-18 NOTE — TELEPHONE ENCOUNTER
Pt is requesting a medication for the cold in his chest.  Please advise.     [phone](N)

## 2018-12-19 NOTE — TELEPHONE ENCOUNTER
Pt is calling in to check on the status of getting the medication for the cold in his chest. He is saying that he has the same symptoms from September. He would like someone to call him when it is done. Please Advise.   [phone]

## 2018-12-20 RX ORDER — AZITHROMYCIN 250 MG/1
TABLET, FILM COATED ORAL
Qty: 6 TAB | Refills: 0 | Status: SHIPPED | OUTPATIENT
Start: 2018-12-20 | End: 2018-12-25

## 2018-12-20 NOTE — TELEPHONE ENCOUNTER
Pt wants it sent to [facility] in [address] and pt has not been taking the singulair.

## 2018-12-20 NOTE — TELEPHONE ENCOUNTER
zpak will be sent in. He should also make sure he is still taking Singulair. Which pharmacy does he want to use ?

## 2018-12-20 NOTE — TELEPHONE ENCOUNTER
Left message for patient to call the office to find out which pharmacy patient wants zpak sent to and to make sure patient is taking Singular.

## 2018-12-21 ENCOUNTER — HOSPITAL ENCOUNTER (OUTPATIENT)
Dept: LAB | Age: 58
Discharge: HOME | End: 2018-12-21
Payer: MEDICARE

## 2018-12-21 DIAGNOSIS — E78.5 DYSLIPIDEMIA: ICD-10-CM

## 2018-12-21 DIAGNOSIS — E10.42 TYPE 1 DIABETES MELLITUS WITH DIABETIC POLYNEUROPATHY (HCC): ICD-10-CM

## 2018-12-21 DIAGNOSIS — E83.52 HYPERCALCEMIA: ICD-10-CM

## 2018-12-21 DIAGNOSIS — I11.9 BENIGN HYPERTENSIVE HEART DISEASE WITHOUT HEART FAILURE: ICD-10-CM

## 2018-12-21 LAB
ALBUMIN SERPL-MCNC: 3.5 G/DL (ref 3.4–5)
ALBUMIN/GLOB SERPL: 1 {RATIO} (ref 0.8–1.7)
ALP SERPL-CCNC: 110 U/L (ref 45–117)
ALT SERPL-CCNC: 31 U/L (ref 16–61)
ANION GAP SERPL CALC-SCNC: 6 MMOL/L (ref 3–18)
AST SERPL-CCNC: 13 U/L (ref 15–37)
BILIRUB SERPL-MCNC: 0.6 MG/DL (ref 0.2–1)
BUN SERPL-MCNC: 16 MG/DL (ref 7–18)
BUN/CREAT SERPL: 14 (ref 12–20)
CALCIUM SERPL-MCNC: 10 MG/DL (ref 8.5–10.1)
CALCIUM SERPL-MCNC: 9.8 MG/DL (ref 8.5–10.1)
CHLORIDE SERPL-SCNC: 107 MMOL/L (ref 100–108)
CHOLEST SERPL-MCNC: 147 MG/DL
CO2 SERPL-SCNC: 28 MMOL/L (ref 21–32)
CREAT SERPL-MCNC: 1.18 MG/DL (ref 0.6–1.3)
GLOBULIN SER CALC-MCNC: 3.4 G/DL (ref 2–4)
GLUCOSE SERPL-MCNC: 121 MG/DL (ref 74–99)
HBA1C MFR BLD: 8.4 % (ref 4.2–5.6)
HDLC SERPL-MCNC: 61 MG/DL (ref 40–60)
HDLC SERPL: 2.4 {RATIO} (ref 0–5)
LDLC SERPL CALC-MCNC: 74 MG/DL (ref 0–100)
LIPID PROFILE,FLP: ABNORMAL
POTASSIUM SERPL-SCNC: 4.7 MMOL/L (ref 3.5–5.5)
PROT SERPL-MCNC: 6.9 G/DL (ref 6.4–8.2)
PTH-INTACT SERPL-MCNC: 71.4 PG/ML (ref 18.4–88)
SODIUM SERPL-SCNC: 141 MMOL/L (ref 136–145)
TRIGL SERPL-MCNC: 60 MG/DL (ref ?–150)
VLDLC SERPL CALC-MCNC: 12 MG/DL

## 2018-12-21 PROCEDURE — 82306 VITAMIN D 25 HYDROXY: CPT

## 2018-12-21 PROCEDURE — 83036 HEMOGLOBIN GLYCOSYLATED A1C: CPT

## 2018-12-21 PROCEDURE — 80061 LIPID PANEL: CPT

## 2018-12-21 PROCEDURE — 36415 COLL VENOUS BLD VENIPUNCTURE: CPT

## 2018-12-21 PROCEDURE — 80053 COMPREHEN METABOLIC PANEL: CPT

## 2018-12-21 PROCEDURE — 82330 ASSAY OF CALCIUM: CPT

## 2018-12-21 PROCEDURE — 83970 ASSAY OF PARATHORMONE: CPT

## 2018-12-21 PROCEDURE — 82397 CHEMILUMINESCENT ASSAY: CPT

## 2018-12-22 LAB
25(OH)D3 SERPL-MCNC: 28.1 NG/ML (ref 30–100)
CA-I SERPL ISE-MCNC: 5.6 MG/DL (ref 4.5–5.6)

## 2018-12-28 ENCOUNTER — OFFICE VISIT (OUTPATIENT)
Dept: FAMILY MEDICINE CLINIC | Age: 58
End: 2018-12-28

## 2018-12-28 VITALS
HEIGHT: 73 IN | OXYGEN SATURATION: 97 % | TEMPERATURE: 97.8 F | SYSTOLIC BLOOD PRESSURE: 130 MMHG | HEART RATE: 89 BPM | BODY MASS INDEX: 26.11 KG/M2 | RESPIRATION RATE: 18 BRPM | DIASTOLIC BLOOD PRESSURE: 60 MMHG | WEIGHT: 197 LBS

## 2018-12-28 DIAGNOSIS — E10.42 TYPE 1 DIABETES MELLITUS WITH DIABETIC POLYNEUROPATHY (HCC): ICD-10-CM

## 2018-12-28 DIAGNOSIS — G63 POLYNEUROPATHY ASSOCIATED WITH UNDERLYING DISEASE (HCC): ICD-10-CM

## 2018-12-28 DIAGNOSIS — R05.9 COUGH: ICD-10-CM

## 2018-12-28 DIAGNOSIS — Z00.00 MEDICARE ANNUAL WELLNESS VISIT, SUBSEQUENT: Primary | ICD-10-CM

## 2018-12-28 DIAGNOSIS — E78.5 DYSLIPIDEMIA: ICD-10-CM

## 2018-12-28 DIAGNOSIS — E55.9 VITAMIN D DEFICIENCY: ICD-10-CM

## 2018-12-28 DIAGNOSIS — J30.1 SEASONAL ALLERGIC RHINITIS DUE TO POLLEN: ICD-10-CM

## 2018-12-28 DIAGNOSIS — I11.9 BENIGN HYPERTENSIVE HEART DISEASE WITHOUT HEART FAILURE: ICD-10-CM

## 2018-12-28 RX ORDER — AZELASTINE 1 MG/ML
2 SPRAY, METERED NASAL 2 TIMES DAILY
Qty: 1 BOTTLE | Refills: 2 | Status: SHIPPED | OUTPATIENT
Start: 2018-12-28 | End: 2019-03-28 | Stop reason: SDUPTHER
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
## 2018-12-28 NOTE — PROGRESS NOTES
Patient here for a 3 months Follow up. 1. Have you been to the ER, urgent care clinic since your last visit? Hospitalized since your last visit? No 
 
2. Have you seen or consulted any other health care providers outside of the [address] [place] [address] since your last visit? Include any pap smears or colon screening.  No

## 2018-12-28 NOTE — PROGRESS NOTES
Subjective: Chief Complaint The patient presents for follow up of diabetes, hypertension and high cholesterol. HPI Tanya Lee. is a 62 y.o. male seen for follow up of diabetes. Healso has hypertension and hyperlipidemia. Diabetes no significant medication side effects noted, poorly controlled but improving, pt continues on insulin pump and will f/u with Endo (Dr Yves Delvalle) and nutritionist for better management, he does not bolus insulin before he eats which is one reason it is uncontrolled, he is trying to do this better ,  hypertension well controlled on lisinopril 20 mg  And Norvasc 5 mg, will continue to monitor, hyperlipidemia well controlled, no significant medication side effects noted, on Lipitor Diet and Lifestyle: generally follows a low fat low cholesterol diet, does not rigorously follow a diabetic diet, exercises sporadically Home BP Monitoring: is not measured at home. Diabetic Review of Systems - home glucose monitoring: is performed regularly, 6x/day. Other symptoms and concerns: pt will try to exercise more. He has a diabetic foot ulcer that is slowly healing. He had left foot surgery a few  Months ago and needs o have further surgery. . Pt is candidate for diabetic shoes due to neuropathy and ulcerative callus. He is followed by Podiatry. Patient over the last few months has had increased nasal congestion postnasal drainage and cough which is worse when he lays down. He tried Singulair with some improvement but stopped taking it. He will restart the Singulair and add Astelin nasal spray and will refer to ENT for further evaluation. Current Outpatient Medications Medication Sig  
 azelastine (ASTELIN) 137 mcg (0.1 %) nasal spray 2 Sprays by Both Nostrils route two (2) times a day. Use in each nostril as directed  sildenafil, antihypertensive, (REVATIO) 20 mg tablet TAKE TWO TO FIVE TABLETS BY MOUTH 1 HOUR PRIOR TO INTERCOURSE  
  albuterol (PROAIR HFA) 90 mcg/actuation inhaler Take 2 Puffs by inhalation every four (4) hours as needed for Wheezing or Shortness of Breath.  insulin regular (NOVOLIN R, HUMULIN R) 100 unit/mL injection 38 units daily for insulin pump  predniSONE (DELTASONE) 10 mg tablet 4 tabs daily for 5 days  cholecalciferol, vitamin D3, (VITAMIN D3) 2,000 unit tab Take  by mouth.  montelukast (SINGULAIR) 10 mg tablet Take 1 Tab by mouth daily.  traMADol (ULTRAM) 50 mg tablet TAKE 1 TABLET EVERY 6 HOURS AS NEEDED FOR PAIN  
 oxybutynin (DITROPAN) 5 mg tablet TAKE 1 TABLET BY MOUTH TWO  TIMES DAILY  MINIMED INFUSION SET iset CHANGE EVERY 3 DAYS  atorvastatin (LIPITOR) 20 mg tablet TAKE 1 TABLET BY MOUTH  DAILY  pantoprazole (PROTONIX) 40 mg tablet TAKE ONE TABLET BY MOUTH ONCE DAILY  amLODIPine (NORVASC) 5 mg tablet Take 1 Tab by mouth daily.  lisinopril (PRINIVIL, ZESTRIL) 20 mg tablet Take 1 Tab by mouth daily.  PARADIGM RESERVOIR 3 mL misc  insulin lispro (HUMALOG) 100 unit/mL injection 38 units daily for insulin pump. DX E10.42  
 aspirin delayed-release 81 mg tablet Take  by mouth daily.  CONTOUR NEXT STRIPS strip CHECK BLOOD SUGAR 8 TIMES A DAY DUE TO INSULIN PUMP AND LABILE BLOOD SUGAR  
 [facility] LANCET misc  omega-3 fatty acids-vitamin e (FISH OIL) 1,000 mg cap Take 1 Cap by mouth.  multivitamin (ONE A DAY) tablet Take 1 Tab by mouth daily.  doxycycline (ADOXA) 100 mg tablet Take 100 mg by mouth two (2) times a day.  guaiFENesin-codeine (CHERATUSSIN AC) 100-10 mg/5 mL solution Take 10 mL by mouth four (4) times daily as needed for Cough. Max Daily Amount: 40 mL. No current facility-administered medications for this visit. Review of Systems Respiratory: negative for dyspnea on exertion Cardiovascular: negative for chest pain Objective:  
 
Visit Vitals /60 Pulse 89 Temp 97.8 °F (36.6 °C) (Oral) Resp 18 Ht 6' 1\" (1.854 m) Wt 197 lb (89.4 kg) SpO2 97% BMI 25.99 kg/m² General appearance - alert, well appearing, and in no distress HEENT bilateral nasal mucosal edema and cobblestoning in posterior pharynx Chest - clear to auscultation, no wheezes, rales or rhonchi, symmetric air entry Heart - normal rate, regular rhythm, normal S1, S2, no murmurs, rubs, clicks or gallops Labs:  
Lab Results Component Value Date/Time Hemoglobin A1c 8.4 (H) 12/21/2018 07:18 AM  
 Hemoglobin A1c 8.3 (H) 09/20/2018 07:10 AM  
 Hemoglobin A1c 8.5 (H) 06/21/2018 07:10 AM  
 Glucose 121 (H) 12/21/2018 07:18 AM  
 Glucose (POC) 133 (H) 02/24/2016 12:46 PM  
 Microalbumin/Creat ratio (mg/g creat) 46 12/04/2015 08:40 AM  
 Microalb/Creat ratio (ug/mg creat.) 17.9 12/22/2017 12:00 AM  
 Microalbumin,urine random 4.10 12/04/2015 08:40 AM  
 LDL, calculated 74 12/21/2018 07:18 AM  
 Creatinine 1.18 12/21/2018 07:18 AM  
 Hemoglobin A1c, External 9.3 12/10/2015 Hemoglobin A1c, External 8.7% 08/20/2015 Hemoglobin A1c, External 9.5 05/22/2015 07:43 PM  
  
Lab Results Component Value Date/Time Cholesterol, total 147 12/21/2018 07:18 AM  
 HDL Cholesterol 61 (H) 12/21/2018 07:18 AM  
 LDL, calculated 74 12/21/2018 07:18 AM  
 Triglyceride 60 12/21/2018 07:18 AM  
 CHOL/HDL Ratio 2.4 12/21/2018 07:18 AM  
 
Lab Results Component Value Date/Time ALT (SGPT) 31 12/21/2018 07:18 AM  
 AST (SGOT) 13 (L) 12/21/2018 07:18 AM  
 Alk. phosphatase 110 12/21/2018 07:18 AM  
 Bilirubin, total 0.6 12/21/2018 07:18 AM  
 
Lab Results Component Value Date/Time GFR est AA >60 12/21/2018 07:18 AM  
 GFR est non-AA >60 12/21/2018 07:18 AM  
 Creatinine 1.18 12/21/2018 07:18 AM  
 BUN 16 12/21/2018 07:18 AM  
 Sodium 141 12/21/2018 07:18 AM  
 Potassium 4.7 12/21/2018 07:18 AM  
 Chloride 107 12/21/2018 07:18 AM  
 CO2 28 12/21/2018 07:18 AM  
  
Lab Results Component Value Date/Time  
 Prostate Specific Ag 0.3 03/21/2018 07:54 AM  
 Prostate Specific Ag 0.4 02/01/2018 10:00 AM  
 Prostate Specific Ag 0.3 12/21/2016 09:54 AM  
 Prostate Specific Ag 0.293 03/11/2015 11:12 AM  
 Prostate Specific Ag 0.251 12/17/2012 03:42 PM  
 PSA 0.3 09/30/2010 10:09 AM  
 PSA, FREE 0.2 09/30/2010 10:09 AM  
 % FREE PSA 67 09/30/2010 10:09 AM  
 
Lab Results Component Value Date/Time Glucose 121 (H) 12/21/2018 07:18 AM  
 Glucose (POC) 133 (H) 02/24/2016 12:46 PM  
  
 
 
 
Assessment / Plan Diabetes poorly controlled, pt remains on insulin pump and will follow up with Dr. Aaron Martínez and nutritionist.  
Hypertension well controlled, on lisinopril 20 mg, and Norvasc 5 mg. Hyperlipidemia well controlled on Lipitor ICD-10-CM ICD-9-CM 1. Medicare annual wellness visit, subsequent Z00.00 V70.0 2. Type 1 diabetes mellitus with diabetic polyneuropathy (HCC) E10.42 250.61 MICROALBUMIN, UR, RAND W/ MICROALB/CREAT RATIO  
  357.2 HEMOGLOBIN A1C W/O EAG 3. Hypertension M20.2 234.45 METABOLIC PANEL, COMPREHENSIVE 4. Dyslipidemia E78.5 272.4 LIPID PANEL 5. Polyneuropathy associated with underlying disease ([address] G63 357.4 Controlled on Ultram prn. He does not show any signs of abuse.  consistent. 6. Seasonal allergic rhinitis due to pollen J30.1 477.0 REFERRAL TO ENT-OTOLARYNGOLOGY Will add azelastine (ASTELIN) 137 mcg (0.1 %) nasal spray 7. Vitamin D deficiency E55.9 268.9 Will hold on supplementation for now since calcium has been elevated in the past. VITAMIN D, 25 HYDROXY 8. Cough R05 786.2 REFERRAL TO ENT-OTOLARYNGOLOGY Will check XR CHEST PA LAT Diabetic issues reviewed with him: diabetic diet discussed in detail, and low cholesterol diet, weight control and daily exercise discussed. Follow-up Disposition: 
Return in about 3 months (around 3/28/2019) for labs 1 week before. Reviewed plan of care. Patient has provided input and agrees with goals.

## 2018-12-28 NOTE — PATIENT INSTRUCTIONS
Medicare Wellness Visit, Male The best way to live healthy is to have a lifestyle where you eat a well-balanced diet, exercise regularly, limit alcohol use, and quit all forms of tobacco/nicotine, if applicable. Regular preventive services are another way to keep healthy. Preventive services (vaccines, screening tests, monitoring & exams) can help personalize your care plan, which helps you manage your own care. Screening tests can find health problems at the earliest stages, when they are easiest to treat. [facility] follows the current, evidence-based guidelines published by the [facility] ([facility]) when recommending preventive services for our patients. Because we follow these guidelines, sometimes recommendations change over time as research supports it. (For example, a prostate screening blood test is no longer routinely recommended for men with no symptoms.) Of course, you and your doctor may decide to screen more often for some diseases, based on your risk and co-morbidities (chronic disease you are already diagnosed with). Preventive services for you include: - Medicare offers their members a free annual wellness visit, which is time for you and your primary care provider to discuss and plan for your preventive service needs. Take advantage of this benefit every year! 
-All adults over age 72 should receive the recommended pneumonia vaccines. Current USPSTF guidelines recommend a series of two vaccines for the best pneumonia protection.  
-All adults should have a flu vaccine yearly and an ECG.  All adults age 61 and older should receive a shingles vaccine once in their lifetime.   
-All adults age 38-68 who are overweight should have a diabetes screening test once every three years.  
-Other screening tests & preventive services for persons with diabetes include: an eye exam to screen for diabetic retinopathy, a kidney function test, a foot exam, and stricter control over your cholesterol.  
-Cardiovascular screening for adults with routine risk involves an electrocardiogram (ECG) at intervals determined by the provider.  
-Colorectal cancer screening should be done for adults age 54-65 with no increased risk factors for colorectal cancer. There are a number of acceptable methods of screening for this type of cancer. Each test has its own benefits and drawbacks. Discuss with your provider what is most appropriate for you during your annual wellness visit. The different tests include: colonoscopy (considered the best screening method), a fecal occult blood test, a fecal DNA test, and sigmoidoscopy. 
-All adults born between [facility] should be screened once for Hepatitis C. 
-An Abdominal Aortic Aneurysm (AAA) Screening is recommended for men age 73-68 who has ever smoked in their lifetime. Here is a list of your current Health Maintenance items (your personalized list of preventive services) with a due date: 
Health Maintenance Due Topic Date Due  Shingles Vaccine (1 of 2) 08/08/2010  Flu Vaccine  08/01/2018  Albumin Urine Test  12/22/2018 [address] [place] [address] Annual Well Visit  12/28/2018  Colonoscopy  02/24/2019

## 2018-12-29 LAB — PTH RELATED PROT SERPL-SCNC: <2 PMOL/L

## 2019-01-02 DIAGNOSIS — I10 ESSENTIAL HYPERTENSION: ICD-10-CM

## 2019-01-02 RX ORDER — LISINOPRIL 20 MG/1
TABLET ORAL
Qty: 90 TAB | Refills: 3 | Status: SHIPPED | OUTPATIENT
Start: 2019-01-02 | End: 2019-11-19 | Stop reason: DRUGHIGH

## 2019-01-02 RX ORDER — AMLODIPINE BESYLATE 5 MG/1
TABLET ORAL
Qty: 90 TAB | Refills: 3 | Status: SHIPPED | OUTPATIENT
Start: 2019-01-02 | End: 2019-03-28

## 2019-01-14 ENCOUNTER — TELEPHONE (OUTPATIENT)
Dept: FAMILY MEDICINE CLINIC | Age: 59
End: 2019-01-14

## 2019-01-14 DIAGNOSIS — J30.1 SEASONAL ALLERGIC RHINITIS DUE TO POLLEN: Primary | ICD-10-CM

## 2019-01-14 DIAGNOSIS — R05.9 COUGH: ICD-10-CM

## 2019-01-14 NOTE — TELEPHONE ENCOUNTER
Patient called to check the status of his Xray appointment. Also his insurance is not covered at the referral location([address]) Patient is requesting to have a new referral ordered. Please advise [phone].

## 2019-01-15 ENCOUNTER — HOSPITAL ENCOUNTER (OUTPATIENT)
Dept: LAB | Age: 59
Discharge: HOME | End: 2019-01-15

## 2019-01-15 LAB — XX-LABCORP SPECIMEN COL,LCBCF: NORMAL

## 2019-01-15 PROCEDURE — 99001 SPECIMEN HANDLING PT-LAB: CPT

## 2019-01-15 NOTE — TELEPHONE ENCOUNTER
Patient is aware new ENT referral was placed and patient can go to [facility] or  to have CXR done. Patient verbalizes understanding.

## 2019-01-15 NOTE — TELEPHONE ENCOUNTER
Pt can go to [facility] or [facility] anytime to get his CXR. Order  Is in the system.   Will try to refer to Dr Kenya Mcpherson for allergies and cough

## 2019-01-16 ENCOUNTER — TELEPHONE (OUTPATIENT)
Dept: SURGERY | Age: 59
End: 2019-01-16

## 2019-02-13 ENCOUNTER — TELEPHONE (OUTPATIENT)
Dept: SURGERY | Age: 59
End: 2019-02-13

## 2019-02-13 ENCOUNTER — HOSPITAL ENCOUNTER (OUTPATIENT)
Dept: GENERAL RADIOLOGY | Age: 59
Discharge: HOME | End: 2019-02-13
Payer: MEDICARE

## 2019-02-13 DIAGNOSIS — R05.9 COUGH: ICD-10-CM

## 2019-02-13 PROCEDURE — 71046 X-RAY EXAM CHEST 2 VIEWS: CPT

## 2019-02-21 RX ORDER — INSULIN PUMP SYRINGE, 3 ML
EACH MISCELLANEOUS
Qty: 1 KIT | Refills: 0 | Status: SHIPPED | OUTPATIENT
Start: 2019-02-21 | End: 2022-03-21 | Stop reason: ALTCHOICE

## 2019-03-04 NOTE — TELEPHONE ENCOUNTER
Pt called stating he need a new glucose machine the pharmacy won't cover his strips with the contour. The one touch berio flex machine,. He test 7 to 8 times a day for the strips and lanets.  Please advise [phone]

## 2019-03-05 RX ORDER — LANCETS
EACH MISCELLANEOUS
Qty: 300 EACH | Refills: 3 | Status: SHIPPED | OUTPATIENT
Start: 2019-03-05 | End: 2019-03-26 | Stop reason: SDUPTHER

## 2019-03-05 RX ORDER — BLOOD-GLUCOSE METER
EACH MISCELLANEOUS
Qty: 1 EACH | Refills: 0 | Status: SHIPPED | OUTPATIENT
Start: 2019-03-05 | End: 2022-03-21 | Stop reason: ALTCHOICE
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
## 2019-03-21 ENCOUNTER — HOSPITAL ENCOUNTER (OUTPATIENT)
Dept: LAB | Age: 59
Discharge: HOME | End: 2019-03-21
Payer: MEDICARE

## 2019-03-21 DIAGNOSIS — E78.5 DYSLIPIDEMIA: ICD-10-CM

## 2019-03-21 DIAGNOSIS — I11.9 BENIGN HYPERTENSIVE HEART DISEASE WITHOUT HEART FAILURE: ICD-10-CM

## 2019-03-21 DIAGNOSIS — E55.9 VITAMIN D DEFICIENCY: ICD-10-CM

## 2019-03-21 DIAGNOSIS — E10.42 TYPE 1 DIABETES MELLITUS WITH DIABETIC POLYNEUROPATHY (HCC): ICD-10-CM

## 2019-03-21 LAB
ALBUMIN SERPL-MCNC: 3.4 G/DL (ref 3.4–5)
ALBUMIN/GLOB SERPL: 1.2 {RATIO} (ref 0.8–1.7)
ALP SERPL-CCNC: 102 U/L (ref 45–117)
ALT SERPL-CCNC: 27 U/L (ref 16–61)
ANION GAP SERPL CALC-SCNC: 5 MMOL/L (ref 3–18)
AST SERPL-CCNC: 12 U/L (ref 15–37)
BILIRUB SERPL-MCNC: 0.6 MG/DL (ref 0.2–1)
BUN SERPL-MCNC: 14 MG/DL (ref 7–18)
BUN/CREAT SERPL: 13 (ref 12–20)
CALCIUM SERPL-MCNC: 8.8 MG/DL (ref 8.5–10.1)
CHLORIDE SERPL-SCNC: 108 MMOL/L (ref 100–108)
CHOLEST SERPL-MCNC: 139 MG/DL
CO2 SERPL-SCNC: 28 MMOL/L (ref 21–32)
CREAT SERPL-MCNC: 1.08 MG/DL (ref 0.6–1.3)
GLOBULIN SER CALC-MCNC: 2.9 G/DL (ref 2–4)
GLUCOSE SERPL-MCNC: 108 MG/DL (ref 74–99)
HDLC SERPL-MCNC: 58 MG/DL (ref 40–60)
HDLC SERPL: 2.4 {RATIO} (ref 0–5)
LDLC SERPL CALC-MCNC: 62 MG/DL (ref 0–100)
LIPID PROFILE,FLP: NORMAL
POTASSIUM SERPL-SCNC: 4 MMOL/L (ref 3.5–5.5)
PROT SERPL-MCNC: 6.3 G/DL (ref 6.4–8.2)
SODIUM SERPL-SCNC: 141 MMOL/L (ref 136–145)
TRIGL SERPL-MCNC: 95 MG/DL (ref ?–150)
VLDLC SERPL CALC-MCNC: 19 MG/DL

## 2019-03-21 PROCEDURE — 36415 COLL VENOUS BLD VENIPUNCTURE: CPT

## 2019-03-21 PROCEDURE — 80053 COMPREHEN METABOLIC PANEL: CPT

## 2019-03-21 PROCEDURE — 83036 HEMOGLOBIN GLYCOSYLATED A1C: CPT

## 2019-03-21 PROCEDURE — 82306 VITAMIN D 25 HYDROXY: CPT

## 2019-03-21 PROCEDURE — 80061 LIPID PANEL: CPT

## 2019-03-21 PROCEDURE — 82043 UR ALBUMIN QUANTITATIVE: CPT

## 2019-03-22 LAB
25(OH)D3 SERPL-MCNC: 32.6 NG/ML (ref 30–100)
CREAT UR-MCNC: 152 MG/DL (ref 30–125)
HBA1C MFR BLD: 8.3 % (ref 4.2–5.6)
MICROALBUMIN UR-MCNC: 8.7 MG/DL (ref 0–3)
MICROALBUMIN/CREAT UR-RTO: 57 MG/G (ref 0–30)

## 2019-03-26 RX ORDER — LANCETS 33 GAUGE
EACH MISCELLANEOUS
Qty: 400 LANCET | Refills: 5 | Status: SHIPPED | OUTPATIENT
Start: 2019-03-26 | End: 2022-03-21 | Stop reason: ALTCHOICE

## 2019-03-26 RX ORDER — PANTOPRAZOLE SODIUM 40 MG/1
TABLET, DELAYED RELEASE ORAL
Qty: 90 TAB | Refills: 3 | Status: SHIPPED | OUTPATIENT
Start: 2019-03-26 | End: 2019-12-09 | Stop reason: SDUPTHER

## 2019-03-28 ENCOUNTER — OFFICE VISIT (OUTPATIENT)
Dept: FAMILY MEDICINE CLINIC | Age: 59
End: 2019-03-28

## 2019-03-28 VITALS
TEMPERATURE: 98.3 F | RESPIRATION RATE: 20 BRPM | WEIGHT: 205 LBS | BODY MASS INDEX: 27.17 KG/M2 | DIASTOLIC BLOOD PRESSURE: 68 MMHG | HEIGHT: 73 IN | OXYGEN SATURATION: 97 % | SYSTOLIC BLOOD PRESSURE: 148 MMHG | HEART RATE: 75 BPM

## 2019-03-28 DIAGNOSIS — E78.5 DYSLIPIDEMIA: ICD-10-CM

## 2019-03-28 DIAGNOSIS — E10.42 TYPE 1 DIABETES MELLITUS WITH DIABETIC POLYNEUROPATHY (HCC): Primary | ICD-10-CM

## 2019-03-28 DIAGNOSIS — J30.1 SEASONAL ALLERGIC RHINITIS DUE TO POLLEN: ICD-10-CM

## 2019-03-28 DIAGNOSIS — J41.0 SIMPLE CHRONIC BRONCHITIS (HCC): ICD-10-CM

## 2019-03-28 DIAGNOSIS — I10 ESSENTIAL HYPERTENSION: ICD-10-CM

## 2019-03-28 DIAGNOSIS — E55.9 VITAMIN D DEFICIENCY: ICD-10-CM

## 2019-03-28 RX ORDER — AZELASTINE 1 MG/ML
2 SPRAY, METERED NASAL 2 TIMES DAILY
Qty: 1 BOTTLE | Refills: 5 | Status: SHIPPED | OUTPATIENT
Start: 2019-03-28 | End: 2020-08-27

## 2019-03-28 RX ORDER — AMLODIPINE BESYLATE 10 MG/1
TABLET ORAL
Qty: 90 TAB | Refills: 3 | Status: SHIPPED | OUTPATIENT
Start: 2019-03-28 | End: 2020-02-28

## 2019-03-28 NOTE — PROGRESS NOTES
Patient is in the office today for 3 month follow up. 1. Have you been to the ER, urgent care clinic since your last visit? Hospitalized since your last visit? No 
 
2. Have you seen or consulted any other health care providers outside of the [address] [place] [address] since your last visit? Include any pap smears or colon screening.  No

## 2019-03-28 NOTE — PATIENT INSTRUCTIONS
High Blood Pressure: Care Instructions Overview It's normal for blood pressure to go up and down throughout the day. But if it stays up, you have high blood pressure. Another name for high blood pressure is hypertension. Despite what a lot of people think, high blood pressure usually doesn't cause headaches or make you feel dizzy or lightheaded. It usually has no symptoms. But it does increase your risk of stroke, heart attack, and other problems. You and your doctor will talk about your risks of these problems based on your blood pressure. Your doctor will give you a goal for your blood pressure. Your goal will be based on your health and your age. Lifestyle changes, such as eating healthy and being active, are always important to help lower blood pressure. You might also take medicine to reach your blood pressure goal. 
Follow-up care is a key part of your treatment and safety. Be sure to make and go to all appointments, and call your doctor if you are having problems. It's also a good idea to know your test results and keep a list of the medicines you take. How can you care for yourself at home? Medical treatment · If you stop taking your medicine, your blood pressure will go back up. You may take one or more types of medicine to lower your blood pressure. Be safe with medicines. Take your medicine exactly as prescribed. Call your doctor if you think you are having a problem with your medicine. · Talk to your doctor before you start taking aspirin every day. Aspirin can help certain people lower their risk of a heart attack or stroke. But taking aspirin isn't right for everyone, because it can cause serious bleeding. · See your doctor regularly. You may need to see the doctor more often at first or until your blood pressure comes down. · If you are taking blood pressure medicine, talk to your doctor before you take decongestants or anti-inflammatory medicine, such as ibuprofen. Some of these medicines can raise blood pressure. · Learn how to check your blood pressure at home. Lifestyle changes · Stay at a healthy weight. This is especially important if you put on weight around the waist. Losing even 10 pounds can help you lower your blood pressure. · If your doctor recommends it, get more exercise. Walking is a good choice. Bit by bit, increase the amount you walk every day. Try for at least 30 minutes on most days of the week. You also may want to swim, bike, or do other activities. · Avoid or limit alcohol. Talk to your doctor about whether you can drink any alcohol. · Try to limit how much sodium you eat to less than 2,300 milligrams (mg) a day. Your doctor may ask you to try to eat less than 1,500 mg a day. · Eat plenty of fruits (such as bananas and oranges), vegetables, legumes, whole grains, and low-fat dairy products. · Lower the amount of saturated fat in your diet. Saturated fat is found in animal products such as milk, cheese, and meat. Limiting these foods may help you lose weight and also lower your risk for heart disease. · Do not smoke. Smoking increases your risk for heart attack and stroke. If you need help quitting, talk to your doctor about stop-smoking programs and medicines. These can increase your chances of quitting for good. When should you call for help? Call 911 anytime you think you may need emergency care. This may mean having symptoms that suggest that your blood pressure is causing a serious heart or blood vessel problem. Your blood pressure may be over 180/120. 
 For example, call 911 if: 
  · You have symptoms of a heart attack. These may include: 
? Chest pain or pressure, or a strange feeling in the chest. 
? Sweating. ? Shortness of breath. ? Nausea or vomiting. ? Pain, pressure, or a strange feeling in the back, neck, jaw, or upper belly or in one or both shoulders or arms. ? Lightheadedness or sudden weakness. ? A fast or irregular heartbeat.  
  · You have symptoms of a stroke. These may include: 
? Sudden numbness, tingling, weakness, or loss of movement in your face, arm, or leg, especially on only one side of your body. ? Sudden vision changes. ? Sudden trouble speaking. ? Sudden confusion or trouble understanding simple statements. ? Sudden problems with walking or balance. ? A sudden, severe headache that is different from past headaches.  
  · You have severe back or belly pain.  
 Do not wait until your blood pressure comes down on its own. Get help right away. 
 Call your doctor now or seek immediate care if: 
  · Your blood pressure is much higher than normal (such as 180/120 or higher), but you don't have symptoms.  
  · You think high blood pressure is causing symptoms, such as: 
? Severe headache. 
? Blurry vision.  
 Watch closely for changes in your health, and be sure to contact your doctor if: 
  · Your blood pressure measures higher than your doctor recommends at least 2 times. That means the top number is higher or the bottom number is higher, or both.  
  · You think you may be having side effects from your blood pressure medicine. Where can you learn more? Go to http://freida-khloe.info/. Enter K149 in the search box to learn more about \"High Blood Pressure: Care Instructions. \" Current as of: July 22, 2018 Content Version: 11.9 © 6475-6277 [employer]. Care instructions adapted under license by [employer] (which disclaims liability or warranty for this information). If you have questions about a medical condition or this instruction, always ask your healthcare professional. [place] [address] any warranty or liability for your use of this information.

## 2019-03-30 NOTE — PROGRESS NOTES
Subjective: Chief Complaint The patient presents for follow up of diabetes, hypertension and high cholesterol. JANESSA Watt is a 62 y.o. male seen for follow up of diabetes. Hunter has hypertension and hyperlipidemia. Diabetes no significant medication side effects noted, poorly controlled but improving, pt continues on insulin pump and will f/u with Endo (Dr Wander Correa) and nutritionist for better management, he does not bolus insulin before he eats which is one reason it is uncontrolled, he is trying to do this better ,  hypertension poorly controlled on lisinopril 20 mg  And Norvasc 5 mg, it is even elevated at home, hyperlipidemia well controlled, no significant medication side effects noted, on Lipitor 20 mg 
 
Diet and Lifestyle: generally follows a low fat low cholesterol diet, does not rigorously follow a diabetic diet, exercises sporadically Home BP Monitoring: is elevated at home. Diabetic Review of Systems - home glucose monitoring: is performed regularly, 6x/day. Other symptoms and concerns: pt will try to exercise more. He has a diabetic foot ulcer that is slowly healing. He had left foot surgery several  Months ago and needs to have further surgery. . Pt is candidate for diabetic shoes due to neuropathy and ulcerative callus. He is followed by Podiatry. he sees Dr Emely Zhu now. Patient over the last few months has had increased nasal congestion postnasal drainage and cough which is worse when he lays down. He has used Singulair without much improvement. He did not get Astelin NS. Pt has smoked for about 30 years in the past and does c/o some mucus so chronic bronchitis soul be a factor. Current Outpatient Medications Medication Sig  
 tiotropium bromide (SPIRIVA RESPIMAT) 2.5 mcg/actuation inhaler Take 2 Puffs by inhalation daily.  amLODIPine (NORVASC) 10 mg tablet TAKE 1 TABLET BY MOUTH  DAILY  azelastine (ASTELIN) 137 mcg (0.1 %) nasal spray 2 Sprays by Both Nostrils route two (2) times a day. Use in each nostril as directed  Indications: Seasonal Runny Nose  pantoprazole (PROTONIX) 40 mg tablet TAKE 1 TABLET BY MOUTH ONCE DAILY  lancets (ONE TOUCH DELICA) 33 gauge misc CHECK BLOOD SUGAR 8 TIMES  DAILY DUE TO INSULIN PUMP  AND LABILE BLOOD SUGAR  Blood-Glucose Meter (ONETOUCH VERIO FLEX) misc DX E10.42 check blood sugar 8 times a day due to insulin pump and labile blood sugar  sildenafil, antihypertensive, (REVATIO) 20 mg tablet Take 1 Tab by mouth daily. Take up to 5 tabs as needed. Do not exceed 5 tabs  Blood-Glucose Meter (CONTOUR NEXT EZ METER) monitoring kit DX E10.42 check blood sugar 8 times a day due to insulin pump and labile blood sugar.  lisinopril (PRINIVIL, ZESTRIL) 20 mg tablet TAKE 1 TABLET BY MOUTH  DAILY  sildenafil, antihypertensive, (REVATIO) 20 mg tablet TAKE TWO TO FIVE TABLETS BY MOUTH 1 HOUR PRIOR TO INTERCOURSE  insulin regular (NOVOLIN R, HUMULIN R) 100 unit/mL injection 38 units daily for insulin pump  montelukast (SINGULAIR) 10 mg tablet Take 1 Tab by mouth daily.  oxybutynin (DITROPAN) 5 mg tablet TAKE 1 TABLET BY MOUTH TWO  TIMES DAILY  MINIMED INFUSION SET iset CHANGE EVERY 3 DAYS  atorvastatin (LIPITOR) 20 mg tablet TAKE 1 TABLET BY MOUTH  DAILY  insulin lispro (HUMALOG) 100 unit/mL injection 38 units daily for insulin pump. DX E10.42  
 aspirin delayed-release 81 mg tablet Take  by mouth daily.  glucose blood VI test strips (ONETOUCH VERIO) strip DX E10.42 check blood sugar 8 times a day due to insulin pump and labile blood sugar  albuterol (PROAIR HFA) 90 mcg/actuation inhaler Take 2 Puffs by inhalation every four (4) hours as needed for Wheezing or Shortness of Breath.  doxycycline (ADOXA) 100 mg tablet Take 100 mg by mouth two (2) times a day.   
 guaiFENesin-codeine (CHERATUSSIN AC) 100-10 mg/5 mL solution Take 10 mL by mouth four (4) times daily as needed for Cough. Max Daily Amount: 40 mL.  predniSONE (DELTASONE) 10 mg tablet 4 tabs daily for 5 days  cholecalciferol, vitamin D3, (VITAMIN D3) 2,000 unit tab Take  by mouth.  traMADol (ULTRAM) 50 mg tablet TAKE 1 TABLET EVERY 6 HOURS AS NEEDED FOR PAIN  
 PARADIGM RESERVOIR 3 mL misc  CONTOUR NEXT STRIPS strip CHECK BLOOD SUGAR 8 TIMES A DAY DUE TO INSULIN PUMP AND LABILE BLOOD SUGAR  
 [facility] LANCET misc  omega-3 fatty acids-vitamin e (FISH OIL) 1,000 mg cap Take 1 Cap by mouth.  multivitamin (ONE A DAY) tablet Take 1 Tab by mouth daily. No current facility-administered medications for this visit. Review of Systems Respiratory: negative for dyspnea on exertion Cardiovascular: negative for chest pain Objective:  
 
Visit Vitals /68 (BP 1 Location: Left arm, BP Patient Position: Sitting) Pulse 75 Temp 98.3 °F (36.8 °C) (Oral) Resp 20 Ht 6' 1\" (1.854 m) Wt 205 lb (93 kg) SpO2 97% BMI 27.05 kg/m² General appearance - alert, well appearing, and in no distress HEENT bilateral nasal mucosal edema and cobblestoning in posterior pharynx Chest - clear to auscultation, no wheezes, rales or rhonchi, symmetric air entry Heart - normal rate, regular rhythm, normal S1, S2, no murmurs, rubs, clicks or gallops Labs:  
Lab Results Component Value Date/Time Hemoglobin A1c 8.3 (H) 03/21/2019 07:52 AM  
 Hemoglobin A1c 8.4 (H) 12/21/2018 07:18 AM  
 Hemoglobin A1c 8.3 (H) 09/20/2018 07:10 AM  
 Glucose 108 (H) 03/21/2019 07:52 AM  
 Glucose (POC) 133 (H) 02/24/2016 12:46 PM  
 Microalbumin/Creat ratio (mg/g creat) 57 (H) 03/21/2019 07:52 AM  
 Microalbumin,urine random 8.70 (H) 03/21/2019 07:52 AM  
 LDL, calculated 62 03/21/2019 07:52 AM  
 Creatinine 1.08 03/21/2019 07:52 AM  
 Hemoglobin A1c, External 9.3 12/10/2015 Hemoglobin A1c, External 8.7% 08/20/2015 Hemoglobin A1c, External 9.5 05/22/2015 07:43 PM  
  
Lab Results Component Value Date/Time Cholesterol, total 139 03/21/2019 07:52 AM  
 HDL Cholesterol 58 03/21/2019 07:52 AM  
 LDL, calculated 62 03/21/2019 07:52 AM  
 Triglyceride 95 03/21/2019 07:52 AM  
 CHOL/HDL Ratio 2.4 03/21/2019 07:52 AM  
 
Lab Results Component Value Date/Time ALT (SGPT) 27 03/21/2019 07:52 AM  
 AST (SGOT) 12 (L) 03/21/2019 07:52 AM  
 Alk. phosphatase 102 03/21/2019 07:52 AM  
 Bilirubin, total 0.6 03/21/2019 07:52 AM  
 
Lab Results Component Value Date/Time GFR est AA >60 03/21/2019 07:52 AM  
 GFR est non-AA >60 03/21/2019 07:52 AM  
 Creatinine 1.08 03/21/2019 07:52 AM  
 BUN 14 03/21/2019 07:52 AM  
 Sodium 141 03/21/2019 07:52 AM  
 Potassium 4.0 03/21/2019 07:52 AM  
 Chloride 108 03/21/2019 07:52 AM  
 CO2 28 03/21/2019 07:52 AM  
  
Lab Results Component Value Date/Time  
 Prostate Specific Ag 0.4 01/14/2019 10:24 AM  
 Prostate Specific Ag 0.3 03/21/2018 07:54 AM  
 Prostate Specific Ag 0.4 02/01/2018 10:00 AM  
 Prostate Specific Ag 0.3 12/21/2016 09:54 AM  
 Prostate Specific Ag 0.251 12/17/2012 03:42 PM  
 PSA 0.3 09/30/2010 10:09 AM  
 PSA, FREE 0.2 09/30/2010 10:09 AM  
 % FREE PSA 67 09/30/2010 10:09 AM  
 
Lab Results Component Value Date/Time Glucose 108 (H) 03/21/2019 07:52 AM  
 Glucose (POC) 133 (H) 02/24/2016 12:46 PM  
  
 
 
 
Assessment / Plan Diabetes poorly controlled, pt remains on insulin pump and will follow up with Dr. Karen Pan and nutritionist.  
Hypertension borderline controlled, on lisinopril 20 mg, and Norvasc 5 mg which I will increase to Norvasc 10 mg. Hyperlipidemia well controlled on Lipitor ICD-10-CM ICD-9-CM 1. Type 1 diabetes mellitus with diabetic polyneuropathy (HCC) E10.42 250.61 HEMOGLOBIN A1C W/O EAG  
  357.2 2. Essential hypertension I10 401.9 amLODIPine (NORVASC) 10 mg tablet    METABOLIC PANEL, COMPREHENSIVE  
 3. Dyslipidemia E78.5 272.4 LIPID PANEL 4. Vitamin D deficiency E55.9 268.9 Well controlled on current supplementation VITAMIN D, 25 HYDROXY 5. Seasonal allergic rhinitis due to pollen J30.1 477.0 Will treat with azelastine (ASTELIN) 137 mcg (0.1 %) nasal spray 6. Simple chronic bronchitis (HCC) J41.0 491.0 Will try tiotropium bromide (SPIRIVA RESPIMAT) 2.5 mcg/actuation inhaler. If not improving would refer to ENT and or pulmonary. His CXR was normal.   
 
 
 
  
 
Diabetic issues reviewed with him: diabetic diet discussed in detail, and low cholesterol diet, weight control and daily exercise discussed. Follow-up and Dispositions · Return in about 3 months (around 6/28/2019) for labs 1 week before. Reviewed plan of care. Patient has provided input and agrees with goals.

## 2019-04-02 ENCOUNTER — OFFICE VISIT (OUTPATIENT)
Dept: FAMILY MEDICINE CLINIC | Age: 59
End: 2019-04-02

## 2019-04-02 VITALS
HEIGHT: 73 IN | SYSTOLIC BLOOD PRESSURE: 169 MMHG | DIASTOLIC BLOOD PRESSURE: 69 MMHG | RESPIRATION RATE: 20 BRPM | BODY MASS INDEX: 27.3 KG/M2 | TEMPERATURE: 98.5 F | OXYGEN SATURATION: 95 % | HEART RATE: 92 BPM | WEIGHT: 206 LBS

## 2019-04-02 DIAGNOSIS — R05.9 COUGH: ICD-10-CM

## 2019-04-02 DIAGNOSIS — J01.91 ACUTE RECURRENT SINUSITIS, UNSPECIFIED LOCATION: Primary | ICD-10-CM

## 2019-04-02 DIAGNOSIS — J30.1 SEASONAL ALLERGIC RHINITIS DUE TO POLLEN: ICD-10-CM

## 2019-04-02 RX ORDER — AMOXICILLIN 875 MG/1
875 TABLET, FILM COATED ORAL 2 TIMES DAILY
Qty: 20 TAB | Refills: 0 | Status: SHIPPED | OUTPATIENT
Start: 2019-04-02 | End: 2019-04-12

## 2019-04-02 RX ORDER — CODEINE PHOSPHATE AND GUAIFENESIN 10; 100 MG/5ML; MG/5ML
10 SOLUTION ORAL
Qty: 120 ML | Refills: 0 | Status: SHIPPED | OUTPATIENT
Start: 2019-04-02 | End: 2019-04-09

## 2019-04-02 NOTE — PATIENT INSTRUCTIONS
Cough: Care Instructions  Your Care Instructions    A cough is your body's response to something that bothers your throat or airways. Many things can cause a cough. You might cough because of a cold or the flu, bronchitis, or asthma. Smoking, postnasal drip, allergies, and stomach acid that backs up into your throat also can cause coughs. A cough is a symptom, not a disease. Most coughs stop when the cause, such as a cold, goes away. You can take a few steps at home to cough less and feel better. Follow-up care is a key part of your treatment and safety. Be sure to make and go to all appointments, and call your doctor if you are having problems. It's also a good idea to know your test results and keep a list of the medicines you take. How can you care for yourself at home? · Drink lots of water and other fluids. This helps thin the mucus and soothes a dry or sore throat. Honey or lemon juice in hot water or tea may ease a dry cough. · Take cough medicine as directed by your doctor. · Prop up your head on pillows to help you breathe and ease a dry cough. · Try cough drops to soothe a dry or sore throat. Cough drops don't stop a cough. Medicine-flavored cough drops are no better than candy-flavored drops or hard candy. · Do not smoke. Avoid secondhand smoke. If you need help quitting, talk to your doctor about stop-smoking programs and medicines. These can increase your chances of quitting for good. When should you call for help? Call 911 anytime you think you may need emergency care.  For example, call if:    · You have severe trouble breathing.    Call your doctor now or seek immediate medical care if:    · You cough up blood.     · You have new or worse trouble breathing.     · You have a new or higher fever.     · You have a new rash.    Watch closely for changes in your health, and be sure to contact your doctor if:    · You cough more deeply or more often, especially if you notice more mucus or a change in the color of your mucus.     · You have new symptoms, such as a sore throat, an earache, or sinus pain.     · You do not get better as expected. Where can you learn more? Go to http://freida-khloe.info/. Enter D279 in the search box to learn more about \"Cough: Care Instructions. \"  Current as of: September 5, 2018  Content Version: 11.9  © 8955-4170 [employer]. Care instructions adapted under license by [employer] (which disclaims liability or warranty for this information). If you have questions about a medical condition or this instruction, always ask your healthcare professional. [address] any warranty or liability for your use of this information.

## 2019-04-02 NOTE — PROGRESS NOTES
HISTORY OF PRESENT ILLNESS  Dane Quintero is a 62 y.o. male. Cold Symptoms   The history is provided by the patient. This is a new problem. The current episode started more than 1 week ago. The problem occurs constantly. The cough is productive of purulent sputum. Treatments tried: asteline, singulair  The treatment provided mild relief. Smoker: former. Review of Systems   Respiratory: Positive for cough and sputum production. Physical Exam   Constitutional: He appears well-developed and well-nourished. HENT:   Right Ear: Tympanic membrane and ear canal normal.   Left Ear: Tympanic membrane and ear canal normal.   Nose: Mucosal edema present. Mouth/Throat: Uvula is midline, oropharynx is clear and moist and mucous membranes are normal.   Cardiovascular: Normal rate, regular rhythm and normal heart sounds. Pulmonary/Chest: Effort normal and breath sounds normal.   Vitals reviewed. ASSESSMENT and PLAN    ICD-10-CM ICD-9-CM    1. Acute recurrent sinusitis, unspecified location J01.91 461.9 Will treat with amoxicillin (AMOXIL) 875 mg tablet BID for 10 days and continue Singulair and Astelin NS       REFERRAL TO ENT-OTOLARYNGOLOGY for further evaluation    2. Cough R05 786.2 Treat with guaiFENesin-codeine (CHERATUSSIN AC) 100-10 mg/5 mL solution      REFERRAL TO ENT-OTOLARYNGOLOGY   3. Seasonal allergic rhinitis due to pollen J30.1 477.0 REFERRAL TO ENT-OTOLARYNGOLOGY     Reviewed plan of care. Patient has provided input and agrees with goals.

## 2019-04-02 NOTE — PROGRESS NOTES
Patient is in the office today for cough x 1 week. 1. Have you been to the ER, urgent care clinic since your last visit? Hospitalized since your last visit? No    2. Have you seen or consulted any other health care providers outside of the [address] [place] [address] since your last visit? Include any pap smears or colon screening.  No

## 2019-04-05 NOTE — TELEPHONE ENCOUNTER
Patient asking for a script for the   [facility] Kit sent to his [facility] he may need a prior auth with it     pls advise

## 2019-04-05 NOTE — TELEPHONE ENCOUNTER
Ok to go prior [facility].  He has Type 1 Dm and checks his sugar 8x/day for insulin pump so he should qualify

## 2019-04-05 NOTE — TELEPHONE ENCOUNTER
Patient is aware PA for [facility] reader will be started. Patient states he needs to have an order sent to [employer].

## 2019-04-30 NOTE — TELEPHONE ENCOUNTER
Is pt getting his insulin pump supplies from me or his endocrinologist? If he is not seeing Endo currently and needs it I will need instructions.  ie how often he changes it

## 2019-05-06 DIAGNOSIS — N52.9 ED (ERECTILE DYSFUNCTION) OF ORGANIC ORIGIN: ICD-10-CM

## 2019-05-06 RX ORDER — INSULIN PUMP SYRINGE, 3 ML
EACH MISCELLANEOUS
Status: CANCELLED | OUTPATIENT
Start: 2019-05-06

## 2019-06-24 ENCOUNTER — HOSPITAL ENCOUNTER (OUTPATIENT)
Dept: LAB | Age: 59
Discharge: HOME | End: 2019-06-24
Payer: MEDICARE

## 2019-06-24 DIAGNOSIS — I10 ESSENTIAL HYPERTENSION: ICD-10-CM

## 2019-06-24 DIAGNOSIS — E10.42 TYPE 1 DIABETES MELLITUS WITH DIABETIC POLYNEUROPATHY (HCC): ICD-10-CM

## 2019-06-24 DIAGNOSIS — E78.5 DYSLIPIDEMIA: ICD-10-CM

## 2019-06-24 DIAGNOSIS — E55.9 VITAMIN D DEFICIENCY: ICD-10-CM

## 2019-06-24 LAB
ALBUMIN SERPL-MCNC: 3.4 G/DL (ref 3.4–5)
ALBUMIN/GLOB SERPL: 1.2 {RATIO} (ref 0.8–1.7)
ALP SERPL-CCNC: 100 U/L (ref 45–117)
ALT SERPL-CCNC: 25 U/L (ref 16–61)
ANION GAP SERPL CALC-SCNC: 6 MMOL/L (ref 3–18)
AST SERPL-CCNC: 15 U/L (ref 15–37)
BILIRUB SERPL-MCNC: 0.4 MG/DL (ref 0.2–1)
BUN SERPL-MCNC: 12 MG/DL (ref 7–18)
BUN/CREAT SERPL: 10 (ref 12–20)
CALCIUM SERPL-MCNC: 9.2 MG/DL (ref 8.5–10.1)
CHLORIDE SERPL-SCNC: 112 MMOL/L (ref 100–108)
CHOLEST SERPL-MCNC: 126 MG/DL
CO2 SERPL-SCNC: 27 MMOL/L (ref 21–32)
CREAT SERPL-MCNC: 1.23 MG/DL (ref 0.6–1.3)
GLOBULIN SER CALC-MCNC: 2.9 G/DL (ref 2–4)
GLUCOSE SERPL-MCNC: 96 MG/DL (ref 74–99)
HDLC SERPL-MCNC: 54 MG/DL (ref 40–60)
HDLC SERPL: 2.3 {RATIO} (ref 0–5)
LDLC SERPL CALC-MCNC: 59.4 MG/DL (ref 0–100)
LIPID PROFILE,FLP: NORMAL
POTASSIUM SERPL-SCNC: 4.2 MMOL/L (ref 3.5–5.5)
PROT SERPL-MCNC: 6.3 G/DL (ref 6.4–8.2)
SODIUM SERPL-SCNC: 145 MMOL/L (ref 136–145)
TRIGL SERPL-MCNC: 63 MG/DL (ref ?–150)
VLDLC SERPL CALC-MCNC: 12.6 MG/DL

## 2019-06-24 PROCEDURE — 83036 HEMOGLOBIN GLYCOSYLATED A1C: CPT

## 2019-06-24 PROCEDURE — 36415 COLL VENOUS BLD VENIPUNCTURE: CPT

## 2019-06-24 PROCEDURE — 82306 VITAMIN D 25 HYDROXY: CPT

## 2019-06-24 PROCEDURE — 80061 LIPID PANEL: CPT

## 2019-06-24 PROCEDURE — 80053 COMPREHEN METABOLIC PANEL: CPT

## 2019-06-24 NOTE — DIABETES MGMT
GLYCEMIC CONTROL:    PAT referral. Pre-op call insulin pump. Attempted to contact patient at [phone] but the person who answered the call stated that he was not available. She took my name and phone number for call back ([phone]). She will tell Mr. Kristyn Ardon to return this call. I also attempted to contact him at [phone] but he was not available.     Noelle Plasencia RN [facility]  Pager: [phone]

## 2019-06-25 ENCOUNTER — ANESTHESIA EVENT (OUTPATIENT)
Dept: ENDOSCOPY | Age: 59
End: 2019-06-25
Payer: MEDICARE

## 2019-06-25 LAB
25(OH)D3 SERPL-MCNC: 33.8 NG/ML (ref 30–100)
HBA1C MFR BLD: 7.4 % (ref 4.2–5.6)

## 2019-06-26 ENCOUNTER — HOSPITAL ENCOUNTER (OUTPATIENT)
Age: 59
Setting detail: OUTPATIENT SURGERY
Discharge: HOME | End: 2019-06-26
Attending: COLON & RECTAL SURGERY | Admitting: COLON & RECTAL SURGERY
Payer: MEDICARE

## 2019-06-26 ENCOUNTER — ANESTHESIA (OUTPATIENT)
Dept: ENDOSCOPY | Age: 59
End: 2019-06-26
Payer: MEDICARE

## 2019-06-26 VITALS
DIASTOLIC BLOOD PRESSURE: 69 MMHG | SYSTOLIC BLOOD PRESSURE: 160 MMHG | BODY MASS INDEX: 26.51 KG/M2 | RESPIRATION RATE: 16 BRPM | TEMPERATURE: 97.5 F | HEIGHT: 73 IN | WEIGHT: 200 LBS | OXYGEN SATURATION: 100 % | HEART RATE: 75 BPM

## 2019-06-26 LAB — GLUCOSE BLD STRIP.AUTO-MCNC: 138 MG/DL (ref 70–110)

## 2019-06-26 PROCEDURE — 76040000007: Performed by: COLON & RECTAL SURGERY

## 2019-06-26 PROCEDURE — 76060000032 HC ANESTHESIA 0.5 TO 1 HR: Performed by: COLON & RECTAL SURGERY

## 2019-06-26 PROCEDURE — 77030009426 HC FCPS BIOP ENDOSC BSC -B: Performed by: COLON & RECTAL SURGERY

## 2019-06-26 PROCEDURE — 82962 GLUCOSE BLOOD TEST: CPT

## 2019-06-26 PROCEDURE — 74011250636 HC RX REV CODE- 250/636: Performed by: NURSE ANESTHETIST, CERTIFIED REGISTERED

## 2019-06-26 PROCEDURE — 74011250636 HC RX REV CODE- 250/636

## 2019-06-26 PROCEDURE — 88305 TISSUE EXAM BY PATHOLOGIST: CPT

## 2019-06-26 RX ORDER — SODIUM CHLORIDE 0.9 % (FLUSH) 0.9 %
5-40 SYRINGE (ML) INJECTION AS NEEDED
Status: DISCONTINUED | OUTPATIENT
Start: 2019-06-26 | End: 2019-06-26 | Stop reason: HOSPADM

## 2019-06-26 RX ORDER — LIDOCAINE HYDROCHLORIDE 10 MG/ML
0.1 INJECTION, SOLUTION EPIDURAL; INFILTRATION; INTRACAUDAL; PERINEURAL AS NEEDED
Status: DISCONTINUED | OUTPATIENT
Start: 2019-06-26 | End: 2019-06-26 | Stop reason: HOSPADM

## 2019-06-26 RX ORDER — PROPOFOL 10 MG/ML
INJECTION, EMULSION INTRAVENOUS AS NEEDED
Status: DISCONTINUED | OUTPATIENT
Start: 2019-06-26 | End: 2019-06-26 | Stop reason: HOSPADM

## 2019-06-26 RX ORDER — SODIUM CHLORIDE, SODIUM LACTATE, POTASSIUM CHLORIDE, CALCIUM CHLORIDE 600; 310; 30; 20 MG/100ML; MG/100ML; MG/100ML; MG/100ML
25 INJECTION, SOLUTION INTRAVENOUS CONTINUOUS
Status: DISCONTINUED | OUTPATIENT
Start: 2019-06-26 | End: 2019-06-26 | Stop reason: HOSPADM

## 2019-06-26 RX ORDER — SODIUM CHLORIDE 0.9 % (FLUSH) 0.9 %
5-40 SYRINGE (ML) INJECTION EVERY 8 HOURS
Status: DISCONTINUED | OUTPATIENT
Start: 2019-06-26 | End: 2019-06-26 | Stop reason: HOSPADM

## 2019-06-26 RX ORDER — INSULIN LISPRO 100 [IU]/ML
INJECTION, SOLUTION INTRAVENOUS; SUBCUTANEOUS ONCE
Status: DISCONTINUED | OUTPATIENT
Start: 2019-06-26 | End: 2019-06-26 | Stop reason: HOSPADM

## 2019-06-26 RX ADMIN — PROPOFOL 50 MG: 10 INJECTION, EMULSION INTRAVENOUS at 08:44

## 2019-06-26 RX ADMIN — FAMOTIDINE 20 MG: 10 INJECTION INTRAVENOUS at 07:47

## 2019-06-26 RX ADMIN — PROPOFOL 70 MG: 10 INJECTION, EMULSION INTRAVENOUS at 08:13

## 2019-06-26 RX ADMIN — PROPOFOL 30 MG: 10 INJECTION, EMULSION INTRAVENOUS at 08:20

## 2019-06-26 RX ADMIN — PROPOFOL 20 MG: 10 INJECTION, EMULSION INTRAVENOUS at 08:29

## 2019-06-26 RX ADMIN — PROPOFOL 30 MG: 10 INJECTION, EMULSION INTRAVENOUS at 08:22

## 2019-06-26 RX ADMIN — SODIUM CHLORIDE, SODIUM LACTATE, POTASSIUM CHLORIDE, AND CALCIUM CHLORIDE 25 ML/HR: 600; 310; 30; 20 INJECTION, SOLUTION INTRAVENOUS at 07:47

## 2019-06-26 RX ADMIN — PROPOFOL 20 MG: 10 INJECTION, EMULSION INTRAVENOUS at 08:34

## 2019-06-26 RX ADMIN — PROPOFOL 30 MG: 10 INJECTION, EMULSION INTRAVENOUS at 08:25

## 2019-06-26 RX ADMIN — PROPOFOL 30 MG: 10 INJECTION, EMULSION INTRAVENOUS at 08:16

## 2019-06-26 RX ADMIN — PROPOFOL 20 MG: 10 INJECTION, EMULSION INTRAVENOUS at 08:31

## 2019-06-26 RX ADMIN — PROPOFOL 20 MG: 10 INJECTION, EMULSION INTRAVENOUS at 08:38

## 2019-06-26 RX ADMIN — PROPOFOL 20 MG: 10 INJECTION, EMULSION INTRAVENOUS at 08:26

## 2019-06-26 RX ADMIN — PROPOFOL 20 MG: 10 INJECTION, EMULSION INTRAVENOUS at 08:42
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
## 2019-06-26 NOTE — DISCHARGE INSTRUCTIONS
From Dr. Anthony Hernandez: FOLLOW UP VISIT Appointment in: Other (Specify) No aspirin or ibuprofen (e.g. Aleve, Motrin, Advil) for 5 days. No fish oil 5 days. Repeat colonoscopy in 5 years. Colonoscopy: What to Expect at [address] [place] [address]  After you have a colonoscopy, you will stay at the clinic for 1 to 2 hours until the medicines wear off. Then you can go home. But you will need to arrange for a ride. Your doctor will tell you when you can eat and do your other usual activities. Your doctor will talk to you about when you will need your next colonoscopy. Your doctor can help you decide how often you need to be checked. This will depend on the results of your test and your risk for colorectal cancer. After the test, you may be bloated or have gas pains. You may need to pass gas. If a biopsy was done or a polyp was removed, you may have streaks of blood in your stool (feces) for a few days. This care sheet gives you a general idea about how long it will take for you to recover. But each person recovers at a different pace. Follow the steps below to get better as quickly as possible. How can you care for yourself at home? Activity  · Rest when you feel tired. · You can do your normal activities when it feels okay to do so. Diet  · Follow your doctor's directions for eating. · Unless your doctor has told you not to, drink plenty of fluids. This helps to replace the fluids that were lost during the colon prep. · Do not drink alcohol. Medicines  · If polyps were removed or a biopsy was done during the test, your doctor may tell you not to take aspirin or other anti-inflammatory medicines for a few days. These include ibuprofen (Advil, Motrin) and naproxen (Aleve). Other instructions  · For your safety, do not drive or operate machinery until the medicine wears off and you can think clearly.  Your doctor may tell you not to drive or operate machinery until the day after your test.  · Do not sign legal documents or make major decisions until the medicine wears off and you can think clearly. The anesthesia can make it hard for you to fully understand what you are agreeing to. Follow-up care is a key part of your treatment and safety. Be sure to make and go to all appointments, and call your doctor if you are having problems. It's also a good idea to know your test results and keep a list of the medicines you take. When should you call for help? Call 911 anytime you think you may need emergency care. For example, call if:  · You passed out (lost consciousness). · You pass maroon or bloody stools. · You have severe belly pain. Call your doctor now or seek immediate medical care if:  · Your stools are black and tarlike. · Your stools have streaks of blood, but you did not have a biopsy or any polyps removed. · You have belly pain, or your belly is swollen and firm. · You vomit. · You have a fever. · You are very dizzy. Watch closely for changes in your health, and be sure to contact your doctor if you have any problems. Where can you learn more? Go to [employer]  Enter E264 in the search box to learn more about \"Colonoscopy: What to Expect at Home. \"   © 5143-8693 Healthwise, Inc[employer]. Care instructions adapted under license by [facility] (which disclaims liability or warranty for this information). This care instruction is for use with your licensed healthcare professional. If you have questions about a medical condition or this instruction, always ask your healthcare professional. [place] [address] any warranty or liability for your use of this information. Content Version: 27.8.579028; Current as of: November 14, 2014  Patient Education        Colon Polyps: Care Instructions  Your Care Instructions    Colon polyps are growths in the colon or the rectum. The cause of most colon polyps is not known, and most people who get them do not have any problems.  But a certain kind can turn into cancer. For this reason, regular testing for colon polyps is important for people age 48 and older and anyone who has an increased risk for colon cancer. Polyps are usually found through routine colon cancer screening tests. Although most colon polyps are not cancerous, they are usually removed and then tested for cancer. Screening for colon cancer saves lives because the cancer can usually be cured if it is caught early. If you have a polyp that is the type that can turn into cancer, you may need more tests to examine your entire colon. The doctor will remove any other polyps that he or she finds, and you will be tested more often. Follow-up care is a key part of your treatment and safety. Be sure to make and go to all appointments, and call your doctor if you are having problems. It's also a good idea to know your test results and keep a list of the medicines you take. How can you care for yourself at home? Regular exams to look for colon polyps are the best way to prevent polyps from turning into colon cancer. These can include stool tests, sigmoidoscopy, colonoscopy, and CT colonography. Talk with your doctor about a testing schedule that is right for you. To prevent polyps  There is no home treatment that can prevent colon polyps. But these steps may help lower your risk for cancer. · Stay active. Being active can help you get to and stay at a healthy weight. Try to exercise on most days of the week. Walking is a good choice. · Eat well. Choose a variety of vegetables, fruits, legumes (such as peas and beans), fish, poultry, and whole grains. · Do not smoke. If you need help quitting, talk to your doctor about stop-smoking programs and medicines. These can increase your chances of quitting for good. · If you drink alcohol, limit how much you drink. Limit alcohol to 2 drinks a day for men and 1 drink a day for women. When should you call for help?   Call your doctor now or seek immediate medical care if:    · You have severe belly pain.     · Your stools are maroon or very bloody.    Watch closely for changes in your health, and be sure to contact your doctor if:    · You have a fever.     · You have nausea or vomiting.     · You have a change in bowel habits (new constipation or diarrhea).     · Your symptoms get worse or are not improving as expected. Where can you learn more? Go to http://freida-khloe.info/. Enter 95 [id number] in the search box to learn more about \"Colon Polyps: Care Instructions. \"  Current as of: March 27, 2018  Content Version: 11.9  © 5942-9119 [employer]. Care instructions adapted under license by [employer] (which disclaims liability or warranty for this information). If you have questions about a medical condition or this instruction, always ask your healthcare professional. [place] [address] any warranty or liability for your use of this information. DISCHARGE SUMMARY from Nurse     POST-PROCEDURE INSTRUCTIONS:    Call your Physician if you:  ? Observe any excess bleeding. ? Develop a temperature over 100.5o F.  ? Experience abdominal, shoulder or chest pain. ? Notice any signs of decreased circulation or nerve impairment to an extremity such as a change in color, persistent numbness, tingling, coldness or increase in pain. ? Vomit blood or you have nausea and vomiting lasting longer than 4 hours. ? Are unable to take medications. ? Are unable to urinate within 8 hours after discharge following general anesthesia or intravenous sedation. For the next 24 hours after receiving general anesthesia or intravenous sedation, or while taking prescription Narcotics, limit your activities:  ? Do NOT drive a motor vehicle, operate hazard machinery or power tools, or perform tasks that require coordination.   The medication you received during your procedure may have some effect on your mental awareness. ? Do NOT make important personal or business decisions. The medication you received during your procedure may have some effect on your mental awareness. ? Do NOT drink alcoholic beverages. These drinks do not mix well with the medications that have been given to you. ? Upon discharge from the hospital, you must be accompanied by a responsible adult. ? Resume your diet as directed by your physician. ? Resume medications as your physician has prescribed. ? Please give a list of your current medications to your Primary Care Provider. ? Please update this list whenever your medications are discontinued, doses are changed, or new medications (including over-the-counter products) are added. ? Please carry medication information at all times in case of emergency situations. These are general instructions for a healthy lifestyle:    No smoking/ No tobacco products/ Avoid exposure to second hand smoke.  Surgeon General's Warning:  Quitting smoking now greatly reduces serious risk to your health. Obesity, smoking, and a sedentary lifestyle greatly increase your risk for illness.  A healthy diet, regular physical exercise & weight monitoring are important for maintaining a healthy lifestyle   You may be retaining fluid if you have a history of heart failure or if you experience any of the following symptoms:  Weight gain of 3 pounds or more overnight or 5 pounds in a week, increased swelling in our hands or feet or shortness of breath while lying flat in bed. Please call your doctor as soon as you notice any of these symptoms; do not wait until your next office visit. Recognize signs and symptoms of STROKE:  F  -  Face looks uneven  A  -  Arms unable to move or move unevenly  S  -  Speech slurred or non-existent  T  -  Time to call 911 - as soon as signs and symptoms begin - DO NOT go back to bed or wait to see If you get better - TIME IS BRAIN.     Colorectal Screening   Colorectal cancer almost always develops from precancerous polyps (abnormal growths) in the colon or rectum. Screening tests can find precancerous polyps, so that they can be removed before they turn into cancer. Screening tests can also find colorectal cancer early, when treatment works best.  Linda Garcia Speak with your physician about when you should begin screening and how often you should be tested. Additional Information    If you have questions, please call [phone]. Remember, [facility] is NOT to be used for urgent needs. For medical emergencies, dial 911. Educational references and/or instructions provided during this visit included:    See attached      APPOINTMENTS:    Please make a follow-up appointment with your physician. Discharge information has been reviewed with the patient. The patient verbalized understanding.

## 2019-06-26 NOTE — PERIOP NOTES
Patient ID band removed and placed in shredder box. Patient left ambulatory with steady gait and wife to drive. No complaints or distress noted.

## 2019-06-26 NOTE — ANESTHESIA PREPROCEDURE EVALUATION
Relevant Problems   No relevant active problems       Anesthetic History   No history of anesthetic complications            Review of Systems / Medical History  Patient summary reviewed and pertinent labs reviewed    Pulmonary  Within defined limits                 Neuro/Psych   Within defined limits           Cardiovascular    Hypertension: well controlled              Exercise tolerance: >4 METS     GI/Hepatic/Renal  Within defined limits              Endo/Other    Diabetes: type 2, using insulin         Other Findings   Comments:                  Physical Exam    Airway  Mallampati: II  TM Distance: 4 - 6 cm  Neck ROM: normal range of motion   Mouth opening: Normal     Cardiovascular  Regular rate and rhythm,  S1 and S2 normal,  no murmur, click, rub, or gallop  Rhythm: regular  Rate: normal         Dental    Dentition: Full lower dentures and Full upper dentures     Pulmonary  Breath sounds clear to auscultation               Abdominal  GI exam deferred       Other Findings            Anesthetic Plan    ASA: 2  Anesthesia type: MAC          Induction: Intravenous  Anesthetic plan and risks discussed with: Patient

## 2019-06-26 NOTE — PROCEDURES
[facility]  [address], [address]   [place], [address]  [phone]                    Colonoscopy Procedure Note      Mikaela Carrion  1960  [id number]                Date of Procedure: 6/26/2019    Preoperative diagnosis: Z12.11,  Colon cancer Screening, History of polyps    Postoperative diagnosis: Polyps of sigmoid colon, rectum and distal rectum    :  Frances Dillon MD    Assistant(s): Endoscopy RN-1: Marques Price RN; Keysha Nicholas, NATASHA; Amanda Sterling, NATASHA    Sedation: MAC    Complications: None    Implants: None    Procedure Details:  Prior to the procedure, a history and physical were performed. The patients medications, allergies and sensitivities were reviewed and all questions were answered. After informed consent was obtained for the procedure, with all risks and benefits of procedure explained. The patient was taken to the endoscopy suite and placed in the left lateral decubitus position. Patient identification and proposed procedure were verified prior to the procedure by the nurse and I. After sequential anesthesia administered by anesthesiologist, a digital rectal exam was performed and was normal.  The Olympus video colonoscope was introduced through the anus and advanced to terminal ileum. The quality of preparation was excellent. The colonoscope was slowly withdrawn and the mucosa examined for any abnormalities. Cecal withdrawal time was greater than 6 minutes. The patient tolerated the procedure well. There were no complications. Findings/Interventions:   Polyps - #1, 5 mm in size, located in the sigmoid, removed by hot biopsy and sent for pathology, - #2, 5 mm in size, located in the rectum, removed by cold biopsy and sent for pathology, - #3, 3 mm in size, located in the rectum, removed by hot biopsy and sent for pathology, very distal.    EBL: none    Recommendations: -Repeat colonoscopy in 5 years.    NO aspirin for 5 days     Discharge Disposition:  Venora Homans, MD  6/26/2019  8:49 AM

## 2019-06-26 NOTE — ANESTHESIA POSTPROCEDURE EVALUATION
Procedure(s):  COLONOSCOPY w/polypectomies. MAC    Anesthesia Post Evaluation      Multimodal analgesia: multimodal analgesia used between 6 hours prior to anesthesia start to PACU discharge  Patient location during evaluation: bedside  Patient participation: complete - patient participated  Level of consciousness: awake  Pain management: adequate  Airway patency: patent  Anesthetic complications: no  Cardiovascular status: stable  Respiratory status: acceptable  Hydration status: acceptable  Post anesthesia nausea and vomiting:  controlled      Vitals Value Taken Time   /69 6/26/2019  9:20 AM   Temp 36.4 °C (97.5 °F) 6/26/2019  9:00 AM   Pulse 74 6/26/2019  9:23 AM   Resp 15 6/26/2019  9:23 AM   SpO2 100 % 6/26/2019  9:23 AM   Vitals shown include unvalidated device data.

## 2019-06-28 RX ORDER — INSULIN LISPRO 100 [IU]/ML
INJECTION, SOLUTION INTRAVENOUS; SUBCUTANEOUS
Qty: 1 VIAL | Refills: 5 | Status: SHIPPED | OUTPATIENT
Start: 2019-06-28 | End: 2020-03-20 | Stop reason: SDUPTHER

## 2019-06-28 NOTE — TELEPHONE ENCOUNTER
Patient called back to check the status of this medication refill request. He informed me that he only receives 1 vial through the pharmacy and that this is for his insulin pump. Please advise.

## 2019-07-01 ENCOUNTER — OFFICE VISIT (OUTPATIENT)
Dept: FAMILY MEDICINE CLINIC | Age: 59
End: 2019-07-01

## 2019-07-01 VITALS
RESPIRATION RATE: 20 BRPM | WEIGHT: 198 LBS | OXYGEN SATURATION: 97 % | HEART RATE: 79 BPM | TEMPERATURE: 97.8 F | DIASTOLIC BLOOD PRESSURE: 69 MMHG | BODY MASS INDEX: 26.24 KG/M2 | SYSTOLIC BLOOD PRESSURE: 128 MMHG | HEIGHT: 73 IN

## 2019-07-01 DIAGNOSIS — E55.9 VITAMIN D DEFICIENCY: ICD-10-CM

## 2019-07-01 DIAGNOSIS — I10 ESSENTIAL HYPERTENSION: ICD-10-CM

## 2019-07-01 DIAGNOSIS — E10.42 TYPE 1 DIABETES MELLITUS WITH DIABETIC POLYNEUROPATHY (HCC): Primary | ICD-10-CM

## 2019-07-01 DIAGNOSIS — G63 POLYNEUROPATHY ASSOCIATED WITH UNDERLYING DISEASE (HCC): ICD-10-CM

## 2019-07-01 DIAGNOSIS — E78.5 DYSLIPIDEMIA: ICD-10-CM

## 2019-07-01 PROBLEM — E11.21 TYPE 2 DIABETES WITH NEPHROPATHY (HCC): Status: ACTIVE | Noted: 2019-07-01

## 2019-07-01 RX ORDER — INFUSION SET FOR INSULIN PUMP
INFUSION SETS-PARAPHERNALIA MISCELLANEOUS
Qty: 30 EACH | Refills: 3 | Status: SHIPPED | OUTPATIENT
Start: 2019-07-01 | End: 2022-02-07 | Stop reason: SDUPTHER

## 2019-07-01 RX ORDER — TRAMADOL HYDROCHLORIDE 50 MG/1
100 TABLET ORAL
Qty: 180 TAB | Refills: 2 | Status: SHIPPED | OUTPATIENT
Start: 2019-07-01 | End: 2019-07-31

## 2019-07-01 NOTE — PROGRESS NOTES
Subjective:       Chief Complaint  The patient presents for follow up of diabetes, hypertension and high cholesterol. HPI  Girish Hernandez is a 62 y.o. male seen for follow up of diabetes. Healso has hypertension and hyperlipidemia. Diabetes no significant medication side effects noted, much improved pt continues on insulin pump and free style randa which have helped a lot and will f/u with Endo (Dr Quan Burrell) and nutritionist for better management ,  hypertension well controlled on lisinopril 20 mg  And Norvasc 10 mg, hyperlipidemia well controlled, no significant medication side effects noted, on Lipitor 20 mg    Diet and Lifestyle: generally follows a low fat low cholesterol diet, does not rigorously follow a diabetic diet, exercises sporadically    Home BP Monitoring: is elevated at home. Diabetic Review of Systems - home glucose monitoring: is performed regularly, 6x/day and more with Free style randa    Other symptoms and concerns: pt will try to exercise more. He has problems with his feet and needs foot surgery. Pt is candidate for diabetic shoes due to neuropathy and ulcerative callus. He is followed by Podiatry. he sees Dr Hilario Tafoya now. Patient chronic cough resolved. May have been due to allergies. Current Outpatient Medications   Medication Sig    MINIMED INFUSION SET iset CHANGE EVERY 3 DAYS    traMADol (ULTRAM) 50 mg tablet Take 2 Tabs by mouth every eight (8) hours as needed for Pain for up to 30 days. Max Daily Amount: 300 mg.    insulin lispro (HUMALOG) 100 unit/mL injection 38 units daily for insulin pump.  DX E10.42    flash glucose scanning reader (FREESTYLE RANDA 14 DAY READER) misc DX E10.42 check blood sugar 8 times a day due to insulin pump and labile blood sugar    flash glucose sensor (FREESTYLE RANDA 14 DAY SENSOR) kit DX E10.42 check blood sugar 8 times a day due to insulin pump and labile blood sugar    tiotropium bromide (SPIRIVA RESPIMAT) 2.5 mcg/actuation inhaler Take 2 Puffs by inhalation daily.  amLODIPine (NORVASC) 10 mg tablet TAKE 1 TABLET BY MOUTH  DAILY    azelastine (ASTELIN) 137 mcg (0.1 %) nasal spray 2 Sprays by Both Nostrils route two (2) times a day. Use in each nostril as directed  Indications: Seasonal Runny Nose    pantoprazole (PROTONIX) 40 mg tablet TAKE 1 TABLET BY MOUTH ONCE DAILY    lancets (ONE TOUCH DELICA) 33 gauge misc CHECK BLOOD SUGAR 8 TIMES  DAILY DUE TO INSULIN PUMP  AND LABILE BLOOD SUGAR    Blood-Glucose Meter (ONETOUCH VERIO FLEX) misc DX E10.42 check blood sugar 8 times a day due to insulin pump and labile blood sugar    glucose blood VI test strips (ONETOUCH VERIO) strip DX E10.42 check blood sugar 8 times a day due to insulin pump and labile blood sugar    Blood-Glucose Meter (CONTOUR NEXT EZ METER) monitoring kit DX E10.42 check blood sugar 8 times a day due to insulin pump and labile blood sugar.  lisinopril (PRINIVIL, ZESTRIL) 20 mg tablet TAKE 1 TABLET BY MOUTH  DAILY    albuterol (PROAIR HFA) 90 mcg/actuation inhaler Take 2 Puffs by inhalation every four (4) hours as needed for Wheezing or Shortness of Breath.  insulin regular (NOVOLIN R, HUMULIN R) 100 unit/mL injection 38 units daily for insulin pump    cholecalciferol, vitamin D3, (VITAMIN D3) 2,000 unit tab Take  by mouth.  montelukast (SINGULAIR) 10 mg tablet Take 1 Tab by mouth daily.  oxybutynin (DITROPAN) 5 mg tablet TAKE 1 TABLET BY MOUTH TWO  TIMES DAILY    atorvastatin (LIPITOR) 20 mg tablet TAKE 1 TABLET BY MOUTH  DAILY    MICROLET LANCET misc     multivitamin (ONE A DAY) tablet Take 1 Tab by mouth daily.  sildenafil, antihypertensive, (REVATIO) 20 mg tablet TAKE ONE TABLET BY MOUTH DAILY . TAKE UP TO 5 TABLETS AS NEEDED. **DO NOT EXCEED 5 TABLETS**    guaiFENesin-codeine (CHERATUSSIN AC) 100-10 mg/5 mL solution Take 10 mL by mouth four (4) times daily as needed for Cough. Max Daily Amount: 40 mL.     PARADIGM RESERVOIR 3 mL mis     CONTOUR NEXT STRIPS strip CHECK BLOOD SUGAR 8 TIMES A DAY DUE TO INSULIN PUMP AND LABILE BLOOD SUGAR     No current facility-administered medications for this visit. Review of Systems  Respiratory: negative for dyspnea on exertion  Cardiovascular: negative for chest pain    Objective:     Visit Vitals  /69 (BP 1 Location: Left arm, BP Patient Position: Sitting)   Pulse 79   Temp 97.8 °F (36.6 °C) (Oral)   Resp 20   Ht 6' 1\" (1.854 m)   Wt 198 lb (89.8 kg)   SpO2 97%   BMI 26.12 kg/m²        General appearance - alert, well appearing, and in no distress  HEENT bilateral nasal mucosal edema and cobblestoning in posterior pharynx  Chest - clear to auscultation, no wheezes, rales or rhonchi, symmetric air entry  Heart - normal rate, regular rhythm, normal S1, S2, no murmurs, rubs, clicks or gallops      Labs:   Lab Results   Component Value Date/Time    Hemoglobin A1c 7.4 (H) 06/24/2019 07:50 AM    Hemoglobin A1c 8.3 (H) 03/21/2019 07:52 AM    Hemoglobin A1c 8.4 (H) 12/21/2018 07:18 AM    Glucose 96 06/24/2019 07:50 AM    Glucose (POC) 138 (H) 06/26/2019 07:47 AM    Microalbumin/Creat ratio (mg/g creat) 57 (H) 03/21/2019 07:52 AM    Microalbumin,urine random 8.70 (H) 03/21/2019 07:52 AM    LDL, calculated 59.4 06/24/2019 07:50 AM    Creatinine 1.23 06/24/2019 07:50 AM    Hemoglobin A1c, External 9.3 12/10/2015    Hemoglobin A1c, External 8.7% 08/20/2015    Hemoglobin A1c, External 9.5 05/22/2015 07:43 PM      Lab Results   Component Value Date/Time    Cholesterol, total 126 06/24/2019 07:50 AM    HDL Cholesterol 54 06/24/2019 07:50 AM    LDL, calculated 59.4 06/24/2019 07:50 AM    Triglyceride 63 06/24/2019 07:50 AM    CHOL/HDL Ratio 2.3 06/24/2019 07:50 AM     Lab Results   Component Value Date/Time    ALT (SGPT) 25 06/24/2019 07:50 AM    AST (SGOT) 15 06/24/2019 07:50 AM    Alk.  phosphatase 100 06/24/2019 07:50 AM    Bilirubin, total 0.4 06/24/2019 07:50 AM     Lab Results   Component Value Date/Time    GFR est AA >60 06/24/2019 07:50 AM    GFR est non-AA >60 06/24/2019 07:50 AM    Creatinine 1.23 06/24/2019 07:50 AM    BUN 12 06/24/2019 07:50 AM    Sodium 145 06/24/2019 07:50 AM    Potassium 4.2 06/24/2019 07:50 AM    Chloride 112 (H) 06/24/2019 07:50 AM    CO2 27 06/24/2019 07:50 AM      Lab Results   Component Value Date/Time    Prostate Specific Ag 0.4 01/14/2019 10:24 AM    Prostate Specific Ag 0.3 03/21/2018 07:54 AM    Prostate Specific Ag 0.4 02/01/2018 10:00 AM    Prostate Specific Ag 0.3 12/21/2016 09:54 AM    Prostate Specific Ag 0.251 12/17/2012 03:42 PM    PSA 0.3 09/30/2010 10:09 AM    PSA, FREE 0.2 09/30/2010 10:09 AM    % FREE PSA 67 09/30/2010 10:09 AM     Lab Results   Component Value Date/Time    Glucose 96 06/24/2019 07:50 AM    Glucose (POC) 138 (H) 06/26/2019 07:47 AM            Assessment / Plan     Diabetes much improved, pt remains on insulin pump and will follow up with Dr. Karla Kramer and nutritionist.   Hypertension well controlled, on lisinopril 20 mg, and Norvasc 10 mg. Hyperlipidemia well controlled on Lipitor       ICD-10-CM ICD-9-CM    1. Type 1 diabetes mellitus with diabetic polyneuropathy (HCC) E10.42 250.61 HEMOGLOBIN A1C W/O EAG     357.2    2. Essential hypertension V56 917.6 METABOLIC PANEL, COMPREHENSIVE   3. Dyslipidemia E78.5 272.4 LIPID PANEL   4. Polyneuropathy associated with underlying disease (HCC) G63 357.4 Pain controlled on traMADol (ULTRAM) 50 mg tablet prn.  reviewed    5. Vitamin D deficiency E55.9 268.9 Well controlled on current supplementation VITAMIN D, 25 HYDROXY              Diabetic issues reviewed with him: diabetic diet discussed in detail, and low cholesterol diet, weight control and daily exercise discussed. Follow-up and Dispositions    · Return in about 4 months (around 11/1/2019) for labs 1 week before. Reviewed plan of care. Patient has provided input and agrees with goals.

## 2019-07-01 NOTE — PATIENT INSTRUCTIONS
High Blood Pressure: Care Instructions Overview It's normal for blood pressure to go up and down throughout the day. But if it stays up, you have high blood pressure. Another name for high blood pressure is hypertension. Despite what a lot of people think, high blood pressure usually doesn't cause headaches or make you feel dizzy or lightheaded. It usually has no symptoms. But it does increase your risk of stroke, heart attack, and other problems. You and your doctor will talk about your risks of these problems based on your blood pressure. Your doctor will give you a goal for your blood pressure. Your goal will be based on your health and your age. Lifestyle changes, such as eating healthy and being active, are always important to help lower blood pressure. You might also take medicine to reach your blood pressure goal. 
Follow-up care is a key part of your treatment and safety. Be sure to make and go to all appointments, and call your doctor if you are having problems. It's also a good idea to know your test results and keep a list of the medicines you take. How can you care for yourself at home? Medical treatment · If you stop taking your medicine, your blood pressure will go back up. You may take one or more types of medicine to lower your blood pressure. Be safe with medicines. Take your medicine exactly as prescribed. Call your doctor if you think you are having a problem with your medicine. · Talk to your doctor before you start taking aspirin every day. Aspirin can help certain people lower their risk of a heart attack or stroke. But taking aspirin isn't right for everyone, because it can cause serious bleeding. · See your doctor regularly. You may need to see the doctor more often at first or until your blood pressure comes down. · If you are taking blood pressure medicine, talk to your doctor before you take decongestants or anti-inflammatory medicine, such as ibuprofen. Some of these medicines can raise blood pressure. · Learn how to check your blood pressure at home. Lifestyle changes · Stay at a healthy weight. This is especially important if you put on weight around the waist. Losing even 10 pounds can help you lower your blood pressure. · If your doctor recommends it, get more exercise. Walking is a good choice. Bit by bit, increase the amount you walk every day. Try for at least 30 minutes on most days of the week. You also may want to swim, bike, or do other activities. · Avoid or limit alcohol. Talk to your doctor about whether you can drink any alcohol. · Try to limit how much sodium you eat to less than 2,300 milligrams (mg) a day. Your doctor may ask you to try to eat less than 1,500 mg a day. · Eat plenty of fruits (such as bananas and oranges), vegetables, legumes, whole grains, and low-fat dairy products. · Lower the amount of saturated fat in your diet. Saturated fat is found in animal products such as milk, cheese, and meat. Limiting these foods may help you lose weight and also lower your risk for heart disease. · Do not smoke. Smoking increases your risk for heart attack and stroke. If you need help quitting, talk to your doctor about stop-smoking programs and medicines. These can increase your chances of quitting for good. When should you call for help? Call 911 anytime you think you may need emergency care. This may mean having symptoms that suggest that your blood pressure is causing a serious heart or blood vessel problem. Your blood pressure may be over 180/120. 
 For example, call 911 if: 
  · You have symptoms of a heart attack. These may include: 
? Chest pain or pressure, or a strange feeling in the chest. 
? Sweating. ? Shortness of breath. ? Nausea or vomiting. ? Pain, pressure, or a strange feeling in the back, neck, jaw, or upper belly or in one or both shoulders or arms. ? Lightheadedness or sudden weakness. ? A fast or irregular heartbeat.  
  · You have symptoms of a stroke. These may include: 
? Sudden numbness, tingling, weakness, or loss of movement in your face, arm, or leg, especially on only one side of your body. ? Sudden vision changes. ? Sudden trouble speaking. ? Sudden confusion or trouble understanding simple statements. ? Sudden problems with walking or balance. ? A sudden, severe headache that is different from past headaches.  
  · You have severe back or belly pain.  
 Do not wait until your blood pressure comes down on its own. Get help right away. 
 Call your doctor now or seek immediate care if: 
  · Your blood pressure is much higher than normal (such as 180/120 or higher), but you don't have symptoms.  
  · You think high blood pressure is causing symptoms, such as: 
? Severe headache. 
? Blurry vision.  
 Watch closely for changes in your health, and be sure to contact your doctor if: 
  · Your blood pressure measures higher than your doctor recommends at least 2 times. That means the top number is higher or the bottom number is higher, or both.  
  · You think you may be having side effects from your blood pressure medicine. Where can you learn more? Go to http://freida-khloe.info/. Enter W484 in the search box to learn more about \"High Blood Pressure: Care Instructions. \" Current as of: July 22, 2018 Content Version: 11.9 © 6067-4959 [employer]. Care instructions adapted under license by [employer] (which disclaims liability or warranty for this information). If you have questions about a medical condition or this instruction, always ask your healthcare professional. [place] [address] any warranty or liability for your use of this information.

## 2019-07-16 ENCOUNTER — TELEPHONE (OUTPATIENT)
Dept: SURGERY | Age: 59
End: 2019-07-16

## 2019-07-18 ENCOUNTER — HOSPITAL ENCOUNTER (EMERGENCY)
Age: 59
Discharge: HOME | End: 2019-07-18
Attending: EMERGENCY MEDICINE
Payer: MEDICARE

## 2019-07-18 VITALS
DIASTOLIC BLOOD PRESSURE: 68 MMHG | HEIGHT: 73 IN | SYSTOLIC BLOOD PRESSURE: 149 MMHG | WEIGHT: 200 LBS | BODY MASS INDEX: 26.51 KG/M2 | RESPIRATION RATE: 16 BRPM | HEART RATE: 79 BPM | OXYGEN SATURATION: 99 % | TEMPERATURE: 99.2 F

## 2019-07-18 DIAGNOSIS — S91.311A LACERATION OF RIGHT HEEL, INITIAL ENCOUNTER: Primary | ICD-10-CM

## 2019-07-18 PROCEDURE — 90715 TDAP VACCINE 7 YRS/> IM: CPT | Performed by: PHYSICIAN ASSISTANT

## 2019-07-18 PROCEDURE — 74011000250 HC RX REV CODE- 250: Performed by: PHYSICIAN ASSISTANT

## 2019-07-18 PROCEDURE — 74011250637 HC RX REV CODE- 250/637: Performed by: PHYSICIAN ASSISTANT

## 2019-07-18 PROCEDURE — 90471 IMMUNIZATION ADMIN: CPT

## 2019-07-18 PROCEDURE — 74011250636 HC RX REV CODE- 250/636: Performed by: PHYSICIAN ASSISTANT

## 2019-07-18 PROCEDURE — 99283 EMERGENCY DEPT VISIT LOW MDM: CPT

## 2019-07-18 RX ORDER — DOXYCYCLINE 100 MG/1
100 CAPSULE ORAL 2 TIMES DAILY
Qty: 14 CAP | Refills: 0 | Status: SHIPPED | OUTPATIENT
Start: 2019-07-18 | End: 2019-07-25

## 2019-07-18 RX ORDER — DOXYCYCLINE 100 MG/1
100 CAPSULE ORAL
Status: COMPLETED | OUTPATIENT
Start: 2019-07-18 | End: 2019-07-18

## 2019-07-18 RX ORDER — BACITRACIN ZINC 500 UNIT/G
OINTMENT (GRAM) TOPICAL
Status: COMPLETED | OUTPATIENT
Start: 2019-07-18 | End: 2019-07-18

## 2019-07-18 RX ADMIN — TETANUS TOXOID, REDUCED DIPHTHERIA TOXOID AND ACELLULAR PERTUSSIS VACCINE, ADSORBED 0.5 ML: 5; 2.5; 8; 8; 2.5 SUSPENSION INTRAMUSCULAR at 20:03

## 2019-07-18 RX ADMIN — DOXYCYCLINE HYCLATE 100 MG: 100 CAPSULE ORAL at 20:06

## 2019-07-18 RX ADMIN — Medication: at 20:03
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
## 2019-07-18 NOTE — ED TRIAGE NOTES
Patient states \"I stepped on a food slicer and cut my right heel. I did not feel it but I saw the blood. \"    Patient does not know when last tetanus immunization was.

## 2019-07-18 NOTE — LETTER
[facility] EMERGENCY DEPT 
[address] [facility] [address] [facility] [address] [id number] [phone] Work/School Note Date: 7/18/2019 To Whom It May concern: 
 
Madhavi Agudelo. was seen and treated today in the emergency room by the following provider(s): 
Attending Provider: Darlene Soni MD 
Physician Assistant: Lise Roblero. Madhavi Agudelo. may return to work when cleared by his primary care physician or podiatrist. 
 
Sincerely, Isai Quan RN

## 2019-07-19 NOTE — DISCHARGE INSTRUCTIONS
Patient Education        Cuts Left Open: Care Instructions  Your Care Instructions    A cut can happen anywhere on your body. Sometimes a cut can injure the tendons, blood vessels, or nerves. A cut may be left open instead of being closed with stitches, staples, or adhesive. A cut may be left open when it is likely to become infected, because closing it can make infection even more likely. You will probably have a bandage. The doctor may want the cut to stay open the whole time it heals. This happens with some cuts when too much time has gone by since the cut happened. Or the doctor may tell you to come back to have the cut closed in 4 to 5 days, when there is less chance of infection. If the cut stays open while healing, your scar may be larger than if the cut was closed. But you can get treatment later to make the scar smaller. The doctor has checked you carefully, but problems can develop later. If you notice any problems or new symptoms, get medical treatment right away. Follow-up care is a key part of your treatment and safety. Be sure to make and go to all appointments, and call your doctor if you are having problems. It's also a good idea to know your test results and keep a list of the medicines you take. How can you care for yourself at home? · Keep the cut dry for the first 24 to 48 hours. After this, you can shower if your doctor okays it. Pat the cut dry. · Don't soak the cut, such as in a bathtub. Your doctor will tell you when it's safe to get the cut wet. · If your doctor told you how to care for your cut, follow your doctor's instructions. If you did not get instructions, follow this general advice:  ? After the first 24 to 48 hours, wash the cut with clean water 2 times a day. Don't use hydrogen peroxide or alcohol, which can slow healing. ? You may cover the cut with a thin layer of petroleum jelly, such as Vaseline, and a nonstick bandage. ?  Apply more petroleum jelly and replace the bandage as needed. · Prop up the injured area on a pillow anytime you sit or lie down during the next 3 days. Try to keep it above the level of your heart. This will help reduce swelling. · Avoid any activity that could cause your cut to get worse. · Take pain medicines exactly as directed. ? If the doctor gave you a prescription medicine for pain, take it as prescribed. ? If you are not taking a prescription pain medicine, ask your doctor if you can take an over-the-counter medicine. When should you call for help? Call your doctor now or seek immediate medical care if:    · You have new pain, or your pain gets worse.     · The cut starts to bleed, and blood soaks through the bandage. Oozing small amounts of blood is normal.     · The skin near the cut is cold or pale or changes color.     · You have tingling, weakness, or numbness near the cut.     · You have trouble moving the area near the cut.     · You have symptoms of infection, such as:  ? Increased pain, swelling, warmth, or redness around the cut.  ? Red streaks leading from the cut.  ? Pus draining from the cut.  ? A fever.    Watch closely for changes in your health, and be sure to contact your doctor if:    · The cut is not closing (getting smaller).     · You do not get better as expected. Where can you learn more? Go to http://freida-khloe.info/. Enter [id number] in the search box to learn more about \"Cuts Left Open: Care Instructions. \"  Current as of: September 23, 2018  Content Version: 11.9  © 1877-5864 [employer]. Care instructions adapted under license by [employer] (which disclaims liability or warranty for this information). If you have questions about a medical condition or this instruction, always ask your healthcare professional. [place] [address] any warranty or liability for your use of this information.

## 2019-07-19 NOTE — ED NOTES
Irrigated 1.5 inch to right foot with 400mls of sterile water for irrigation using a 30ml syringe and a 20ga needle. After applying bacitracin, covered with telfa pad, sterile 4x4's and kerlex. Post-op shoe applied. Patient verbalized wound care instructions including need to return immediately if for erythema, edema, hot or purulent drainage.

## 2019-07-22 ENCOUNTER — OFFICE VISIT (OUTPATIENT)
Dept: PULMONOLOGY | Age: 59
End: 2019-07-22

## 2019-07-22 VITALS
WEIGHT: 200 LBS | SYSTOLIC BLOOD PRESSURE: 130 MMHG | TEMPERATURE: 98.4 F | OXYGEN SATURATION: 98 % | HEIGHT: 73 IN | HEART RATE: 83 BPM | DIASTOLIC BLOOD PRESSURE: 60 MMHG | BODY MASS INDEX: 26.51 KG/M2 | RESPIRATION RATE: 19 BRPM

## 2019-07-22 DIAGNOSIS — R06.02 SOB (SHORTNESS OF BREATH): Primary | ICD-10-CM

## 2019-07-22 RX ORDER — OMEPRAZOLE 20 MG/1
20 CAPSULE, DELAYED RELEASE ORAL DAILY
COMMUNITY
Start: 2016-12-02 | End: 2019-11-19 | Stop reason: SDUPTHER

## 2019-07-22 NOTE — PROGRESS NOTES
Verbal Order with read back per Vanessa Delaney MD  For PFT smart panel. AMB POC PFT complete w/ bronchodilator  AMB POC PFT complete w/o bronchodilator    Dr. Isidra Lemus MD will co-sign the orders.
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
## 2019-07-22 NOTE — PROGRESS NOTES
Mark Marquez. presents today for   Chief Complaint   Patient presents with    Shortness of Breath     referred by Dr. Janeen Gibbs Other     CXR 2/13/2019       Is someone accompanying this pt? No    Is the patient using any DME equipment during OV? No    -DME Company N/A    Depression Screening:  3 most recent PHQ Screens 7/1/2019   Little interest or pleasure in doing things Not at all   Feeling down, depressed, irritable, or hopeless Not at all   Total Score PHQ 2 0       Learning Assessment:  Learning Assessment 2/17/2016   PRIMARY LEARNER Patient   BARRIERS PRIMARY LEARNER NONE   PRIMARY LANGUAGE ENGLISH   LEARNER PREFERENCE PRIMARY DEMONSTRATION     READING   ANSWERED BY patient   RELATIONSHIP SELF       Abuse Screening:  Abuse Screening Questionnaire 6/28/2018   Do you ever feel afraid of your partner? N   Are you in a relationship with someone who physically or mentally threatens you? N   Is it safe for you to go home? Y       Fall Risk  Fall Risk Assessment, last 12 mths 12/21/2016   Able to walk? Yes   Fall in past 12 months? No         Coordination of Care:  1. Have you been to the ER, urgent care clinic since your last visit? Hospitalized since your last visit? Yes; Where: [facility], When: 6/26/2019-colonoscopy with polypectomies    2. Have you seen or consulted any other health care providers outside of the [address] [place] [address] since your last visit? Include any pap smears or colon screening. Yes.  Dr. Mario Montiel, Podiatrist

## 2019-07-22 NOTE — PATIENT INSTRUCTIONS
Call for any change in symptoms    Consider sleeping with the head of your bed elevated for reflux disease. This can be accomplished by putting 6 inch blocks or \"bed risers\" under your head posts. If you use 6 inch blocks you have to cut out the top so it fits the head posts so your bed one slip off the block.   I think the bed risers can be found at [address] [employer] or on the Internet  Also for reflux try not eating for 2-1/2 hours before bedtime    For reconditioning start exercising without stopping 15 minutes and gradually increase the time you with a goal of about 30 minutes eventually

## 2019-08-18 DIAGNOSIS — E78.00 HIGH CHOLESTEROL: ICD-10-CM

## 2019-08-18 RX ORDER — ATORVASTATIN CALCIUM 20 MG/1
TABLET, FILM COATED ORAL
Qty: 90 TAB | Refills: 3 | Status: SHIPPED | OUTPATIENT
Start: 2019-08-18 | End: 2020-03-19

## 2019-08-18 RX ORDER — OXYBUTYNIN CHLORIDE 5 MG/1
TABLET ORAL
Qty: 180 TAB | Refills: 3 | Status: SHIPPED | OUTPATIENT
Start: 2019-08-18 | End: 2020-07-25

## 2019-08-30 ENCOUNTER — HOSPITAL ENCOUNTER (OUTPATIENT)
Dept: LAB | Age: 59
Discharge: HOME | End: 2019-08-30

## 2019-08-30 LAB — XX-LABCORP SPECIMEN COL,LCBCF: NORMAL

## 2019-08-30 PROCEDURE — 99001 SPECIMEN HANDLING PT-LAB: CPT

## 2019-10-25 ENCOUNTER — HOSPITAL ENCOUNTER (OUTPATIENT)
Dept: LAB | Age: 59
Discharge: HOME | End: 2019-10-25
Payer: MEDICARE

## 2019-10-25 DIAGNOSIS — E10.42 TYPE 1 DIABETES MELLITUS WITH DIABETIC POLYNEUROPATHY (HCC): ICD-10-CM

## 2019-10-25 DIAGNOSIS — E78.5 DYSLIPIDEMIA: ICD-10-CM

## 2019-10-25 DIAGNOSIS — E55.9 VITAMIN D DEFICIENCY: ICD-10-CM

## 2019-10-25 DIAGNOSIS — I10 ESSENTIAL HYPERTENSION: ICD-10-CM

## 2019-10-25 LAB — HBA1C MFR BLD: 7.8 % (ref 4.2–5.6)

## 2019-10-25 PROCEDURE — 82306 VITAMIN D 25 HYDROXY: CPT

## 2019-10-25 PROCEDURE — 80061 LIPID PANEL: CPT

## 2019-10-25 PROCEDURE — 83036 HEMOGLOBIN GLYCOSYLATED A1C: CPT

## 2019-10-25 PROCEDURE — 36415 COLL VENOUS BLD VENIPUNCTURE: CPT

## 2019-10-25 PROCEDURE — 80053 COMPREHEN METABOLIC PANEL: CPT

## 2019-10-26 LAB
25(OH)D3 SERPL-MCNC: 44.7 NG/ML (ref 30–100)
ALBUMIN SERPL-MCNC: 3.6 G/DL (ref 3.4–5)
ALBUMIN/GLOB SERPL: 1.2 {RATIO} (ref 0.8–1.7)
ALP SERPL-CCNC: 108 U/L (ref 45–117)
ALT SERPL-CCNC: 28 U/L (ref 16–61)
ANION GAP SERPL CALC-SCNC: 7 MMOL/L (ref 3–18)
AST SERPL-CCNC: 11 U/L (ref 10–38)
BILIRUB SERPL-MCNC: 0.5 MG/DL (ref 0.2–1)
BUN SERPL-MCNC: 15 MG/DL (ref 7–18)
BUN/CREAT SERPL: 13 (ref 12–20)
CALCIUM SERPL-MCNC: 10.1 MG/DL (ref 8.5–10.1)
CHLORIDE SERPL-SCNC: 108 MMOL/L (ref 100–111)
CHOLEST SERPL-MCNC: 132 MG/DL
CO2 SERPL-SCNC: 27 MMOL/L (ref 21–32)
CREAT SERPL-MCNC: 1.19 MG/DL (ref 0.6–1.3)
GLOBULIN SER CALC-MCNC: 3.1 G/DL (ref 2–4)
GLUCOSE SERPL-MCNC: 53 MG/DL (ref 50–100)
HDLC SERPL-MCNC: 59 MG/DL (ref 40–60)
HDLC SERPL: 2.2 {RATIO} (ref 0–5)
LDLC SERPL CALC-MCNC: 61.4 MG/DL (ref 0–100)
LIPID PROFILE,FLP: NORMAL
POTASSIUM SERPL-SCNC: 4.1 MMOL/L (ref 3.5–5.5)
PROT SERPL-MCNC: 6.7 G/DL (ref 6.4–8.2)
SODIUM SERPL-SCNC: 142 MMOL/L (ref 136–145)
TRIGL SERPL-MCNC: 58 MG/DL (ref ?–150)
VLDLC SERPL CALC-MCNC: 11.6 MG/DL

## 2019-11-06 ENCOUNTER — TELEPHONE (OUTPATIENT)
Dept: FAMILY MEDICINE CLINIC | Age: 59
End: 2019-11-06

## 2019-11-06 DIAGNOSIS — E10.42 TYPE 1 DIABETES MELLITUS WITH DIABETIC POLYNEUROPATHY (HCC): Primary | ICD-10-CM

## 2019-11-06 NOTE — TELEPHONE ENCOUNTER
Pt  Called to request   new prescription dxcom G6.  ( cont. monitor glucose meter)    Pt also, request a follow up.

## 2019-11-19 ENCOUNTER — OFFICE VISIT (OUTPATIENT)
Dept: FAMILY MEDICINE CLINIC | Age: 59
End: 2019-11-19

## 2019-11-19 VITALS
BODY MASS INDEX: 26.93 KG/M2 | DIASTOLIC BLOOD PRESSURE: 57 MMHG | HEART RATE: 85 BPM | OXYGEN SATURATION: 98 % | HEIGHT: 73 IN | RESPIRATION RATE: 18 BRPM | WEIGHT: 203.2 LBS | SYSTOLIC BLOOD PRESSURE: 140 MMHG | TEMPERATURE: 98.2 F

## 2019-11-19 DIAGNOSIS — E10.42 TYPE 1 DIABETES MELLITUS WITH DIABETIC POLYNEUROPATHY (HCC): ICD-10-CM

## 2019-11-19 DIAGNOSIS — E78.5 DYSLIPIDEMIA: ICD-10-CM

## 2019-11-19 DIAGNOSIS — Z82.49 FH: CAD (CORONARY ARTERY DISEASE): ICD-10-CM

## 2019-11-19 DIAGNOSIS — I11.9 BENIGN HYPERTENSIVE HEART DISEASE WITHOUT HEART FAILURE: ICD-10-CM

## 2019-11-19 DIAGNOSIS — Z23 ENCOUNTER FOR IMMUNIZATION: ICD-10-CM

## 2019-11-19 DIAGNOSIS — Z00.00 MEDICARE ANNUAL WELLNESS VISIT, SUBSEQUENT: Primary | ICD-10-CM

## 2019-11-19 RX ORDER — OMEPRAZOLE 20 MG/1
20 CAPSULE, DELAYED RELEASE ORAL DAILY
Qty: 90 CAP | Refills: 3 | Status: SHIPPED | OUTPATIENT
Start: 2019-11-19 | End: 2019-12-09 | Stop reason: SDUPTHER

## 2019-11-19 RX ORDER — LISINOPRIL 40 MG/1
40 TABLET ORAL DAILY
Qty: 90 TAB | Refills: 2 | Status: SHIPPED | OUTPATIENT
Start: 2019-11-19 | End: 2020-09-10

## 2019-11-19 NOTE — PATIENT INSTRUCTIONS
High Blood Pressure: Care Instructions Overview It's normal for blood pressure to go up and down throughout the day. But if it stays up, you have high blood pressure. Another name for high blood pressure is hypertension. Despite what a lot of people think, high blood pressure usually doesn't cause headaches or make you feel dizzy or lightheaded. It usually has no symptoms. But it does increase your risk of stroke, heart attack, and other problems. You and your doctor will talk about your risks of these problems based on your blood pressure. Your doctor will give you a goal for your blood pressure. Your goal will be based on your health and your age. Lifestyle changes, such as eating healthy and being active, are always important to help lower blood pressure. You might also take medicine to reach your blood pressure goal. 
Follow-up care is a key part of your treatment and safety. Be sure to make and go to all appointments, and call your doctor if you are having problems. It's also a good idea to know your test results and keep a list of the medicines you take. How can you care for yourself at home? Medical treatment · If you stop taking your medicine, your blood pressure will go back up. You may take one or more types of medicine to lower your blood pressure. Be safe with medicines. Take your medicine exactly as prescribed. Call your doctor if you think you are having a problem with your medicine. · Talk to your doctor before you start taking aspirin every day. Aspirin can help certain people lower their risk of a heart attack or stroke. But taking aspirin isn't right for everyone, because it can cause serious bleeding. · See your doctor regularly. You may need to see the doctor more often at first or until your blood pressure comes down. · If you are taking blood pressure medicine, talk to your doctor before you take decongestants or anti-inflammatory medicine, such as ibuprofen. Some of these medicines can raise blood pressure. · Learn how to check your blood pressure at home. Lifestyle changes · Stay at a healthy weight. This is especially important if you put on weight around the waist. Losing even 10 pounds can help you lower your blood pressure. · If your doctor recommends it, get more exercise. Walking is a good choice. Bit by bit, increase the amount you walk every day. Try for at least 30 minutes on most days of the week. You also may want to swim, bike, or do other activities. · Avoid or limit alcohol. Talk to your doctor about whether you can drink any alcohol. · Try to limit how much sodium you eat to less than 2,300 milligrams (mg) a day. Your doctor may ask you to try to eat less than 1,500 mg a day. · Eat plenty of fruits (such as bananas and oranges), vegetables, legumes, whole grains, and low-fat dairy products. · Lower the amount of saturated fat in your diet. Saturated fat is found in animal products such as milk, cheese, and meat. Limiting these foods may help you lose weight and also lower your risk for heart disease. · Do not smoke. Smoking increases your risk for heart attack and stroke. If you need help quitting, talk to your doctor about stop-smoking programs and medicines. These can increase your chances of quitting for good. When should you call for help? Call  911 anytime you think you may need emergency care. This may mean having symptoms that suggest that your blood pressure is causing a serious heart or blood vessel problem. Your blood pressure may be over 180/120. 
 For example, call  911 if: 
  · You have symptoms of a heart attack. These may include: 
? Chest pain or pressure, or a strange feeling in the chest. 
? Sweating. ? Shortness of breath. ? Nausea or vomiting. ? Pain, pressure, or a strange feeling in the back, neck, jaw, or upper belly or in one or both shoulders or arms. ? Lightheadedness or sudden weakness. ? A fast or irregular heartbeat.  
  · You have symptoms of a stroke. These may include: 
? Sudden numbness, tingling, weakness, or loss of movement in your face, arm, or leg, especially on only one side of your body. ? Sudden vision changes. ? Sudden trouble speaking. ? Sudden confusion or trouble understanding simple statements. ? Sudden problems with walking or balance. ? A sudden, severe headache that is different from past headaches.  
  · You have severe back or belly pain.  
 Do not wait until your blood pressure comes down on its own. Get help right away. 
 Call your doctor now or seek immediate care if: 
  · Your blood pressure is much higher than normal (such as 180/120 or higher), but you don't have symptoms.  
  · You think high blood pressure is causing symptoms, such as: 
? Severe headache. 
? Blurry vision.  
 Watch closely for changes in your health, and be sure to contact your doctor if: 
  · Your blood pressure measures higher than your doctor recommends at least 2 times. That means the top number is higher or the bottom number is higher, or both.  
  · You think you may be having side effects from your blood pressure medicine. Where can you learn more? Go to http://freida-khloe.info/. Enter R313 in the search box to learn more about \"High Blood Pressure: Care Instructions. \" Current as of: April 9, 2019 Content Version: 12.2 © 9291-6384 [employer]. Care instructions adapted under license by [employer] (which disclaims liability or warranty for this information). If you have questions about a medical condition or this instruction, always ask your healthcare professional. [place] [address] any warranty or liability for your use of this information. Vaccine Information Statement Influenza (Flu) Vaccine (Inactivated or Recombinant): What You Need to Know Many Vaccine Information Statements are available in [place] and other languages. See www.immunize.org/vis Hojas de información sobre vacunas están disponibles en español y en muchos otros idiomas. Visite www.immunize.org/vis 1. Why get vaccinated? Influenza vaccine can prevent influenza (flu). Flu is a contagious disease that spreads around the [facility] every year, usually between October and May. Anyone can get the flu, but it is more dangerous for some people. Infants and young children, people 72years of age and older, pregnant women, and people with certain health conditions or a weakened immune system are at greatest risk of flu complications. Pneumonia, bronchitis, sinus infections and ear infections are examples of flu-related complications. If you have a medical condition, such as heart disease, cancer or diabetes, flu can make it worse. Flu can cause fever and chills, sore throat, muscle aches, fatigue, cough, headache, and runny or stuffy nose. Some people may have vomiting and diarrhea, though this is more common in children than adults. Each year thousands of people in the [facility] die from flu, and many more are hospitalized. Flu vaccine prevents millions of illnesses and flu-related visits to the doctor each year. 2. Influenza vaccines CDC recommends everyone 10months of age and older get vaccinated every flu season. Children 6 months through 6years of age may need 2 doses during a single flu season. Everyone else needs only 1 dose each flu season. It takes about 2 weeks for protection to develop after vaccination. There are many flu viruses, and they are always changing. Each year a new flu vaccine is made to protect against three or four viruses that are likely to cause disease in the upcoming flu season. Even when the vaccine doesnt exactly match these viruses, it may still provide some protection. Influenza vaccine does not cause flu. Influenza vaccine may be given at the same time as other vaccines. 3. Talk with your health care provider Tell your vaccine provider if the person getting the vaccine: 
 Has had an allergic reaction after a previous dose of influenza vaccine, or has any severe, life-threatening allergies.  Has ever had Guillain-Barré Syndrome (also called GBS). In some cases, your health care provider may decide to postpone influenza vaccination to a future visit. People with minor illnesses, such as a cold, may be vaccinated. People who are moderately or severely ill should usually wait until they recover before getting influenza vaccine. Your health care provider can give you more information. 4. Risks of a reaction  Soreness, redness, and swelling where shot is given, fever, muscle aches, and headache can happen after influenza vaccine.  There may be a very small increased risk of Guillain-Barré Syndrome (GBS) after inactivated influenza vaccine (the flu shot). Delong Beery children who get the flu shot along with pneumococcal vaccine (PCV13), and/or DTaP vaccine at the same time might be slightly more likely to have a seizure caused by fever. Tell your health care provider if a child who is getting flu vaccine has ever had a seizure. People sometimes faint after medical procedures, including vaccination. Tell your provider if you feel dizzy or have vision changes or ringing in the ears. As with any medicine, there is a very remote chance of a vaccine causing a severe allergic reaction, other serious injury, or death. 5. What if there is a serious problem? An allergic reaction could occur after the vaccinated person leaves the clinic.  If you see signs of a severe allergic reaction (hives, swelling of the face and throat, difficulty breathing, a fast heartbeat, dizziness, or weakness), call 9-1-1 and get the person to the nearest hospital. 
 
 For other signs that concern you, call your health care provider. Adverse reactions should be reported to the Vaccine Adverse Event Reporting System (VAERS). Your health care provider will usually file this report, or you can do it yourself. Visit the VAERS website at www.vaers. hhs.gov or call 5-681.109.7734. VAERS is only for reporting reactions, and VAERS staff do not give medical advice. 6. The National Vaccine Injury Compensation Program 
 
The [facility] Vaccine Injury Compensation Program (VICP) is a federal program that was created to compensate people who may have been injured by certain vaccines. Visit the VICP website at www.[facility].gov/vaccinecompensation or call 8-908.213.3423 to learn about the program and about filing a claim. There is a time limit to file a claim for compensation. 7. How can I learn more?  Ask your health care provider.  Call your local or state health department.  Contact the Centers for Disease Control and Prevention (CDC): 
- Call 1-706.453.1333 (6-986-HXH-INFO) or 
- Visit CDCs influenza website at www.cdc.gov/flu Vaccine Information Statement (Interim) Inactivated Influenza Vaccine 8/15/2019 
[address] [place] [address] 101PE-28 [employer] Centers for Disease Control and Prevention Office Use Only Medicare Wellness Visit, Male The best way to live healthy is to have a lifestyle where you eat a well-balanced diet, exercise regularly, limit alcohol use, and quit all forms of tobacco/nicotine, if applicable. Regular preventive services are another way to keep healthy. Preventive services (vaccines, screening tests, monitoring & exams) can help personalize your care plan, which helps you manage your own care. Screening tests can find health problems at the earliest stages, when they are easiest to treat.   
Vi follows the current, evidence-based guidelines published by the [facility] (USPSTF) when recommending preventive services for our patients. Because we follow these guidelines, sometimes recommendations change over time as research supports it. (For example, a prostate screening blood test is no longer routinely recommended for men with no symptoms). Of course, you and your doctor may decide to screen more often for some diseases, based on your risk and co-morbidities (chronic disease you are already diagnosed with). Preventive services for you include: - Medicare offers their members a free annual wellness visit, which is time for you and your primary care provider to discuss and plan for your preventive service needs. Take advantage of this benefit every year! 
-All adults over age 72 should receive the recommended pneumonia vaccines. Current USPSTF guidelines recommend a series of two vaccines for the best pneumonia protection.  
-All adults should have a flu vaccine yearly and tetanus vaccine every 10 years. 
-All adults age 48 and older should receive the shingles vaccines (series of two vaccines). -All adults age 38-68 who are overweight should have a diabetes screening test once every three years.  
-Other screening tests & preventive services for persons with diabetes include: an eye exam to screen for diabetic retinopathy, a kidney function test, a foot exam, and stricter control over your cholesterol.  
-Cardiovascular screening for adults with routine risk involves an electrocardiogram (ECG) at intervals determined by the provider.  
-Colorectal cancer screening should be done for adults age 54-65 with no increased risk factors for colorectal cancer. There are a number of acceptable methods of screening for this type of cancer. Each test has its own benefits and drawbacks. Discuss with your provider what is most appropriate for you during your annual wellness visit.  The different tests include: colonoscopy (considered the best screening method), a fecal occult blood test, a fecal DNA test, and sigmoidoscopy. 
-All adults born between [facility] should be screened once for Hepatitis C. 
-An Abdominal Aortic Aneurysm (AAA) Screening is recommended for men age 73-68 who has ever smoked in their lifetime. Here is a list of your current Health Maintenance items (your personalized list of preventive services) with a due date: 
Health Maintenance Due Topic Date Due  Shingles Vaccine (1 of 2) 08/08/2010 [facility] Diabetic Foot Care  05/08/2018 [facility] Eye Exam  06/15/2019  Flu Vaccine  08/01/2019

## 2019-11-19 NOTE — PROGRESS NOTES
Subjective:       Chief Complaint  The patient presents for follow up of diabetes, hypertension and high cholesterol. HPI  Kisha Dobson. is a 61 y.o. male seen for follow up of diabetes. Healso has hypertension and hyperlipidemia. Diabetes no significant medication side effects noted, much improved pt continues on insulin pump and is trying to get a continuous glucose monitor since he checks his blood sugars 6-8 times a day, will f/u with Endo (Dr Cindy Romero)  for further management ,  hypertension borderline controlled on lisinopril 20 mg  And Norvasc 10 mg, hyperlipidemia well controlled, no significant medication side effects noted, on Lipitor 20 mg    Diet and Lifestyle: generally follows a low fat low cholesterol diet, does not rigorously follow a diabetic diet, exercises sporadically    Home BP Monitoring: is uncontrolled at home. Diabetic Review of Systems - home glucose monitoring: is performed regularly, 6x/day. Other symptoms and concerns: pt will try to exercise more. He has problems with his feet and needs foot surgery. Pt is candidate for diabetic shoes due to neuropathy and ulcerative callus. He is followed by Podiatry. he sees Dr Leone Prader now. Patient chronic cough resolved. May have been due to allergies. Patient has a strong family history of coronary artery disease and with history of type 1 diabetes will refer back to cardiology for follow-up he was last seen by them in February 2018 and has been seen by Alina Keene in the past.     Current Outpatient Medications   Medication Sig    omeprazole (PRILOSEC) 20 mg capsule Take 1 Cap by mouth daily.  lisinopril (PRINIVIL, ZESTRIL) 40 mg tablet Take 1 Tab by mouth daily.     sildenafil, antihypertensive, (REVATIO) 20 mg tablet TAKE UP TO 5 TABLETS BY MOUTH DAILY AS NEEDED (DO NOT EXCEED 5 TABLETS)    Blood-Glucose Transmitter (DEXCOM G6 TRANSMITTER) albert E10.42    Blood-Glucose Meter,Continuous (DEXCOM G6 ) misc E10.42    Blood-Glucose Sensor (DEXCOM G6 SENSOR) albert E10.42    oxybutynin (DITROPAN) 5 mg tablet TAKE 1 TABLET BY MOUTH TWO  TIMES DAILY    atorvastatin (LIPITOR) 20 mg tablet TAKE 1 TABLET BY MOUTH  DAILY    MINIMED INFUSION SET iset CHANGE EVERY 3 DAYS    insulin lispro (HUMALOG) 100 unit/mL injection 38 units daily for insulin pump. DX E10.42    flash glucose scanning reader (GnzoSTYLE RUEL 14 DAY READER) [facility] DX E10.42 check blood sugar 8 times a day due to insulin pump and labile blood sugar    flash glucose sensor (FREESTYLE RUEL 14 DAY SENSOR) kit DX E10.42 check blood sugar 8 times a day due to insulin pump and labile blood sugar    tiotropium bromide (SPIRIVA RESPIMAT) 2.5 mcg/actuation inhaler Take 2 Puffs by inhalation daily.  amLODIPine (NORVASC) 10 mg tablet TAKE 1 TABLET BY MOUTH  DAILY    pantoprazole (PROTONIX) 40 mg tablet TAKE 1 TABLET BY MOUTH ONCE DAILY    lancets (ONE TOUCH DELICA) 33 gauge misc CHECK BLOOD SUGAR 8 TIMES  DAILY DUE TO INSULIN PUMP  AND LABILE BLOOD SUGAR    Blood-Glucose Meter (ONETOUCH VERIO FLEX) misc DX E10.42 check blood sugar 8 times a day due to insulin pump and labile blood sugar    glucose blood VI test strips (ONETOUCH VERIO) strip DX E10.42 check blood sugar 8 times a day due to insulin pump and labile blood sugar    Blood-Glucose Meter (CONTOUR NEXT EZ METER) monitoring kit DX E10.42 check blood sugar 8 times a day due to insulin pump and labile blood sugar.  albuterol (PROAIR HFA) 90 mcg/actuation inhaler Take 2 Puffs by inhalation every four (4) hours as needed for Wheezing or Shortness of Breath.  guaiFENesin-codeine (CHERATUSSIN AC) 100-10 mg/5 mL solution Take 10 mL by mouth four (4) times daily as needed for Cough. Max Daily Amount: 40 mL.  cholecalciferol, vitamin D3, (VITAMIN D3) 2,000 unit tab Take 1 Tab by mouth daily.  montelukast (SINGULAIR) 10 mg tablet Take 1 Tab by mouth daily.     PARADIGM RESERVOIR 3 mL [facility]     CONTOUR NEXT STRIPS strip CHECK BLOOD SUGAR 8 TIMES A DAY DUE TO INSULIN PUMP AND LABILE BLOOD SUGAR    MICROLET LANCET misc     multivitamin (ONE A DAY) tablet Take 1 Tab by mouth daily.  azelastine (ASTELIN) 137 mcg (0.1 %) nasal spray 2 Sprays by Both Nostrils route two (2) times a day. Use in each nostril as directed  Indications: Seasonal Runny Nose    insulin regular (NOVOLIN R, HUMULIN R) 100 unit/mL injection 38 units daily for insulin pump     No current facility-administered medications for this visit.               Review of Systems  Respiratory: negative for dyspnea on exertion  Cardiovascular: negative for chest pain    Objective:     Visit Vitals  /57   Pulse 85   Temp 98.2 °F (36.8 °C) (Oral)   Resp 18   Ht 6' 1\" (1.854 m)   Wt 203 lb 3.2 oz (92.2 kg)   SpO2 98%   BMI 26.81 kg/m²        General appearance - alert, well appearing, and in no distress  HEENT bilateral nasal mucosal edema and cobblestoning in posterior pharynx  Chest - clear to auscultation, no wheezes, rales or rhonchi, symmetric air entry  Heart - normal rate, regular rhythm, normal S1, S2, no murmurs, rubs, clicks or gallops      Labs:   Lab Results   Component Value Date/Time    Hemoglobin A1c 7.8 (H) 10/25/2019 07:24 AM    Hemoglobin A1c 7.4 (H) 06/24/2019 07:50 AM    Hemoglobin A1c 8.3 (H) 03/21/2019 07:52 AM    Glucose 53 10/25/2019 07:24 AM    Glucose (POC) 138 (H) 06/26/2019 07:47 AM    Microalbumin/Creat ratio (mg/g creat) 57 (H) 03/21/2019 07:52 AM    Microalbumin,urine random 8.70 (H) 03/21/2019 07:52 AM    LDL, calculated 61.4 10/25/2019 07:24 AM    Creatinine 1.19 10/25/2019 07:24 AM    Hemoglobin A1c, External 9.3 12/10/2015    Hemoglobin A1c, External 8.7% 08/20/2015    Hemoglobin A1c, External 9.5 05/22/2015 07:43 PM      Lab Results   Component Value Date/Time    Cholesterol, total 132 10/25/2019 07:24 AM    HDL Cholesterol 59 10/25/2019 07:24 AM    LDL, calculated 61.4 10/25/2019 07:24 AM    Triglyceride 58 10/25/2019 07:24 AM    CHOL/HDL Ratio 2.2 10/25/2019 07:24 AM     Lab Results   Component Value Date/Time    ALT (SGPT) 28 10/25/2019 07:24 AM    AST (SGOT) 11 10/25/2019 07:24 AM    Alk. phosphatase 108 10/25/2019 07:24 AM    Bilirubin, total 0.5 10/25/2019 07:24 AM     Lab Results   Component Value Date/Time    GFR est AA >60 10/25/2019 07:24 AM    GFR est non-AA >60 10/25/2019 07:24 AM    Creatinine 1.19 10/25/2019 07:24 AM    BUN 15 10/25/2019 07:24 AM    Sodium 142 10/25/2019 07:24 AM    Potassium 4.1 10/25/2019 07:24 AM    Chloride 108 10/25/2019 07:24 AM    CO2 27 10/25/2019 07:24 AM      Lab Results   Component Value Date/Time    Prostate Specific Ag 0.4 01/14/2019 10:24 AM    Prostate Specific Ag 0.3 03/21/2018 07:54 AM    Prostate Specific Ag 0.4 02/01/2018 10:00 AM    Prostate Specific Ag 0.3 12/21/2016 09:54 AM    Prostate Specific Ag 0.251 12/17/2012 03:42 PM    PSA 0.3 09/30/2010 10:09 AM    PSA, FREE 0.2 09/30/2010 10:09 AM    % FREE PSA 67 09/30/2010 10:09 AM     Lab Results   Component Value Date/Time    Glucose 53 10/25/2019 07:24 AM    Glucose (POC) 138 (H) 06/26/2019 07:47 AM            Assessment / Plan     Diabetes much improved, pt remains on insulin pump and will follow up with Dr. Brink [facility] endocrine. Hypertension borderline controlled, will increase to lisinopril 40 mg, and continue Norvasc 10 mg. Hyperlipidemia well controlled on Lipitor       ICD-10-CM ICD-9-CM    1. Medicare annual wellness visit, subsequent Z00.00 V70.0    2. Type 1 diabetes mellitus with diabetic polyneuropathy (HCC) E10.42 250.61 MICROALBUMIN, UR, RAND W/ MICROALB/CREAT RATIO     357.2 HEMOGLOBIN A1C W/O EAG   3. Hypertension Y84.5 227.00 METABOLIC PANEL, COMPREHENSIVE   4. Dyslipidemia E78.5 272.4 LIPID PANEL   5. FH: CAD (coronary artery disease) Z82.49 V17.3 REFERRAL TO CARDIOLOGY   6.  Encounter for immunization Z23 V03.89 INFLUENZA VIRUS VAC QUAD,SPLIT,PRESV FREE SYRINGE IM      MT IMMUNIZ ADMIN,1 SINGLE/COMB VAC/TOXOID              Diabetic issues reviewed with him: diabetic diet discussed in detail, and low cholesterol diet, weight control and daily exercise discussed. Follow-up and Dispositions    · Return in about 3 months (around 2/19/2020) for labs 1 week before. Reviewed plan of care. Patient has provided input and agrees with goals.
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
## 2019-11-19 NOTE — PROGRESS NOTES
Patient here for hypertension/cholesterol problem and diabets Follow up. Flu vaccine given, patient tolerate well. No S/S of adverse reaction noted at this time. 1. Have you been to the ER, urgent care clinic since your last visit? Hospitalized since your last visit? No    2. Have you seen or consulted any other health care providers outside of the [address] [place] [address] since your last visit? Include any pap smears or colon screening.  Dr Ryan Condon

## 2019-12-09 RX ORDER — OMEPRAZOLE 40 MG/1
40 CAPSULE, DELAYED RELEASE ORAL DAILY
Qty: 90 CAP | Refills: 3 | Status: SHIPPED | OUTPATIENT
Start: 2019-12-09 | End: 2020-05-07 | Stop reason: SDUPTHER

## 2019-12-09 NOTE — TELEPHONE ENCOUNTER
Requested Prescriptions     Pending Prescriptions Disp Refills    omeprazole (PRILOSEC) 20 mg capsule 90 Cap 3     Sig: Take 1 Cap by mouth daily. Pt. Need 180 count so he can take it twice a day.

## 2020-01-01 NOTE — TELEPHONE ENCOUNTER
Pt called asking do we have any coupons for the lantus vial. He is in a window with his enrollment that he didn't pay one time and now he has to wait until next year before they will cover his medications again.  He wants to know if you have something cheaper it is $200 per vial please advise [phone] Statement Selected

## 2020-01-06 ENCOUNTER — OFFICE VISIT (OUTPATIENT)
Dept: ORTHOPEDIC SURGERY | Facility: CLINIC | Age: 60
End: 2020-01-06

## 2020-01-06 VITALS
DIASTOLIC BLOOD PRESSURE: 64 MMHG | SYSTOLIC BLOOD PRESSURE: 118 MMHG | TEMPERATURE: 96.5 F | RESPIRATION RATE: 18 BRPM | WEIGHT: 199.2 LBS | OXYGEN SATURATION: 100 % | BODY MASS INDEX: 26.4 KG/M2 | HEIGHT: 73 IN | HEART RATE: 81 BPM

## 2020-01-06 DIAGNOSIS — M19.049 HAND ARTHRITIS: Primary | ICD-10-CM

## 2020-01-06 DIAGNOSIS — M79.642 HAND PAIN, LEFT: ICD-10-CM

## 2020-01-06 RX ORDER — DICLOFENAC SODIUM 10 MG/G
4 GEL TOPICAL 4 TIMES DAILY
Qty: 5 EACH | Refills: 5 | Status: SHIPPED | OUTPATIENT
Start: 2020-01-06 | End: 2022-03-21 | Stop reason: ALTCHOICE

## 2020-01-06 NOTE — PROGRESS NOTES
Patient: Bennie Ward. MRN: [id number]       SSN: [id number]  YOB: 1960        AGE: 61 y.o. SEX: male    PCP: Zeno Snellen, MD  01/06/20    Chief Complaint   Patient presents with    Hand Pain     Left 3rd Finger     HISTORY:  Bennie Alanis is a 61 y.o. male who is seen for left middle finger pain. He has been experiencing PIP joint pain for the past week and a half. He does not recall any injury. He denies triggering, numbness, or tingling. He denies h/o gout. He has a h/o multiple right LE surgeries for heel cord tightness and intractable plantar ulcers. He received hyperbaric oxygen for his foot ulcers in the past.    Pain Assessment  1/6/2020   Location of Pain Hand;Finger   Pain Location Comment 3rd Finger   Location Modifiers Left   Severity of Pain 0   Quality of Pain -   Duration of Pain -   Frequency of Pain -   Aggravating Factors -   Limiting Behavior Some   Relieving Factors -   Result of Injury -     Occupation, etc:  Mr. Wallace Mahan receives social security disability benefits for right ankle pain. 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 He previously worked at the [employer]. He lives in [place] with his wife. He has identical twin sons, 1 daughter and 3 grandchildren. He is diabetic. Mr. Wallace Mahan weighs 199 lbs and is 6'1\" tall.        Lab Results   Component Value Date/Time    Hemoglobin A1c 7.8 (H) 10/25/2019 07:24 AM    Hemoglobin A1c (POC) 9.1 06/11/2015 09:54 AM    Hemoglobin A1c, External 9.3 12/10/2015     Weight Metrics 1/6/2020 11/19/2019 7/22/2019 7/18/2019 7/1/2019 6/26/2019 6/20/2019   Weight 199 lb 3.2 oz 203 lb 3.2 oz 200 lb 200 lb 198 lb - 200 lb   BMI 26.28 kg/m2 26.81 kg/m2 26.39 kg/m2 26.39 kg/m2 26.12 kg/m2 26.39 kg/m2 -       Patient Active Problem List   Diagnosis Code    Left shoulder pain M25.512    Bursitis of left shoulder M75.52    Hypogonadism male E29.1    PN (peripheral neuropathy) G62.9    Hypertension I11.9    Dyslipidemia E78.5    Dyspnea R06.00    ACP (advance care planning) Z71.89    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 Cardiovascular: negative for chest pain, claudication, leg swelling, SOB, BURDEN   Gastrointestinal: Negative for pain, N/V/C/D, Blood in stool or urine, dysuria, hematuria, incontinence, pelvic pain. Musculoskeletal: See HPI   Neurological: Negative for dizziness and weakness. Negative for headaches, Visual changes, confusion, seizures   Phychiatric/Behavioral: Negative for depression, memory loss, substance abuse. Extremities: Negative for hair changes, rash, or skin lesion changes. Hematologic: Negative for bleeding problems, bruising, pallor or swollen lymph nodes   Peripheral Vascular: No calf pain, no circulation deficits. Social History     Socioeconomic History    Marital status:      Spouse name: Not on file    Number of children: Not on file    Years of education: Not on file    Highest education level: Not on file   Occupational History    Not on file   Social Needs    Financial resource strain: Not on file    Food insecurity:     Worry: Not on file     Inability: Not on file    Transportation needs:     Medical: Not on file     Non-medical: Not on file   Tobacco Use    Smoking status: Former Smoker     Packs/day: 1.00     Years: 10.00     Pack years: 10.00     Types: Cigarettes     Last attempt to quit: 1998     Years since quittin.7    Smokeless tobacco: Never Used   Substance and Sexual Activity    Alcohol use:  Yes Comment: rarely    Drug use: No    Sexual activity: Not on file   Lifestyle    Physical activity:     Days per week: Not on file     Minutes per session: Not on file    Stress: Not on file   Relationships    Social connections:     Talks on phone: Not on file     Gets together: Not on file     Attends Latter day service: Not on file     Active member of club or organization: Not on file     Attends meetings of clubs or organizations: Not on file     Relationship status: Not on file    Intimate partner violence:     Fear of current or ex partner: Not on file     Emotionally abused: Not on file     Physically abused: Not on file     Forced sexual activity: Not on file   Other Topics Concern    Not on file   Social History Narrative    Not on file      No Known Allergies   Current Outpatient Medications   Medication Sig    diclofenac (VOLTAREN) 1 % gel Apply 4 g to affected area four (4) times daily. left hand and fingers    omeprazole (PRILOSEC) 40 mg capsule Take 1 Cap by mouth daily.  lisinopril (PRINIVIL, ZESTRIL) 40 mg tablet Take 1 Tab by mouth daily.  sildenafil, antihypertensive, (REVATIO) 20 mg tablet TAKE UP TO 5 TABLETS BY MOUTH DAILY AS NEEDED (DO NOT EXCEED 5 TABLETS)    Blood-Glucose Transmitter (DEXCOM G6 TRANSMITTER) albert E10.42    Blood-Glucose Meter,Continuous (DEXCOM G6 ) misc E10.42    Blood-Glucose Sensor (DEXCOM G6 SENSOR) albert E10.42    oxybutynin (DITROPAN) 5 mg tablet TAKE 1 TABLET BY MOUTH TWO  TIMES DAILY    atorvastatin (LIPITOR) 20 mg tablet TAKE 1 TABLET BY MOUTH  DAILY    MINIMED INFUSION SET iset CHANGE EVERY 3 DAYS    insulin lispro (HUMALOG) 100 unit/mL injection 38 units daily for insulin pump.  DX E10.42    flash glucose scanning reader (FREESTYLE RUEL 14 DAY READER) [facility] [place] E10.42 check blood sugar 8 times a day due to insulin pump and labile blood sugar    amLODIPine (NORVASC) 10 mg tablet TAKE 1 TABLET BY MOUTH  DAILY    lancets (ONE TOUCH DELICA) 33 gauge misc CHECK BLOOD SUGAR 8 TIMES  DAILY DUE TO INSULIN PUMP  AND LABILE BLOOD SUGAR    Blood-Glucose Meter (ONETOUCH VERIO FLEX) misc DX E10.42 check blood sugar 8 times a day due to insulin pump and labile blood sugar    glucose blood VI test strips (ONETOUCH VERIO) strip DX E10.42 check blood sugar 8 times a day due to insulin pump and labile blood sugar    Blood-Glucose Meter (CONTOUR NEXT EZ METER) monitoring kit DX E10.42 check blood sugar 8 times a day due to insulin pump and labile blood sugar.  cholecalciferol, vitamin D3, (VITAMIN D3) 2,000 unit tab Take 1 Tab by mouth daily.  PARADIGM RESERVOIR 3 mL misc     CONTOUR NEXT STRIPS strip CHECK BLOOD SUGAR 8 TIMES A DAY DUE TO INSULIN PUMP AND LABILE BLOOD SUGAR    MICROLET LANCET misc     multivitamin (ONE A DAY) tablet Take 1 Tab by mouth daily.  flash glucose sensor (FREESTYLE RUEL 14 DAY SENSOR) kit DX E10.42 check blood sugar 8 times a day due to insulin pump and labile blood sugar    tiotropium bromide (SPIRIVA RESPIMAT) 2.5 mcg/actuation inhaler Take 2 Puffs by inhalation daily.  azelastine (ASTELIN) 137 mcg (0.1 %) nasal spray 2 Sprays by Both Nostrils route two (2) times a day. Use in each nostril as directed  Indications: Seasonal Runny Nose    albuterol (PROAIR HFA) 90 mcg/actuation inhaler Take 2 Puffs by inhalation every four (4) hours as needed for Wheezing or Shortness of Breath.  insulin regular (NOVOLIN R, HUMULIN R) 100 unit/mL injection 38 units daily for insulin pump    guaiFENesin-codeine (CHERATUSSIN AC) 100-10 mg/5 mL solution Take 10 mL by mouth four (4) times daily as needed for Cough. Max Daily Amount: 40 mL.  montelukast (SINGULAIR) 10 mg tablet Take 1 Tab by mouth daily. No current facility-administered medications for this visit.        PHYSICAL EXAMINATION:  Visit Vitals  /64   Pulse 81   Temp 96.5 °F (35.8 °C) (Oral)   Resp 18   Ht 6' 1\" (1.854 m)   Wt 199 lb 3.2 oz (90.4 kg)   SpO2 100% BMI 26.28 kg/m²      ORTHO EXAMINATION:  Examination Right Hand Left Hand   Skin Intact Intact   Deformity - -   Swelling - + middle PIP   Tenderness - + middle finger   Finger flexion Full Full   Finger extension Full Full   Sensation Normal Normal   Capillary refill Normal Normal   Heberden's nodes - -   Dupuytren's - -       RADIOGRAPHS:  XR LEFT HAND 1/6/20 MELODY  IMPRESSION:  Three views - No fractures, no joint space narrowing, soft tissue swelling of middle finger. IMPRESSION:      ICD-10-CM ICD-9-CM    1. Hand arthritis M19.049 716.94 diclofenac (VOLTAREN) 1 % gel   2. Hand pain, left M79.642 729.5 AMB POC XRAY, HAND; 3+ VIEWS      diclofenac (VOLTAREN) 1 % gel     PLAN:  Voltaren gel Rx provided. There is no need for surgery at this time. He will follow up PRN with Dr. Rosa Mar for orthopedic hand surgery consultation.       Scribed by Bettina Hammans ([address]) as dictated by Geetha Schroeder MD

## 2020-01-13 ENCOUNTER — TELEPHONE (OUTPATIENT)
Dept: FAMILY MEDICINE CLINIC | Age: 60
End: 2020-01-13

## 2020-01-13 NOTE — TELEPHONE ENCOUNTER
Spoke with patient he states he was talking to Dr. Sin Ortega regarding a fast acting insulin he would get at [facility] instead of using Humalog. Please advise.

## 2020-01-13 NOTE — TELEPHONE ENCOUNTER
Pt called back stating the medication is the novolin insulin please advise pt is out completely [phone]

## 2020-01-14 NOTE — TELEPHONE ENCOUNTER
Left detailed message for patient medication was sent to the pharmacy   Any questions call the office.

## 2020-02-19 ENCOUNTER — HOSPITAL ENCOUNTER (OUTPATIENT)
Dept: LAB | Age: 60
Discharge: HOME | End: 2020-02-19
Payer: MEDICARE

## 2020-02-19 DIAGNOSIS — E78.5 DYSLIPIDEMIA: ICD-10-CM

## 2020-02-19 DIAGNOSIS — I11.9 BENIGN HYPERTENSIVE HEART DISEASE WITHOUT HEART FAILURE: ICD-10-CM

## 2020-02-19 DIAGNOSIS — E10.42 TYPE 1 DIABETES MELLITUS WITH DIABETIC POLYNEUROPATHY (HCC): ICD-10-CM

## 2020-02-19 LAB
ALBUMIN SERPL-MCNC: 3.4 G/DL (ref 3.4–5)
ALBUMIN/GLOB SERPL: 1 {RATIO} (ref 0.8–1.7)
ALP SERPL-CCNC: 109 U/L (ref 45–117)
ALT SERPL-CCNC: 30 U/L (ref 16–61)
ANION GAP SERPL CALC-SCNC: 1 MMOL/L (ref 3–18)
AST SERPL-CCNC: 18 U/L (ref 10–38)
BILIRUB SERPL-MCNC: 0.8 MG/DL (ref 0.2–1)
BUN SERPL-MCNC: 12 MG/DL (ref 7–18)
BUN/CREAT SERPL: 11 (ref 12–20)
CALCIUM SERPL-MCNC: 9.6 MG/DL (ref 8.5–10.1)
CHLORIDE SERPL-SCNC: 110 MMOL/L (ref 100–111)
CHOLEST SERPL-MCNC: 136 MG/DL
CO2 SERPL-SCNC: 33 MMOL/L (ref 21–32)
CREAT SERPL-MCNC: 1.13 MG/DL (ref 0.6–1.3)
CREAT UR-MCNC: 127 MG/DL (ref 30–125)
GLOBULIN SER CALC-MCNC: 3.3 G/DL (ref 2–4)
GLUCOSE SERPL-MCNC: 55 MG/DL (ref 74–99)
HBA1C MFR BLD: 8.1 % (ref 4.2–5.6)
HDLC SERPL-MCNC: 60 MG/DL (ref 40–60)
HDLC SERPL: 2.3 {RATIO} (ref 0–5)
LDLC SERPL CALC-MCNC: 65 MG/DL (ref 0–100)
LIPID PROFILE,FLP: NORMAL
MICROALBUMIN UR-MCNC: 6.7 MG/DL (ref 0–3)
MICROALBUMIN/CREAT UR-RTO: 53 MG/G (ref 0–30)
POTASSIUM SERPL-SCNC: 4.3 MMOL/L (ref 3.5–5.5)
PROT SERPL-MCNC: 6.7 G/DL (ref 6.4–8.2)
SODIUM SERPL-SCNC: 144 MMOL/L (ref 136–145)
TRIGL SERPL-MCNC: 55 MG/DL (ref ?–150)
VLDLC SERPL CALC-MCNC: 11 MG/DL

## 2020-02-19 PROCEDURE — 36415 COLL VENOUS BLD VENIPUNCTURE: CPT

## 2020-02-19 PROCEDURE — 80061 LIPID PANEL: CPT

## 2020-02-19 PROCEDURE — 80053 COMPREHEN METABOLIC PANEL: CPT

## 2020-02-19 PROCEDURE — 82043 UR ALBUMIN QUANTITATIVE: CPT

## 2020-02-19 PROCEDURE — 83036 HEMOGLOBIN GLYCOSYLATED A1C: CPT

## 2020-02-26 ENCOUNTER — OFFICE VISIT (OUTPATIENT)
Dept: FAMILY MEDICINE CLINIC | Age: 60
End: 2020-02-26

## 2020-02-26 VITALS
BODY MASS INDEX: 27.63 KG/M2 | RESPIRATION RATE: 18 BRPM | TEMPERATURE: 98 F | WEIGHT: 204 LBS | HEART RATE: 75 BPM | HEIGHT: 72 IN | SYSTOLIC BLOOD PRESSURE: 150 MMHG | DIASTOLIC BLOOD PRESSURE: 69 MMHG | OXYGEN SATURATION: 97 %

## 2020-02-26 DIAGNOSIS — I11.9 BENIGN HYPERTENSIVE HEART DISEASE WITHOUT HEART FAILURE: ICD-10-CM

## 2020-02-26 DIAGNOSIS — E10.42 TYPE 1 DIABETES MELLITUS WITH DIABETIC POLYNEUROPATHY (HCC): Primary | ICD-10-CM

## 2020-02-26 DIAGNOSIS — E78.5 DYSLIPIDEMIA: ICD-10-CM

## 2020-02-26 NOTE — PROGRESS NOTES
Patient is in the office today for 3 month follow up. 1. Have you been to the ER, urgent care clinic since your last visit? Hospitalized since your last visit? No    2. Have you seen or consulted any other health care providers outside of the [address] [place] [address] since your last visit? Include any pap smears or colon screening.  No

## 2020-02-26 NOTE — PROGRESS NOTES
Subjective:       Chief Complaint  The patient presents for follow up of diabetes, hypertension and high cholesterol. HPI  Gulshan Whittington is a 61 y.o. male seen for follow up of diabetes. Healso has hypertension and hyperlipidemia. Diabetes no significant medication side effects noted, much improved pt continues on insulin pump and is trying to get a continuous glucose monitor since he checks his blood sugars 6-8 times a day, will f/u with Endo (Dr Taye Tellez)  for further management ,  hypertension borderline controlled on lisinopril 40 mg  And Norvasc 10 mg, hyperlipidemia well controlled, no significant medication side effects noted, on Lipitor 20 mg    Diet and Lifestyle: generally follows a low fat low cholesterol diet, does not rigorously follow a diabetic diet, exercises sporadically    Home BP Monitoring: is uncontrolled at home. Diabetic Review of Systems - home glucose monitoring: is performed regularly, 6x/day. Other symptoms and concerns: pt will try to exercise more. He has problems with his feet and needed foot surgery but has put it off for now. Pt is candidate for diabetic shoes due to neuropathy and ulcerative callus. He is followed by Podiatry. he sees Dr Camila Trinidad now. Patient chronic cough resolved. May have been due to allergies. Patient has a strong family history of coronary artery disease and will shaila cardiology to f/u. Current Outpatient Medications   Medication Sig    omeprazole (PRILOSEC) 40 mg capsule Take 1 Cap by mouth daily.  lisinopril (PRINIVIL, ZESTRIL) 40 mg tablet Take 1 Tab by mouth daily.     Blood-Glucose Transmitter (DEXCOM G6 TRANSMITTER) albert E10.42    Blood-Glucose Meter,Continuous (DEXCOM G6 ) misc E10.42    Blood-Glucose Sensor (DEXCOM G6 SENSOR) albert E10.42    oxybutynin (DITROPAN) 5 mg tablet TAKE 1 TABLET BY MOUTH TWO  TIMES DAILY    atorvastatin (LIPITOR) 20 mg tablet TAKE 1 TABLET BY MOUTH  DAILY    MINIMED INFUSION SET iset CHANGE EVERY 3 DAYS    insulin lispro (HUMALOG) 100 unit/mL injection 38 units daily for insulin pump. DX E10.42    flash glucose scanning reader (FREESTYLE RUEL 14 DAY READER) [facility] DX E10.42 check blood sugar 8 times a day due to insulin pump and labile blood sugar    flash glucose sensor (FREESTYLE RUEL 14 DAY SENSOR) kit DX E10.42 check blood sugar 8 times a day due to insulin pump and labile blood sugar    lancets (ONE TOUCH DELICA) 33 gauge misc CHECK BLOOD SUGAR 8 TIMES  DAILY DUE TO INSULIN PUMP  AND LABILE BLOOD SUGAR    Blood-Glucose Meter (ONETOUCH VERIO FLEX) misc DX E10.42 check blood sugar 8 times a day due to insulin pump and labile blood sugar    glucose blood VI test strips (ONETOUCH VERIO) strip DX E10.42 check blood sugar 8 times a day due to insulin pump and labile blood sugar    Blood-Glucose Meter (CONTOUR NEXT EZ METER) monitoring kit DX E10.42 check blood sugar 8 times a day due to insulin pump and labile blood sugar.  cholecalciferol, vitamin D3, (VITAMIN D3) 2,000 unit tab Take 1 Tab by mouth daily.  PARADIGM RESERVOIR 3 mL misc     CONTOUR NEXT STRIPS strip CHECK BLOOD SUGAR 8 TIMES A DAY DUE TO INSULIN PUMP AND LABILE BLOOD SUGAR    MICROLET LANCET misc     multivitamin (ONE A DAY) tablet Take 1 Tab by mouth daily.  amLODIPine (NORVASC) 10 mg tablet TAKE 1 TABLET BY MOUTH  DAILY    sildenafil, pulm. hypertension, (REVATIO) 20 mg tablet Take 1 Tab by mouth as needed for Other (ED). Take up to 5 tablets as needed. Do not exceed 5 tablets.  spironolactone (ALDACTONE) 25 mg tablet Take 1 Tab by mouth daily.  insulin regular (NOVOLIN R, HUMULIN R) 100 unit/mL injection 38 units daily for insulin pump E10.42    diclofenac (VOLTAREN) 1 % gel Apply 4 g to affected area four (4) times daily. left hand and fingers    tiotropium bromide (SPIRIVA RESPIMAT) 2.5 mcg/actuation inhaler Take 2 Puffs by inhalation daily.     azelastine (ASTELIN) 137 mcg (0.1 %) nasal spray 2 Sprays by Both Nostrils route two (2) times a day. Use in each nostril as directed  Indications: Seasonal Runny Nose    albuterol (PROAIR HFA) 90 mcg/actuation inhaler Take 2 Puffs by inhalation every four (4) hours as needed for Wheezing or Shortness of Breath.  guaiFENesin-codeine (CHERATUSSIN AC) 100-10 mg/5 mL solution Take 10 mL by mouth four (4) times daily as needed for Cough. Max Daily Amount: 40 mL.  montelukast (SINGULAIR) 10 mg tablet Take 1 Tab by mouth daily. No current facility-administered medications for this visit.               Review of Systems  Respiratory: negative for dyspnea on exertion  Cardiovascular: negative for chest pain    Objective:     Visit Vitals  /69 (BP 1 Location: Left arm, BP Patient Position: Sitting)   Pulse 75   Temp 98 °F (36.7 °C) (Oral)   Resp 18   Ht 6' (1.829 m)   Wt 204 lb (92.5 kg)   SpO2 97%   BMI 27.67 kg/m²        General appearance - alert, well appearing, and in no distress  HEENT bilateral nasal mucosal edema and cobblestoning in posterior pharynx  Chest - clear to auscultation, no wheezes, rales or rhonchi, symmetric air entry  Heart - normal rate, regular rhythm, normal S1, S2, no murmurs, rubs, clicks or gallops      Labs:   Lab Results   Component Value Date/Time    Hemoglobin A1c 8.1 (H) 02/19/2020 07:20 AM    Hemoglobin A1c 7.8 (H) 10/25/2019 07:24 AM    Hemoglobin A1c 7.4 (H) 06/24/2019 07:50 AM    Glucose 55 (L) 02/19/2020 07:20 AM    Glucose (POC) 138 (H) 06/26/2019 07:47 AM    Microalbumin/Creat ratio (mg/g creat) 53 (H) 02/19/2020 07:20 AM    Microalbumin,urine random 6.70 (H) 02/19/2020 07:20 AM    LDL, calculated 65 02/19/2020 07:20 AM    Creatinine 1.13 02/19/2020 07:20 AM    Hemoglobin A1c, External 9.3 12/10/2015    Hemoglobin A1c, External 8.7% 08/20/2015    Hemoglobin A1c, External 9.5 05/22/2015 07:43 PM      Lab Results   Component Value Date/Time    Cholesterol, total 136 02/19/2020 07:20 AM    HDL Cholesterol 60 02/19/2020 07:20 AM    LDL, calculated 65 02/19/2020 07:20 AM    Triglyceride 55 02/19/2020 07:20 AM    CHOL/HDL Ratio 2.3 02/19/2020 07:20 AM     Lab Results   Component Value Date/Time    ALT (SGPT) 30 02/19/2020 07:20 AM    AST (SGOT) 18 02/19/2020 07:20 AM    Alk. phosphatase 109 02/19/2020 07:20 AM    Bilirubin, total 0.8 02/19/2020 07:20 AM     Lab Results   Component Value Date/Time    GFR est AA >60 02/19/2020 07:20 AM    GFR est non-AA >60 02/19/2020 07:20 AM    Creatinine 1.13 02/19/2020 07:20 AM    BUN 12 02/19/2020 07:20 AM    Sodium 144 02/19/2020 07:20 AM    Potassium 4.3 02/19/2020 07:20 AM    Chloride 110 02/19/2020 07:20 AM    CO2 33 (H) 02/19/2020 07:20 AM      Lab Results   Component Value Date/Time    Prostate Specific Ag 0.4 02/19/2020 03:40 PM    Prostate Specific Ag 0.4 01/14/2019 10:24 AM    Prostate Specific Ag 0.3 03/21/2018 07:54 AM    Prostate Specific Ag 0.4 02/01/2018 10:00 AM    Prostate Specific Ag 0.251 12/17/2012 03:42 PM    PSA 0.3 09/30/2010 10:09 AM    PSA, FREE 0.2 09/30/2010 10:09 AM    % FREE PSA 67 09/30/2010 10:09 AM     Lab Results   Component Value Date/Time    Glucose 55 (L) 02/19/2020 07:20 AM    Glucose (POC) 138 (H) 06/26/2019 07:47 AM            Assessment / Plan     Diabetes much improved, pt remains on insulin pump and will follow up with Dr. Fortunato Elise endocrine. Hypertension borderline controlled, will continue  lisinopril 40 mg, and Norvasc 10 mg. If not improving would add aldactone 25 mg  Hyperlipidemia well controlled on Lipitor       ICD-10-CM ICD-9-CM    1. Type 1 diabetes mellitus with diabetic polyneuropathy (HCC) E10.42 250.61 HEMOGLOBIN A1C W/O EAG     357.2    2. Hypertension R14.1 954.14 METABOLIC PANEL, COMPREHENSIVE   3. Dyslipidemia E78.5 272.4 LIPID PANEL              Diabetic issues reviewed with him: diabetic diet discussed in detail, and low cholesterol diet, weight control and daily exercise discussed.      Follow-up and Dispositions    · Return in about 3 months (around 5/26/2020) for labs 1 week before. Reviewed plan of care. Patient has provided input and agrees with goals.

## 2020-02-26 NOTE — PATIENT INSTRUCTIONS
High Blood Pressure: Care Instructions  Overview    It's normal for blood pressure to go up and down throughout the day. But if it stays up, you have high blood pressure. Another name for high blood pressure is hypertension. Despite what a lot of people think, high blood pressure usually doesn't cause headaches or make you feel dizzy or lightheaded. It usually has no symptoms. But it does increase your risk of stroke, heart attack, and other problems. You and your doctor will talk about your risks of these problems based on your blood pressure. Your doctor will give you a goal for your blood pressure. Your goal will be based on your health and your age. Lifestyle changes, such as eating healthy and being active, are always important to help lower blood pressure. You might also take medicine to reach your blood pressure goal.  Follow-up care is a key part of your treatment and safety. Be sure to make and go to all appointments, and call your doctor if you are having problems. It's also a good idea to know your test results and keep a list of the medicines you take. How can you care for yourself at home? Medical treatment  · If you stop taking your medicine, your blood pressure will go back up. You may take one or more types of medicine to lower your blood pressure. Be safe with medicines. Take your medicine exactly as prescribed. Call your doctor if you think you are having a problem with your medicine. · Talk to your doctor before you start taking aspirin every day. Aspirin can help certain people lower their risk of a heart attack or stroke. But taking aspirin isn't right for everyone, because it can cause serious bleeding. · See your doctor regularly. You may need to see the doctor more often at first or until your blood pressure comes down. · If you are taking blood pressure medicine, talk to your doctor before you take decongestants or anti-inflammatory medicine, such as ibuprofen.  Some of these medicines can raise blood pressure. · Learn how to check your blood pressure at home. Lifestyle changes  · Stay at a healthy weight. This is especially important if you put on weight around the waist. Losing even 10 pounds can help you lower your blood pressure. · If your doctor recommends it, get more exercise. Walking is a good choice. Bit by bit, increase the amount you walk every day. Try for at least 30 minutes on most days of the week. You also may want to swim, bike, or do other activities. · Avoid or limit alcohol. Talk to your doctor about whether you can drink any alcohol. · Try to limit how much sodium you eat to less than 2,300 milligrams (mg) a day. Your doctor may ask you to try to eat less than 1,500 mg a day. · Eat plenty of fruits (such as bananas and oranges), vegetables, legumes, whole grains, and low-fat dairy products. · Lower the amount of saturated fat in your diet. Saturated fat is found in animal products such as milk, cheese, and meat. Limiting these foods may help you lose weight and also lower your risk for heart disease. · Do not smoke. Smoking increases your risk for heart attack and stroke. If you need help quitting, talk to your doctor about stop-smoking programs and medicines. These can increase your chances of quitting for good. When should you call for help? Call  911 anytime you think you may need emergency care. This may mean having symptoms that suggest that your blood pressure is causing a serious heart or blood vessel problem. Your blood pressure may be over 180/120.   For example, call  911 if:    · You have symptoms of a heart attack. These may include:  ? Chest pain or pressure, or a strange feeling in the chest.  ? Sweating. ? Shortness of breath. ? Nausea or vomiting. ? Pain, pressure, or a strange feeling in the back, neck, jaw, or upper belly or in one or both shoulders or arms. ? Lightheadedness or sudden weakness.   ? A fast or irregular heartbeat.     · You have symptoms of a stroke. These may include:  ? Sudden numbness, tingling, weakness, or loss of movement in your face, arm, or leg, especially on only one side of your body. ? Sudden vision changes. ? Sudden trouble speaking. ? Sudden confusion or trouble understanding simple statements. ? Sudden problems with walking or balance. ? A sudden, severe headache that is different from past headaches.     · You have severe back or belly pain.    Do not wait until your blood pressure comes down on its own. Get help right away.   Call your doctor now or seek immediate care if:    · Your blood pressure is much higher than normal (such as 180/120 or higher), but you don't have symptoms.     · You think high blood pressure is causing symptoms, such as:  ? Severe headache.  ? Blurry vision.    Watch closely for changes in your health, and be sure to contact your doctor if:    · Your blood pressure measures higher than your doctor recommends at least 2 times. That means the top number is higher or the bottom number is higher, or both.     · You think you may be having side effects from your blood pressure medicine. Where can you learn more? Go to http://freida-khloe.info/. Enter Q143 in the search box to learn more about \"High Blood Pressure: Care Instructions. \"  Current as of: April 9, 2019  Content Version: 12.2  © 4980-8032 [employer]. Care instructions adapted under license by [employer] (which disclaims liability or warranty for this information). If you have questions about a medical condition or this instruction, always ask your healthcare professional. [place] [address] any warranty or liability for your use of this information. High Blood Pressure: Care Instructions  Overview    It's normal for blood pressure to go up and down throughout the day. But if it stays up, you have high blood pressure.  Another name for high blood pressure is hypertension. Despite what a lot of people think, high blood pressure usually doesn't cause headaches or make you feel dizzy or lightheaded. It usually has no symptoms. But it does increase your risk of stroke, heart attack, and other problems. You and your doctor will talk about your risks of these problems based on your blood pressure. Your doctor will give you a goal for your blood pressure. Your goal will be based on your health and your age. Lifestyle changes, such as eating healthy and being active, are always important to help lower blood pressure. You might also take medicine to reach your blood pressure goal.  Follow-up care is a key part of your treatment and safety. Be sure to make and go to all appointments, and call your doctor if you are having problems. It's also a good idea to know your test results and keep a list of the medicines you take. How can you care for yourself at home? Medical treatment  · If you stop taking your medicine, your blood pressure will go back up. You may take one or more types of medicine to lower your blood pressure. Be safe with medicines. Take your medicine exactly as prescribed. Call your doctor if you think you are having a problem with your medicine. · Talk to your doctor before you start taking aspirin every day. Aspirin can help certain people lower their risk of a heart attack or stroke. But taking aspirin isn't right for everyone, because it can cause serious bleeding. · See your doctor regularly. You may need to see the doctor more often at first or until your blood pressure comes down. · If you are taking blood pressure medicine, talk to your doctor before you take decongestants or anti-inflammatory medicine, such as ibuprofen. Some of these medicines can raise blood pressure. · Learn how to check your blood pressure at home. Lifestyle changes  · Stay at a healthy weight.  This is especially important if you put on weight around the waist. Losing even 10 pounds can help you lower your blood pressure. · If your doctor recommends it, get more exercise. Walking is a good choice. Bit by bit, increase the amount you walk every day. Try for at least 30 minutes on most days of the week. You also may want to swim, bike, or do other activities. · Avoid or limit alcohol. Talk to your doctor about whether you can drink any alcohol. · Try to limit how much sodium you eat to less than 2,300 milligrams (mg) a day. Your doctor may ask you to try to eat less than 1,500 mg a day. · Eat plenty of fruits (such as bananas and oranges), vegetables, legumes, whole grains, and low-fat dairy products. · Lower the amount of saturated fat in your diet. Saturated fat is found in animal products such as milk, cheese, and meat. Limiting these foods may help you lose weight and also lower your risk for heart disease. · Do not smoke. Smoking increases your risk for heart attack and stroke. If you need help quitting, talk to your doctor about stop-smoking programs and medicines. These can increase your chances of quitting for good. When should you call for help? Call  911 anytime you think you may need emergency care. This may mean having symptoms that suggest that your blood pressure is causing a serious heart or blood vessel problem. Your blood pressure may be over 180/120.   For example, call  911 if:    · You have symptoms of a heart attack. These may include:  ? Chest pain or pressure, or a strange feeling in the chest.  ? Sweating. ? Shortness of breath. ? Nausea or vomiting. ? Pain, pressure, or a strange feeling in the back, neck, jaw, or upper belly or in one or both shoulders or arms. ? Lightheadedness or sudden weakness. ? A fast or irregular heartbeat.     · You have symptoms of a stroke. These may include:  ? Sudden numbness, tingling, weakness, or loss of movement in your face, arm, or leg, especially on only one side of your body. ? Sudden vision changes.   ? Sudden trouble speaking. ? Sudden confusion or trouble understanding simple statements. ? Sudden problems with walking or balance. ? A sudden, severe headache that is different from past headaches.     · You have severe back or belly pain.    Do not wait until your blood pressure comes down on its own. Get help right away.   Call your doctor now or seek immediate care if:    · Your blood pressure is much higher than normal (such as 180/120 or higher), but you don't have symptoms.     · You think high blood pressure is causing symptoms, such as:  ? Severe headache.  ? Blurry vision.    Watch closely for changes in your health, and be sure to contact your doctor if:    · Your blood pressure measures higher than your doctor recommends at least 2 times. That means the top number is higher or the bottom number is higher, or both.     · You think you may be having side effects from your blood pressure medicine. Where can you learn more? Go to http://freida-khloe.info/. Enter G102 in the search box to learn more about \"High Blood Pressure: Care Instructions. \"  Current as of: April 9, 2019  Content Version: 12.2  © 0829-6316 [employer]. Care instructions adapted under license by [employer] (which disclaims liability or warranty for this information). If you have questions about a medical condition or this instruction, always ask your healthcare professional. [address] any warranty or liability for your use of this information.

## 2020-02-27 ENCOUNTER — TELEPHONE (OUTPATIENT)
Dept: FAMILY MEDICINE CLINIC | Age: 60
End: 2020-02-27

## 2020-02-27 RX ORDER — SPIRONOLACTONE 25 MG/1
25 TABLET ORAL DAILY
Qty: 90 TAB | Refills: 1 | Status: SHIPPED | OUTPATIENT
Start: 2020-02-27 | End: 2020-05-08 | Stop reason: SDUPTHER

## 2020-02-27 NOTE — TELEPHONE ENCOUNTER
Pt called to advise provider that he would like to go ahead and try the new blood pressure medication that he wanted to put him on.     Please adv pt [phone]

## 2020-02-28 DIAGNOSIS — I10 ESSENTIAL HYPERTENSION: ICD-10-CM

## 2020-02-28 RX ORDER — AMLODIPINE BESYLATE 10 MG/1
TABLET ORAL
Qty: 90 TAB | Refills: 3 | Status: SHIPPED | OUTPATIENT
Start: 2020-02-28 | End: 2020-12-16

## 2020-03-19 DIAGNOSIS — E78.00 HIGH CHOLESTEROL: ICD-10-CM

## 2020-03-19 RX ORDER — ATORVASTATIN CALCIUM 20 MG/1
TABLET, FILM COATED ORAL
Qty: 90 TAB | Refills: 3 | Status: SHIPPED | OUTPATIENT
Start: 2020-03-19 | End: 2021-03-15

## 2020-03-20 RX ORDER — INSULIN LISPRO 100 [IU]/ML
INJECTION, SOLUTION INTRAVENOUS; SUBCUTANEOUS
Qty: 2 VIAL | Refills: 5
Start: 2020-03-20 | End: 2020-05-11

## 2020-03-20 NOTE — TELEPHONE ENCOUNTER
Spoke with patient he is aware will send request for Humalog insulin to the pharmacy per request.  Patient states does not need Novolin at this time.

## 2020-05-07 RX ORDER — OMEPRAZOLE 40 MG/1
40 CAPSULE, DELAYED RELEASE ORAL DAILY
Qty: 90 CAP | Refills: 3 | Status: SHIPPED | OUTPATIENT
Start: 2020-05-07 | End: 2021-03-01 | Stop reason: SDUPTHER

## 2020-05-07 NOTE — TELEPHONE ENCOUNTER
Please advise regarding dose change. Patient was previously on 20mg twice a day, then once a day, then was taking Protonix. This was changed to 40mg once daily in December.

## 2020-05-07 NOTE — TELEPHONE ENCOUNTER
Patient stating he takes 2 a day but somewhere along the line it got switched to 1 a day. He needs a new rx for 2 a day to be sent to [facility].

## 2020-05-08 RX ORDER — SPIRONOLACTONE 25 MG/1
25 TABLET ORAL DAILY
Qty: 90 TAB | Refills: 1 | Status: SHIPPED | OUTPATIENT
Start: 2020-05-08 | End: 2020-09-04

## 2020-05-08 NOTE — TELEPHONE ENCOUNTER
Minoo Godinez is requesting a 90 day supply  Previous Rx was sent to [facility]    Last Visit: 2/26/20 with MD Ibanez  Next Appointment: 5/27/20 with MD Ibanez  Previous Refill Encounter(s): 2/27/20 #90 with 1 refill    Requested Prescriptions     Pending Prescriptions Disp Refills    spironolactone (ALDACTONE) 25 mg tablet 90 Tab 1     Sig: Take 1 Tab by mouth daily.

## 2020-05-11 RX ORDER — INSULIN LISPRO 100 [IU]/ML
INJECTION, SOLUTION INTRAVENOUS; SUBCUTANEOUS
Qty: 20 ML | Refills: 3 | Status: SHIPPED | OUTPATIENT
Start: 2020-05-11 | End: 2020-11-18

## 2020-05-20 ENCOUNTER — HOSPITAL ENCOUNTER (OUTPATIENT)
Dept: LAB | Age: 60
Discharge: HOME | End: 2020-05-20
Payer: MEDICARE

## 2020-05-20 ENCOUNTER — APPOINTMENT (OUTPATIENT)
Dept: INTERNAL MEDICINE CLINIC | Age: 60
End: 2020-05-20

## 2020-05-20 DIAGNOSIS — E10.42 TYPE 1 DIABETES MELLITUS WITH DIABETIC POLYNEUROPATHY (HCC): Primary | ICD-10-CM

## 2020-05-20 DIAGNOSIS — E10.42 TYPE 1 DIABETES MELLITUS WITH DIABETIC POLYNEUROPATHY (HCC): ICD-10-CM

## 2020-05-20 LAB
ALBUMIN SERPL-MCNC: 3.5 G/DL (ref 3.4–5)
ALBUMIN/GLOB SERPL: 1 {RATIO} (ref 0.8–1.7)
ALP SERPL-CCNC: 118 U/L (ref 45–117)
ALT SERPL-CCNC: 28 U/L (ref 16–61)
ANION GAP SERPL CALC-SCNC: 3 MMOL/L (ref 3–18)
AST SERPL-CCNC: 14 U/L (ref 10–38)
BILIRUB SERPL-MCNC: 0.4 MG/DL (ref 0.2–1)
BUN SERPL-MCNC: 19 MG/DL (ref 7–18)
BUN/CREAT SERPL: 16 (ref 12–20)
CALCIUM SERPL-MCNC: 9.9 MG/DL (ref 8.5–10.1)
CHLORIDE SERPL-SCNC: 111 MMOL/L (ref 100–111)
CHOLEST SERPL-MCNC: 141 MG/DL
CO2 SERPL-SCNC: 30 MMOL/L (ref 21–32)
CREAT SERPL-MCNC: 1.2 MG/DL (ref 0.6–1.3)
GLOBULIN SER CALC-MCNC: 3.4 G/DL (ref 2–4)
GLUCOSE SERPL-MCNC: 140 MG/DL (ref 74–99)
HBA1C MFR BLD: 7.8 % (ref 4.2–5.6)
HDLC SERPL-MCNC: 58 MG/DL (ref 40–60)
HDLC SERPL: 2.4 {RATIO} (ref 0–5)
LDLC SERPL CALC-MCNC: 66.4 MG/DL (ref 0–100)
LIPID PROFILE,FLP: NORMAL
POTASSIUM SERPL-SCNC: 4.5 MMOL/L (ref 3.5–5.5)
PROT SERPL-MCNC: 6.9 G/DL (ref 6.4–8.2)
SODIUM SERPL-SCNC: 144 MMOL/L (ref 136–145)
TRIGL SERPL-MCNC: 83 MG/DL (ref ?–150)
VLDLC SERPL CALC-MCNC: 16.6 MG/DL

## 2020-05-20 PROCEDURE — 36415 COLL VENOUS BLD VENIPUNCTURE: CPT

## 2020-05-20 PROCEDURE — 83036 HEMOGLOBIN GLYCOSYLATED A1C: CPT

## 2020-05-20 PROCEDURE — 80061 LIPID PANEL: CPT

## 2020-05-20 PROCEDURE — 80053 COMPREHEN METABOLIC PANEL: CPT

## 2020-05-26 ENCOUNTER — VIRTUAL VISIT (OUTPATIENT)
Dept: INTERNAL MEDICINE CLINIC | Age: 60
End: 2020-05-26

## 2020-05-26 VITALS — DIASTOLIC BLOOD PRESSURE: 85 MMHG | SYSTOLIC BLOOD PRESSURE: 130 MMHG

## 2020-05-26 DIAGNOSIS — I11.9 BENIGN HYPERTENSIVE HEART DISEASE WITHOUT HEART FAILURE: ICD-10-CM

## 2020-05-26 DIAGNOSIS — E78.5 DYSLIPIDEMIA: ICD-10-CM

## 2020-05-26 DIAGNOSIS — E11.21 TYPE 2 DIABETES WITH NEPHROPATHY (HCC): Primary | ICD-10-CM

## 2020-05-26 NOTE — PROGRESS NOTES
Han Williamson. 61 y.o. male who was seen by synchronous (real-time) audio-video technology on 05/26/20    Consent:  heand/or his healthcare decision maker is aware that this patient-initiated Telehealth encounter is a billable service, with coverage as determined by her insurance carrier. he is aware that he may receive a bill and has provided verbal consent to proceed: Yes I was in my office while conducting this encounter. The patient was in their home. Subjective:       Chief Complaint  The patient presents for follow up of diabetes, hypertension and high cholesterol. JANESSA Williamson. is a 61 y.o. male seen for follow up of diabetes. Healso has hypertension and hyperlipidemia. Diabetes no significant medication side effects noted, much improved pt continues on insulin pump and is trying to get a continuous glucose monitor since he checks his blood sugars 6-8 times a day, will f/u with Endo (Dr Jed Ayala)  for further management, he will try to get more protein in his diet to avoid hypoglycemia and taking too much sugar to compensate,   hypertension well controlled on lisinopril 40 mg  And Norvasc 10 mg, and aldactone 25 mg hyperlipidemia well controlled, no significant medication side effects noted, on Lipitor 20 mg    Diet and Lifestyle: generally follows a low fat low cholesterol diet, does not rigorously follow a diabetic diet, exercises sporadically    Home BP Monitoring: is well controlled at home. Diabetic Review of Systems - home glucose monitoring: is performed regularly, 6x/day. Other symptoms and concerns: pt will try to exercise more. He has problems with his feet and needed foot surgery but has put it off for now. Pt is candidate for diabetic shoes due to neuropathy and ulcerative callus. He is followed by Podiatry. he sees Dr Ann Ag now. Patient chronic cough resolved. May have been due to allergies.      Patient has a strong family history of coronary artery disease and will call cardiology to f/u. Current Outpatient Medications   Medication Sig    insulin lispro (HUMALOG) 100 unit/mL injection 38 UNITS GIVEN DAILY VIA INSULIN PUMP    spironolactone (ALDACTONE) 25 mg tablet Take 1 Tab by mouth daily.  omeprazole (PRILOSEC) 40 mg capsule Take 1 Cap by mouth daily.  atorvastatin (LIPITOR) 20 mg tablet TAKE 1 TABLET BY MOUTH  DAILY    amLODIPine (NORVASC) 10 mg tablet TAKE 1 TABLET BY MOUTH  DAILY    sildenafil, pulm. hypertension, (REVATIO) 20 mg tablet Take 1 Tab by mouth as needed for Other (ED). Take up to 5 tablets as needed. Do not exceed 5 tablets.  diclofenac (VOLTAREN) 1 % gel Apply 4 g to affected area four (4) times daily. left hand and fingers    lisinopril (PRINIVIL, ZESTRIL) 40 mg tablet Take 1 Tab by mouth daily.  Blood-Glucose Transmitter (DEXCOM G6 TRANSMITTER) albert E10.42    Blood-Glucose Meter,Continuous (DEXCOM G6 ) misc E10.42    Blood-Glucose Sensor (DEXCOM G6 SENSOR) albert E10.42    oxybutynin (DITROPAN) 5 mg tablet TAKE 1 TABLET BY MOUTH TWO  TIMES DAILY    MINIMED INFUSION SET iset CHANGE EVERY 3 DAYS    flash glucose scanning reader (FREESTYLE RUEL 14 DAY READER) [facility] DX E10.42 check blood sugar 8 times a day due to insulin pump and labile blood sugar    flash glucose sensor (FREESTYLE RUEL 14 DAY SENSOR) kit DX E10.42 check blood sugar 8 times a day due to insulin pump and labile blood sugar    tiotropium bromide (SPIRIVA RESPIMAT) 2.5 mcg/actuation inhaler Take 2 Puffs by inhalation daily.  azelastine (ASTELIN) 137 mcg (0.1 %) nasal spray 2 Sprays by Both Nostrils route two (2) times a day.  Use in each nostril as directed  Indications: Seasonal Runny Nose    lancets (ONE TOUCH DELICA) 33 gauge misc CHECK BLOOD SUGAR 8 TIMES  DAILY DUE TO INSULIN PUMP  AND LABILE BLOOD SUGAR    Blood-Glucose Meter (ONETOUCH VERIO FLEX) misc DX E10.42 check blood sugar 8 times a day due to insulin pump and labile blood sugar    glucose blood VI test strips (ONETOUCH VERIO) strip DX E10.42 check blood sugar 8 times a day due to insulin pump and labile blood sugar    Blood-Glucose Meter (CONTOUR NEXT EZ METER) monitoring kit DX E10.42 check blood sugar 8 times a day due to insulin pump and labile blood sugar.  albuterol (PROAIR HFA) 90 mcg/actuation inhaler Take 2 Puffs by inhalation every four (4) hours as needed for Wheezing or Shortness of Breath.  guaiFENesin-codeine (CHERATUSSIN AC) 100-10 mg/5 mL solution Take 10 mL by mouth four (4) times daily as needed for Cough. Max Daily Amount: 40 mL.  cholecalciferol, vitamin D3, (VITAMIN D3) 2,000 unit tab Take 1 Tab by mouth daily.  montelukast (SINGULAIR) 10 mg tablet Take 1 Tab by mouth daily.  PARADIGM RESERVOIR 3 mL misc     CONTOUR NEXT STRIPS strip CHECK BLOOD SUGAR 8 TIMES A DAY DUE TO INSULIN PUMP AND LABILE BLOOD SUGAR    MICROLET LANCET misc     multivitamin (ONE A DAY) tablet Take 1 Tab by mouth daily. No current facility-administered medications for this visit.               Review of Systems  Respiratory: negative for dyspnea on exertion  Cardiovascular: negative for chest pain    Objective:     Visit Vitals  /85        General appearance - alert, well appearing, and in no distress  ps      Labs:   Lab Results   Component Value Date/Time    Hemoglobin A1c 7.8 (H) 05/20/2020 08:02 AM    Hemoglobin A1c 8.1 (H) 02/19/2020 07:20 AM    Hemoglobin A1c 7.8 (H) 10/25/2019 07:24 AM    Glucose 140 (H) 05/20/2020 08:02 AM    Glucose (POC) 138 (H) 06/26/2019 07:47 AM    Microalbumin/Creat ratio (mg/g creat) 53 (H) 02/19/2020 07:20 AM    Microalbumin,urine random 6.70 (H) 02/19/2020 07:20 AM    LDL, calculated 66.4 05/20/2020 08:02 AM    Creatinine 1.20 05/20/2020 08:02 AM    Hemoglobin A1c, External 9.3 12/10/2015    Hemoglobin A1c, External 8.7% 08/20/2015    Hemoglobin A1c, External 9.5 05/22/2015 07:43 PM      Lab Results   Component Value Date/Time    Cholesterol, total 141 05/20/2020 08:02 AM    HDL Cholesterol 58 05/20/2020 08:02 AM    LDL, calculated 66.4 05/20/2020 08:02 AM    Triglyceride 83 05/20/2020 08:02 AM    CHOL/HDL Ratio 2.4 05/20/2020 08:02 AM     Lab Results   Component Value Date/Time    ALT (SGPT) 28 05/20/2020 08:02 AM    AST (SGOT) 14 05/20/2020 08:02 AM    Alk. phosphatase 118 (H) 05/20/2020 08:02 AM    Bilirubin, total 0.4 05/20/2020 08:02 AM     Lab Results   Component Value Date/Time    GFR est AA >60 05/20/2020 08:02 AM    GFR est non-AA >60 05/20/2020 08:02 AM    Creatinine 1.20 05/20/2020 08:02 AM    BUN 19 (H) 05/20/2020 08:02 AM    Sodium 144 05/20/2020 08:02 AM    Potassium 4.5 05/20/2020 08:02 AM    Chloride 111 05/20/2020 08:02 AM    CO2 30 05/20/2020 08:02 AM      Lab Results   Component Value Date/Time    Prostate Specific Ag 0.4 02/19/2020 03:40 PM    Prostate Specific Ag 0.4 01/14/2019 10:24 AM    Prostate Specific Ag 0.3 03/21/2018 07:54 AM    Prostate Specific Ag 0.4 02/01/2018 10:00 AM    Prostate Specific Ag 0.251 12/17/2012 03:42 PM    PSA 0.3 09/30/2010 10:09 AM    PSA, FREE 0.2 09/30/2010 10:09 AM    % FREE PSA 67 09/30/2010 10:09 AM     Lab Results   Component Value Date/Time    Glucose 140 (H) 05/20/2020 08:02 AM    Glucose (POC) 138 (H) 06/26/2019 07:47 AM            Assessment / Plan     Diabetes much improved, pt remains on insulin pump and will follow up with Dr. Mandie Cruz endocrine. He will try to get more protein in his diet and less sugar. Hypertension well controlled, will continue  lisinopril 40 mg, and Norvasc 10 mg and aldactone 25 mg  Hyperlipidemia well controlled on Lipitor       ICD-10-CM ICD-9-CM    1. Type 2 diabetes with nephropathy (HCC) E11.21 250.40 MICROALBUMIN, UR, RAND W/ MICROALB/CREAT RATIO     583.81 HEMOGLOBIN A1C W/O EAG   2. Hypertension Z54.5 768.93 METABOLIC PANEL, COMPREHENSIVE   3.  Dyslipidemia E78.5 272.4 LIPID PANEL              Diabetic issues reviewed with him: diabetic diet discussed in detail, and low cholesterol diet, weight control and daily exercise discussed. Follow-up and Dispositions    · Return in about 3 months (around 8/26/2020) for labs 1 week before. Reviewed plan of care. Patient has provided input and agrees with goals.

## 2020-06-11 ENCOUNTER — OFFICE VISIT (OUTPATIENT)
Dept: ORTHOPEDIC SURGERY | Facility: CLINIC | Age: 60
End: 2020-06-11

## 2020-06-11 VITALS
WEIGHT: 203.4 LBS | HEIGHT: 73 IN | DIASTOLIC BLOOD PRESSURE: 73 MMHG | SYSTOLIC BLOOD PRESSURE: 138 MMHG | TEMPERATURE: 97.6 F | BODY MASS INDEX: 26.96 KG/M2 | HEART RATE: 79 BPM

## 2020-06-11 DIAGNOSIS — M25.511 ACUTE PAIN OF RIGHT SHOULDER: ICD-10-CM

## 2020-06-11 DIAGNOSIS — G89.29 CHRONIC RIGHT SHOULDER PAIN: ICD-10-CM

## 2020-06-11 DIAGNOSIS — M25.511 CHRONIC RIGHT SHOULDER PAIN: ICD-10-CM

## 2020-06-11 DIAGNOSIS — M19.011 PRIMARY OSTEOARTHRITIS, RIGHT SHOULDER: Primary | ICD-10-CM

## 2020-06-11 DIAGNOSIS — M24.811 CREPITUS OF RIGHT SHOULDER JOINT: ICD-10-CM

## 2020-06-11 RX ORDER — BETAMETHASONE SODIUM PHOSPHATE AND BETAMETHASONE ACETATE 3; 3 MG/ML; MG/ML
6 INJECTION, SUSPENSION INTRA-ARTICULAR; INTRALESIONAL; INTRAMUSCULAR; SOFT TISSUE ONCE
Qty: 0.5 ML | Refills: 0
Start: 2020-06-11 | End: 2020-06-11

## 2020-06-11 NOTE — PROGRESS NOTES
Pt reports pain being over area that seems to be trapezius. States that neck movement increases pain; no increase in pain with arm mov't.

## 2020-06-11 NOTE — PROGRESS NOTES
Patient: Tomas Yepez MRN: [id number]       SSN: [id number]  YOB: 1960        AGE: 61 y.o. SEX: male    PCP: Ele Hua MD  06/11/20    Chief Complaint   Patient presents with    Shoulder Pain     Rt     HISTORY:  Tomas Yepez is a 61 y.o. male who is seen for right shoulder pain. He has been experiencing right shoulder pain for the past few months. He does not recall any injury. He has tried heat with no relief. He feels shoulder pain with overhead activities and at night. He was previously seen for left middle finger pain. He has been experiencing PIP joint pain for the past week and a half. He does not recall any injury. He denies triggering, numbness, or tingling. He denies h/o gout. He has a h/o multiple right LE surgeries for heel cord tightness and intractable plantar ulcers. He received hyperbaric oxygen for his foot ulcers in the past. He sees Dr. Lilia Lyle. Pain Assessment  6/11/2020   Location of Pain Shoulder   Pain Location Comment -   Location Modifiers Right   Severity of Pain 7   Quality of Pain Other (Comment)   Duration of Pain Persistent   Frequency of Pain Constant   Aggravating Factors Bending; Other (Comment)   Limiting Behavior Some   Relieving Factors Nothing   Result of Injury -     Occupation, etc:  Mr. Clem Mckeon receives social security disability benefits for right ankle pain. 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 He previously worked at the [employer]. He lives in [place] with his wife. He has identical 46 yo twin sons, 1 daughter and 3 grandchildren. He is diabetic. Mr. Clem Mckeon weighs 199 lbs and is 6'1\" tall.        Lab Results   Component Value Date/Time    Hemoglobin A1c 7.8 (H) 05/20/2020 08:02 AM    Hemoglobin A1c (POC) 9.1 06/11/2015 09:54 AM    Hemoglobin A1c, External 9.3 12/10/2015     Weight Metrics 6/11/2020 2/27/2020 2/26/2020 1/6/2020 11/19/2019 7/22/2019 7/18/2019   Weight 203 lb 6.4 oz 205 lb 204 lb 199 lb 3.2 oz 203 lb 3.2 oz 200 lb 200 lb   BMI 26.84 kg/m2 27.05 kg/m2 27.67 kg/m2 26.28 kg/m2 26.81 kg/m2 26.39 kg/m2 26.39 kg/m2       Patient Active Problem List   Diagnosis Code    Left shoulder pain M25.512    Bursitis of left shoulder M75.52    Hypogonadism male E29.1    PN (peripheral neuropathy) G62.9    Hypertension I11.9    Dyslipidemia E78.5    Dyspnea R06.00    ACP (advance care planning) Z71.89    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 Cardiovascular: negative for chest pain, claudication, leg swelling, SOB, BURDEN   Gastrointestinal: Negative for pain, N/V/C/D, Blood in stool or urine, dysuria, hematuria, incontinence, pelvic pain. Musculoskeletal: See HPI   Neurological: Negative for dizziness and weakness. Negative for headaches, Visual changes, confusion, seizures   Phychiatric/Behavioral: Negative for depression, memory loss, substance abuse. Extremities: Negative for hair changes, rash, or skin lesion changes. Hematologic: Negative for bleeding problems, bruising, pallor or swollen lymph nodes   Peripheral Vascular: No calf pain, no circulation deficits.     Social History     Socioeconomic History    Marital status:      Spouse name: Not on file    Number of children: Not on file    Years of education: Not on file    Highest education level: Not on file   Occupational History    Not on file   Social Needs    Financial resource strain: Not on file    Food insecurity     Worry: Not on file     Inability: Not on file   LEYIO needs Medical: Not on file     Non-medical: Not on file   Tobacco Use    Smoking status: Former Smoker     Packs/day: 1.00     Years: 10.00     Pack years: 10.00     Types: Cigarettes     Last attempt to quit: 1998     Years since quittin.2    Smokeless tobacco: Never Used   Substance and Sexual Activity    Alcohol use: Yes     Comment: rarely    Drug use: No    Sexual activity: Not on file   Lifestyle    Physical activity     Days per week: Not on file     Minutes per session: Not on file    Stress: Not on file   Relationships    Social connections     Talks on phone: Not on file     Gets together: Not on file     Attends Yazdanism service: Not on file     Active member of club or organization: Not on file     Attends meetings of clubs or organizations: Not on file     Relationship status: Not on file    Intimate partner violence     Fear of current or ex partner: Not on file     Emotionally abused: Not on file     Physically abused: Not on file     Forced sexual activity: Not on file   Other Topics Concern    Not on file   Social History Narrative    Not on file      No Known Allergies   Current Outpatient Medications   Medication Sig    betamethasone (Celestone Soluspan) 6 mg/mL injection 1 mL by Intra artICUlar route once for 1 dose.  insulin lispro (HUMALOG) 100 unit/mL injection 38 UNITS GIVEN DAILY VIA INSULIN PUMP    omeprazole (PRILOSEC) 40 mg capsule Take 1 Cap by mouth daily.  atorvastatin (LIPITOR) 20 mg tablet TAKE 1 TABLET BY MOUTH  DAILY    amLODIPine (NORVASC) 10 mg tablet TAKE 1 TABLET BY MOUTH  DAILY    sildenafil, pulm. hypertension, (REVATIO) 20 mg tablet Take 1 Tab by mouth as needed for Other (ED). Take up to 5 tablets as needed. Do not exceed 5 tablets.  diclofenac (VOLTAREN) 1 % gel Apply 4 g to affected area four (4) times daily. left hand and fingers    lisinopril (PRINIVIL, ZESTRIL) 40 mg tablet Take 1 Tab by mouth daily.     oxybutynin (DITROPAN) 5 mg tablet TAKE 1 TABLET BY MOUTH TWO  TIMES DAILY    MINIMED INFUSION SET iset CHANGE EVERY 3 DAYS    flash glucose scanning reader (FREESTYLE RUEL 14 DAY READER) misc DX E10.42 check blood sugar 8 times a day due to insulin pump and labile blood sugar    flash glucose sensor (FREESTYLE RUEL 14 DAY SENSOR) kit DX E10.42 check blood sugar 8 times a day due to insulin pump and labile blood sugar    lancets (ONE TOUCH DELICA) 33 gauge misc CHECK BLOOD SUGAR 8 TIMES  DAILY DUE TO INSULIN PUMP  AND LABILE BLOOD SUGAR    glucose blood VI test strips (ONETOUCH VERIO) strip DX E10.42 check blood sugar 8 times a day due to insulin pump and labile blood sugar    Blood-Glucose Meter (CONTOUR NEXT EZ METER) monitoring kit DX E10.42 check blood sugar 8 times a day due to insulin pump and labile blood sugar.  albuterol (PROAIR HFA) 90 mcg/actuation inhaler Take 2 Puffs by inhalation every four (4) hours as needed for Wheezing or Shortness of Breath.  cholecalciferol, vitamin D3, (VITAMIN D3) 2,000 unit tab Take 1 Tab by mouth daily.  montelukast (SINGULAIR) 10 mg tablet Take 1 Tab by mouth daily.  PARADIGM RESERVOIR 3 mL misc     CONTOUR NEXT STRIPS strip CHECK BLOOD SUGAR 8 TIMES A DAY DUE TO INSULIN PUMP AND LABILE BLOOD SUGAR    MICROLET LANCET misc     multivitamin (ONE A DAY) tablet Take 1 Tab by mouth daily.  spironolactone (ALDACTONE) 25 mg tablet Take 1 Tab by mouth daily.  Blood-Glucose Transmitter (DEXCOM G6 TRANSMITTER) albert E10.42    Blood-Glucose Meter,Continuous (DEXCOM G6 ) misc E10.42    Blood-Glucose Sensor (DEXCOM G6 SENSOR) albert E10.42    tiotropium bromide (SPIRIVA RESPIMAT) 2.5 mcg/actuation inhaler Take 2 Puffs by inhalation daily.  azelastine (ASTELIN) 137 mcg (0.1 %) nasal spray 2 Sprays by Both Nostrils route two (2) times a day.  Use in each nostril as directed  Indications: Seasonal Runny Nose    Blood-Glucose Meter (ONETOUCH VERIO FLEX) misc DX E10.42 check blood sugar 8 times a day due to insulin pump and labile blood sugar    guaiFENesin-codeine (CHERATUSSIN AC) 100-10 mg/5 mL solution Take 10 mL by mouth four (4) times daily as needed for Cough. Max Daily Amount: 40 mL. No current facility-administered medications for this visit.        PHYSICAL EXAMINATION:  Visit Vitals  /73   Pulse 79   Temp 97.6 °F (36.4 °C)   Ht 6' 1\" (1.854 m)   Wt 203 lb 6.4 oz (92.3 kg)   BMI 26.84 kg/m²      ORTHO EXAMINATION:  Examination Right shoulder Left shoulder   Skin Intact Intact   Effusion - -   Biceps deformity - -   Atrophy - -   AC joint tenderness - -   Acromial tenderness + +   Biceps tenderness - -   Forward flexion/Elevation  170   Active abduction  160   External rotation ROM 20 30   Internal rotation ROM 65 90   Apprehension - -   Impingement - -   Drop Arm Test - -   Neurovascular Intact Intact    Crepitation of shoulder  Examination Right Hand Left Hand   Skin Intact Intact   Deformity - -   Swelling - + middle PIP   Tenderness - + middle finger   Finger flexion Full Full   Finger extension Full Full   Sensation Normal Normal   Capillary refill Normal Normal   Heberden's nodes - -   Dupuytren's - -     TIME OUT:  Chart reviewed for the following:   Lita Turpin MD, have reviewed the History, Physical and updated the Allergic reactions for [address] [place], [place] [address] performed immediately prior to start of procedure:  Lita Turpin MD, have performed the following [facility] to the start of the procedure:          * Patient was identified by name and date of birth   * Agreement on procedure being performed was verified  * Risks and Benefits explained to the patient  * Procedure site verified and marked as necessary  * Patient was positioned for comfort  * Consent was obtained     Time: 11:41 AM     Date of procedure: 6/11/2020  Procedure performed by:  Glory Bocanegra MD  Mr. Claude Neve tolerated the procedure well with no complications. RADIOGRAPHS:  XR RIGHT SHOULDER 6/11/20 [place]  IMPRESSION:  Three views - No fractures, no acromioclavicular narrowing, moderately severe glenohumeral narrowing, no calcific densities. XR LEFT HAND 1/6/20 MELODY  IMPRESSION:  Three views - No fractures, no joint space narrowing, soft tissue swelling of middle finger. IMPRESSION:      ICD-10-CM ICD-9-CM    1. Primary osteoarthritis, right shoulder M19.011 715.11 betamethasone (Celestone Soluspan) 6 mg/mL injection      BETAMETHASONE ACETATE & SODIUM PHOSPHATE INJECTION 3 MG EA.      DRAIN/INJECT LARGE JOINT/BURSA   2. Acute pain of right shoulder M25.511 719.41 AMB POC XRAY, SHOULDER; COMPLETE, 2+   3. Chronic right shoulder pain M25.511 719.41 betamethasone (Celestone Soluspan) 6 mg/mL injection    G89.29 338.29 BETAMETHASONE ACETATE & SODIUM PHOSPHATE INJECTION 3 MG EA.      DRAIN/INJECT LARGE JOINT/BURSA   4. Crepitus of right shoulder joint M24.811 719.61      PLAN:  After discussing treatment options, patient's right shoulder was injected with 4 cc Marcaine and 1/2 cc Celestone. Dietary counseling provided today. Start weight loss with low carb diet and intermittent fasting. We discussed a possible need for right shoulder MRI in the future if pain continues. There is no need for surgery at this time. He will follow up as needed.     Scribed by Romayne Beams ([facility]) as dictated by Juanito Santiago MD

## 2020-06-22 ENCOUNTER — OFFICE VISIT (OUTPATIENT)
Dept: ORTHOPEDIC SURGERY | Age: 60
End: 2020-06-22

## 2020-06-22 VITALS
HEART RATE: 78 BPM | DIASTOLIC BLOOD PRESSURE: 69 MMHG | TEMPERATURE: 96.6 F | OXYGEN SATURATION: 99 % | HEIGHT: 73 IN | SYSTOLIC BLOOD PRESSURE: 134 MMHG | BODY MASS INDEX: 26.98 KG/M2 | RESPIRATION RATE: 16 BRPM | WEIGHT: 203.6 LBS

## 2020-06-22 DIAGNOSIS — M15.2 DEGENERATIVE ARTHRITIS OF PROXIMAL INTERPHALANGEAL JOINT OF MIDDLE FINGER OF LEFT HAND: Primary | ICD-10-CM
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
## 2020-06-22 NOTE — PATIENT INSTRUCTIONS
Hand Arthritis: Exercises Introduction Here are some examples of exercises for you to try. The exercises may be suggested for a condition or for rehabilitation. Start each exercise slowly. Ease off the exercises if you start to have pain. You will be told when to start these exercises and which ones will work best for you. How to do the exercises Tendon glides 1. In this exercise, the steps follow one another to a make a continuous movement. 2. With your affected hand, point your fingers and thumb straight up. Your wrist should be relaxed, following the line of your fingers and thumb. 3. Curl your fingers so that the top two joints in them are bent, and your fingers wrap down. Your fingertips should touch or be near the base of your fingers. Your fingers will look like a hook. 4. Make a fist by bending your knuckles. Your thumb can gently rest against your index (pointing) finger. 5. Unwind your fingers slightly so that your fingertips can touch the base of your palm. Your thumb can rest against your index finger. 6. Move back to your starting position, with your fingers and thumb pointing up. 7. Repeat the series of motions 8 to 12 times. 8. Switch hands and repeat steps 1 through 6, even if only one hand is sore. Intrinsic flexion 1. Rest your affected hand on a table and bend the large joints where your fingers connect to your hand. Keep your thumb and the other joints in your fingers straight. 2. Slowly straighten your fingers. Your wrist should be relaxed, following the line of your fingers and thumb. 3. Move back to your starting position, with your hand bent. 4. Repeat 8 to 12 times. 5. Switch hands and repeat steps 1 through 4, even if only one hand is sore. Finger extension 1. Place your affected hand flat on a table. 2. Lift and then lower one finger at a time off the table. 3. Repeat 8 to 12 times.  
4. Switch hands and repeat steps 1 through 3, even if only one hand is sore.   
MP extension 1. Place your good hand on a table, palm up. Put your affected hand on top of your good hand with your fingers wrapped around the thumb of your good hand like you are making a fist. 
2. Slowly uncurl the joints of your affected hand where your fingers connect to your hand so that only the top two joints of your fingers are bent. Your fingers will look like a hook. 3. Move back to your starting position, with your fingers wrapped around your good thumb. 4. Repeat 8 to 12 times. 5. Switch hands and repeat steps 1 through 4, even if only one hand is sore. PIP extension (with MP extension) 1. Place your good hand on a table, palm up. Put your affected hand on top of your good hand, palm up. 2. Use the thumb and fingers of your good hand to grasp below the middle joint of one finger of your affected hand. 3. Straighten the last two joints of that finger. 4. Repeat 8 to 12 times. 5. Repeat steps 1 through 4 with each finger. 6. Switch hands and repeat steps 1 through 5, even if only one hand is sore. DIP flexion 1. With your good hand, grasp one finger of your affected hand. Your thumb will be on the top side of your finger just below the joint that is closest to your fingernail. 2. Slowly bend your affected finger only at the joint closest to your fingernail. 3. Repeat 8 to 12 times. 4. Repeat steps 1 through 3 with each finger. 5. Switch hands and repeat steps 1 through 4, even if only one hand is sore. Follow-up care is a key part of your treatment and safety. Be sure to make and go to all appointments, and call your doctor if you are having problems. It's also a good idea to know your test results and keep a list of the medicines you take. Where can you learn more? Go to http://freida-khloe.info/ Enter O251 in the search box to learn more about \"Hand Arthritis: Exercises. \" Current as of: March 2, 2020               Content Version: 12.5 © 0961-4462 [employer]. Care instructions adapted under license by [employer] (which disclaims liability or warranty for this information). If you have questions about a medical condition or this instruction, always ask your healthcare professional. [address] any warranty or liability for your use of this information.

## 2020-07-14 ENCOUNTER — HOSPITAL ENCOUNTER (OUTPATIENT)
Dept: LAB | Age: 60
Discharge: HOME | End: 2020-07-14

## 2020-07-14 LAB — XX-LABCORP SPECIMEN COL,LCBCF: NORMAL

## 2020-07-14 PROCEDURE — 99001 SPECIMEN HANDLING PT-LAB: CPT

## 2020-07-25 RX ORDER — OXYBUTYNIN CHLORIDE 5 MG/1
TABLET ORAL
Qty: 180 TAB | Refills: 3 | Status: SHIPPED | OUTPATIENT
Start: 2020-07-25 | End: 2021-06-23 | Stop reason: SDUPTHER

## 2020-08-05 ENCOUNTER — OFFICE VISIT (OUTPATIENT)
Dept: ORTHOPEDIC SURGERY | Age: 60
End: 2020-08-05

## 2020-08-05 VITALS
HEIGHT: 73 IN | WEIGHT: 204.4 LBS | HEART RATE: 73 BPM | DIASTOLIC BLOOD PRESSURE: 67 MMHG | TEMPERATURE: 98.6 F | SYSTOLIC BLOOD PRESSURE: 125 MMHG | RESPIRATION RATE: 16 BRPM | OXYGEN SATURATION: 98 % | BODY MASS INDEX: 27.09 KG/M2

## 2020-08-05 DIAGNOSIS — M54.2 CERVICAL PAIN: Primary | ICD-10-CM

## 2020-08-05 DIAGNOSIS — S16.1XXA CERVICAL STRAIN, ACUTE, INITIAL ENCOUNTER: ICD-10-CM

## 2020-08-05 RX ORDER — BETAMETHASONE SODIUM PHOSPHATE AND BETAMETHASONE ACETATE 3; 3 MG/ML; MG/ML
6 INJECTION, SUSPENSION INTRA-ARTICULAR; INTRALESIONAL; INTRAMUSCULAR; SOFT TISSUE ONCE
Qty: 0.5 ML | Refills: 0
Start: 2020-08-05 | End: 2020-08-05

## 2020-08-05 NOTE — PROGRESS NOTES
Patient: Marlo Padilla MRN: [id number]       SSN: [id number]  YOB: 1960        AGE: 61 y.o. SEX: male    PCP: Felix Han MD  08/05/20    CC: CERVICAL PAIN    HISTORY:  Marlo Padilla is a 61 y.o. male who is seen for neck pain which radiates into his right upper shoulder. His shoulder joint has been feeling better since his injection last ov. He has been experiencing cervical-trapezius pain for the past several months and he does not recall any injury. He has tried heat with no relief. He feels upper shoulder pain with overhead activities and at night. He was previously seen for left middle finger pain. He has a h/o multiple right LE surgeries for heel cord tightness and intractable plantar ulcers. He received hyperbaric oxygen for his foot ulcers in the past. He sees Dr. Bandar Fang. Pain Assessment  8/5/2020   Location of Pain Shoulder   Pain Location Comment -   Location Modifiers Right   Severity of Pain 5   Quality of Pain Aching   Duration of Pain A few hours   Frequency of Pain Intermittent   Aggravating Factors Bending;Stretching;Straightening   Aggravating Factors Comment lifting and reaching   Limiting Behavior -   Relieving Factors Nothing   Result of Injury No     Occupation, etc:  Mr. Shell Sales receives social security disability benefits for right ankle pain. 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 He previously worked at the [employer]. He lives in [place] with his wife. He has identical 46 yo twin sons, 1 daughter and 3 grandchildren. He is diabetic. Mr. Shell Sales weighs 199 lbs and is 6'1\" tall.        Lab Results   Component Value Date/Time    Hemoglobin A1c 7.8 (H) 05/20/2020 08:02 AM    Hemoglobin A1c (POC) 9.1 06/11/2015 09:54 AM    Hemoglobin A1c, External 9.3 12/10/2015     Weight Metrics 8/5/2020 6/22/2020 6/11/2020 2/27/2020 2/26/2020 1/6/2020 11/19/2019   Weight 204 lb 6.4 oz 203 lb 9.6 oz 203 lb 6.4 oz 205 lb 204 lb 199 lb 3.2 oz 203 lb 3.2 oz   BMI 26.97 kg/m2 26.86 kg/m2 26.84 kg/m2 27.05 kg/m2 27.67 kg/m2 26.28 kg/m2 26.81 kg/m2       Patient Active Problem List   Diagnosis Code    Left shoulder pain M25.512    Bursitis of left shoulder M75.52    Hypogonadism male E29.1    PN (peripheral neuropathy) G62.9    Hypertension I11.9    Dyslipidemia E78.5    Dyspnea R06.00    ACP (advance care planning) Z71.89    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 Cardiovascular: negative for chest pain, claudication, leg swelling, SOB, BURDEN   Gastrointestinal: Negative for pain, N/V/C/D, Blood in stool or urine, dysuria, hematuria, incontinence, pelvic pain. Musculoskeletal: See HPI   Neurological: Negative for dizziness and weakness. Negative for headaches, Visual changes, confusion, seizures   Phychiatric/Behavioral: Negative for depression, memory loss, substance abuse. Extremities: Negative for hair changes, rash, or skin lesion changes. Hematologic: Negative for bleeding problems, bruising, pallor or swollen lymph nodes   Peripheral Vascular: No calf pain, no circulation deficits.     Social History     Socioeconomic History    Marital status:      Spouse name: Not on file    Number of children: Not on file    Years of education: Not on file    Highest education level: Not on file   Occupational History    Not on file   Social Needs    Financial resource strain: Not on file    Food insecurity     Worry: Not on file     Inability: Not on file    Transportation needs     Medical: Not on file Non-medical: Not on file   Tobacco Use    Smoking status: Former Smoker     Packs/day: 1.00     Years: 10.00     Pack years: 10.00     Types: Cigarettes     Last attempt to quit: 1998     Years since quittin.3    Smokeless tobacco: Never Used   Substance and Sexual Activity    Alcohol use: Yes     Comment: rarely    Drug use: No    Sexual activity: Not on file   Lifestyle    Physical activity     Days per week: Not on file     Minutes per session: Not on file    Stress: Not on file   Relationships    Social connections     Talks on phone: Not on file     Gets together: Not on file     Attends [employer] service: Not on file     Active member of club or organization: Not on file     Attends meetings of clubs or organizations: Not on file     Relationship status: Not on file    Intimate partner violence     Fear of current or ex partner: Not on file     Emotionally abused: Not on file     Physically abused: Not on file     Forced sexual activity: Not on file   Other Topics Concern    Not on file   Social History Narrative    Not on file      No Known Allergies   Current Outpatient Medications   Medication Sig    oxybutynin (DITROPAN) 5 mg tablet TAKE 1 TABLET BY MOUTH TWO  TIMES DAILY    sildenafiL, pulmonary hypertension, (REVATIO) 20 mg tablet TAKE ONE TO FIVE TABLETS BY MOUTH DAILY AS NEEDED (MAX 5 TABLETS PER DAY)    insulin lispro (HUMALOG) 100 unit/mL injection 38 UNITS GIVEN DAILY VIA INSULIN PUMP    spironolactone (ALDACTONE) 25 mg tablet Take 1 Tab by mouth daily.  omeprazole (PRILOSEC) 40 mg capsule Take 1 Cap by mouth daily.  atorvastatin (LIPITOR) 20 mg tablet TAKE 1 TABLET BY MOUTH  DAILY    amLODIPine (NORVASC) 10 mg tablet TAKE 1 TABLET BY MOUTH  DAILY    diclofenac (VOLTAREN) 1 % gel Apply 4 g to affected area four (4) times daily. left hand and fingers    lisinopril (PRINIVIL, ZESTRIL) 40 mg tablet Take 1 Tab by mouth daily.     Blood-Glucose Transmitter (DEXCOM G6 TRANSMITTER) albert E10.42    Blood-Glucose Meter,Continuous (DEXCOM G6 ) misc E10.42    Blood-Glucose Sensor (DEXCOM G6 SENSOR) albert E10.42    MINIMED INFUSION SET iset CHANGE EVERY 3 DAYS    flash glucose scanning reader (FREESTYLE RUEL 14 DAY READER) misc DX E10.42 check blood sugar 8 times a day due to insulin pump and labile blood sugar    flash glucose sensor (FREESTYLE RUEL 14 DAY SENSOR) kit DX E10.42 check blood sugar 8 times a day due to insulin pump and labile blood sugar    tiotropium bromide (SPIRIVA RESPIMAT) 2.5 mcg/actuation inhaler Take 2 Puffs by inhalation daily.  azelastine (ASTELIN) 137 mcg (0.1 %) nasal spray 2 Sprays by Both Nostrils route two (2) times a day. Use in each nostril as directed  Indications: Seasonal Runny Nose    lancets (ONE TOUCH DELICA) 33 gauge misc CHECK BLOOD SUGAR 8 TIMES  DAILY DUE TO INSULIN PUMP  AND LABILE BLOOD SUGAR    Blood-Glucose Meter (ONETOUCH VERIO FLEX) misc DX E10.42 check blood sugar 8 times a day due to insulin pump and labile blood sugar    glucose blood VI test strips (ONETOUCH VERIO) strip DX E10.42 check blood sugar 8 times a day due to insulin pump and labile blood sugar    Blood-Glucose Meter (CONTOUR NEXT EZ METER) monitoring kit DX E10.42 check blood sugar 8 times a day due to insulin pump and labile blood sugar.  albuterol (PROAIR HFA) 90 mcg/actuation inhaler Take 2 Puffs by inhalation every four (4) hours as needed for Wheezing or Shortness of Breath.  guaiFENesin-codeine (CHERATUSSIN AC) 100-10 mg/5 mL solution Take 10 mL by mouth four (4) times daily as needed for Cough. Max Daily Amount: 40 mL.  cholecalciferol, vitamin D3, (VITAMIN D3) 2,000 unit tab Take 1 Tab by mouth daily.  montelukast (SINGULAIR) 10 mg tablet Take 1 Tab by mouth daily.     PARADIGM RESERVOIR 3 mL misc     CONTOUR NEXT STRIPS strip CHECK BLOOD SUGAR 8 TIMES A DAY DUE TO INSULIN PUMP AND LABILE BLOOD SUGAR    [place] LANCET [facility]     multivitamin (ONE A DAY) tablet Take 1 Tab by mouth daily. No current facility-administered medications for this visit. PHYSICAL EXAMINATION:  Visit Vitals  /67 (BP 1 Location: Left arm, BP Patient Position: Sitting)   Pulse 73   Temp 98.6 °F (37 °C) (Oral)   Resp 16   Ht 6' 1\" (1.854 m)   Wt 204 lb 6.4 oz (92.7 kg)   SpO2 98%   BMI 26.97 kg/m²      ORTHO EXAMINATION:  Examination Right shoulder Left shoulder   Skin Intact Intact   Effusion - -   Biceps deformity - -   Atrophy - -   AC joint tenderness - -   Acromial tenderness + +   Biceps tenderness - -   Forward flexion/Elevation  170   Active abduction  160   External rotation ROM 20 30   Internal rotation ROM 65 90   Apprehension - -   Impingement - -   Drop Arm Test - -   Neurovascular Intact Intact    Crepitation of shoulder  Examination Neck   Skin Intact   Tenderness +, right cervical trapezius   Tightness +, cervical trapezius   Flexion Decreased 25%   Extension Decreased 25%   Lateral bend left Normal   Lateral bend right Normal   Masses -   Biceps reflex Normal   Triceps reflex Normal   Brachioradialis reflex Normal    TIME OUT:  Chart reviewed for the following:   IFitz MD, have reviewed the History, Physical and updated the Allergic reactions for Frida Magdaleno performed immediately prior to start of procedure:  Jesusita Jean MD, have performed the following reviews on Nate Kramer. prior to the start of the procedure:          * Patient was identified by name and date of birth   * Agreement on procedure being performed was verified  * Risks and Benefits explained to the patient  * Procedure site verified and marked as necessary  * Patient was positioned for comfort  * Consent was obtained     Time: 11:07 AM    Date of procedure: 8/5/2020  Procedure performed by:  Fitz Bee MD  Mr. Jarek Goins tolerated the procedure well with no complications.      RADIOGRAPHS:  XR RIGHT SHOULDER 6/11/20 MELODY  IMPRESSION:  Three views - No fractures, no acromioclavicular narrowing, moderately severe glenohumeral narrowing, no calcific densities. XR LEFT HAND 1/6/20 MELODY  IMPRESSION:  Three views - No fractures, no joint space narrowing, soft tissue swelling of middle finger. IMPRESSION:      ICD-10-CM ICD-9-CM    1. Cervical pain  M54.2 723.1 betamethasone (Celestone Soluspan) 6 mg/mL injection      BETAMETHASONE ACETATE & SODIUM PHOSPHATE INJECTION 3 MG EA. INJECT TRIGGER POINT, 1 OR 2   2. Cervical strain, acute, initial encounter  S16. 1XXA 847.0 betamethasone (Celestone Soluspan) 6 mg/mL injection      BETAMETHASONE ACETATE & SODIUM PHOSPHATE INJECTION 3 MG EA. INJECT TRIGGER POINT, 1 OR 2     PLAN:  After discussing treatment options, patient's right cervical trapezius was injected with 4 cc Marcaine and 1/2 cc Celestone. Dietary counseling provided today. Start weight loss with low carb diet and intermittent fasting. There is no need for surgery at this time. He will follow up at the spine center.       Scribed by Yesenia Bustos ([address]) as dictated by Nara Bolivar MD

## 2020-08-17 ENCOUNTER — TELEPHONE (OUTPATIENT)
Dept: INTERNAL MEDICINE CLINIC | Age: 60
End: 2020-08-17

## 2020-08-17 NOTE — TELEPHONE ENCOUNTER
Jaswant Muir with [facility] [address] called. Dmitry Moreira pt was referred for home health (?) and she needs his med list and history and physical faxed to [phone] so they can get started with his care. I didn't see a referral in his chart? ?

## 2020-08-17 NOTE — TELEPHONE ENCOUNTER
Patient gave verbal consent to fax med list and history and physical. [facility] and Rehana Owen will call [phone] and ankle specialists to get med list and history and physical.

## 2020-08-24 ENCOUNTER — APPOINTMENT (OUTPATIENT)
Dept: INTERNAL MEDICINE CLINIC | Age: 60
End: 2020-08-24

## 2020-08-24 ENCOUNTER — HOSPITAL ENCOUNTER (OUTPATIENT)
Dept: LAB | Age: 60
Discharge: HOME | End: 2020-08-24
Payer: MEDICARE

## 2020-08-24 DIAGNOSIS — E11.21 TYPE 2 DIABETES WITH NEPHROPATHY (HCC): ICD-10-CM

## 2020-08-24 DIAGNOSIS — E78.5 DYSLIPIDEMIA: ICD-10-CM

## 2020-08-24 DIAGNOSIS — I11.9 BENIGN HYPERTENSIVE HEART DISEASE WITHOUT HEART FAILURE: ICD-10-CM

## 2020-08-24 LAB
ALBUMIN SERPL-MCNC: 3.2 G/DL (ref 3.4–5)
ALBUMIN/GLOB SERPL: 1.1 {RATIO} (ref 0.8–1.7)
ALP SERPL-CCNC: 94 U/L (ref 45–117)
ALT SERPL-CCNC: 25 U/L (ref 16–61)
ANION GAP SERPL CALC-SCNC: 4 MMOL/L (ref 3–18)
AST SERPL-CCNC: 14 U/L (ref 10–38)
BILIRUB SERPL-MCNC: 0.9 MG/DL (ref 0.2–1)
BUN SERPL-MCNC: 15 MG/DL (ref 7–18)
BUN/CREAT SERPL: 11 (ref 12–20)
CALCIUM SERPL-MCNC: 9.2 MG/DL (ref 8.5–10.1)
CHLORIDE SERPL-SCNC: 109 MMOL/L (ref 100–111)
CHOLEST SERPL-MCNC: 134 MG/DL
CO2 SERPL-SCNC: 29 MMOL/L (ref 21–32)
CREAT SERPL-MCNC: 1.38 MG/DL (ref 0.6–1.3)
CREAT UR-MCNC: 241 MG/DL (ref 30–125)
GLOBULIN SER CALC-MCNC: 3 G/DL (ref 2–4)
GLUCOSE SERPL-MCNC: 160 MG/DL (ref 74–99)
HBA1C MFR BLD: 7.6 % (ref 4.2–5.6)
HDLC SERPL-MCNC: 62 MG/DL (ref 40–60)
HDLC SERPL: 2.2 {RATIO} (ref 0–5)
LDLC SERPL CALC-MCNC: 56.8 MG/DL (ref 0–100)
LIPID PROFILE,FLP: ABNORMAL
MICROALBUMIN UR-MCNC: 3.86 MG/DL (ref 0–3)
MICROALBUMIN/CREAT UR-RTO: 16 MG/G (ref 0–30)
POTASSIUM SERPL-SCNC: 4.6 MMOL/L (ref 3.5–5.5)
PROT SERPL-MCNC: 6.2 G/DL (ref 6.4–8.2)
SODIUM SERPL-SCNC: 142 MMOL/L (ref 136–145)
TRIGL SERPL-MCNC: 76 MG/DL (ref ?–150)
VLDLC SERPL CALC-MCNC: 15.2 MG/DL

## 2020-08-24 PROCEDURE — 36415 COLL VENOUS BLD VENIPUNCTURE: CPT

## 2020-08-24 PROCEDURE — 82043 UR ALBUMIN QUANTITATIVE: CPT

## 2020-08-24 PROCEDURE — 80053 COMPREHEN METABOLIC PANEL: CPT

## 2020-08-24 PROCEDURE — 80061 LIPID PANEL: CPT

## 2020-08-24 PROCEDURE — 83036 HEMOGLOBIN GLYCOSYLATED A1C: CPT

## 2020-08-27 ENCOUNTER — VIRTUAL VISIT (OUTPATIENT)
Dept: ORTHOPEDIC SURGERY | Age: 60
End: 2020-08-27

## 2020-08-27 DIAGNOSIS — M62.838 TRAPEZIUS MUSCLE SPASM: Primary | ICD-10-CM

## 2020-08-27 RX ORDER — METHOCARBAMOL 750 MG/1
375-750 TABLET, FILM COATED ORAL
Qty: 45 TAB | Refills: 0 | Status: SHIPPED | OUTPATIENT
Start: 2020-08-27 | End: 2022-03-21 | Stop reason: ALTCHOICE

## 2020-08-27 RX ORDER — ASPIRIN 81 MG/1
81 TABLET ORAL DAILY
COMMUNITY
End: 2022-09-22

## 2020-08-27 NOTE — PROGRESS NOTES
Eamon Horvath. presents today for No chief complaint on file. Is someone accompanying this pt? no    Is the patient using any DME equipment during [address]? no    Depression Screening:  3 most recent PHQ Screens 8/5/2020   PHQ Not Done -   Little interest or pleasure in doing things Not at all   Feeling down, depressed, irritable, or hopeless Not at all   Total Score PHQ 2 0       Learning Assessment:  Learning Assessment 2/17/2016   PRIMARY LEARNER Patient   BARRIERS PRIMARY LEARNER NONE   PRIMARY LANGUAGE ENGLISH   LEARNER PREFERENCE PRIMARY DEMONSTRATION     READING   ANSWERED BY patient   RELATIONSHIP SELF       Abuse Screening:  Abuse Screening Questionnaire 6/28/2018   Do you ever feel afraid of your partner? N   Are you in a relationship with someone who physically or mentally threatens you? N   Is it safe for you to go home? Y       Fall Risk  Fall Risk Assessment, last 12 mths 12/21/2016   Able to walk? Yes   Fall in past 12 months? No         Coordination of Care:  1. Have you been to the ER, urgent care clinic since your last visit? no  Hospitalized since your last visit? no    2. Have you seen or consulted any other health care providers outside of the [address] [place] [address] since your last visit? Yes, orthopedics and podiatry Include any pap smears or colon screening.  no
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
## 2020-08-31 ENCOUNTER — HOSPITAL ENCOUNTER (EMERGENCY)
Age: 60
Discharge: HOME | End: 2020-08-31
Attending: EMERGENCY MEDICINE
Payer: MEDICARE

## 2020-08-31 ENCOUNTER — OFFICE VISIT (OUTPATIENT)
Dept: INTERNAL MEDICINE CLINIC | Age: 60
End: 2020-08-31

## 2020-08-31 VITALS
BODY MASS INDEX: 27.17 KG/M2 | OXYGEN SATURATION: 99 % | DIASTOLIC BLOOD PRESSURE: 68 MMHG | HEIGHT: 73 IN | WEIGHT: 205 LBS | HEART RATE: 99 BPM | SYSTOLIC BLOOD PRESSURE: 153 MMHG | TEMPERATURE: 99.3 F | RESPIRATION RATE: 16 BRPM

## 2020-08-31 VITALS
TEMPERATURE: 97.2 F | SYSTOLIC BLOOD PRESSURE: 132 MMHG | HEIGHT: 73 IN | OXYGEN SATURATION: 97 % | BODY MASS INDEX: 27.17 KG/M2 | RESPIRATION RATE: 18 BRPM | DIASTOLIC BLOOD PRESSURE: 66 MMHG | HEART RATE: 78 BPM | WEIGHT: 205 LBS

## 2020-08-31 DIAGNOSIS — R50.9 FEVER, UNSPECIFIED FEVER CAUSE: Primary | ICD-10-CM

## 2020-08-31 DIAGNOSIS — G63 POLYNEUROPATHY ASSOCIATED WITH UNDERLYING DISEASE (HCC): ICD-10-CM

## 2020-08-31 DIAGNOSIS — E78.5 DYSLIPIDEMIA: ICD-10-CM

## 2020-08-31 DIAGNOSIS — E10.42 TYPE 1 DIABETES MELLITUS WITH DIABETIC POLYNEUROPATHY (HCC): Primary | ICD-10-CM

## 2020-08-31 DIAGNOSIS — I11.9 BENIGN HYPERTENSIVE HEART DISEASE WITHOUT HEART FAILURE: ICD-10-CM

## 2020-08-31 LAB
ANION GAP SERPL CALC-SCNC: 2 MMOL/L (ref 3–18)
APPEARANCE UR: CLEAR
BASOPHILS # BLD: 0 K/UL (ref 0–0.1)
BASOPHILS NFR BLD: 0 % (ref 0–2)
BILIRUB UR QL: NEGATIVE
BUN SERPL-MCNC: 16 MG/DL (ref 7–18)
BUN/CREAT SERPL: 13 (ref 12–20)
CALCIUM SERPL-MCNC: 9.8 MG/DL (ref 8.5–10.1)
CHLORIDE SERPL-SCNC: 106 MMOL/L (ref 100–111)
CK MB CFR SERPL CALC: NORMAL % (ref 0–4)
CK MB SERPL-MCNC: <1 NG/ML (ref 5–25)
CK SERPL-CCNC: 46 U/L (ref 39–308)
CO2 SERPL-SCNC: 30 MMOL/L (ref 21–32)
COLOR UR: YELLOW
CREAT SERPL-MCNC: 1.27 MG/DL (ref 0.6–1.3)
DIFFERENTIAL METHOD BLD: ABNORMAL
EOSINOPHIL # BLD: 0.1 K/UL (ref 0–0.4)
EOSINOPHIL NFR BLD: 1 % (ref 0–5)
ERYTHROCYTE [DISTWIDTH] IN BLOOD BY AUTOMATED COUNT: 12.9 % (ref 11.6–14.5)
GLUCOSE SERPL-MCNC: 231 MG/DL (ref 74–99)
GLUCOSE UR STRIP.AUTO-MCNC: >1000 MG/DL
HCT VFR BLD AUTO: 41.4 % (ref 36–48)
HGB BLD-MCNC: 13.4 G/DL (ref 13–16)
HGB UR QL STRIP: NEGATIVE
KETONES UR QL STRIP.AUTO: NEGATIVE MG/DL
LACTATE BLD-SCNC: 0.63 MMOL/L (ref 0.4–2)
LEUKOCYTE ESTERASE UR QL STRIP.AUTO: NEGATIVE
LYMPHOCYTES # BLD: 0.7 K/UL (ref 0.9–3.6)
LYMPHOCYTES NFR BLD: 12 % (ref 21–52)
MCH RBC QN AUTO: 30.2 PG (ref 24–34)
MCHC RBC AUTO-ENTMCNC: 32.4 G/DL (ref 31–37)
MCV RBC AUTO: 93.5 FL (ref 74–97)
MONOCYTES # BLD: 0.5 K/UL (ref 0.05–1.2)
MONOCYTES NFR BLD: 7 % (ref 3–10)
NEUTS SEG # BLD: 5.1 K/UL (ref 1.8–8)
NEUTS SEG NFR BLD: 80 % (ref 40–73)
NITRITE UR QL STRIP.AUTO: NEGATIVE
PH UR STRIP: 6.5 [PH] (ref 5–8)
PLATELET # BLD AUTO: 184 K/UL (ref 135–420)
PMV BLD AUTO: 10.6 FL (ref 9.2–11.8)
POTASSIUM SERPL-SCNC: 4.3 MMOL/L (ref 3.5–5.5)
PROT UR STRIP-MCNC: NEGATIVE MG/DL
RBC # BLD AUTO: 4.43 M/UL (ref 4.7–5.5)
SODIUM SERPL-SCNC: 138 MMOL/L (ref 136–145)
SP GR UR REFRACTOMETRY: 1.02 (ref 1–1.03)
TROPONIN I SERPL-MCNC: <0.02 NG/ML (ref 0–0.04)
UROBILINOGEN UR QL STRIP.AUTO: 1 EU/DL (ref 0.2–1)
WBC # BLD AUTO: 6.4 K/UL (ref 4.6–13.2)

## 2020-08-31 PROCEDURE — 85025 COMPLETE CBC W/AUTO DIFF WBC: CPT

## 2020-08-31 PROCEDURE — 82550 ASSAY OF CK (CPK): CPT

## 2020-08-31 PROCEDURE — 80048 BASIC METABOLIC PNL TOTAL CA: CPT

## 2020-08-31 PROCEDURE — 81003 URINALYSIS AUTO W/O SCOPE: CPT

## 2020-08-31 PROCEDURE — 87635 SARS-COV-2 COVID-19 AMP PRB: CPT

## 2020-08-31 PROCEDURE — 83605 ASSAY OF LACTIC ACID: CPT

## 2020-08-31 PROCEDURE — 99284 EMERGENCY DEPT VISIT MOD MDM: CPT

## 2020-08-31 NOTE — PATIENT INSTRUCTIONS

## 2020-08-31 NOTE — PROGRESS NOTES
Patient's O2 saturation is 92 and began decreasing to 74 and heart rate increased 149 Subjective:       Chief Complaint  The patient presents for follow up of diabetes, hypertension and high cholesterol. JANESSA Owens is a 61 y.o. male seen for follow up of diabetes. Healso has hypertension and hyperlipidemia. Diabetes Type 3since 25years old, no significant medication side effects noted, much improved pt continues on insulin pump and is trying to get a continuous glucose monitor since he checks his blood sugars 6-8 times a day, will f/u with Endo (Dr Ignacia Lynn)  for further management, he does a lot of snacking at night and does not bolus insulin for this which is contributing to his blood sugars being elevated, patient encouraged to eat more protein and fat in his dinner and bolus appropriately and try not to snack at night,  hypertension well controlled on lisinopril 40 mg  And Norvasc 10 mg and aldactone 25 mg, hyperlipidemia well controlled, no significant medication side effects noted, on Lipitor 20 mg    Diet and Lifestyle: generally follows a low fat low cholesterol diet, does not rigorously follow a diabetic diet, exercises sporadically    Home BP Monitoring: is controlled at home. Diabetic Review of Systems - home glucose monitoring: is performed regularly, 6x-8x/day. Other symptoms and concerns: pt will try to exercise more. He has problems with his feet which limits him. Pt is candidate for diabetic shoes due to peripheral neuropathy and ulcerative callus. He is followed by Podiatry. he sees Dr Ute Saavedra now. Patient has a strong family history of coronary artery disease and will f/u with cardiology. Current Outpatient Medications   Medication Sig    aspirin delayed-release 81 mg tablet Take 81 mg by mouth daily.  fish oil-omega-3 fatty acids (Fish Oil) 340-1,000 mg capsule Take 1 Cap by mouth daily.  methocarbamoL (ROBAXIN) 750 mg tablet Take 0.5-1 Tabs by mouth two (2) times daily as needed for Muscle Spasm(s) or Pain.     oxybutynin (DITROPAN) 5 mg tablet TAKE 1 TABLET BY MOUTH TWO  TIMES DAILY    sildenafiL, pulmonary hypertension, (REVATIO) 20 mg tablet TAKE ONE TO FIVE TABLETS BY MOUTH DAILY AS NEEDED (MAX 5 TABLETS PER DAY)    insulin lispro (HUMALOG) 100 unit/mL injection 38 UNITS GIVEN DAILY VIA INSULIN PUMP    spironolactone (ALDACTONE) 25 mg tablet Take 1 Tab by mouth daily.  omeprazole (PRILOSEC) 40 mg capsule Take 1 Cap by mouth daily.  atorvastatin (LIPITOR) 20 mg tablet TAKE 1 TABLET BY MOUTH  DAILY    amLODIPine (NORVASC) 10 mg tablet TAKE 1 TABLET BY MOUTH  DAILY    diclofenac (VOLTAREN) 1 % gel Apply 4 g to affected area four (4) times daily. left hand and fingers    lisinopril (PRINIVIL, ZESTRIL) 40 mg tablet Take 1 Tab by mouth daily.  Blood-Glucose Transmitter (DEXCOM G6 TRANSMITTER) albert E10.42    Blood-Glucose Meter,Continuous (DEXCOM G6 ) misc E10.42    Blood-Glucose Sensor (DEXCOM G6 SENSOR) albert E10.42    MINIMED INFUSION SET iset CHANGE EVERY 3 DAYS    flash glucose scanning reader (FREESTYLE RUEL 14 DAY READER) [facility] DX E10.42 check blood sugar 8 times a day due to insulin pump and labile blood sugar    flash glucose sensor (FREESTYLE RUEL 14 DAY SENSOR) kit DX E10.42 check blood sugar 8 times a day due to insulin pump and labile blood sugar    lancets (ONE TOUCH DELICA) 33 gauge misc CHECK BLOOD SUGAR 8 TIMES  DAILY DUE TO INSULIN PUMP  AND LABILE BLOOD SUGAR    Blood-Glucose Meter (ONETOUCH VERIO FLEX) misc DX E10.42 check blood sugar 8 times a day due to insulin pump and labile blood sugar    glucose blood VI test strips (ONETOUCH VERIO) strip DX E10.42 check blood sugar 8 times a day due to insulin pump and labile blood sugar    Blood-Glucose Meter (CONTOUR NEXT EZ METER) monitoring kit DX E10.42 check blood sugar 8 times a day due to insulin pump and labile blood sugar.     albuterol (PROAIR HFA) 90 mcg/actuation inhaler Take 2 Puffs by inhalation every four (4) hours as needed for Wheezing or Shortness of Breath.  cholecalciferol, vitamin D3, (VITAMIN D3) 2,000 unit tab Take 1 Tab by mouth daily.  CONTOUR NEXT STRIPS strip CHECK BLOOD SUGAR 8 TIMES A DAY DUE TO INSULIN PUMP AND LABILE BLOOD SUGAR    MICROLET LANCET misc     multivitamin (ONE A DAY) tablet Take 1 Tab by mouth daily. No current facility-administered medications for this visit.               Review of Systems  Respiratory: negative for dyspnea on exertion  Cardiovascular: negative for chest pain    Objective:     Visit Vitals  /66 (BP 1 Location: Left arm, BP Patient Position: Sitting)   Pulse 78   Temp 97.2 °F (36.2 °C) (Temporal)   Resp 18   Ht 6' 1\" (1.854 m)   Wt 205 lb (93 kg)   SpO2 97%   BMI 27.05 kg/m²        General appearance - alert, well appearing, and in no distress  HEENT bilateral nasal mucosal edema and cobblestoning in posterior pharynx  Chest - clear to auscultation, no wheezes, rales or rhonchi, symmetric air entry  Heart - normal rate, regular rhythm, normal S1, S2, no murmurs, rubs, clicks or gallops      Labs:   Lab Results   Component Value Date/Time    Hemoglobin A1c 7.6 (H) 08/24/2020 09:41 AM    Hemoglobin A1c 7.8 (H) 05/20/2020 08:02 AM    Hemoglobin A1c 8.1 (H) 02/19/2020 07:20 AM    Glucose 231 (H) 08/31/2020 10:42 PM    Glucose (POC) 138 (H) 06/26/2019 07:47 AM    Microalbumin/Creat ratio (mg/g creat) 16 08/24/2020 09:41 AM    Microalbumin,urine random 3.86 (H) 08/24/2020 09:41 AM    LDL, calculated 56.8 08/24/2020 09:41 AM    Creatinine 1.27 08/31/2020 10:42 PM    Hemoglobin A1c, External 9.3 12/10/2015    Hemoglobin A1c, External 8.7% 08/20/2015    Hemoglobin A1c, External 9.5 05/22/2015 07:43 PM      Lab Results   Component Value Date/Time    Cholesterol, total 134 08/24/2020 09:41 AM    HDL Cholesterol 62 (H) 08/24/2020 09:41 AM    LDL, calculated 56.8 08/24/2020 09:41 AM    Triglyceride 76 08/24/2020 09:41 AM    CHOL/HDL Ratio 2.2 08/24/2020 09:41 AM     Lab Results   Component Value Date/Time    ALT (SGPT) 25 08/24/2020 09:41 AM    Alk. phosphatase 94 08/24/2020 09:41 AM    Bilirubin, total 0.9 08/24/2020 09:41 AM     Lab Results   Component Value Date/Time    GFR est AA >60 08/31/2020 10:42 PM    GFR est non-AA 58 (L) 08/31/2020 10:42 PM    Creatinine 1.27 08/31/2020 10:42 PM    BUN 16 08/31/2020 10:42 PM    Sodium 138 08/31/2020 10:42 PM    Potassium 4.3 08/31/2020 10:42 PM    Chloride 106 08/31/2020 10:42 PM    CO2 30 08/31/2020 10:42 PM      Lab Results   Component Value Date/Time    Prostate Specific Ag 0.4 06/24/2020 09:32 AM    Prostate Specific Ag 0.4 02/19/2020 03:40 PM    Prostate Specific Ag 0.4 01/14/2019 10:24 AM    Prostate Specific Ag 0.3 03/21/2018 07:54 AM    Prostate Specific Ag 0.251 12/17/2012 03:42 PM    PSA 0.3 09/30/2010 10:09 AM    PSA, FREE 0.2 09/30/2010 10:09 AM    % FREE PSA 67 09/30/2010 10:09 AM     Lab Results   Component Value Date/Time    Glucose 231 (H) 08/31/2020 10:42 PM    Glucose (POC) 138 (H) 06/26/2019 07:47 AM            Assessment / Plan     Diabetes improving, pt remains on insulin pump and will follow up with Dr. Prasanna San endocrine. Hypertension well controlled, will continue  lisinopril 40 mg, and Norvasc 10 mg and aldactone 25 mg  Hyperlipidemia well controlled on Lipitor 20 mg      ICD-10-CM ICD-9-CM    1. Type 1 diabetes mellitus with diabetic polyneuropathy (HCC)  E10.42 250.61 HEMOGLOBIN A1C W/O EAG     357.2    2. Hypertension  G57.4 749.56 METABOLIC PANEL, COMPREHENSIVE   3. Dyslipidemia  E78.5 272.4 LIPID PANEL   4. Polyneuropathy associated with underlying disease ([facility] [address]  G63 357. 4  patient will follow-up with podiatry since he is at high risk of diabetic foot ulcers with his significant peripheral neuropathy. Diabetic issues reviewed with him: diabetic diet discussed in detail, and low cholesterol diet, weight control and daily exercise discussed.      Follow-up and Dispositions    · Return in about 6 months (around 2/28/2021) for labs 1 week before. Reviewed plan of care. Patient has provided input and agrees with goals.

## 2020-09-01 ENCOUNTER — APPOINTMENT (OUTPATIENT)
Dept: PHYSICAL THERAPY | Age: 60
End: 2020-09-01

## 2020-09-01 ENCOUNTER — TELEPHONE (OUTPATIENT)
Dept: CASE MANAGEMENT | Age: 60
End: 2020-09-01

## 2020-09-01 NOTE — TELEPHONE ENCOUNTER
Patient contacted regarding recent discharge and COVID-19 risk   Care Coordinator contacted the patient by telephone to perform post discharge assessment. Verified name and  with patient as identifiers. Patient has following risk factors of: diabetes. Care Coordinator reviewed discharge instructions, medical action plan and red flags related to discharge diagnosis. Reviewed and educated them on any new and changed medications related to discharge diagnosis. Advised obtaining a 90-day supply of all daily and as-needed medications. Education provided regarding infection prevention, and signs and symptoms of COVID-19 and when to seek medical attention with patient who verbalized understanding. Discussed exposure protocols and quarantine from [address] you at higher risk for severe illness  and given an opportunity for questions and concerns. The patient agrees to contact the COVID-19 hotline [phone] or PCP office for questions related to their healthcare. Care Coordinator provided contact information for future reference. From CDC: Are you at higher risk for severe illness?  Wash your hands often.  Avoid close contact (6 feet, which is about two arm lengths) with people who are sick.  Put distance between yourself and other people if COVID-19 is spreading in your community.  Clean and disinfect frequently touched surfaces.  Avoid all cruise travel and non-essential air travel.  Call your healthcare professional if you have concerns about COVID-19 and your underlying condition or if you are sick. For more information on steps you can take to protect yourself, see CDC's How to Protect Yourself      Patient/family/caregiver given information for [employer] Loop and agrees to enroll no  Patient's preferred e-mail:    Patient's preferred phone number:   Based on Loop alert triggers, patient will be contacted by nurse care manager for worsening symptoms.     Plan for follow-up call in 7-14 days based on severity of symptoms and risk factors.     Declined loop

## 2020-09-01 NOTE — DISCHARGE INSTRUCTIONS
Return for pain, fever/chills, shortness of breath, vomiting, decreased fluid intake, weakness, numbness, dizziness, or any change or concerns. Patient Education        Learning About Coronavirus [phone])  Coronavirus [phone]): Overview  What is coronavirus (ZDAOG-68)? The coronavirus disease (COVID-19) is caused by a virus. It is an illness that was first found in [place], [place], in December 2019. It has since spread worldwide. The virus can cause fever, cough, and trouble breathing. In severe cases, it can cause pneumonia and make it hard to breathe without help. It can cause death. Coronaviruses are a large group of viruses. They cause the common cold. They also cause more serious illnesses like Middle East respiratory syndrome (MERS) and severe acute respiratory syndrome (SARS). COVID-19 is caused by a novel coronavirus. That means it's a new type that has not been seen in people before. This virus spreads person-to-person through droplets from coughing and sneezing. It can also spread when you are close to someone who is infected. And it can spread when you touch something that has the virus on it, such as a doorknob or a tabletop. What can you do to protect yourself from coronavirus (COVID-19)? The best way to protect yourself from getting sick is to:  · Avoid areas where there is an outbreak. · Avoid contact with people who may be infected. · Wash your hands often with soap or alcohol-based hand sanitizers. · Avoid crowds and try to stay at least 6 feet away from other people. · Wash your hands often, especially after you cough or sneeze. Use soap and water, and scrub for at least 20 seconds. If soap and water aren't available, use an alcohol-based hand . · Avoid touching your mouth, nose, and eyes. What can you do to avoid spreading the virus to others? To help avoid spreading the virus to others:  · Cover your mouth with a tissue when you cough or sneeze.  Then throw the tissue in the trash. · Use a disinfectant to clean things that you touch often. · Wear a cloth face cover if you have to go to public areas. · Stay home if you are sick or have been exposed to the virus. Don't go to school, work, or public areas. And don't use public transportation, ride-shares, or taxis unless you have no choice. · If you are sick:  ? Leave your home only if you need to get medical care. But call the doctor's office first so they know you're coming. And wear a face cover. ? Wear the face cover whenever you're around other people. It can help stop the spread of the virus when you cough or sneeze. ? Clean and disinfect your home every day. Use household  and disinfectant wipes or sprays. Take special care to clean things that you grab with your hands. These include doorknobs, remote controls, phones, and handles on your refrigerator and microwave. And don't forget countertops, tabletops, bathrooms, and computer keyboards. When to call for help  [id number] anytime you think you may need emergency care. For example, call if:  · You have severe trouble breathing. (You can't talk at all.)  · You have constant chest pain or pressure. · You are severely dizzy or lightheaded. · You are confused or can't think clearly. · Your face and lips have a blue color. · You pass out (lose consciousness) or are very hard to wake up. Call your doctor now if you develop symptoms such as:  · Shortness of breath. · Fever. · Cough. If you need to get care, call ahead to the doctor's office for instructions before you go. Make sure you wear a face cover to prevent exposing other people to the virus. Where can you get the latest information? The following health organizations are tracking and studying this virus. Their websites contain the most up-to-date information. Yair Abdullahi also learn what to do if you think you may have been exposed to the virus. · U.[place]  Centers for Disease Control and Prevention (CDC): The CDC provides updated news about the disease and travel advice. The website also tells you how to prevent the spread of infection. www.cdc.gov  · World Health Organization [facility]): WHO offers information about the virus outbreaks. WHO also has travel advice. www.who.int  Current as of: May 8, 2020               Content Version: 12.5  © 2006-2020 [employer]. Care instructions adapted under license by [employer] (which disclaims liability or warranty for this information). If you have questions about a medical condition or this instruction, always ask your healthcare professional. [place] [address] any warranty or liability for your use of this information. Patient Education        Learning About Fever  What is a fever? A fever is a high body temperature. It's one way your body fights being sick. A fever shows that the body is responding to infection or other illnesses, both minor and severe. A fever is a symptom, not an illness by itself. A fever can be a sign that you are ill, but most fevers are not caused by a serious problem. You may have a fever with a minor illness, such as a cold. But sometimes a very serious infection may cause little or no fever. It is important to look at other symptoms, other conditions you have, and how you feel in general. In children, notice how they act and see what symptoms they complain of. What is a normal body temperature? A normal body temperature is about 98. 6ºF. Some people have a normal temperature that is a little higher or a little lower than this. Your temperature may be a little lower in the morning than it is later in the day. It may go up during hot weather or when you exercise, wear heavy clothes, or take a hot bath. Your temperature may also be different depending on how you take it. A temperature taken in the mouth (oral) or under the arm may be a little lower than your core temperature (rectal).   What is a fever temperature? A core temperature of 100.4°F or above is considered a fever. What can cause a fever? A fever may be caused by:  · Infections. This is the most common cause of a fever. Examples of infections that can cause a fever include the flu, a kidney infection, or pneumonia. · Some medicines. · Severe trauma or injury, such as a heart attack, stroke, heatstroke, or burns. · Other medical conditions, such as arthritis and some cancers. How can you treat a fever at home? · Ask your doctor if you can take an over-the-counter pain medicine, such as acetaminophen (Tylenol), ibuprofen (Advil, Motrin), or naproxen (Aleve). Be safe with medicines. Read and follow all instructions on the label. · To prevent dehydration, drink plenty of fluids. Choose water and other caffeine-free clear liquids until you feel better. If you have kidney, heart, or liver disease and have to limit fluids, talk with your doctor before you increase the amount of fluids you drink. Follow-up care is a key part of your treatment and safety. Be sure to make and go to all appointments, and call your doctor if you are having problems. It's also a good idea to know your test results and keep a list of the medicines you take. Where can you learn more? Go to http://www.gray.com/  Enter G732 in the search box to learn more about \"Learning About Fever. \"  Current as of: June 26, 2019               Content Version: 12.5  © 2242-5459 Healthwise, [employer]. Care instructions adapted under license by [employer] (which disclaims liability or warranty for this information). If you have questions about a medical condition or this instruction, always ask your healthcare professional. [place] [address] any warranty or liability for your use of this information.
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
## 2020-09-01 NOTE — ED NOTES
Bedside and Verbal shift change report given to Darlene Rabago RN (oncoming nurse) by Adela Meek RN (offgoing nurse). Report included the following information SBAR, Kardex, MAR and Recent Results.

## 2020-09-02 LAB — SARS-COV-2, COV2NT: NOT DETECTED

## 2020-09-04 ENCOUNTER — HOSPITAL ENCOUNTER (OUTPATIENT)
Dept: PHYSICAL THERAPY | Age: 60
Discharge: HOME | End: 2020-09-04
Payer: MEDICARE

## 2020-09-04 PROCEDURE — 97162 PT EVAL MOD COMPLEX 30 MIN: CPT

## 2020-09-04 PROCEDURE — 97110 THERAPEUTIC EXERCISES: CPT

## 2020-09-04 RX ORDER — SPIRONOLACTONE 25 MG/1
TABLET ORAL
Qty: 90 TAB | Refills: 3 | Status: SHIPPED | OUTPATIENT
Start: 2020-09-04 | End: 2021-04-16 | Stop reason: SDUPTHER

## 2020-09-04 NOTE — PROGRESS NOTES
[facility], [address] [place] [address]  [phone] fax  Plan of Care/ Statement of Necessity for Physical Therapy Services    Patient name: Edwardo Tracey. Start of Care: 2020   Referral source: Yakov Brody MD : 1960    Medical Diagnosis: Trapezius muscle spasm [M62.838]  Payor: AARP MEDICARE COMPLETE / Plan: [address] / Product Type: Managed Care Medicare /  Onset Date:2020    Treatment Diagnosis: Right shoulder, neck pain   Prior Hospitalization: see medical history Provider#: [id number]   Medications: Verified on Patient summary List    Comorbidities: diabetes, torn Achilles/foot ulcer   Prior Level of Function: Functionally independent, lives with wife, works in sales at [employer] and following information is based on the information from the initial evaluation. Assessment/ key information: Patient is a pleasant Right handed 61year old male who presents with complaints of Right shoulder/neck pain that began earlier this year, without specific injury. To note, Patient has a history of frozen shoulder, as well as scoliosis. Currently due to pain he reports increasing discomfort with lifting tasks (such as work), looking over his shoulder, and increased pain at the end of the day with activity. Able to perform necessary daily tasks but with pain. At evaluation Patient demonstrates slouched posture and forward head, and some winging of the Right scapula possibly related to spinal curvature. Impaired cervical mobility to the Right with pain, but grossly full shoulder AROM and strength bilaterally. Impaired Right scapular passive mobility, with hiking noted during active elevation. Tender to palpation in the Right upper trapezius. Signs and symptoms consistent with muscle imbalance of the shoulder/scapular musculature.  Overall Patient is a good rehab candidate based on premorbid status, and will benefit from skilled physical therapy in order to address the above deficits. Evaluation Complexity History MEDIUM  Complexity : 1-2 comorbidities / personal factors will impact the outcome/ POC ; Examination LOW Complexity : 1-2 Standardized tests and measures addressing body structure, function, activity limitation and / or participation in recreation  ;Presentation LOW Complexity : Stable, uncomplicated  ;Clinical Decision Making MEDIUM Complexity : FOTO score of 26-74  Overall Complexity Rating: MEDIUM  Problem List: pain affecting function, decrease ROM, decrease strength, edema affecting function, decrease ADL/ functional abilitiies, decrease activity tolerance, decrease flexibility/ joint mobility and decrease transfer abilities   Treatment Plan may include any combination of the following: Therapeutic exercise, Therapeutic activities, Neuromuscular re-education, Physical agent/modality, Manual therapy, Aquatic therapy, Patient education and Self Care training  Patient / Family readiness to learn indicated by: asking questions, trying to perform skills and interest  Persons(s) to be included in education: patient (P)  Barriers to Learning/Limitations: None  Patient Goal (s): relieve pain  Patient Self Reported Health Status: fair  Rehabilitation Potential: good    Short Term Goals: To be accomplished in 1 weeks:  Goal: Patient will be independent and compliant with HEP in order to progress toward long term goals. Status at last note/certification: issued and reviewed  Long Term Goals: To be accomplished in 10 treatments:  Goal: Patient will improve FOTO assessment score to 64 pts in order to indicate improved functional abilities. Status at last note/certification: 55 pts  Goal: Patient will improve cervical Right rotation and lateral flexion AROM by at least 10 deg, without increased pain, in order to improve ease of driving, daily tasks.   Status at last note/certification: rotation 45 deg, lateral flexion 14 deg  Goal: Patient will report no more than a little difficulty with looking up in order to more easily assist customers at work. Status at last note/certification: moderate difficulty  Goal: Patient will report worst Right shoulder/neck pain as 2-3/10 or less in order to progress toward premorbid status. Status at last note/certification: 4/24    Frequency / Duration: Patient to be seen 2 to 3 times per week for 10 treatments. Patient/ Caregiver education and instruction: Diagnosis, prognosis, exercises   [x]  Plan of care has been reviewed with PTA    Certification Period: 9/4/20 to 10/3/20  Roberto Sincereshikha, PT 9/4/2020 8:43 AM  _____________________________________________________________________  I certify that the above Therapy Services are being furnished while the patient is under my care. I agree with the treatment plan and certify that this therapy is necessary.     Physician's Signature:____________Date:_________TIME:________    ** Signature, Date and Time must be completed for valid certification **    Please sign and return to [facility] [place] [address]  [phone] fax

## 2020-09-04 NOTE — PROGRESS NOTES
PT DAILY TREATMENT NOTE 10-18    Patient Name: Germania Gotti.   Date:2020  : 1960  [x]  Patient  Verified  Payor: [employer] MEDICARE COMPLETE / Plan: [facility] MEDICARE COMPLETE / Product Type: Managed Care Medicare /    In time:808  Out time:840  Total Treatment Time (min): 32  Visit #: 1 of 10    Medicare/BCBS Only   Total Timed Codes (min):  10 1:1 Treatment Time:  22       Treatment Area: Trapezius muscle spasm [M62.838]    SUBJECTIVE  Pain Level (0-10 scale): 3  Any medication changes, allergies to medications, adverse drug reactions, diagnosis change, or new procedure performed?: [x] No    [] Yes (see summary sheet for update)  Subjective functional status/changes:   [] No changes reported      OBJECTIVE    Modality rationale:    Min Type Additional Details    [] Estim:  []Unatt       []IFC  []Premod                        []Other:  []w/ice   []w/heat  Position:  Location:    [] Estim: []Att    []TENS instruct  []NMES                    []Other:  []w/US   []w/ice   []w/heat  Position:  Location:    []  Traction: [] Cervical       []Lumbar                       [] Prone          []Supine                       []Intermittent   []Continuous Lbs:  [] before manual  [] after manual    []  Ultrasound: []Continuous   [] Pulsed                           []1MHz   []3MHz W/cm2:  Location:    []  Iontophoresis with dexamethasone         Location: [] Take home patch   [] In clinic    []  Ice     []  heat  []  Ice massage  []  Laser   []  Anodyne Position:  Location:    []  Laser with stim  []  Other:  Position:  Location:    []  Vasopneumatic Device Pressure:       [] lo [] med [] hi   Temperature: [] lo [] med [] hi   [] Skin assessment post-treatment:  []intact []redness- no adverse reaction    []redness  adverse reaction:     22 min [x]Eval                  []Re-Eval       10 min Therapeutic Exercise:  [] See flow sheet :HEP   Rationale: increase ROM and increase strength to improve the patients ability to improve postural awareness          With   [] TE   [] TA   [] neuro   [] other: Patient Education: [x] Review HEP    [] Progressed/Changed HEP based on:   [] positioning   [] body mechanics   [] transfers   [] heat/ice application    [] other:      Other Objective/Functional Measures:      Pain Level (0-10 scale) post treatment: 3    ASSESSMENT/Changes in Function:     Patient will continue to benefit from skilled PT services to modify and progress therapeutic interventions, address functional mobility deficits, address ROM deficits, address strength deficits, analyze and address soft tissue restrictions, analyze and cue movement patterns, analyze and modify body mechanics/ergonomics, assess and modify postural abnormalities and instruct in home and community integration to attain remaining goals.      [x]  See Plan of Care  []  See progress note/recertification  []  See Discharge Summary         Progress towards goals / Updated goals:  See POC    PLAN  []  Upgrade activities as tolerated     [x]  Continue plan of care  []  Update interventions per flow sheet       []  Discharge due to:_  []  Other:_      Rajeev Jeffries PT 9/4/2020  8:41 AM    Future Appointments   Date Time Provider Enriqueta Beauchamp   9/8/2020  1:00 PM Ursula Huang MD [facility]   9/15/2020  1:30 PM Norberto Espinosa [facility]   9/18/2020 10:30 AM Jef Arauz PT [facility]   1/4/2021  9:00 AM [facility] NURSE [facility]   2/4/2021 10:30 AM Ursula Huang MD [facility]   2/22/2021  8:25 AM [facility] NURSE VISIT IOC KARLA SCHED   3/1/2021  8:30 AM Doug Ibanez MD [facility]

## 2020-09-10 RX ORDER — LISINOPRIL 40 MG/1
TABLET ORAL
Qty: 90 TAB | Refills: 3 | Status: SHIPPED | OUTPATIENT
Start: 2020-09-10 | End: 2021-04-16 | Stop reason: SDUPTHER

## 2020-09-15 ENCOUNTER — HOSPITAL ENCOUNTER (OUTPATIENT)
Dept: PHYSICAL THERAPY | Age: 60
Discharge: HOME | End: 2020-09-15
Payer: MEDICARE

## 2020-09-15 PROCEDURE — 97110 THERAPEUTIC EXERCISES: CPT

## 2020-09-15 PROCEDURE — 97112 NEUROMUSCULAR REEDUCATION: CPT

## 2020-09-15 PROCEDURE — 97140 MANUAL THERAPY 1/> REGIONS: CPT

## 2020-09-15 NOTE — PROGRESS NOTES
PT DAILY TREATMENT NOTE 10-18    Patient Name: Dipak Serna. Date:9/15/2020  : 1960  [x]  Patient  Verified  Payor: [employer] MEDIC[employer] COMPLETE / Plan: [address] / Product Type: Managed Care Medicare /    In time:1:30  Out time:2:25  Total Treatment Time (min): 55  Visit #: 2 of 10    Medicare/BCBS Only   Total Timed Codes (min):  55 1:1 Treatment Time:  55       Treatment Area: Cervicalgia [M54.2]  Pain in right shoulder [M25.511]    SUBJECTIVE  Pain Level (0-10 scale): 3  Any medication changes, allergies to medications, adverse drug reactions, diagnosis change, or new procedure performed?: [x] No    [] Yes (see summary sheet for update)  Subjective functional status/changes:   [] No changes reported  This shoulder has been stiff for about a year    OBJECTIVE      22 min Therapeutic Exercise:  [] See flow sheet :   Rationale: increase ROM and increase strength to improve the patients ability to improve ease of driving, cs mobility    10 min Neuromuscular Re-education:  []  See flow sheet :   Rationale: increase strength and improve coordination  to improve the patients ability to increase scapular stability for functional tasks    23 min Manual Therapy:  Supine: STM to scalenes (middle), SCM. SL scap mobs with STM to UT, lev scap. Rationale: decrease pain, increase ROM and increase tissue extensibility to reduce muscle tension,improve cervical and shoulder mobility for functionaltasks          With   [] TE   [] TA   [] neuro   [] other: Patient Education: [x] Review HEP    [] Progressed/Changed HEP based on:   [] positioning   [] body mechanics   [] transfers   [] heat/ice application    [] other:      Other Objective/Functional Measures: initiated x program     Pain Level (0-10 scale) post treatment: 2    ASSESSMENT/Changes in Function: first follow-up after eval.. Pt has significant muscle tightness throughout the shoulder, UT region.   Trigger points palpable in middle steff with referred pain into right shoulder. Responded well to PT with decreased pain and improved shoulder mobility    Patient will continue to benefit from skilled PT services to modify and progress therapeutic interventions, address functional mobility deficits, address ROM deficits, address strength deficits, analyze and address soft tissue restrictions, analyze and cue movement patterns, analyze and modify body mechanics/ergonomics, assess and modify postural abnormalities, address imbalance/dizziness and instruct in home and community integration to attain remaining goals. []  See Plan of Care  []  See progress note/recertification  []  See Discharge Summary         Progress towards goals / Updated goals:  Goal: Patient will be independent and compliant with HEP in order to progress toward long term goals. Status at last note/certification: issued and reviewed  Compliant with HEP  Goal Met  Long Term Goals: To be accomplished in 10 treatments:  Goal: Patient will improve FOTO assessment score to 64 pts in order to indicate improved functional abilities. Status at last note/certification: 55 pts  Goal: Patient will improve cervical Right rotation and lateral flexion AROM by at least 10 deg, without increased pain, in order to improve ease of driving, daily tasks. Status at last note/certification: rotation 45 deg, lateral flexion 14 deg  Goal: Patient will report no more than a little difficulty with looking up in order to more easily assist customers at work. Status at last note/certification: moderate difficulty  Goal: Patient will report worst Right shoulder/neck pain as 2-3/10 or less in order to progress toward premorbid status.   Status at last note/certification: 8/83    PLAN  [x]  Upgrade activities as tolerated     []  Continue plan of care  []  Update interventions per flow sheet       []  Discharge due to:_  []  Other:_      Joel Faust, IRAJ 9/15/2020  1:32 PM    Future Appointments Date Time Provider Enriqueta Beauchamp   9/18/2020 10:30 AM Sindi Pike PT [facility]   1/4/2021  9:00 AM [facility]   2/4/2021 10:30 AM Natalie Justice MD [facility]   2/22/2021  8:25 [facility]   3/1/2021  8:30 AM Karla Bryant MD [facility]   9/8/2021 10:30 AM Natalie Justice MD [facility]

## 2020-09-18 ENCOUNTER — HOSPITAL ENCOUNTER (OUTPATIENT)
Dept: PHYSICAL THERAPY | Age: 60
Discharge: HOME | End: 2020-09-18
Payer: MEDICARE

## 2020-09-18 PROCEDURE — 97112 NEUROMUSCULAR REEDUCATION: CPT

## 2020-09-18 PROCEDURE — 97110 THERAPEUTIC EXERCISES: CPT

## 2020-09-18 PROCEDURE — 97140 MANUAL THERAPY 1/> REGIONS: CPT

## 2020-09-18 NOTE — PROGRESS NOTES
PT DAILY TREATMENT NOTE 10-18    Patient Name: Christy Garcia. Date:2020  : 1960  [x]  Patient  Verified  Payor: [facility] MEDICARE COMPLETE / Plan: [facility] MEDICARE COMPLETE / Product Type: Managed Care Medicare /    In time:1030  Out time:1121  Total Treatment Time (min): 46  Visit #: 3 of 10    Medicare/BCBS Only   Total Timed Codes (min):  41 1:1 Treatment Time:  41       Treatment Area: Cervicalgia [M54.2]  Pain in right shoulder [M25.511]    SUBJECTIVE  Pain Level (0-10 scale): 0  Any medication changes, allergies to medications, adverse drug reactions, diagnosis change, or new procedure performed?: [x] No    [] Yes (see summary sheet for update)  Subjective functional status/changes:   [] No changes reported  It's not too bad today.     OBJECTIVE    Modality rationale: decrease pain and increase tissue extensibility to improve the patients ability to reduce soreness and tension after exercises    Min Type Additional Details    [] Estim:  []Unatt       []IFC  []Premod                        []Other:  []w/ice   []w/heat  Position:  Location:    [] Estim: []Att    []TENS instruct  []NMES                    []Other:  []w/US   []w/ice   []w/heat  Position:  Location:    []  Traction: [] Cervical       []Lumbar                       [] Prone          []Supine                       []Intermittent   []Continuous Lbs:  [] before manual  [] after manual    []  Ultrasound: []Continuous   [] Pulsed                           []1MHz   []3MHz W/cm2:  Location:    []  Iontophoresis with dexamethasone         Location: [] Take home patch   [] In clinic   10 []  Ice     [x]  heat  []  Ice massage  []  Laser   []  Anodyne Position:seated  Location:neck/shoulders     []  Laser with stim  []  Other:  Position:  Location:    []  Vasopneumatic Device Pressure:       [] lo [] med [] hi   Temperature: [] lo [] med [] hi   [x] Skin assessment post-treatment:  [x]intact []redness- no adverse reaction    []redness  adverse reaction:     15 min Therapeutic Exercise:  [x] See flow sheet :   Rationale: increase ROM and increase strength to improve the patients ability to perform ADLs    13 min Neuromuscular Re-education:  [x]  See flow sheet :   Rationale: increase strength, improve coordination and increase proprioception  to improve the patients ability to improve scapular stability and mechanics with functional tasks    13 min Manual Therapy:  Sidelying Right STM/TrP release to levator scapulae/UT/infraspinatus/medial scapular border, manual scapular distraction and stretching   Rationale: decrease pain, increase ROM, increase tissue extensibility and decrease trigger points to improve mechanics with functional tasks, reduce muscle tension in order to reduce compensation      With   [] TE   [] TA   [] neuro   [] other: Patient Education: [x] Review HEP    [] Progressed/Changed HEP based on:   [] positioning   [] body mechanics   [] transfers   [] heat/ice application    [] other:      Other Objective/Functional Measures: completed exercises per flow sheet     Pain Level (0-10 scale) post treatment: 0    ASSESSMENT/Changes in Function: Patient very guarded/having difficulty relaxing during manual interventions, with poor scapular mobility initially. After TrP release and manual stretching there is some improvement in scapular mobility, but remains impaired. Initiated moist heat after exercises which helped to lessen sensation of tightness after exercises. Patient will continue to benefit from skilled PT services to modify and progress therapeutic interventions, address functional mobility deficits, address ROM deficits, address strength deficits, analyze and address soft tissue restrictions, analyze and cue movement patterns, analyze and modify body mechanics/ergonomics, assess and modify postural abnormalities and instruct in home and community integration to attain remaining goals.      []  See Plan of Care  []  See progress note/recertification  []  See Discharge Summary         Progress towards goals / Updated goals:  Goal: Patient will be independent and compliant with HEP in order to progress toward long term goals. Status at last note/certification: issued and reviewed  Current status: met  Long Term Goals: To be accomplished in 10 treatments:  Goal: Patient will improve FOTO assessment score to 64 pts in order to indicate improved functional abilities. Status at last note/certification: 55 pts  Current status: reassess at MD note  Goal: Patient will improve cervical Right rotation and lateral flexion AROM by at least 10 deg, without increased pain, in order to improve ease of driving, daily tasks. Status at last note/certification: rotation 45 deg, lateral flexion 14 deg  Current status: reassess in coming visits  Goal: Patient will report no more than a little difficulty with looking up in order to more easily assist customers at work. Status at last note/certification: moderate difficulty  Current status: reassess with FOTO  Goal: Patient will report worst Right shoulder/neck pain as 2-3/10 or less in order to progress toward premorbid status.   Status at last note/certification: 6/19  Current status: progressing, decreasing pain levels since initiating therapy    PLAN  [x]  Upgrade activities as tolerated     [x]  Continue plan of care  []  Update interventions per flow sheet       []  Discharge due to:_  []  Other:_      Harriet Barba, PT 9/18/2020  10:29 AM    Future Appointments   Date Time Provider Enriqueta Beauchamp   9/18/2020 10:30 AM Caleb Zapien, PT [facility]   1/4/2021  9:00 AM [facility] NURSE [facility]   2/4/2021 10:30 AM Kilo Garcia MD [facility]   2/22/2021  8:25 AM [facility] NURSE VISIT IOC KARLA SCHED   3/1/2021  8:30 AM Kelsey Perez MD [address]   9/8/2021 10:30 AM Kilo Garcia MD [facility]

## 2020-09-24 ENCOUNTER — APPOINTMENT (OUTPATIENT)
Dept: PHYSICAL THERAPY | Age: 60
End: 2020-09-24
Payer: MEDICARE

## 2020-09-24 ENCOUNTER — TELEPHONE (OUTPATIENT)
Dept: CASE MANAGEMENT | Age: 60
End: 2020-09-24

## 2020-09-24 NOTE — TELEPHONE ENCOUNTER
Patient resolved from Transition of Care episode on 9/24/2020  Discussed COVID-19 related testing which was available at this time. Test results were negative. Patient informed of results, if available? Left message with the information provided below     Patient/family has been provided the following resources and education related to COVID-19:                         Signs, symptoms and red flags related to COVID-19            CDC exposure and quarantine guidelines            [employer] exposure contact - [phone]            Contact for their local Department of Health               No further outreach scheduled with this CTN/ACM/LPN/HC/ MA. Episode of Care resolved. Patient has this CTN/ACM/LPN/HC/MA contact information if future needs arise.

## 2020-09-25 ENCOUNTER — APPOINTMENT (OUTPATIENT)
Dept: PHYSICAL THERAPY | Age: 60
End: 2020-09-25
Payer: MEDICARE

## 2020-10-19 NOTE — PROGRESS NOTES
[facility]  [address]  [phone] fax    Discharge Summary    Patient name: Mignon Ortiz Start of Care: 2020   Referral source: Erica Thomas MD : 1960   Medical/Treatment Diagnosis: Cervicalgia [M54.2]  Pain in right shoulder [M25.511]  Payor: [employer] / Plan: [address] / Product Type: Managed Care Medicare /  Onset Date:2020     Prior Hospitalization: see medical history Provider#: [id number]   Medications: Verified on Patient Summary List     Comorbidities: diabetes, torn Achilles/foot ulcer   Prior Level of Function: Functionally independent, lives with wife, works in sales at [employer]    Visits from [place] of Care: 3    Missed Visits: 0    Reporting Period : 20 to 20    Goal: Patient will be independent and compliant with HEP in order to progress toward long term goals. Status at last note/certification: issued and reviewed  Current status: met  Long Term Goals: To be accomplished in 10 treatments:  Goal: Patient will improve FOTO assessment score to 64 pts in order to indicate improved functional abilities. Status at last note/certification: 55 pts  Current status: unable to reassess, unplanned discharge  Goal: Patient will improve cervical Right rotation and lateral flexion AROM by at least 10 deg, without increased pain, in order to improve ease of driving, daily tasks. Status at last note/certification: rotation 45 deg, lateral flexion 14 deg  Current status: unable to reassess, unplanned discharge  Goal: Patient will report no more than a little difficulty with looking up in order to more easily assist customers at work.   Status at last note/certification: moderate difficulty  Current status: unable to reassess, unplanned discharge  Goal: Patient will report worst Right shoulder/neck pain as 2-3/10 or less in order to progress toward premorbid status. Status at last note/certification: 7/59  Current status: progressing, decreasing pain levels since initiating therapy    Assessment/ Summary of Care: Patient attended evaluation and two follow up sessions for Right shoulder pain and neck pain, with reports of reduction in pain with therapeutic interventions. Unfortunately he had to hold off on therapy due to changes in work schedule and inability to attend, and due to not being able to attend for over 30 days we will discharge per policy. Thank you for the referral of this Patient.      RECOMMENDATIONS:  [x]Discontinue therapy: []Patient has reached or is progressing toward set goals      [x]Patient is non-compliant or has abdicated      []Due to lack of appreciable progress towards set [address] [facility], PT 10/19/2020 12:16 PM

## 2020-10-22 ENCOUNTER — OFFICE VISIT (OUTPATIENT)
Dept: INTERNAL MEDICINE CLINIC | Age: 60
End: 2020-10-22
Payer: MEDICARE

## 2020-10-22 VITALS
RESPIRATION RATE: 20 BRPM | DIASTOLIC BLOOD PRESSURE: 68 MMHG | HEIGHT: 73 IN | OXYGEN SATURATION: 98 % | BODY MASS INDEX: 28.1 KG/M2 | HEART RATE: 80 BPM | WEIGHT: 212 LBS | TEMPERATURE: 97.2 F | SYSTOLIC BLOOD PRESSURE: 133 MMHG

## 2020-10-22 DIAGNOSIS — R06.09 DYSPNEA ON EXERTION: Primary | ICD-10-CM

## 2020-10-22 DIAGNOSIS — R53.82 CHRONIC FATIGUE: ICD-10-CM

## 2020-10-22 DIAGNOSIS — Z23 NEEDS FLU SHOT: ICD-10-CM

## 2020-10-22 PROCEDURE — G8419 CALC BMI OUT NRM PARAM NOF/U: HCPCS | Performed by: INTERNAL MEDICINE

## 2020-10-22 PROCEDURE — G8752 SYS BP LESS 140: HCPCS | Performed by: INTERNAL MEDICINE

## 2020-10-22 PROCEDURE — G8754 DIAS BP LESS 90: HCPCS | Performed by: INTERNAL MEDICINE

## 2020-10-22 PROCEDURE — G0008 ADMIN INFLUENZA VIRUS VAC: HCPCS | Performed by: INTERNAL MEDICINE

## 2020-10-22 PROCEDURE — 3017F COLORECTAL CA SCREEN DOC REV: CPT | Performed by: INTERNAL MEDICINE

## 2020-10-22 PROCEDURE — G8432 DEP SCR NOT DOC, RNG: HCPCS | Performed by: INTERNAL MEDICINE

## 2020-10-22 PROCEDURE — G8427 DOCREV CUR MEDS BY ELIG CLIN: HCPCS | Performed by: INTERNAL MEDICINE

## 2020-10-22 PROCEDURE — 90686 IIV4 VACC NO PRSV 0.5 ML IM: CPT | Performed by: INTERNAL MEDICINE

## 2020-10-22 PROCEDURE — 99213 OFFICE O/P EST LOW 20 MIN: CPT | Performed by: INTERNAL MEDICINE

## 2020-10-22 NOTE — PATIENT INSTRUCTIONS

## 2020-10-22 NOTE — PROGRESS NOTES
Patient is in the office today for tired felling with activity. 1. Have you been to the ER, urgent care clinic since your last visit? Hospitalized since your last visit? No    2. Have you seen or consulted any other health care providers outside of the [address] [place] [address] since your last visit? Include any pap smears or colon screening. No      Verbal order read back per Dr. Andrea Gutiérrez flu vaccine. Patient received flu vaccine in right deltoid. Patient was observed and no signs or symptoms of an allergic reaction noted at this time. Patient tolerated well and left without complaints. Patient received flu VIS.

## 2020-10-23 NOTE — PROGRESS NOTES
Jacques Yanez. is a 61 y.o.  male and presents with Immunization/Injection (flu vaccine) and Fatigue (request referral to Cardiology)      SUBJECTIVE:    Fatigue  Patient complains of fatigue. Symptoms began several months ago. Sentinal symptom the patient feels fatigue began with: true exercise intolerance. Symptoms of his fatigue have been general malaise. Patient describes the following psychologic symptoms: none. Patient denies significant change in weight, witnessed or suspected sleep apnea. The course has been symptoms have progressed to a point and plateaued. . Severity has been symptoms bothersome, but easily able to carry out all usual work/school/family. Previous visits for this problem: yes. Pt has FH of CAD. Respiratory ROS: negative for - shortness of breath  Cardiovascular ROS: negative for - chest pain    Current Outpatient Medications   Medication Sig    sildenafiL, pulmonary hypertension, (REVATIO) 20 mg tablet TAKE ONE TO FIVE TABLETS BY MOUTH DAILY AS NEEDED *MAX AMOUNT: FIVE TABLETS A DAY*    lisinopriL (PRINIVIL, ZESTRIL) 40 mg tablet TAKE 1 TABLET BY MOUTH  DAILY    spironolactone (ALDACTONE) 25 mg tablet TAKE 1 TABLET BY MOUTH  DAILY    aspirin delayed-release 81 mg tablet Take 81 mg by mouth daily.  fish oil-omega-3 fatty acids (Fish Oil) 340-1,000 mg capsule Take 1 Cap by mouth daily.  methocarbamoL (ROBAXIN) 750 mg tablet Take 0.5-1 Tabs by mouth two (2) times daily as needed for Muscle Spasm(s) or Pain.  oxybutynin (DITROPAN) 5 mg tablet TAKE 1 TABLET BY MOUTH TWO  TIMES DAILY    insulin lispro (HUMALOG) 100 unit/mL injection 38 UNITS GIVEN DAILY VIA INSULIN PUMP    omeprazole (PRILOSEC) 40 mg capsule Take 1 Cap by mouth daily.  atorvastatin (LIPITOR) 20 mg tablet TAKE 1 TABLET BY MOUTH  DAILY    amLODIPine (NORVASC) 10 mg tablet TAKE 1 TABLET BY MOUTH  DAILY    diclofenac (VOLTAREN) 1 % gel Apply 4 g to affected area four (4) times daily.  left hand and fingers  Blood-Glucose Transmitter (DEXCOM G6 TRANSMITTER) albert E10.42    Blood-Glucose Meter,Continuous (DEXCOM G6 ) misc E10.42    Blood-Glucose Sensor (DEXCOM G6 SENSOR) albert E10.42    MINIMED INFUSION SET iset CHANGE EVERY 3 DAYS    flash glucose scanning reader (FREESTYLE RUEL 14 DAY READER) misc DX E10.42 check blood sugar 8 times a day due to insulin pump and labile blood sugar    flash glucose sensor (FREESTYLE RUEL 14 DAY SENSOR) kit DX E10.42 check blood sugar 8 times a day due to insulin pump and labile blood sugar    lancets (ONE TOUCH DELICA) 33 gauge misc CHECK BLOOD SUGAR 8 TIMES  DAILY DUE TO INSULIN PUMP  AND LABILE BLOOD SUGAR    Blood-Glucose Meter (ONETOUCH VERIO FLEX) misc DX E10.42 check blood sugar 8 times a day due to insulin pump and labile blood sugar    glucose blood VI test strips (ONETOUCH VERIO) strip DX E10.42 check blood sugar 8 times a day due to insulin pump and labile blood sugar    Blood-Glucose Meter (CONTOUR NEXT EZ METER) monitoring kit DX E10.42 check blood sugar 8 times a day due to insulin pump and labile blood sugar.  cholecalciferol, vitamin D3, (VITAMIN D3) 2,000 unit tab Take 1 Tab by mouth daily.  CONTOUR NEXT STRIPS strip CHECK BLOOD SUGAR 8 TIMES A DAY DUE TO INSULIN PUMP AND LABILE BLOOD SUGAR    MICROLET LANCET misc     multivitamin (ONE A DAY) tablet Take 1 Tab by mouth daily. No current facility-administered medications for this visit.           OBJECTIVE:  alert, well appearing, and in no distress  Visit Vitals  /68 (BP 1 Location: Left arm, BP Patient Position: Sitting)   Pulse 80   Temp 97.2 °F (36.2 °C) (Temporal)   Resp 20   Ht 6' 1\" (1.854 m)   Wt 212 lb (96.2 kg)   SpO2 98%   BMI 27.97 kg/m²      well developed and well nourished          Labs:   Lab Results   Component Value Date/Time    Cholesterol, total 134 08/24/2020 09:41 AM    HDL Cholesterol 62 (H) 08/24/2020 09:41 AM    LDL, calculated 56.8 08/24/2020 09:41 AM LDL-C, External 72 08/20/2015    Triglyceride 76 08/24/2020 09:41 AM    CHOL/HDL Ratio 2.2 08/24/2020 09:41 AM     Lab Results   Component Value Date/Time    Hemoglobin A1c 7.6 (H) 08/24/2020 09:41 AM    Hemoglobin A1c (POC) 9.1 06/11/2015 09:54 AM    Hemoglobin A1c, External 9.3 12/10/2015          Assessment/Plan      ICD-10-CM ICD-9-CM    1. Dyspnea on exertion  R06.00 786.09 REFERRAL TO CARDIOLOGY to evaluate for anginal equivalent    2. Chronic fatigue  R53.82 780.79 If cardiac evaluation negative may need to evaluate for COPD with h/o tobacco use. 3. Needs flu shot  Z23 V04.81 INFLUENZA VIRUS VAC QUAD,SPLIT,PRESV FREE SYRINGE IM     Follow-up and Dispositions    · Return in about 4 weeks (around 11/19/2020) for OV, and Medicare Wellness Visit. Reviewed plan of care. Patient has provided input and agrees with goals.

## 2020-10-26 ENCOUNTER — HOSPITAL ENCOUNTER (OUTPATIENT)
Dept: LAB | Age: 60
Discharge: HOME | End: 2020-10-26
Payer: MEDICARE

## 2020-10-26 LAB
ANION GAP SERPL CALC-SCNC: 6 MMOL/L (ref 3–18)
BUN SERPL-MCNC: 15 MG/DL (ref 7–18)
BUN/CREAT SERPL: 13 (ref 12–20)
CALCIUM SERPL-MCNC: 10 MG/DL (ref 8.5–10.1)
CHLORIDE SERPL-SCNC: 106 MMOL/L (ref 100–111)
CO2 SERPL-SCNC: 28 MMOL/L (ref 21–32)
CREAT SERPL-MCNC: 1.18 MG/DL (ref 0.6–1.3)
GLUCOSE SERPL-MCNC: 133 MG/DL (ref 74–99)
HBA1C MFR BLD: 7.5 % (ref 4.2–5.6)
POTASSIUM SERPL-SCNC: 4 MMOL/L (ref 3.5–5.5)
SODIUM SERPL-SCNC: 140 MMOL/L (ref 136–145)

## 2020-10-26 PROCEDURE — 36415 COLL VENOUS BLD VENIPUNCTURE: CPT

## 2020-10-26 PROCEDURE — 83036 HEMOGLOBIN GLYCOSYLATED A1C: CPT

## 2020-10-26 PROCEDURE — 80048 BASIC METABOLIC PNL TOTAL CA: CPT

## 2020-10-28 ENCOUNTER — OFFICE VISIT (OUTPATIENT)
Dept: CARDIOLOGY CLINIC | Age: 60
End: 2020-10-28
Payer: MEDICARE

## 2020-10-28 VITALS
WEIGHT: 213 LBS | BODY MASS INDEX: 28.23 KG/M2 | HEART RATE: 76 BPM | DIASTOLIC BLOOD PRESSURE: 78 MMHG | OXYGEN SATURATION: 98 % | HEIGHT: 73 IN | SYSTOLIC BLOOD PRESSURE: 160 MMHG

## 2020-10-28 DIAGNOSIS — I95.1 ORTHOSTATIC HYPOTENSION: ICD-10-CM

## 2020-10-28 DIAGNOSIS — R06.02 SOB (SHORTNESS OF BREATH): Primary | ICD-10-CM

## 2020-10-28 DIAGNOSIS — I11.9 BENIGN HYPERTENSIVE HEART DISEASE WITHOUT HEART FAILURE: ICD-10-CM

## 2020-10-28 DIAGNOSIS — R06.02 SOB (SHORTNESS OF BREATH): ICD-10-CM

## 2020-10-28 PROCEDURE — G8419 CALC BMI OUT NRM PARAM NOF/U: HCPCS | Performed by: INTERNAL MEDICINE

## 2020-10-28 PROCEDURE — G8754 DIAS BP LESS 90: HCPCS | Performed by: INTERNAL MEDICINE

## 2020-10-28 PROCEDURE — G8753 SYS BP > OR = 140: HCPCS | Performed by: INTERNAL MEDICINE

## 2020-10-28 PROCEDURE — 99204 OFFICE O/P NEW MOD 45 MIN: CPT | Performed by: INTERNAL MEDICINE

## 2020-10-28 PROCEDURE — G8427 DOCREV CUR MEDS BY ELIG CLIN: HCPCS | Performed by: INTERNAL MEDICINE

## 2020-10-28 PROCEDURE — G8432 DEP SCR NOT DOC, RNG: HCPCS | Performed by: INTERNAL MEDICINE

## 2020-10-28 PROCEDURE — 93000 ELECTROCARDIOGRAM COMPLETE: CPT | Performed by: INTERNAL MEDICINE
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
## 2020-11-09 LAB
ECHO AO ROOT DIAM: 3.76 CM
ECHO LA AREA 4C: 20.51 CM2
ECHO LA VOL 2C: 61.98 ML (ref 18–58)
ECHO LA VOL 4C: 58.42 ML (ref 18–58)
ECHO LA VOL BP: 63.71 ML (ref 18–58)
ECHO LA VOL BP: 68.17 ML (ref 18–58)
ECHO LA VOLUME INDEX A2C: 28.04 ML/M2 (ref 16–28)
ECHO LA VOLUME INDEX A4C: 26.43 ML/M2 (ref 16–28)
ECHO LV E' LATERAL VELOCITY: 13.49 CM/S
ECHO LV E' SEPTAL VELOCITY: 10.22 CM/S
ECHO LV INTERNAL DIMENSION DIASTOLIC: 4.25 CM (ref 4.2–5.9)
ECHO LV INTERNAL DIMENSION SYSTOLIC: 2.35 CM
ECHO LV IVSD: 1.81 CM (ref 0.6–1)
ECHO LV MASS 2D: 318 G (ref 88–224)
ECHO LV MASS INDEX 2D: 143.9 G/M2 (ref 49–115)
ECHO LV POSTERIOR WALL DIASTOLIC: 1.65 CM (ref 0.6–1)
ECHO LVOT CARDIAC OUTPUT: 8.73 LITER/MINUTE
ECHO LVOT DIAM: 2.36 CM
ECHO LVOT PEAK GRADIENT: 3.66 MMHG
ECHO LVOT PEAK VELOCITY: 95.61 CM/S
ECHO LVOT SV: 98.1 ML
ECHO LVOT VTI: 22.36 CM
ECHO MV A VELOCITY: 70.84 CM/S
ECHO MV E DECELERATION TIME (DT): 176.71 MS
ECHO MV E VELOCITY: 62.09 CM/S
ECHO MV E/A RATIO: 0.88
ECHO MV E/E' LATERAL: 4.6
ECHO MV E/E' RATIO (AVERAGED): 5.34
ECHO MV E/E' SEPTAL: 6.08
ECHO PVEIN A DURATION: 110.13 MS
ECHO PVEIN A VELOCITY: 27.39 CM/S
ECHO RV TAPSE: 2.88 CM (ref 1.5–2)
ECHO TV REGURGITANT MAX VELOCITY: 193.46 CM/S
ECHO TV REGURGITANT PEAK GRADIENT: 14.97 MMHG
LVOT MG: 2.36 MMHG

## 2020-11-09 NOTE — PROGRESS NOTES
Per your last note\" Impression and Plan:  Taylor Tripp. is a 61 y.o. with:     1.) SOB  2.) DM1, longstanding  3.) HTN  4.) HL  5.) +FH premature ASCVD  6.) Normal LV function 2018, neg stress 2016     1.) No need for ischemic eval at this time, but low threshold for future  2.) Echo to ensure normal LV function  3.) Continue med tx, incl statin, asa, ACEI  4.) Encouraged routine exercise with stationary bike  5.) RTC yearly, will call with echo result

## 2020-11-13 ENCOUNTER — TELEPHONE (OUTPATIENT)
Dept: CARDIOLOGY CLINIC | Age: 60
End: 2020-11-13

## 2020-11-13 NOTE — TELEPHONE ENCOUNTER
----- Message from Jeane Terrazas DO sent at 11/11/2020 12:58 PM EST -----  Please let him know his echo is normal  ----- Message -----  From: Mary Lucio LPN  Sent: [id number]/[id number]   1:46 PM EST  To: Jeane Terrazas DO    Per your last note\" Impression and Plan:  Susy Goodwin is a 61 y.o. with:     1.) SOB  2.) DM1, longstanding  3.) HTN  4.) HL  5.) +FH premature ASCVD  6.) Normal LV function 2018, neg stress 2016     1.) No need for ischemic eval at this time, but low threshold for future  2.) Echo to ensure normal LV function  3.) Continue med tx, incl statin, asa, ACEI  4.) Encouraged routine exercise with stationary bike  5.) RTC yearly, will call with echo result

## 2020-11-30 ENCOUNTER — OFFICE VISIT (OUTPATIENT)
Dept: INTERNAL MEDICINE CLINIC | Age: 60
End: 2020-11-30
Payer: MEDICARE

## 2020-11-30 VITALS
WEIGHT: 213 LBS | HEART RATE: 78 BPM | RESPIRATION RATE: 18 BRPM | OXYGEN SATURATION: 97 % | SYSTOLIC BLOOD PRESSURE: 136 MMHG | DIASTOLIC BLOOD PRESSURE: 77 MMHG | BODY MASS INDEX: 28.23 KG/M2 | TEMPERATURE: 97.8 F | HEIGHT: 73 IN

## 2020-11-30 DIAGNOSIS — Z00.00 MEDICARE ANNUAL WELLNESS VISIT, SUBSEQUENT: Primary | ICD-10-CM

## 2020-11-30 DIAGNOSIS — E10.42 TYPE 1 DIABETES MELLITUS WITH DIABETIC POLYNEUROPATHY (HCC): ICD-10-CM

## 2020-11-30 PROCEDURE — 3051F HG A1C>EQUAL 7.0%<8.0%: CPT | Performed by: INTERNAL MEDICINE

## 2020-11-30 PROCEDURE — G8419 CALC BMI OUT NRM PARAM NOF/U: HCPCS | Performed by: INTERNAL MEDICINE

## 2020-11-30 PROCEDURE — G8432 DEP SCR NOT DOC, RNG: HCPCS | Performed by: INTERNAL MEDICINE

## 2020-11-30 PROCEDURE — 2022F DILAT RTA XM EVC RTNOPTHY: CPT | Performed by: INTERNAL MEDICINE

## 2020-11-30 PROCEDURE — 99214 OFFICE O/P EST MOD 30 MIN: CPT | Performed by: INTERNAL MEDICINE

## 2020-11-30 PROCEDURE — G8427 DOCREV CUR MEDS BY ELIG CLIN: HCPCS | Performed by: INTERNAL MEDICINE

## 2020-11-30 PROCEDURE — G8754 DIAS BP LESS 90: HCPCS | Performed by: INTERNAL MEDICINE

## 2020-11-30 PROCEDURE — G0439 PPPS, SUBSEQ VISIT: HCPCS | Performed by: INTERNAL MEDICINE

## 2020-11-30 PROCEDURE — 3017F COLORECTAL CA SCREEN DOC REV: CPT | Performed by: INTERNAL MEDICINE

## 2020-11-30 PROCEDURE — G8752 SYS BP LESS 140: HCPCS | Performed by: INTERNAL MEDICINE

## 2020-11-30 NOTE — PATIENT INSTRUCTIONS
High Blood Pressure: Care Instructions Overview It's normal for blood pressure to go up and down throughout the day. But if it stays up, you have high blood pressure. Another name for high blood pressure is hypertension. Despite what a lot of people think, high blood pressure usually doesn't cause headaches or make you feel dizzy or lightheaded. It usually has no symptoms. But it does increase your risk of stroke, heart attack, and other problems. You and your doctor will talk about your risks of these problems based on your blood pressure. Your doctor will give you a goal for your blood pressure. Your goal will be based on your health and your age. Lifestyle changes, such as eating healthy and being active, are always important to help lower blood pressure. You might also take medicine to reach your blood pressure goal. 
Follow-up care is a key part of your treatment and safety. Be sure to make and go to all appointments, and call your doctor if you are having problems. It's also a good idea to know your test results and keep a list of the medicines you take. How can you care for yourself at home? Medical treatment · If you stop taking your medicine, your blood pressure will go back up. You may take one or more types of medicine to lower your blood pressure. Be safe with medicines. Take your medicine exactly as prescribed. Call your doctor if you think you are having a problem with your medicine. · Talk to your doctor before you start taking aspirin every day. Aspirin can help certain people lower their risk of a heart attack or stroke. But taking aspirin isn't right for everyone, because it can cause serious bleeding. · See your doctor regularly. You may need to see the doctor more often at first or until your blood pressure comes down. · If you are taking blood pressure medicine, talk to your doctor before you take decongestants or anti-inflammatory medicine, such as ibuprofen. Some of these medicines can raise blood pressure. · Learn how to check your blood pressure at home. Lifestyle changes · Stay at a healthy weight. This is especially important if you put on weight around the waist. Losing even 10 pounds can help you lower your blood pressure. · If your doctor recommends it, get more exercise. Walking is a good choice. Bit by bit, increase the amount you walk every day. Try for at least 30 minutes on most days of the week. You also may want to swim, bike, or do other activities. · Avoid or limit alcohol. Talk to your doctor about whether you can drink any alcohol. · Try to limit how much sodium you eat to less than 2,300 milligrams (mg) a day. Your doctor may ask you to try to eat less than 1,500 mg a day. · Eat plenty of fruits (such as bananas and oranges), vegetables, legumes, whole grains, and low-fat dairy products. · Lower the amount of saturated fat in your diet. Saturated fat is found in animal products such as milk, cheese, and meat. Limiting these foods may help you lose weight and also lower your risk for heart disease. · Do not smoke. Smoking increases your risk for heart attack and stroke. If you need help quitting, talk to your doctor about stop-smoking programs and medicines. These can increase your chances of quitting for good. When should you call for help? Call  911 anytime you think you may need emergency care. This may mean having symptoms that suggest that your blood pressure is causing a serious heart or blood vessel problem. Your blood pressure may be over 180/120. For example, call 911 if: 
  · You have symptoms of a heart attack. These may include: 
? Chest pain or pressure, or a strange feeling in the chest. 
? Sweating. ? Shortness of breath. ? Nausea or vomiting. ? Pain, pressure, or a strange feeling in the back, neck, jaw, or upper belly or in one or both shoulders or arms. ? Lightheadedness or sudden weakness. ? A fast or irregular heartbeat.  
  · You have symptoms of a stroke. These may include: 
? Sudden numbness, tingling, weakness, or loss of movement in your face, arm, or leg, especially on only one side of your body. ? Sudden vision changes. ? Sudden trouble speaking. ? Sudden confusion or trouble understanding simple statements. ? Sudden problems with walking or balance. ? A sudden, severe headache that is different from past headaches.  
  · You have severe back or belly pain. Do not wait until your blood pressure comes down on its own. Get help right away. Call your doctor now or seek immediate care if: 
  · Your blood pressure is much higher than normal (such as 180/120 or higher), but you don't have symptoms.  
  · You think high blood pressure is causing symptoms, such as: 
? Severe headache. 
? Blurry vision. Watch closely for changes in your health, and be sure to contact your doctor if: 
  · Your blood pressure measures higher than your doctor recommends at least 2 times. That means the top number is higher or the bottom number is higher, or both.  
  · You think you may be having side effects from your blood pressure medicine. Where can you learn more? Go to http://www.[employer].com/ Enter H523 in the search box to learn more about \"High Blood Pressure: Care Instructions. \" Current as of: December 16, 2019               Content Version: 12.6 © 1294-0125 [employer]. Care instructions adapted under license by [employer] (which disclaims liability or warranty for this information). If you have questions about a medical condition or this instruction, always ask your healthcare professional. [place] [address] any warranty or liability for your use of this information. Medicare Wellness Visit, Male The best way to live healthy is to have a lifestyle where you eat a well-balanced diet, exercise regularly, limit alcohol use, and quit all forms of tobacco/nicotine, if applicable. Regular preventive services are another way to keep healthy. Preventive services (vaccines, screening tests, monitoring & exams) can help personalize your care plan, which helps you manage your own care. Screening tests can find health problems at the earliest stages, when they are easiest to treat. Vi follows the current, evidence-based guidelines published by the [facility] ([facility]) when recommending preventive services for our patients. Because we follow these guidelines, sometimes recommendations change over time as research supports it. (For example, a prostate screening blood test is no longer routinely recommended for men with no symptoms). Of course, you and your doctor may decide to screen more often for some diseases, based on your risk and co-morbidities (chronic disease you are already diagnosed with). Preventive services for you include: - Medicare offers their members a free annual wellness visit, which is time for you and your primary care provider to discuss and plan for your preventive service needs. Take advantage of this benefit every year! 
-All adults over age 72 should receive the recommended pneumonia vaccines. Current USPSTF guidelines recommend a series of two vaccines for the best pneumonia protection.  
-All adults should have a flu vaccine yearly and tetanus vaccine every 10 years. 
-All adults age 48 and older should receive the shingles vaccines (series of two vaccines). -All adults age 38-68 who are overweight should have a diabetes screening test once every three years.  
-Other screening tests & preventive services for persons with diabetes include: an eye exam to screen for diabetic retinopathy, a kidney function test, a foot exam, and stricter control over your cholesterol. -Cardiovascular screening for adults with routine risk involves an electrocardiogram (ECG) at intervals determined by the provider.  
-Colorectal cancer screening should be done for adults age 54-65 with no increased risk factors for colorectal cancer. There are a number of acceptable methods of screening for this type of cancer. Each test has its own benefits and drawbacks. Discuss with your provider what is most appropriate for you during your annual wellness visit. The different tests include: colonoscopy (considered the best screening method), a fecal occult blood test, a fecal DNA test, and sigmoidoscopy. 
-All adults born between [facility] should be screened once for Hepatitis C. 
-An Abdominal Aortic Aneurysm (AAA) Screening is recommended for men age 73-68 who has ever smoked in their lifetime. Here is a list of your current Health Maintenance items (your personalized list of preventive services) with a due date: 
Health Maintenance Due Topic Date Due  Shingles Vaccine (1 of 2) 08/08/2010 [facility] Diabetic Foot Care  05/08/2018 [facility] Eye Exam  06/15/2019

## 2020-11-30 NOTE — PROGRESS NOTES
Patient is in the office today for a 4 month follow up, and Medicare Wellness Visit. 1. Have you been to the ER, urgent care clinic since your last visit? Hospitalized since your last visit? No    2. Have you seen or consulted any other health care providers outside of the [address] [place] [address] since your last visit? Include any pap smears or colon screening.  No
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
## 2020-12-03 NOTE — PROGRESS NOTES
Subjective:       Chief Complaint  The patient presents for follow up of diabetes, hypertension and high cholesterol. JANESSA Bagley. is a 61 y.o. male seen for follow up of diabetes. Hunter has hypertension and hyperlipidemia. Diabetes Type 3since 25years old, no significant medication side effects noted, much improved pt continues on insulin pump and is now on a continuous glucose monitor since he checks his blood sugars 6-8 times a day, he is followed by Endo (Dr Madison Hernandez),  hypertension well controlled on lisinopril 40 mg  And Norvasc 10 mg and aldactone 25 mg, hyperlipidemia well controlled, no significant medication side effects noted, on Lipitor 20 mg    Diet and Lifestyle: generally follows a low fat low cholesterol diet, does not rigorously follow a diabetic diet, exercises sporadically    Home BP Monitoring: is controlled at home. Diabetic Review of Systems - home glucose monitoring: is performed regularly, 6x-8x/day. Other symptoms and concerns: pt will try to exercise more. He has problems with his feet which limits him. Pt is candidate for diabetic shoes due to peripheral neuropathy and ulcerative callus. He is followed by Podiatry. he sees Dr Korey Acevedo now. Current Outpatient Medications   Medication Sig    insulin lispro (HUMALOG) 100 unit/mL injection INJECT 38 UNITS ONCE DAILY VIA  INSULIN  PUMP    sildenafiL, pulmonary hypertension, (REVATIO) 20 mg tablet TAKE ONE TO FIVE TABLETS BY MOUTH DAILY AS NEEDED *MAX AMOUNT: FIVE TABLETS A DAY*    lisinopriL (PRINIVIL, ZESTRIL) 40 mg tablet TAKE 1 TABLET BY MOUTH  DAILY    spironolactone (ALDACTONE) 25 mg tablet TAKE 1 TABLET BY MOUTH  DAILY    aspirin delayed-release 81 mg tablet Take 81 mg by mouth daily.  fish oil-omega-3 fatty acids (Fish Oil) 340-1,000 mg capsule Take 1 Cap by mouth daily.     methocarbamoL (ROBAXIN) 750 mg tablet Take 0.5-1 Tabs by mouth two (2) times daily as needed for Muscle Spasm(s) or Pain.    oxybutynin (DITROPAN) 5 mg tablet TAKE 1 TABLET BY MOUTH TWO  TIMES DAILY    omeprazole (PRILOSEC) 40 mg capsule Take 1 Cap by mouth daily.  atorvastatin (LIPITOR) 20 mg tablet TAKE 1 TABLET BY MOUTH  DAILY    amLODIPine (NORVASC) 10 mg tablet TAKE 1 TABLET BY MOUTH  DAILY    diclofenac (VOLTAREN) 1 % gel Apply 4 g to affected area four (4) times daily. left hand and fingers    Blood-Glucose Transmitter (DEXCOM G6 TRANSMITTER) albert E10.42    Blood-Glucose Meter,Continuous (DEXCOM G6 ) misc E10.42    Blood-Glucose Sensor (DEXCOM G6 SENSOR) albert E10.42    MINIMED INFUSION SET iset CHANGE EVERY 3 DAYS    flash glucose scanning reader (FREESTYLE RUEL 14 DAY READER) [facility] DX E10.42 check blood sugar 8 times a day due to insulin pump and labile blood sugar    flash glucose sensor (FREESTYLE RUEL 14 DAY SENSOR) kit DX E10.42 check blood sugar 8 times a day due to insulin pump and labile blood sugar    cholecalciferol, vitamin D3, (VITAMIN D3) 2,000 unit tab Take 1 Tab by mouth daily.  MICROLET LANCET misc     multivitamin (ONE A DAY) tablet Take 1 Tab by mouth daily.  lancets (ONE TOUCH DELICA) 33 gauge misc CHECK BLOOD SUGAR 8 TIMES  DAILY DUE TO INSULIN PUMP  AND LABILE BLOOD SUGAR    Blood-Glucose Meter (ONETOUCH VERIO FLEX) misc DX E10.42 check blood sugar 8 times a day due to insulin pump and labile blood sugar    glucose blood VI test strips (ONETOUCH VERIO) strip DX E10.42 check blood sugar 8 times a day due to insulin pump and labile blood sugar    Blood-Glucose Meter (CONTOUR NEXT EZ METER) monitoring kit DX E10.42 check blood sugar 8 times a day due to insulin pump and labile blood sugar.  CONTOUR NEXT STRIPS strip CHECK BLOOD SUGAR 8 TIMES A DAY DUE TO INSULIN PUMP AND LABILE BLOOD SUGAR     No current facility-administered medications for this visit.               Review of Systems  Respiratory: negative for dyspnea on exertion  Cardiovascular: negative for chest pain    Objective:     Visit Vitals  /77 (BP 1 Location: Left arm, BP Patient Position: Sitting)   Pulse 78   Temp 97.8 °F (36.6 °C) (Temporal)   Resp 18   Ht 6' 1\" (1.854 m)   Wt 213 lb (96.6 kg)   SpO2 97%   BMI 28.10 kg/m²        General appearance - alert, well appearing, and in no distress  HEENT bilateral nasal mucosal edema and cobblestoning in posterior pharynx  Chest - clear to auscultation, no wheezes, rales or rhonchi, symmetric air entry  Heart - normal rate, regular rhythm, normal S1, S2, no murmurs, rubs, clicks or gallops      Labs:   Lab Results   Component Value Date/Time    Hemoglobin A1c 7.5 (H) 10/26/2020 10:27 AM    Hemoglobin A1c 7.6 (H) 08/24/2020 09:41 AM    Hemoglobin A1c 7.8 (H) 05/20/2020 08:02 AM    Glucose 133 (H) 10/26/2020 10:28 AM    Glucose (POC) 138 (H) 06/26/2019 07:47 AM    Microalbumin/Creat ratio (mg/g creat) 16 08/24/2020 09:41 AM    Microalbumin,urine random 3.86 (H) 08/24/2020 09:41 AM    LDL, calculated 56.8 08/24/2020 09:41 AM    Creatinine 1.18 10/26/2020 10:28 AM    Hemoglobin A1c, External 9.3 12/10/2015    Hemoglobin A1c, External 8.7% 08/20/2015    Hemoglobin A1c, External 9.5 05/22/2015 07:43 PM      Lab Results   Component Value Date/Time    Cholesterol, total 134 08/24/2020 09:41 AM    HDL Cholesterol 62 (H) 08/24/2020 09:41 AM    LDL, calculated 56.8 08/24/2020 09:41 AM    Triglyceride 76 08/24/2020 09:41 AM    CHOL/HDL Ratio 2.2 08/24/2020 09:41 AM     Lab Results   Component Value Date/Time    ALT (SGPT) 25 08/24/2020 09:41 AM    Alk.  phosphatase 94 08/24/2020 09:41 AM    Bilirubin, total 0.9 08/24/2020 09:41 AM     Lab Results   Component Value Date/Time    GFR est AA >60 10/26/2020 10:28 AM    GFR est non-AA >60 10/26/2020 10:28 AM    Creatinine 1.18 10/26/2020 10:28 AM    BUN 15 10/26/2020 10:28 AM    Sodium 140 10/26/2020 10:28 AM    Potassium 4.0 10/26/2020 10:28 AM    Chloride 106 10/26/2020 10:28 AM    CO2 28 10/26/2020 10:28 AM      Lab Results Component Value Date/Time    Prostate Specific Ag 0.4 06/24/2020 09:32 AM    Prostate Specific Ag 0.4 02/19/2020 03:40 PM    Prostate Specific Ag 0.4 01/14/2019 10:24 AM    Prostate Specific Ag 0.3 03/21/2018 07:54 AM    Prostate Specific Ag 0.251 12/17/2012 03:42 PM    PSA 0.3 09/30/2010 10:09 AM    PSA, FREE 0.2 09/30/2010 10:09 AM    % FREE PSA 67 09/30/2010 10:09 AM     Lab Results   Component Value Date/Time    Glucose 133 (H) 10/26/2020 10:28 AM    Glucose (POC) 138 (H) 06/26/2019 07:47 AM            Assessment / Plan     Diabetes improving, pt remains on insulin pump and will follow up with Dr. Iwona So endocrine. Hypertension well controlled, will continue  lisinopril 40 mg, and Norvasc 10 mg and aldactone 25 mg  Hyperlipidemia well controlled on Lipitor 20 mg      ICD-10-CM ICD-9-CM    1. Medicare annual wellness visit, subsequent  Z00.00 V70.0    2. Type 1 diabetes mellitus with diabetic polyneuropathy (HCC)  E10.42 250.61 REFERRAL TO PODIATRY     357.2    3. Hypertension  I11.9 402.10    4. Dyslipidemia  E78.5 272.4               Diabetic issues reviewed with him: diabetic diet discussed in detail, and low cholesterol diet, weight control and daily exercise discussed. Follow-up and Dispositions    · Return in about 13 weeks (around 3/1/2021) for labs 1 week before. Reviewed plan of care. Patient has provided input and agrees with goals.

## 2020-12-15 DIAGNOSIS — I10 ESSENTIAL HYPERTENSION: ICD-10-CM

## 2020-12-16 RX ORDER — AMLODIPINE BESYLATE 10 MG/1
TABLET ORAL
Qty: 90 TAB | Refills: 3 | Status: SHIPPED | OUTPATIENT
Start: 2020-12-16 | End: 2021-04-16 | Stop reason: SDUPTHER

## 2021-01-22 ENCOUNTER — HOSPITAL ENCOUNTER (OUTPATIENT)
Dept: LAB | Age: 61
Discharge: HOME | End: 2021-01-22

## 2021-01-22 LAB — XX-LABCORP SPECIMEN COL,LCBCF: NORMAL

## 2021-01-22 PROCEDURE — 99001 SPECIMEN HANDLING PT-LAB: CPT

## 2021-02-19 ENCOUNTER — HOSPITAL ENCOUNTER (OUTPATIENT)
Dept: LAB | Age: 61
Discharge: HOME | End: 2021-02-19
Payer: MEDICARE

## 2021-02-19 ENCOUNTER — APPOINTMENT (OUTPATIENT)
Dept: INTERNAL MEDICINE CLINIC | Age: 61
End: 2021-02-19

## 2021-02-19 DIAGNOSIS — E10.42 TYPE 1 DIABETES MELLITUS WITH DIABETIC POLYNEUROPATHY (HCC): ICD-10-CM

## 2021-02-19 DIAGNOSIS — I11.9 BENIGN HYPERTENSIVE HEART DISEASE WITHOUT HEART FAILURE: ICD-10-CM

## 2021-02-19 DIAGNOSIS — E78.5 DYSLIPIDEMIA: ICD-10-CM

## 2021-02-19 LAB
ALBUMIN SERPL-MCNC: 3.5 G/DL (ref 3.4–5)
ALBUMIN/GLOB SERPL: 1 {RATIO} (ref 0.8–1.7)
ALP SERPL-CCNC: 127 U/L (ref 45–117)
ALT SERPL-CCNC: 22 U/L (ref 16–61)
ANION GAP SERPL CALC-SCNC: 2 MMOL/L (ref 3–18)
AST SERPL-CCNC: 12 U/L (ref 10–38)
BILIRUB SERPL-MCNC: 0.5 MG/DL (ref 0.2–1)
BUN SERPL-MCNC: 21 MG/DL (ref 7–18)
BUN/CREAT SERPL: 17 (ref 12–20)
CALCIUM SERPL-MCNC: 10.1 MG/DL (ref 8.5–10.1)
CHLORIDE SERPL-SCNC: 110 MMOL/L (ref 100–111)
CHOLEST SERPL-MCNC: 146 MG/DL
CO2 SERPL-SCNC: 32 MMOL/L (ref 21–32)
CREAT SERPL-MCNC: 1.24 MG/DL (ref 0.6–1.3)
GLOBULIN SER CALC-MCNC: 3.4 G/DL (ref 2–4)
GLUCOSE SERPL-MCNC: 66 MG/DL (ref 74–99)
HBA1C MFR BLD: 8.8 % (ref 4.2–5.6)
HDLC SERPL-MCNC: 64 MG/DL (ref 40–60)
HDLC SERPL: 2.3 {RATIO} (ref 0–5)
LDLC SERPL CALC-MCNC: 73.8 MG/DL (ref 0–100)
LIPID PROFILE,FLP: ABNORMAL
POTASSIUM SERPL-SCNC: 4.4 MMOL/L (ref 3.5–5.5)
PROT SERPL-MCNC: 6.9 G/DL (ref 6.4–8.2)
SODIUM SERPL-SCNC: 144 MMOL/L (ref 136–145)
TRIGL SERPL-MCNC: 41 MG/DL (ref ?–150)
VLDLC SERPL CALC-MCNC: 8.2 MG/DL

## 2021-02-19 PROCEDURE — 83036 HEMOGLOBIN GLYCOSYLATED A1C: CPT

## 2021-02-19 PROCEDURE — 80053 COMPREHEN METABOLIC PANEL: CPT

## 2021-02-19 PROCEDURE — 36415 COLL VENOUS BLD VENIPUNCTURE: CPT

## 2021-02-19 PROCEDURE — 80061 LIPID PANEL: CPT

## 2021-03-01 ENCOUNTER — OFFICE VISIT (OUTPATIENT)
Dept: INTERNAL MEDICINE CLINIC | Age: 61
End: 2021-03-01

## 2021-03-01 VITALS
TEMPERATURE: 97.9 F | WEIGHT: 215 LBS | RESPIRATION RATE: 16 BRPM | OXYGEN SATURATION: 97 % | BODY MASS INDEX: 28.49 KG/M2 | HEIGHT: 73 IN | SYSTOLIC BLOOD PRESSURE: 137 MMHG | HEART RATE: 75 BPM | DIASTOLIC BLOOD PRESSURE: 76 MMHG

## 2021-03-01 RX ORDER — PANTOPRAZOLE SODIUM 40 MG/1
TABLET, DELAYED RELEASE ORAL
Qty: 90 TAB | Refills: 3 | Status: SHIPPED | OUTPATIENT
Start: 2021-03-01 | End: 2021-04-16 | Stop reason: SDUPTHER

## 2021-03-01 NOTE — PROGRESS NOTES
Patient is in the office today for a 6 month follow up. Patient is requesting a refill on Protonix 40 mg.       1. Have you been to the ER, urgent care clinic since your last visit? Hospitalized since your last visit? No    2. Have you seen or consulted any other health care providers outside of the [address] [place] [address] since your last visit? Include any pap smears or colon screening.  No

## 2021-03-01 NOTE — PATIENT INSTRUCTIONS

## 2021-03-07 DIAGNOSIS — E78.00 HIGH CHOLESTEROL: ICD-10-CM

## 2021-03-08 ENCOUNTER — OFFICE VISIT (OUTPATIENT)
Dept: INTERNAL MEDICINE CLINIC | Age: 61
End: 2021-03-08
Payer: MEDICARE

## 2021-03-08 VITALS
RESPIRATION RATE: 20 BRPM | HEIGHT: 73 IN | WEIGHT: 215 LBS | HEART RATE: 80 BPM | OXYGEN SATURATION: 100 % | BODY MASS INDEX: 28.49 KG/M2 | SYSTOLIC BLOOD PRESSURE: 151 MMHG | DIASTOLIC BLOOD PRESSURE: 75 MMHG | TEMPERATURE: 97.2 F

## 2021-03-08 DIAGNOSIS — I11.9 BENIGN HYPERTENSIVE HEART DISEASE WITHOUT HEART FAILURE: ICD-10-CM

## 2021-03-08 DIAGNOSIS — E78.5 DYSLIPIDEMIA: ICD-10-CM

## 2021-03-08 DIAGNOSIS — E10.42 TYPE 1 DIABETES MELLITUS WITH DIABETIC POLYNEUROPATHY (HCC): Primary | ICD-10-CM

## 2021-03-08 DIAGNOSIS — G63 POLYNEUROPATHY ASSOCIATED WITH UNDERLYING DISEASE (HCC): ICD-10-CM

## 2021-03-08 PROCEDURE — 99214 OFFICE O/P EST MOD 30 MIN: CPT | Performed by: INTERNAL MEDICINE

## 2021-03-08 PROCEDURE — 2022F DILAT RTA XM EVC RTNOPTHY: CPT | Performed by: INTERNAL MEDICINE

## 2021-03-08 PROCEDURE — G8427 DOCREV CUR MEDS BY ELIG CLIN: HCPCS | Performed by: INTERNAL MEDICINE

## 2021-03-08 PROCEDURE — 3052F HG A1C>EQUAL 8.0%<EQUAL 9.0%: CPT | Performed by: INTERNAL MEDICINE

## 2021-03-08 PROCEDURE — G8510 SCR DEP NEG, NO PLAN REQD: HCPCS | Performed by: INTERNAL MEDICINE

## 2021-03-08 PROCEDURE — 3017F COLORECTAL CA SCREEN DOC REV: CPT | Performed by: INTERNAL MEDICINE

## 2021-03-08 PROCEDURE — G8419 CALC BMI OUT NRM PARAM NOF/U: HCPCS | Performed by: INTERNAL MEDICINE

## 2021-03-08 NOTE — PROGRESS NOTES
Subjective:       Chief Complaint  The patient presents for follow up of diabetes, hypertension and high cholesterol. JANESSA Biswas. is a 61 y.o. male seen for follow up of diabetes. Healso has hypertension and hyperlipidemia. Diabetes Type 3since 25years old, no significant medication side effects noted, uncontrolled on insulin pump and is now on a continuous glucose monitor since he checks his blood sugars 6-8 times a day, he is followed by Endo (Dr Krissy Bustos), patient needs to work on his diet as well as bolusing correctly for his meals, hypertension borderline controlled on lisinopril 40 mg  And Norvasc 10 mg and aldactone 25 mg, better at home, pt also misses doses of his BP meds,  hyperlipidemia well controlled, no significant medication side effects noted, on Lipitor 20 mg    Diet and Lifestyle: generally follows a low fat low cholesterol diet, does not rigorously follow a diabetic diet, exercises sporadically    Home BP Monitoring: is controlled at home. Diabetic Review of Systems - home glucose monitoring: is performed regularly, 6x-8x/day. Other symptoms and concerns: pt will try to exercise more. He has problems with his feet which limits him. Pt is candidate for diabetic shoes due to peripheral neuropathy and ulcerative callus. He is followed by Podiatry. he sees Dr Leopold Georgis now.   Patient uses Neurontin as needed          Current Outpatient Medications   Medication Sig    pantoprazole (PROTONIX) 40 mg tablet TAKE ONE TABLET BY MOUTH ONCE DAILY    sildenafiL, pulmonary hypertension, (REVATIO) 20 mg tablet Take 1-5 tablets as needed for sexual activity    amLODIPine (NORVASC) 10 mg tablet TAKE 1 TABLET BY MOUTH  DAILY    insulin lispro (HUMALOG) 100 unit/mL injection INJECT 38 UNITS ONCE DAILY VIA  INSULIN  PUMP    lisinopriL (PRINIVIL, ZESTRIL) 40 mg tablet TAKE 1 TABLET BY MOUTH  DAILY    spironolactone (ALDACTONE) 25 mg tablet TAKE 1 TABLET BY MOUTH  DAILY    aspirin delayed-release 81 mg tablet Take 81 mg by mouth daily.  fish oil-omega-3 fatty acids (Fish Oil) 340-1,000 mg capsule Take 1 Cap by mouth daily.  methocarbamoL (ROBAXIN) 750 mg tablet Take 0.5-1 Tabs by mouth two (2) times daily as needed for Muscle Spasm(s) or Pain.  oxybutynin (DITROPAN) 5 mg tablet TAKE 1 TABLET BY MOUTH TWO  TIMES DAILY    atorvastatin (LIPITOR) 20 mg tablet TAKE 1 TABLET BY MOUTH  DAILY    diclofenac (VOLTAREN) 1 % gel Apply 4 g to affected area four (4) times daily. left hand and fingers    Blood-Glucose Transmitter (DEXCOM G6 TRANSMITTER) albert E10.42    Blood-Glucose Meter,Continuous (DEXCOM G6 ) misc E10.42    Blood-Glucose Sensor (DEXCOM G6 SENSOR) albert E10.42    MINIMED INFUSION SET iset CHANGE EVERY 3 DAYS    flash glucose scanning reader (FREESTYLE RUEL 14 DAY READER) [facility] DX E10.42 check blood sugar 8 times a day due to insulin pump and labile blood sugar    flash glucose sensor (FREESTYLE RUEL 14 DAY SENSOR) kit DX E10.42 check blood sugar 8 times a day due to insulin pump and labile blood sugar    cholecalciferol, vitamin D3, (VITAMIN D3) 2,000 unit tab Take 1 Tab by mouth daily.  multivitamin (ONE A DAY) tablet Take 1 Tab by mouth daily.  lancets (ONE TOUCH DELICA) 33 gauge misc CHECK BLOOD SUGAR 8 TIMES  DAILY DUE TO INSULIN PUMP  AND LABILE BLOOD SUGAR    Blood-Glucose Meter (ONETOUCH VERIO FLEX) misc DX E10.42 check blood sugar 8 times a day due to insulin pump and labile blood sugar    glucose blood VI test strips (ONETOUCH VERIO) strip DX E10.42 check blood sugar 8 times a day due to insulin pump and labile blood sugar    Blood-Glucose Meter (CONTOUR NEXT EZ METER) monitoring kit DX E10.42 check blood sugar 8 times a day due to insulin pump and labile blood sugar.     CONTOUR NEXT STRIPS strip CHECK BLOOD SUGAR 8 TIMES A DAY DUE TO INSULIN PUMP AND LABILE BLOOD SUGAR    MICROLET LANCET [facility]      No current facility-administered medications for this visit. Review of Systems  Respiratory: negative for dyspnea on exertion  Cardiovascular: negative for chest pain    Objective:     Visit Vitals  BP (!) 151/75 (BP 1 Location: Left arm, BP Patient Position: Sitting, BP Cuff Size: Adult)   Pulse 80   Temp 97.2 °F (36.2 °C) (Temporal)   Resp 20   Ht 6' 1\" (1.854 m)   Wt 215 lb (97.5 kg)   SpO2 100%   BMI 28.37 kg/m²        General appearance - alert, well appearing, and in no distress  HEENT bilateral nasal mucosal edema and cobblestoning in posterior pharynx  Chest - clear to auscultation, no wheezes, rales or rhonchi, symmetric air entry  Heart - normal rate, regular rhythm, normal S1, S2, no murmurs, rubs, clicks or gallops      Labs:   Lab Results   Component Value Date/Time    Hemoglobin A1c 8.8 (H) 02/19/2021 01:17 PM    Hemoglobin A1c 7.5 (H) 10/26/2020 10:27 AM    Hemoglobin A1c 7.6 (H) 08/24/2020 09:41 AM    Glucose 66 (L) 02/19/2021 01:17 PM    Glucose (POC) 138 (H) 06/26/2019 07:47 AM    Microalbumin/Creat ratio (mg/g creat) 16 08/24/2020 09:41 AM    Microalbumin,urine random 3.86 (H) 08/24/2020 09:41 AM    LDL, calculated 73.8 02/19/2021 01:17 PM    Creatinine 1.24 02/19/2021 01:17 PM    Hemoglobin A1c, External 9.3 12/10/2015    Hemoglobin A1c, External 8.7% 08/20/2015    Hemoglobin A1c, External 9.5 05/22/2015 07:43 PM      Lab Results   Component Value Date/Time    Cholesterol, total 146 02/19/2021 01:17 PM    HDL Cholesterol 64 (H) 02/19/2021 01:17 PM    LDL, calculated 73.8 02/19/2021 01:17 PM    Triglyceride 41 02/19/2021 01:17 PM    CHOL/HDL Ratio 2.3 02/19/2021 01:17 PM     Lab Results   Component Value Date/Time    ALT (SGPT) 22 02/19/2021 01:17 PM    Alk.  phosphatase 127 (H) 02/19/2021 01:17 PM    Bilirubin, total 0.5 02/19/2021 01:17 PM     Lab Results   Component Value Date/Time    GFR est AA >60 02/19/2021 01:17 PM    GFR est non-AA 59 (L) 02/19/2021 01:17 PM    Creatinine 1.24 02/19/2021 01:17 PM    BUN 21 (H) 02/19/2021 01:17 PM    Sodium 144 02/19/2021 01:17 PM    Potassium 4.4 02/19/2021 01:17 PM    Chloride 110 02/19/2021 01:17 PM    CO2 32 02/19/2021 01:17 PM      Lab Results   Component Value Date/Time    Prostate Specific Ag 0.4 01/04/2021 09:02 AM    Prostate Specific Ag 0.4 06/24/2020 09:32 AM    Prostate Specific Ag 0.4 02/19/2020 03:40 PM    Prostate Specific Ag 0.3 03/21/2018 07:54 AM    Prostate Specific Ag 0.251 12/17/2012 03:42 PM    PSA 0.3 09/30/2010 10:09 AM    PSA, FREE 0.2 09/30/2010 10:09 AM    % FREE PSA 67 09/30/2010 10:09 AM     Lab Results   Component Value Date/Time    Glucose 66 (L) 02/19/2021 01:17 PM    Glucose (POC) 138 (H) 06/26/2019 07:47 AM            Assessment / Plan     Diabetes uncontrolled, pt remains on insulin pump and will follow up with Dr. Kristeen Hashimoto endocrine. Patient needs to bolus with his meals better and to improve his diet with less starches and sweets overall. Hypertension borderline controlled, will continue  lisinopril 40 mg, and Norvasc 10 mg and aldactone 25 mg. Patient will try not to miss doses. Hyperlipidemia well controlled on Lipitor 20 mg      ICD-10-CM ICD-9-CM    1. Type 1 diabetes mellitus with diabetic polyneuropathy (HCC)  E10.42 250.61 HEMOGLOBIN A1C W/O EAG     357.2 MICROALBUMIN, UR, RAND W/ MICROALB/CREAT RATIO   2. Hypertension  P82.7 311.33 METABOLIC PANEL, COMPREHENSIVE   3. Dyslipidemia  E78.5 272.4 LIPID PANEL   4. Polyneuropathy associated with underlying disease ([address]  G63 357. 4  patient uses Neurontin as needed. He is followed by podiatry for diabetic shoes. Diabetic issues reviewed with him: diabetic diet discussed in detail, and low cholesterol diet, weight control and daily exercise discussed. Follow-up and Dispositions    · Return in about 3 months (around 6/8/2021) for labs 1 week before. Reviewed plan of care. Patient has provided input and agrees with goals.

## 2021-03-08 NOTE — PROGRESS NOTES
Patient is in the office today for a 6 month follow up. 1. Have you been to the ER, urgent care clinic since your last visit? Hospitalized since your last visit? No    2. Have you seen or consulted any other health care providers outside of the [address] [place] [address] since your last visit? Include any pap smears or colon screening.  No

## 2021-03-08 NOTE — PATIENT INSTRUCTIONS

## 2021-03-10 ENCOUNTER — TELEPHONE (OUTPATIENT)
Dept: INTERNAL MEDICINE CLINIC | Age: 61
End: 2021-03-10

## 2021-03-10 NOTE — TELEPHONE ENCOUNTER
Left message with pt's family member for return call     appt 06/18/2021 requires re-scheduling due to provider out of office that week

## 2021-03-15 RX ORDER — ATORVASTATIN CALCIUM 20 MG/1
TABLET, FILM COATED ORAL
Qty: 90 TAB | Refills: 3 | Status: SHIPPED | OUTPATIENT
Start: 2021-03-15 | End: 2021-04-16 | Stop reason: SDUPTHER

## 2021-03-15 NOTE — TELEPHONE ENCOUNTER
Last Visit: 3/8/21 with MD Ibanez  Next Appointment: Advised to follow-up in 3 months  Previous Refill Encounter(s): 3/19/20 #90 with 3 refills    Requested Prescriptions     Pending Prescriptions Disp Refills    atorvastatin (LIPITOR) 20 mg tablet [Pharmacy Med Name: ATORVASTATIN  20MG  TAB] 90 Tab 3     Sig: TAKE 1 TABLET BY MOUTH  DAILY

## 2021-04-01 RX ORDER — INSULIN LISPRO 100 [IU]/ML
INJECTION, SOLUTION INTRAVENOUS; SUBCUTANEOUS
Qty: 20 ML | Refills: 5 | Status: SHIPPED | OUTPATIENT
Start: 2021-04-01 | End: 2021-04-27 | Stop reason: ALTCHOICE

## 2021-04-01 NOTE — TELEPHONE ENCOUNTER
Last Visit: 3/8/21 with MD Ibanez  Next Appointment: Advised to follow-up in 3 months  Previous Refill Encounter(s): 11/18/20 #20 with 5 refills    Requested Prescriptions     Pending Prescriptions Disp Refills    insulin lispro (HUMALOG) 100 unit/mL injection 20 mL 5     Sig: INJECT 38 UNITS ONCE DAILY VIA INSULIN PUMP  Dx U18.55

## 2021-04-14 ENCOUNTER — TELEPHONE (OUTPATIENT)
Dept: INTERNAL MEDICINE CLINIC | Age: 61
End: 2021-04-14

## 2021-04-14 DIAGNOSIS — I10 ESSENTIAL HYPERTENSION: ICD-10-CM

## 2021-04-14 DIAGNOSIS — E78.00 HIGH CHOLESTEROL: ICD-10-CM

## 2021-04-14 NOTE — TELEPHONE ENCOUNTER
Pt calling asking to speak to Sunshine Waggoner.  Says he needs a prior auth on Novalog R DExom  and transmitter, Mini Med Quick set, Electronic reservoir for his pump    Says he has new [facility] policy that is [id number] which is a medicare replacement hmo    ,

## 2021-04-16 RX ORDER — FLASH GLUCOSE SENSOR
KIT MISCELLANEOUS
Qty: 1 KIT | Refills: 0 | Status: SHIPPED | OUTPATIENT
Start: 2021-04-16 | End: 2021-05-01

## 2021-04-16 RX ORDER — PANTOPRAZOLE SODIUM 40 MG/1
TABLET, DELAYED RELEASE ORAL
Qty: 90 TAB | Refills: 3 | Status: SHIPPED | OUTPATIENT
Start: 2021-04-16 | End: 2021-06-23 | Stop reason: ALTCHOICE

## 2021-04-16 RX ORDER — SPIRONOLACTONE 25 MG/1
TABLET ORAL
Qty: 90 TAB | Refills: 3 | Status: SHIPPED | OUTPATIENT
Start: 2021-04-16 | End: 2022-02-02

## 2021-04-16 RX ORDER — AMLODIPINE BESYLATE 10 MG/1
10 TABLET ORAL DAILY
Qty: 90 TAB | Refills: 3 | Status: SHIPPED | OUTPATIENT
Start: 2021-04-16 | End: 2022-02-02

## 2021-04-16 RX ORDER — LISINOPRIL 40 MG/1
TABLET ORAL
Qty: 90 TAB | Refills: 3 | Status: SHIPPED | OUTPATIENT
Start: 2021-04-16 | End: 2021-06-23 | Stop reason: ALTCHOICE

## 2021-04-16 RX ORDER — ATORVASTATIN CALCIUM 20 MG/1
TABLET, FILM COATED ORAL
Qty: 90 TAB | Refills: 3 | Status: SHIPPED | OUTPATIENT
Start: 2021-04-16 | End: 2022-02-02

## 2021-04-16 RX ORDER — FLASH GLUCOSE SCANNING READER
EACH MISCELLANEOUS
Qty: 6 EACH | Refills: 2 | Status: SHIPPED | OUTPATIENT
Start: 2021-04-16

## 2021-04-16 NOTE — TELEPHONE ENCOUNTER
Told rep to disregard PA since medications were being sent to Mail order pharmacy and will see what they require a PA.

## 2021-04-16 NOTE — TELEPHONE ENCOUNTER
Spoke with Pharmacist to see if PA is actually needed or if refills are needed and what pharmacy patient wants things called into. Left message for patient to call the office. Valeriano log R is available without a Prior Authorization. Dexom  available with out a Prior Authorization. Per pharmacist machine is on manufacture back order. Dexcom Transmitter available without a PA. Per pharmacist machine is on manufacture back order.

## 2021-04-16 NOTE — TELEPHONE ENCOUNTER
Spoke with patient he states he needs to have prescription sent to [address] [place] [address] , mail order pharmacy because he switched insurance. Patient states he will need a prescription for Novolog R because his current insulin is not covered by [facility].

## 2021-04-16 NOTE — TELEPHONE ENCOUNTER
Win Solorio is calling from [employer]. She wants to confirm that Dr. Fernando Sevilla ok with the therapy change to formulary. Stating that is the box that was checked.     [phone]  Reference Aguiar:  Paul Valverde

## 2021-04-27 RX ORDER — INSULIN ASPART 100 [IU]/ML
INJECTION, SOLUTION INTRAVENOUS; SUBCUTANEOUS
Qty: 20 ML | Refills: 3 | Status: SHIPPED | OUTPATIENT
Start: 2021-04-27 | End: 2021-04-29 | Stop reason: SDUPTHER

## 2021-04-27 NOTE — TELEPHONE ENCOUNTER
Stating [facility] can't fill the  Humalog R. Stating it needs to be changed to  Novolog R. He has run out so he needs this to be sent to [facility].

## 2021-04-29 ENCOUNTER — TELEPHONE (OUTPATIENT)
Dept: INTERNAL MEDICINE CLINIC | Age: 61
End: 2021-04-29

## 2021-04-29 RX ORDER — INSULIN ASPART 100 [IU]/ML
INJECTION, SOLUTION INTRAVENOUS; SUBCUTANEOUS
Qty: 60 ML | Refills: 3 | Status: SHIPPED | OUTPATIENT
Start: 2021-04-29 | End: 2022-02-16 | Stop reason: SDUPTHER

## 2021-05-01 RX ORDER — FLASH GLUCOSE SENSOR
KIT MISCELLANEOUS
Qty: 1 KIT | Refills: 0 | Status: SHIPPED | OUTPATIENT
Start: 2021-05-01

## 2021-05-06 ENCOUNTER — TELEPHONE (OUTPATIENT)
Dept: INTERNAL MEDICINE CLINIC | Age: 61
End: 2021-05-06

## 2021-05-06 NOTE — TELEPHONE ENCOUNTER
Patient called and said that he would nee a prior authorization for the free style randa patches.  Patient said that it would be through either [facility] or [address] [place] [address]

## 2021-05-07 ENCOUNTER — TELEPHONE (OUTPATIENT)
Dept: INTERNAL MEDICINE CLINIC | Age: 61
End: 2021-05-07

## 2021-05-07 NOTE — TELEPHONE ENCOUNTER
Denzil Romberg from [address] called asking for a prior authorization for novolog -100   Reference # [id number]

## 2021-06-11 ENCOUNTER — APPOINTMENT (OUTPATIENT)
Dept: INTERNAL MEDICINE CLINIC | Age: 61
End: 2021-06-11

## 2021-06-11 DIAGNOSIS — I11.9 BENIGN HYPERTENSIVE HEART DISEASE WITHOUT HEART FAILURE: ICD-10-CM

## 2021-06-11 DIAGNOSIS — E78.5 DYSLIPIDEMIA: ICD-10-CM

## 2021-06-11 DIAGNOSIS — E10.42 TYPE 1 DIABETES MELLITUS WITH DIABETIC POLYNEUROPATHY (HCC): ICD-10-CM

## 2021-06-12 LAB
ALBUMIN SERPL-MCNC: 4 G/DL (ref 3.8–4.9)
ALBUMIN/CREAT UR: 18 MG/G CREAT (ref 0–29)
ALBUMIN/GLOB SERPL: 1.5 {RATIO} (ref 1.2–2.2)
ALP SERPL-CCNC: 115 IU/L (ref 48–121)
ALT SERPL-CCNC: 19 IU/L (ref 0–44)
AST SERPL-CCNC: 17 IU/L (ref 0–40)
BILIRUB SERPL-MCNC: 0.3 MG/DL (ref 0–1.2)
BUN SERPL-MCNC: 18 MG/DL (ref 8–27)
BUN/CREAT SERPL: 15 (ref 10–24)
CALCIUM SERPL-MCNC: 10.5 MG/DL (ref 8.6–10.2)
CHLORIDE SERPL-SCNC: 109 MMOL/L (ref 96–106)
CHOLEST SERPL-MCNC: 151 MG/DL (ref 100–199)
CO2 SERPL-SCNC: 22 MMOL/L (ref 20–29)
CREAT SERPL-MCNC: 1.23 MG/DL (ref 0.76–1.27)
CREAT UR-MCNC: 126.5 MG/DL
GLOBULIN SER CALC-MCNC: 2.6 G/DL (ref 1.5–4.5)
GLUCOSE SERPL-MCNC: 79 MG/DL (ref 65–99)
HBA1C MFR BLD: 8.5 % (ref 4.8–5.6)
HDLC SERPL-MCNC: 64 MG/DL
IMP & REVIEW OF LAB RESULTS: NORMAL
LDLC SERPL CALC-MCNC: 75 MG/DL (ref 0–99)
MICROALBUMIN UR-MCNC: 22.7 UG/ML
POTASSIUM SERPL-SCNC: 5.2 MMOL/L (ref 3.5–5.2)
PROT SERPL-MCNC: 6.6 G/DL (ref 6–8.5)
SODIUM SERPL-SCNC: 144 MMOL/L (ref 134–144)
TRIGL SERPL-MCNC: 58 MG/DL (ref 0–149)
VLDLC SERPL CALC-MCNC: 12 MG/DL (ref 5–40)

## 2021-06-23 ENCOUNTER — OFFICE VISIT (OUTPATIENT)
Dept: INTERNAL MEDICINE CLINIC | Age: 61
End: 2021-06-23
Payer: MEDICARE

## 2021-06-23 VITALS
SYSTOLIC BLOOD PRESSURE: 149 MMHG | BODY MASS INDEX: 27.83 KG/M2 | HEART RATE: 80 BPM | HEIGHT: 73 IN | TEMPERATURE: 97.8 F | OXYGEN SATURATION: 98 % | WEIGHT: 210 LBS | DIASTOLIC BLOOD PRESSURE: 74 MMHG | RESPIRATION RATE: 18 BRPM

## 2021-06-23 DIAGNOSIS — E83.52 HYPERCALCEMIA: ICD-10-CM

## 2021-06-23 DIAGNOSIS — I10 ESSENTIAL HYPERTENSION: ICD-10-CM

## 2021-06-23 DIAGNOSIS — E78.00 HIGH CHOLESTEROL: ICD-10-CM

## 2021-06-23 DIAGNOSIS — E10.42 TYPE 1 DIABETES MELLITUS WITH DIABETIC POLYNEUROPATHY (HCC): Primary | ICD-10-CM

## 2021-06-23 PROCEDURE — G8510 SCR DEP NEG, NO PLAN REQD: HCPCS | Performed by: INTERNAL MEDICINE

## 2021-06-23 PROCEDURE — G8419 CALC BMI OUT NRM PARAM NOF/U: HCPCS | Performed by: INTERNAL MEDICINE

## 2021-06-23 PROCEDURE — 3017F COLORECTAL CA SCREEN DOC REV: CPT | Performed by: INTERNAL MEDICINE

## 2021-06-23 PROCEDURE — G8754 DIAS BP LESS 90: HCPCS | Performed by: INTERNAL MEDICINE

## 2021-06-23 PROCEDURE — G8753 SYS BP > OR = 140: HCPCS | Performed by: INTERNAL MEDICINE

## 2021-06-23 PROCEDURE — 99214 OFFICE O/P EST MOD 30 MIN: CPT | Performed by: INTERNAL MEDICINE

## 2021-06-23 PROCEDURE — 2022F DILAT RTA XM EVC RTNOPTHY: CPT | Performed by: INTERNAL MEDICINE

## 2021-06-23 PROCEDURE — 3052F HG A1C>EQUAL 8.0%<EQUAL 9.0%: CPT | Performed by: INTERNAL MEDICINE

## 2021-06-23 PROCEDURE — G8427 DOCREV CUR MEDS BY ELIG CLIN: HCPCS | Performed by: INTERNAL MEDICINE

## 2021-06-23 RX ORDER — OLMESARTAN MEDOXOMIL 40 MG/1
40 TABLET ORAL DAILY
Qty: 90 TABLET | Refills: 3 | Status: SHIPPED | OUTPATIENT
Start: 2021-06-23 | End: 2022-02-07 | Stop reason: SDUPTHER

## 2021-06-23 RX ORDER — OXYBUTYNIN CHLORIDE 5 MG/1
TABLET ORAL
Qty: 180 TABLET | Refills: 3 | Status: SHIPPED | OUTPATIENT
Start: 2021-06-23 | End: 2021-10-01 | Stop reason: ALTCHOICE

## 2021-06-23 RX ORDER — OMEPRAZOLE 40 MG/1
40 CAPSULE, DELAYED RELEASE ORAL DAILY
Qty: 90 CAPSULE | Refills: 3 | Status: SHIPPED | OUTPATIENT
Start: 2021-06-23 | End: 2022-02-07 | Stop reason: SDUPTHER

## 2021-06-23 NOTE — PROGRESS NOTES
Subjective:       Chief Complaint  The patient presents for follow up of diabetes, hypertension and high cholesterol. JANESSA Younger is a 61 y.o. male seen for follow up of diabetes. Hunter has hypertension and hyperlipidemia. Diabetes Type 3since 25years old, no significant medication side effects noted, uncontrolled on insulin pump and is now on a continuous glucose monitor since he checks his blood sugars 6-8 times a day, he is followed by Endo (Dr Rm Madera) but has not followed up recently,  patient needs to work on his diet as well as bolusing correctly for his meals, he will f/u with endo to help with management,  hypertension borderline controlled on lisinopril 40 mg  And Norvasc 10 mg and aldactone 25 mg,  hyperlipidemia well controlled, no significant medication side effects noted, on Lipitor 20 mg    Diet and Lifestyle: generally follows a low fat low cholesterol diet, does not rigorously follow a diabetic diet, exercises sporadically    Home BP Monitoring: is uncontrolled at home. Diabetic Review of Systems - home glucose monitoring: is performed regularly, 6x-8x/day. Other symptoms and concerns: pt will try to exercise more. He has problems with his feet which limits him. Pt is candidate for diabetic shoes due to peripheral neuropathy and ulcerative callus. He is followed by Podiatry. he sees Dr Carter Blackwell now. Patient uses Neurontin as needed    Patient's calcium level has been elevated. Patient will follow up with endocrine to be evaluated for possible parathyroid adenoma. Current Outpatient Medications   Medication Sig    omeprazole (PRILOSEC) 40 mg capsule Take 1 Capsule by mouth daily.  oxybutynin (DITROPAN) 5 mg tablet TAKE 1 TABLET BY MOUTH TWO  TIMES DAILY    olmesartan (BENICAR) 40 mg tablet Take 1 Tablet by mouth daily.     sildenafiL, pulmonary hypertension, (REVATIO) 20 mg tablet TAKE 1 TO 5 TABLETS BY MOUTH AS NEEDED FOR SEXUAL ACTIVITY    flash glucose sensor (FreeStyle Kimi 14 Day Sensor) kit CHECK BLOOD SUGAR 8 TIMES A DAY AND CHANGE SENSOR EVERY 14 DAYS AS DIRECTED    insulin aspart U-100 (NovoLOG U-100 Insulin aspart) 100 unit/mL injection INJECT 38 UNITS ONCE DAILY VIA INSULIN PUMP  Dx E10.42    flash glucose scanning reader (FreeStyle Kimi 14 Day [place]) [facility] DX H32.78 check blood sugar 8 times a day due to insulin pump and labile blood sugar    spironolactone (ALDACTONE) 25 mg tablet TAKE 1 TABLET BY MOUTH  DAILY    atorvastatin (LIPITOR) 20 mg tablet TAKE 1 TABLET BY MOUTH  DAILY  Indications: treatment to slow progression of coronary artery disease    amLODIPine (NORVASC) 10 mg tablet Take 1 Tab by mouth daily.  aspirin delayed-release 81 mg tablet Take 81 mg by mouth daily.  fish oil-omega-3 fatty acids (Fish Oil) 340-1,000 mg capsule Take 1 Cap by mouth daily.  methocarbamoL (ROBAXIN) 750 mg tablet Take 0.5-1 Tabs by mouth two (2) times daily as needed for Muscle Spasm(s) or Pain.  MINIMED INFUSION SET iset CHANGE EVERY 3 DAYS    cholecalciferol, vitamin D3, (VITAMIN D3) 2,000 unit tab Take 1 Tab by mouth daily.  MICROLET LANCET misc     multivitamin (ONE A DAY) tablet Take 1 Tab by mouth daily.  diclofenac (VOLTAREN) 1 % gel Apply 4 g to affected area four (4) times daily.  left hand and fingers (Patient not taking: Reported on 6/23/2021)    Blood-Glucose Transmitter (DEXCOM G6 TRANSMITTER) albert E10.42    Blood-Glucose Meter,Continuous (DEXCOM G6 ) misc E10.42    Blood-Glucose Sensor (DEXCOM G6 SENSOR) albert E10.42    lancets (ONE TOUCH DELICA) 33 gauge misc CHECK BLOOD SUGAR 8 TIMES  DAILY DUE TO INSULIN PUMP  AND LABILE BLOOD SUGAR    Blood-Glucose Meter (ONETOUCH VERIO FLEX) misc DX E10.42 check blood sugar 8 times a day due to insulin pump and labile blood sugar    glucose blood VI test strips (ONETOUCH VERIO) strip DX E10.42 check blood sugar 8 times a day due to insulin pump and labile blood sugar    Blood-Glucose Meter (CONTOUR NEXT EZ METER) monitoring kit DX E10.42 check blood sugar 8 times a day due to insulin pump and labile blood sugar.  CONTOUR NEXT STRIPS strip CHECK BLOOD SUGAR 8 TIMES A DAY DUE TO INSULIN PUMP AND LABILE BLOOD SUGAR     No current facility-administered medications for this visit.              Review of Systems  Respiratory: negative for dyspnea on exertion  Cardiovascular: negative for chest pain    Objective:     Visit Vitals  BP (!) 149/74 (BP 1 Location: Left arm, BP Patient Position: Sitting, BP Cuff Size: Adult)   Pulse 80   Temp 97.8 °F (36.6 °C) (Temporal)   Resp 18   Ht 6' 1\" (1.854 m)   Wt 210 lb (95.3 kg)   SpO2 98%   BMI 27.71 kg/m²        General appearance - alert, well appearing, and in no distress  HEENT bilateral nasal mucosal edema and cobblestoning in posterior pharynx  Chest - clear to auscultation, no wheezes, rales or rhonchi, symmetric air entry  Heart - normal rate, regular rhythm, normal S1, S2, no murmurs, rubs, clicks or gallops      Labs:     Labs:   Lab Results   Component Value Date/Time    Cholesterol, total 151 06/11/2021 08:34 AM    HDL Cholesterol 64 06/11/2021 08:34 AM    LDL, calculated 75 06/11/2021 08:34 AM    LDL, calculated 73.8 02/19/2021 01:17 PM    LDL-C, External 72 08/20/2015 12:00 AM    Triglyceride 58 06/11/2021 08:34 AM    CHOL/HDL Ratio 2.3 02/19/2021 01:17 PM     Lab Results   Component Value Date/Time    Prostate Specific Ag 0.4 01/04/2021 09:02 AM    Prostate Specific Ag 0.4 06/24/2020 09:32 AM    Prostate Specific Ag 0.4 02/19/2020 03:40 PM    Prostate Specific Ag 0.3 03/21/2018 07:54 AM    Prostate Specific Ag 0.251 12/17/2012 03:42 PM    PSA 0.3 09/30/2010 10:09 AM    PSA, FREE 0.2 09/30/2010 10:09 AM    % FREE PSA 67 09/30/2010 10:09 AM     Lab Results   Component Value Date/Time    Sodium 144 06/11/2021 08:34 AM    Potassium 5.2 06/11/2021 08:34 AM    Chloride 109 (H) 06/11/2021 08:34 AM    CO2 22 06/11/2021 08:34 AM    Anion gap 2 (L) 02/19/2021 01:17 PM    Glucose 79 06/11/2021 08:34 AM    BUN 18 06/11/2021 08:34 AM    Creatinine 1.23 06/11/2021 08:34 AM    BUN/Creatinine ratio 15 06/11/2021 08:34 AM    GFR est AA 73 06/11/2021 08:34 AM    GFR est non-AA 63 06/11/2021 08:34 AM    Calcium 10.5 (H) 06/11/2021 08:34 AM    Bilirubin, total 0.3 06/11/2021 08:34 AM    ALT (SGPT) 19 06/11/2021 08:34 AM    Alk. phosphatase 115 06/11/2021 08:34 AM    Protein, total 6.6 06/11/2021 08:34 AM    Albumin 4.0 06/11/2021 08:34 AM    Globulin 3.4 02/19/2021 01:17 PM    A-G Ratio 1.5 06/11/2021 08:34 AM      Lab Results   Component Value Date/Time    Hemoglobin A1c 8.5 (H) 06/11/2021 08:34 AM    Hemoglobin A1c (POC) 9.1 06/11/2015 09:54 AM    Hemoglobin A1c, External 9.3 12/10/2015 12:00 AM                             Assessment / Plan     Diabetes uncontrolled, pt remains on insulin pump and will follow up with Dr. Zachery Schaumann endocrine. Patient needs to bolus with his meals better and to improve his diet with less starches and sweets overall. Hypertension uncontrolled, will continue  Change to Benicar 40 mg from lisinopril  and continue Norvasc 10 mg and aldactone 25 mg. Patient will try not to miss doses. Hyperlipidemia well controlled on Lipitor 20 mg        ICD-10-CM ICD-9-CM    1. Type 1 diabetes mellitus with diabetic polyneuropathy (HCC)  E10.42 250.61 REFERRAL TO ENDOCRINOLOGY     357.2 HEMOGLOBIN A1C W/O EAG   2. Essential hypertension  I10 401.9 olmesartan (BENICAR) 40 mg tablet      METABOLIC PANEL, COMPREHENSIVE   3. High cholesterol  E78.00 272.0 LIPID PANEL   4. Hypercalcemia  E83.52 275.42  patient to follow-up with endocrine for further evaluation for possible parathyroid adenoma              Diabetic issues reviewed with him: diabetic diet discussed in detail, and low cholesterol diet, weight control and daily exercise discussed.      Follow-up and Dispositions    · Return in about 3 months (around 9/23/2021) for labs 1 week before. Reviewed plan of care. Patient has provided input and agrees with goals.

## 2021-06-23 NOTE — PATIENT INSTRUCTIONS
Diabetes Foot Health: Care Instructions  Your Care Instructions     When you have diabetes, your feet need extra care and attention. Diabetes can damage the nerve endings and blood vessels in your feet, making you less likely to notice when your feet are injured. Diabetes also limits your body's ability to fight infection and get blood to areas that need it. If you get a minor foot injury, it could become an ulcer or a serious infection. With good foot care, you can prevent most of these problems. Caring for your feet can be quick and easy. Most of the care can be done when you are bathing or getting ready for bed. Follow-up care is a key part of your treatment and safety. Be sure to make and go to all appointments, and call your doctor if you are having problems. It's also a good idea to know your test results and keep a list of the medicines you take. How can you care for yourself at home? · Keep your blood sugar close to normal by watching what and how much you eat, monitoring blood sugar, taking medicines if prescribed, and getting regular exercise. · Do not smoke. Smoking affects blood flow and can make foot problems worse. If you need help quitting, talk to your doctor about stop-smoking programs and medicines. These can increase your chances of quitting for good. · Eat a diet that is low in fats. High fat intake can cause fat to build up in your blood vessels and decrease blood flow. · Inspect your feet daily for blisters, cuts, cracks, or sores. If you cannot see well, use a mirror or have someone help you. · Take care of your feet:  ? Wash your feet every day. Use warm (not hot) water. Check the water temperature with your wrists or other part of your body, not your feet. ? Dry your feet well. Pat them dry. Do not rub the skin on your feet too hard. Dry well between your toes. If the skin on your feet stays moist, bacteria or a fungus can grow, which can lead to infection. ?  Keep your skin soft. Use moisturizing skin cream to keep the skin on your feet soft and prevent calluses and cracks. But do not put the cream between your toes, and stop using any cream that causes a rash. ? Clean underneath your toenails carefully. Do not use a sharp object to clean underneath your toenails. Use the blunt end of a nail file or other rounded tool. ? Trim and file your toenails straight across to prevent ingrown toenails. Use a nail clipper, not scissors. Use an emery board to smooth the edges. · Change socks daily. Socks without seams are best, because seams often rub the feet. You can find socks for people with diabetes from specialty catalogs. · Look inside your shoes every day for things like gravel or torn linings, which could cause blisters or sores. · Buy shoes that fit well:  ? Look for shoes that have plenty of space around the toes. This helps prevent bunions and blisters. ? Try on shoes while wearing the kind of socks you will usually wear with the shoes. ? Avoid plastic shoes. They may rub your feet and cause blisters. Good shoes should be made of materials that are flexible and breathable, such as leather or cloth. ? Break in new shoes slowly by wearing them for no more than an hour a day for several days. Take extra time to check your feet for red areas, blisters, or other problems after you wear new shoes. · Do not go barefoot. Do not wear sandals, and do not wear shoes with very thin soles. Thin soles are easy to puncture. They also do not protect your feet from hot pavement or cold weather. · Have your doctor check your feet during each visit. If you have a foot problem, see your doctor. Do not try to treat an early foot problem at home. Home remedies or treatments that you can buy without a prescription (such as corn removers) can be harmful. · Always get early treatment for foot problems. A minor irritation can lead to a major problem if not properly cared for early.   When should you call for help? Call your doctor now or seek immediate medical care if:    · You have a foot sore, an ulcer or break in the skin that is not healing after 4 days, bleeding corns or calluses, or an ingrown toenail.     · You have blue or black areas, which can mean bruising or blood flow problems.     · You have peeling skin or tiny blisters between your toes or cracking or oozing of the skin.     · You have a fever for more than 24 hours and a foot sore.     · You have new numbness or tingling in your feet that does not go away after you move your feet or change positions.     · You have unexplained or unusual swelling of the foot or ankle. Watch closely for changes in your health, and be sure to contact your doctor if:    · You cannot do proper foot care. Where can you learn more? Go to http://www.gray.com/  Enter A739 in the search box to learn more about \"Diabetes Foot Health: Care Instructions. \"  Current as of: August 31, 2020               Content Version: 12.8  © 2006-2021 [employer]. Care instructions adapted under license by [employer] (which disclaims liability or warranty for this information). If you have questions about a medical condition or this instruction, always ask your healthcare professional. Eusebio Powers any warranty or liability for your use of this information.

## 2021-06-23 NOTE — PROGRESS NOTES
Patient is in the office today for a 3 month follow up. 1. Have you been to the ER, urgent care clinic since your last visit? Hospitalized since your last visit? No    2. Have you seen or consulted any other health care providers outside of the [address] [place] [address] since your last visit? Include any pap smears or colon screening.  No

## 2021-09-24 ENCOUNTER — APPOINTMENT (OUTPATIENT)
Dept: INTERNAL MEDICINE CLINIC | Age: 61
End: 2021-09-24

## 2021-09-25 LAB
ALBUMIN SERPL-MCNC: 3.7 G/DL (ref 3.8–4.8)
ALBUMIN/GLOB SERPL: 1.4 {RATIO} (ref 1.2–2.2)
ALP SERPL-CCNC: 96 IU/L (ref 44–121)
ALT SERPL-CCNC: 15 IU/L (ref 0–44)
AST SERPL-CCNC: 16 IU/L (ref 0–40)
BILIRUB SERPL-MCNC: 0.4 MG/DL (ref 0–1.2)
BUN SERPL-MCNC: 20 MG/DL (ref 8–27)
BUN/CREAT SERPL: 16 (ref 10–24)
CALCIUM SERPL-MCNC: 10 MG/DL (ref 8.6–10.2)
CHLORIDE SERPL-SCNC: 105 MMOL/L (ref 96–106)
CHOLEST SERPL-MCNC: 155 MG/DL (ref 100–199)
CO2 SERPL-SCNC: 25 MMOL/L (ref 20–29)
CREAT SERPL-MCNC: 1.23 MG/DL (ref 0.76–1.27)
GLOBULIN SER CALC-MCNC: 2.6 G/DL (ref 1.5–4.5)
GLUCOSE SERPL-MCNC: 124 MG/DL (ref 65–99)
HBA1C MFR BLD: 9 % (ref 4.8–5.6)
HDLC SERPL-MCNC: 53 MG/DL
IMP & REVIEW OF LAB RESULTS: NORMAL
LDLC SERPL CALC-MCNC: 89 MG/DL (ref 0–99)
POTASSIUM SERPL-SCNC: 4.7 MMOL/L (ref 3.5–5.2)
PROT SERPL-MCNC: 6.3 G/DL (ref 6–8.5)
SODIUM SERPL-SCNC: 141 MMOL/L (ref 134–144)
TRIGL SERPL-MCNC: 67 MG/DL (ref 0–149)
VLDLC SERPL CALC-MCNC: 13 MG/DL (ref 5–40)

## 2021-10-01 ENCOUNTER — OFFICE VISIT (OUTPATIENT)
Dept: INTERNAL MEDICINE CLINIC | Age: 61
End: 2021-10-01
Payer: MEDICARE

## 2021-10-01 VITALS
WEIGHT: 215 LBS | RESPIRATION RATE: 16 BRPM | HEIGHT: 73 IN | HEART RATE: 80 BPM | DIASTOLIC BLOOD PRESSURE: 58 MMHG | OXYGEN SATURATION: 100 % | BODY MASS INDEX: 28.49 KG/M2 | TEMPERATURE: 97.5 F | SYSTOLIC BLOOD PRESSURE: 123 MMHG

## 2021-10-01 DIAGNOSIS — I10 ESSENTIAL HYPERTENSION: ICD-10-CM

## 2021-10-01 DIAGNOSIS — E78.00 HIGH CHOLESTEROL: ICD-10-CM

## 2021-10-01 DIAGNOSIS — Z23 NEEDS FLU SHOT: ICD-10-CM

## 2021-10-01 DIAGNOSIS — L74.519 GUSTATORY SWEATING: ICD-10-CM

## 2021-10-01 DIAGNOSIS — E10.42 TYPE 1 DIABETES MELLITUS WITH DIABETIC POLYNEUROPATHY (HCC): Primary | ICD-10-CM

## 2021-10-01 PROCEDURE — 3052F HG A1C>EQUAL 8.0%<EQUAL 9.0%: CPT | Performed by: INTERNAL MEDICINE

## 2021-10-01 PROCEDURE — G8754 DIAS BP LESS 90: HCPCS | Performed by: INTERNAL MEDICINE

## 2021-10-01 PROCEDURE — 99214 OFFICE O/P EST MOD 30 MIN: CPT | Performed by: INTERNAL MEDICINE

## 2021-10-01 PROCEDURE — G8419 CALC BMI OUT NRM PARAM NOF/U: HCPCS | Performed by: INTERNAL MEDICINE

## 2021-10-01 PROCEDURE — G8427 DOCREV CUR MEDS BY ELIG CLIN: HCPCS | Performed by: INTERNAL MEDICINE

## 2021-10-01 PROCEDURE — 3017F COLORECTAL CA SCREEN DOC REV: CPT | Performed by: INTERNAL MEDICINE

## 2021-10-01 PROCEDURE — G0008 ADMIN INFLUENZA VIRUS VAC: HCPCS | Performed by: INTERNAL MEDICINE

## 2021-10-01 PROCEDURE — G8752 SYS BP LESS 140: HCPCS | Performed by: INTERNAL MEDICINE

## 2021-10-01 PROCEDURE — 2022F DILAT RTA XM EVC RTNOPTHY: CPT | Performed by: INTERNAL MEDICINE

## 2021-10-01 PROCEDURE — 90686 IIV4 VACC NO PRSV 0.5 ML IM: CPT | Performed by: INTERNAL MEDICINE

## 2021-10-01 PROCEDURE — G8510 SCR DEP NEG, NO PLAN REQD: HCPCS | Performed by: INTERNAL MEDICINE

## 2021-10-01 RX ORDER — GLYCOPYRROLATE 2 MG/1
2 TABLET ORAL 2 TIMES DAILY
Qty: 180 TABLET | Refills: 1 | Status: SHIPPED | OUTPATIENT
Start: 2021-10-01 | End: 2022-04-06

## 2021-10-01 NOTE — PATIENT INSTRUCTIONS
Influenza (Flu) Vaccine (Inactivated or Recombinant): What You Need to Know  Why get vaccinated? Influenza vaccine can prevent influenza (flu). Flu is a contagious disease that spreads around the [place] every year, usually between October and May. Anyone can get the flu, but it is more dangerous for some people. Infants and young children, people 72years of age and older, pregnant women, and people with certain health conditions or a weakened immune system are at greatest risk of flu complications. Pneumonia, bronchitis, sinus infections and ear infections are examples of flu-related complications. If you have a medical condition, such as heart disease, cancer or diabetes, flu can make it worse. Flu can cause fever and chills, sore throat, muscle aches, fatigue, cough, headache, and runny or stuffy nose. Some people may have vomiting and diarrhea, though this is more common in children than adults. Each year, thousands of people in the [facility] die from flu, and many more are hospitalized. Flu vaccine prevents millions of illnesses and flu-related visits to the doctor each year. Influenza vaccine  CDC recommends everyone 10months of age and older get vaccinated every flu season. Children 6 months through 6years of age may need 2 doses during a single flu season. Everyone else needs only 1 dose each flu season. It takes about 2 weeks for protection to develop after vaccination. There are many flu viruses, and they are always changing. Each year a new flu vaccine is made to protect against three or four viruses that are likely to cause disease in the upcoming flu season. Even when the vaccine doesn't exactly match these viruses, it may still provide some protection. Influenza vaccine does not cause flu. Influenza vaccine may be given at the same time as other vaccines.   Talk with your health care provider  Tell your vaccine provider if the person getting the vaccine:  · Has had an allergic reaction after a previous dose of influenza vaccine, or has any severe, life-threatening allergies. · Has ever had Guillain-Barré Syndrome (also called GBS). In some cases, your health care provider may decide to postpone influenza vaccination to a future visit. People with minor illnesses, such as a cold, may be vaccinated. People who are moderately or severely ill should usually wait until they recover before getting influenza vaccine. Your health care provider can give you more information. Risks of a vaccine reaction  · Soreness, redness, and swelling where shot is given, fever, muscle aches, and headache can happen after influenza vaccine. · There may be a very small increased risk of Guillain-Barré Syndrome (GBS) after inactivated influenza vaccine (the flu shot). Denver Fulling children who get the flu shot along with pneumococcal vaccine (PCV13), and/or DTaP vaccine at the same time might be slightly more likely to have a seizure caused by fever. Tell your health care provider if a child who is getting flu vaccine has ever had a seizure. People sometimes faint after medical procedures, including vaccination. Tell your provider if you feel dizzy or have vision changes or ringing in the ears. As with any medicine, there is a very remote chance of a vaccine causing a severe allergic reaction, other serious injury, or death. What if there is a serious problem? An allergic reaction could occur after the vaccinated person leaves the clinic. If you see signs of a severe allergic reaction (hives, swelling of the face and throat, difficulty breathing, a fast heartbeat, dizziness, or weakness), call 9-1-1 and get the person to the nearest hospital.  For other signs that concern you, call your health care provider. Adverse reactions should be reported to the Vaccine Adverse Event Reporting System (VAERS). Your health care provider will usually file this report, or you can do it yourself.  Visit the VAERS website at www.vaers. [facility].gov or call 5-507.310.1882. VAERS is only for reporting reactions, and VAERS staff do not give medical advice. The National Vaccine Injury Compensation Program  The National Vaccine Injury Compensation Program (VICP) is a federal program that was created to compensate people who may have been injured by certain vaccines. Visit the VICP website at www.[facility].gov/vaccinecompensation or call 3-668.270.8193 to learn about the program and about filing a claim. There is a time limit to file a claim for compensation. How can I learn more? · Ask your healthcare provider. · Call your local or state health department. · Contact the Centers for Disease Control and Prevention (CDC):  ? Call 4-584.646.6570 (1-800-CDC-INFO) or  ? Visit CDC's website at www.cdc.gov/flu  Vaccine Information Statement (Interim)  Inactivated Influenza Vaccine  8/15/2019  42 RUBI Choudhury 900RO-88  Department of Health and Human Services  Centers for Disease Control and Prevention  Many Vaccine Information Statements are available in Telugu and other languages. See www.immunize.org/vis. Muchas hojas de información sobre vacunas están disponibles en español y en otros idiomas. Visite www.immunize.org/vis. Care instructions adapted under license by [employer] (which disclaims liability or warranty for this information). If you have questions about a medical condition or this instruction, always ask your healthcare professional. [place] [address] any warranty or liability for your use of this information.

## 2021-10-01 NOTE — PROGRESS NOTES
Patient is in the office today for a 4 month follow up. 1. Have you been to the ER, urgent care clinic since your last visit? Hospitalized since your last visit? No    2. Have you seen or consulted any other health care providers outside of the [address] [place] [address] since your last visit? Include any pap smears or colon screening. yes, Dr. Lexis Denise. Verbal order read back per Dr. Renzo Olivera flu vaccine. Patient received flu vaccine in left deltoid. Patient was observed and no signs or symptoms of an allergic reaction noted at this time. Patient tolerated well and left without complaints. Patient received flu VIS.

## 2021-10-01 NOTE — PROGRESS NOTES
Subjective:       Chief Complaint  The patient presents for follow up of diabetes, hypertension and high cholesterol. HPI  Liza Mcdonald. is a 64 y.o. male seen for follow up of diabetes. Healso has hypertension and hyperlipidemia. Diabetes Type 3since 25years old, no significant medication side effects noted, uncontrolled on insulin pump and is now on a continuous glucose monitor since he checks his blood sugars 6-8 times a day, he is followed by Endo (Dr Goldy Purcell),  patient needs to work on his diet as well as bolusing correctly for his meals, he will f/u with endo to help with management, he had problems affording NovoLog insulin and tells me blood sugars have not been controlled on generic short acting insulin,  hypertension well controlled on Benicar 40 mg and Norvasc 10 mg and aldactone 25 mg,  hyperlipidemia well controlled, no significant medication side effects noted, on Lipitor 20 mg    Diet and Lifestyle: generally follows a low fat low cholesterol diet, does not rigorously follow a diabetic diet, exercises sporadically    Home BP Monitoring: is controlled at home. Diabetic Review of Systems - home glucose monitoring: is performed regularly, 6x-8x/day. Other symptoms and concerns: pt will try to exercise more. He has problems with his feet which limits him. Pt is candidate for diabetic shoes due to peripheral neuropathy and ulcerative callus. He is followed by Podiatry. he sees Dr Mary Schultz now. Patient uses Neurontin as needed    Patient has gustatory sweating and was using Ditropan however it is caused urinary retention and his urologist asked him to stop it. We will therefore try glycopyrrolate to see if this helps his excessive sweating with eating. Current Outpatient Medications   Medication Sig    glycopyrrolate 2 mg tablet Take 1 Tablet by mouth two (2) times a day.     Fiasp U-100 Insulin 100 unit/mL soln USE 90 UNITS FOR INSULIN PUMP THERAPY AS DIRECTED    tadalafiL (CIALIS) 5 mg tablet Take 1 Tablet by mouth daily as needed for Erectile Dysfunction.  omeprazole (PRILOSEC) 40 mg capsule Take 1 Capsule by mouth daily.  olmesartan (BENICAR) 40 mg tablet Take 1 Tablet by mouth daily.  sildenafiL, pulmonary hypertension, (REVATIO) 20 mg tablet TAKE 1 TO 5 TABLETS BY MOUTH AS NEEDED FOR SEXUAL ACTIVITY    flash glucose sensor (FreeStyle Kimi 14 Day Sensor) kit CHECK BLOOD SUGAR 8 TIMES A DAY AND CHANGE SENSOR EVERY 14 DAYS AS DIRECTED    insulin aspart U-100 (NovoLOG U-100 Insulin aspart) 100 unit/mL injection INJECT 38 UNITS ONCE DAILY VIA INSULIN PUMP  Dx E10.42    flash glucose scanning reader (FreeStyle Kimi 14 Day [place]) misc DX K13.01 check blood sugar 8 times a day due to insulin pump and labile blood sugar    spironolactone (ALDACTONE) 25 mg tablet TAKE 1 TABLET BY MOUTH  DAILY    atorvastatin (LIPITOR) 20 mg tablet TAKE 1 TABLET BY MOUTH  DAILY  Indications: treatment to slow progression of coronary artery disease    amLODIPine (NORVASC) 10 mg tablet Take 1 Tab by mouth daily.  aspirin delayed-release 81 mg tablet Take 81 mg by mouth daily.  fish oil-omega-3 fatty acids (Fish Oil) 340-1,000 mg capsule Take 1 Cap by mouth daily.  diclofenac (VOLTAREN) 1 % gel Apply 4 g to affected area four (4) times daily. left hand and fingers    MINIMED INFUSION SET iset CHANGE EVERY 3 DAYS    cholecalciferol, vitamin D3, (VITAMIN D3) 2,000 unit tab Take 1 Tab by mouth daily.  multivitamin (ONE A DAY) tablet Take 1 Tab by mouth daily.  methocarbamoL (ROBAXIN) 750 mg tablet Take 0.5-1 Tabs by mouth two (2) times daily as needed for Muscle Spasm(s) or Pain.  (Patient not taking: Reported on 10/1/2021)    Blood-Glucose Transmitter (DEXCOM G6 TRANSMITTER) albert E10.42 (Patient not taking: Reported on 10/1/2021)    Blood-Glucose Meter,Continuous (DEXCOM G6 ) misc E10.42 (Patient not taking: Reported on 10/1/2021)    Blood-Glucose Sensor (DEXCOM G6 SENSOR) albert E10.42 (Patient not taking: Reported on 10/1/2021)    lancets (ONE TOUCH DELICA) 33 gauge misc CHECK BLOOD SUGAR 8 TIMES  DAILY DUE TO INSULIN PUMP  AND LABILE BLOOD SUGAR (Patient not taking: Reported on 10/1/2021)    Blood-Glucose Meter (ONETOUCH VERIO FLEX) misc DX E10.42 check blood sugar 8 times a day due to insulin pump and labile blood sugar (Patient not taking: Reported on 10/1/2021)    glucose blood VI test strips (ONETOUCH VERIO) strip DX E10.42 check blood sugar 8 times a day due to insulin pump and labile blood sugar (Patient not taking: Reported on 10/1/2021)    Blood-Glucose Meter (CONTOUR NEXT EZ METER) monitoring kit DX E10.42 check blood sugar 8 times a day due to insulin pump and labile blood sugar. (Patient not taking: Reported on 10/1/2021)    CONTOUR NEXT STRIPS strip CHECK BLOOD SUGAR 8 TIMES A DAY DUE TO INSULIN PUMP AND LABILE BLOOD SUGAR (Patient not taking: Reported on 10/1/2021)   [address]  (Patient not taking: Reported on 10/1/2021)     No current facility-administered medications for this visit.              Review of Systems  Respiratory: negative for dyspnea on exertion  Cardiovascular: negative for chest pain    Objective:     Visit Vitals  BP (!) 123/58 (BP 1 Location: Right arm, BP Patient Position: Sitting, BP Cuff Size: Adult)   Pulse 80   Temp 97.5 °F (36.4 °C) (Temporal)   Resp 16   Ht 6' 1\" (1.854 m)   Wt 215 lb (97.5 kg)   SpO2 100%   BMI 28.37 kg/m²        General appearance - alert, well appearing, and in no distress  HEENT bilateral nasal mucosal edema and cobblestoning in posterior pharynx  Chest - clear to auscultation, no wheezes, rales or rhonchi, symmetric air entry  Heart - normal rate, regular rhythm, normal S1, S2, no murmurs, rubs, clicks or gallops          Labs:   Lab Results   Component Value Date/Time    Cholesterol, total 155 09/24/2021 12:00 AM    HDL Cholesterol 53 09/24/2021 12:00 AM    LDL, calculated 89 09/24/2021 12:00 AM LDL, calculated 73.8 02/19/2021 01:17 PM    LDL-C, External 72 08/20/2015 12:00 AM    Triglyceride 67 09/24/2021 12:00 AM    CHOL/HDL Ratio 2.3 02/19/2021 01:17 PM     Lab Results   Component Value Date/Time    Prostate Specific Ag 0.5 09/03/2021 03:04 PM    Prostate Specific Ag 0.4 01/04/2021 09:02 AM    Prostate Specific Ag 0.4 06/24/2020 09:32 AM    Prostate Specific Ag 0.3 03/21/2018 07:54 AM    Prostate Specific Ag 0.251 12/17/2012 03:42 PM    PSA 0.3 09/30/2010 10:09 AM    PSA, FREE 0.2 09/30/2010 10:09 AM    % FREE PSA 67 09/30/2010 10:09 AM     Lab Results   Component Value Date/Time    Sodium 141 09/24/2021 12:00 AM    Potassium 4.7 09/24/2021 12:00 AM    Chloride 105 09/24/2021 12:00 AM    CO2 25 09/24/2021 12:00 AM    Anion gap 2 (L) 02/19/2021 01:17 PM    Glucose 124 (H) 09/24/2021 12:00 AM    BUN 20 09/24/2021 12:00 AM    Creatinine 1.23 09/24/2021 12:00 AM    BUN/Creatinine ratio 16 09/24/2021 12:00 AM    GFR est AA 73 09/24/2021 12:00 AM    GFR est non-AA 63 09/24/2021 12:00 AM    Calcium 10.0 09/24/2021 12:00 AM    Bilirubin, total 0.4 09/24/2021 12:00 AM    ALT (SGPT) 15 09/24/2021 12:00 AM    Alk. phosphatase 96 09/24/2021 12:00 AM    Protein, total 6.3 09/24/2021 12:00 AM    Albumin 3.7 (L) 09/24/2021 12:00 AM    Globulin 3.4 02/19/2021 01:17 PM    A-G Ratio 1.4 09/24/2021 12:00 AM      Lab Results   Component Value Date/Time    Hemoglobin A1c 9.0 (H) 09/24/2021 12:00 AM    Hemoglobin A1c (POC) 9.1 06/11/2015 09:54 AM    Hemoglobin A1c, External 9.3 12/10/2015 12:00 AM                             Assessment / Plan     Diabetes uncontrolled, pt remains on insulin pump and will follow up with Dr. Willem Henriquez endocrine. Patient needs to bolus with his meals better and to improve his diet with less starches and sweets overall. We will try to change back to NovoLog insulin which seems to work better for him than generic insulin that he is using his pump currently.   Hypertension well controlled on Benicar 40 mg daily and Norvasc 10 mg and aldactone 25 mg. Hyperlipidemia well controlled on Lipitor 20 mg        ICD-10-CM ICD-9-CM    1. Type 1 diabetes mellitus with diabetic polyneuropathy (HCC)  E10.42 250.61 HEMOGLOBIN A1C W/O EAG     357.2    2. Essential hypertension  P74 370.8 METABOLIC PANEL, COMPREHENSIVE   3. High cholesterol  E78.00 272.0 LIPID PANEL   4. Gustatory sweating  L74.519 705.22 Will try glycopyrrolate 2 mg tablet BID    5. Needs flu shot  Z23 V04.81 INFLUENZA VIRUS VAC QUAD,SPLIT,PRESV FREE SYRINGE IM              Diabetic issues reviewed with him: diabetic diet discussed in detail, and low cholesterol diet, weight control and daily exercise discussed. Follow-up and Dispositions    · Return in about 3 months (around 1/1/2022) for OV, and Medicare Wellness Visit, labs 1 week before. Reviewed plan of care. Patient has provided input and agrees with goals.

## 2021-11-01 ENCOUNTER — TELEPHONE (OUTPATIENT)
Dept: INTERNAL MEDICINE CLINIC | Age: 61
End: 2021-11-01

## 2021-11-01 NOTE — TELEPHONE ENCOUNTER
Pt is requesting his last lab results be faxed to Dr Amaris Marin at [phone]. Printed and faxed.

## 2021-11-16 ENCOUNTER — TELEPHONE (OUTPATIENT)
Dept: INTERNAL MEDICINE CLINIC | Age: 61
End: 2021-11-16

## 2021-11-23 NOTE — TELEPHONE ENCOUNTER
He should stick with the one that works best and he can occasional take it BID if needed.  He can uses omeprazole  40 mg BID for a few days if needed

## 2021-11-23 NOTE — TELEPHONE ENCOUNTER
Patient called back. I advised him that Dr. Peterson Has switched his medication to Prilosec. Stating that doesn't always work and sometimes he needs the Pantoprazole as well. He wants to know if he can take both or is that not recommended?

## 2022-01-07 ENCOUNTER — APPOINTMENT (OUTPATIENT)
Dept: INTERNAL MEDICINE CLINIC | Age: 62
End: 2022-01-07

## 2022-01-07 DIAGNOSIS — E10.42 TYPE 1 DIABETES MELLITUS WITH DIABETIC POLYNEUROPATHY (HCC): ICD-10-CM

## 2022-01-07 DIAGNOSIS — I10 ESSENTIAL HYPERTENSION: ICD-10-CM

## 2022-01-07 DIAGNOSIS — E78.00 HIGH CHOLESTEROL: ICD-10-CM

## 2022-01-08 LAB
AVG GLU, 10930: 199 MG/DL (ref 91–123)
CHOLEST SERPL-MCNC: 153 MG/DL (ref 110–200)
HBA1C MFR BLD HPLC: 8.6 % (ref 4.8–5.6)
HDLC SERPL-MCNC: 3 MG/DL (ref 0–5)
HDLC SERPL-MCNC: 51 MG/DL
LDL/HDL RATIO,LDHD: 1.6
LDLC SERPL CALC-MCNC: 81 MG/DL (ref 50–99)
NON-HDL CHOLESTEROL, 011976: 102 MG/DL
TRIGL SERPL-MCNC: 104 MG/DL (ref 40–149)
VLDLC SERPL CALC-MCNC: 21 MG/DL (ref 8–30)

## 2022-01-12 LAB
A-G RATIO,AGRAT: 1.4 RATIO (ref 1.1–2.6)
ALBUMIN SERPL-MCNC: 3.9 G/DL (ref 3.5–5)
ALP SERPL-CCNC: 106 U/L (ref 40–125)
ALT SERPL-CCNC: 18 U/L (ref 5–40)
ANION GAP SERPL CALC-SCNC: 10 MMOL/L (ref 3–15)
AST SERPL W P-5'-P-CCNC: 15 U/L (ref 10–37)
BILIRUB SERPL-MCNC: 0.2 MG/DL (ref 0.2–1.2)
BUN SERPL-MCNC: 14 MG/DL (ref 6–22)
CALCIUM SERPL-MCNC: 10.1 MG/DL (ref 8.4–10.5)
CHLORIDE SERPL-SCNC: 106 MMOL/L (ref 98–110)
CO2 SERPL-SCNC: 25 MMOL/L (ref 20–32)
CREAT SERPL-MCNC: 1.3 MG/DL (ref 0.8–1.6)
GFRAA, 66117: >60
GFRNA, 66118: 54.2
GLOBULIN,GLOB: 2.7 G/DL (ref 2–4)
GLUCOSE SERPL-MCNC: 116 MG/DL (ref 70–99)
POTASSIUM SERPL-SCNC: 4.7 MMOL/L (ref 3.5–5.5)
PROT SERPL-MCNC: 6.6 G/DL (ref 6.2–8.1)
SODIUM SERPL-SCNC: 141 MMOL/L (ref 133–145)

## 2022-01-13 ENCOUNTER — OFFICE VISIT (OUTPATIENT)
Dept: INTERNAL MEDICINE CLINIC | Age: 62
End: 2022-01-13
Payer: MEDICARE

## 2022-01-13 VITALS
RESPIRATION RATE: 20 BRPM | WEIGHT: 223 LBS | SYSTOLIC BLOOD PRESSURE: 134 MMHG | DIASTOLIC BLOOD PRESSURE: 64 MMHG | HEART RATE: 81 BPM | BODY MASS INDEX: 29.55 KG/M2 | HEIGHT: 73 IN | OXYGEN SATURATION: 98 % | TEMPERATURE: 97.8 F

## 2022-01-13 DIAGNOSIS — Z00.00 MEDICARE ANNUAL WELLNESS VISIT, SUBSEQUENT: Primary | ICD-10-CM

## 2022-01-13 DIAGNOSIS — E10.42 TYPE 1 DIABETES MELLITUS WITH DIABETIC POLYNEUROPATHY (HCC): ICD-10-CM

## 2022-01-13 DIAGNOSIS — Z23 ENCOUNTER FOR IMMUNIZATION: ICD-10-CM

## 2022-01-13 DIAGNOSIS — I10 ESSENTIAL HYPERTENSION: ICD-10-CM

## 2022-01-13 DIAGNOSIS — E78.00 HIGH CHOLESTEROL: ICD-10-CM

## 2022-01-13 PROCEDURE — 2022F DILAT RTA XM EVC RTNOPTHY: CPT | Performed by: INTERNAL MEDICINE

## 2022-01-13 PROCEDURE — G8754 DIAS BP LESS 90: HCPCS | Performed by: INTERNAL MEDICINE

## 2022-01-13 PROCEDURE — G8510 SCR DEP NEG, NO PLAN REQD: HCPCS | Performed by: INTERNAL MEDICINE

## 2022-01-13 PROCEDURE — 3017F COLORECTAL CA SCREEN DOC REV: CPT | Performed by: INTERNAL MEDICINE

## 2022-01-13 PROCEDURE — G8752 SYS BP LESS 140: HCPCS | Performed by: INTERNAL MEDICINE

## 2022-01-13 PROCEDURE — G8427 DOCREV CUR MEDS BY ELIG CLIN: HCPCS | Performed by: INTERNAL MEDICINE

## 2022-01-13 PROCEDURE — 3052F HG A1C>EQUAL 8.0%<EQUAL 9.0%: CPT | Performed by: INTERNAL MEDICINE

## 2022-01-13 PROCEDURE — G0009 ADMIN PNEUMOCOCCAL VACCINE: HCPCS | Performed by: INTERNAL MEDICINE

## 2022-01-13 PROCEDURE — G8419 CALC BMI OUT NRM PARAM NOF/U: HCPCS | Performed by: INTERNAL MEDICINE

## 2022-01-13 PROCEDURE — 99214 OFFICE O/P EST MOD 30 MIN: CPT | Performed by: INTERNAL MEDICINE

## 2022-01-13 PROCEDURE — 90732 PPSV23 VACC 2 YRS+ SUBQ/IM: CPT | Performed by: INTERNAL MEDICINE

## 2022-01-13 PROCEDURE — G0439 PPPS, SUBSEQ VISIT: HCPCS | Performed by: INTERNAL MEDICINE
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
## 2022-01-13 NOTE — PROGRESS NOTES
Patient is in the office today for a 3 month follow up, and Medicare Wellness Visit. Do you have an Advance Directive no  Do you want more information : information given     1. \"Have you been to the ER, urgent care clinic since your last visit? Hospitalized since your last visit? \" No    2. \"Have you seen or consulted any other health care providers outside of the [address] [place] [address] since your last visit? \" yes, Dr. Vincent Anderson, Arleen Boateng and Dr. Wilder Hyatt. 3. For patients aged 39-70: Has the patient had a colonoscopy / FIT/ Cologuard? Yes, HM satisfied with blue hyperlink     If the patient is female:    4. For patients aged 41-77: Has the patient had a mammogram within the past 2 years? NA based on age or sex    11. For patients aged 21-65: Has the patient had a pap smear? NA based on age or sex    Verbal order read back per Dr. Jalyn Rogers Pneumovax 23. Patient received Pneumovax 23 vaccine in right deltoid. Patient was observed and no signs or symptoms of an allergic reaction noted at this time. Patient tolerated well and left without complaints. Patient received Pneumovax 23 VIS.

## 2022-01-13 NOTE — PROGRESS NOTES
Subjective:       Chief Complaint  The patient presents for follow up of diabetes, hypertension and high cholesterol. JANESSA Roche is a 64 y.o. male seen for follow up of diabetes. Hunter has hypertension and hyperlipidemia. Diabetes Type 3since 25years old, no significant medication side effects noted, uncontrolled on insulin pump and is now on a continuous glucose monitor since he checks his blood sugars 6-8 times a day, he is followed by Endo (Dr Allen Said) but he no longer takes his inusurance and he needs to see a new endocrinologist ,  patient needs to work on his diet as well as bolusing correctly for his meals, he will f/u with endo to help with management, he had problems affording NovoLog insulin and tells me blood sugars have not been controlled on generic short acting insulin,  hypertension well controlled on Benicar 40 mg and Norvasc 10 mg and aldactone 25 mg,  hyperlipidemia well controlled, no significant medication side effects noted, on Lipitor 20 mg    Diet and Lifestyle: generally follows a low fat low cholesterol diet, does not rigorously follow a diabetic diet, exercises sporadically    Home BP Monitoring: is controlled at home. Diabetic Review of Systems - home glucose monitoring: is performed regularly, 6x-8x/day. Other symptoms and concerns: pt will try to exercise more. He has problems with his feet which limits him. Pt is candidate for diabetic shoes due to peripheral neuropathy and ulcerative callus. He is followed by Podiatry. he sees Dr Bia Butt now. Patient uses Neurontin as needed    Patient has gustatory sweating and was using Ditropan however it is caused urinary retention and his urologist asked him to stop it. We tried glycopyrrolate but patient did not think it worked well and he wanted to go back to Ditropan in the future.     Current Outpatient Medications   Medication Sig    sildenafiL, pulmonary hypertension, (REVATIO) 20 mg tablet TAKE 1 TO 5 TABLETS BY MOUTH AS NEEDED FOR SEXUAL ACTIVITY    glycopyrrolate 2 mg tablet Take 1 Tablet by mouth two (2) times a day.  Fiasp U-100 Insulin 100 unit/mL soln USE 90 UNITS FOR INSULIN PUMP THERAPY AS DIRECTED    omeprazole (PRILOSEC) 40 mg capsule Take 1 Capsule by mouth daily.  olmesartan (BENICAR) 40 mg tablet Take 1 Tablet by mouth daily.  flash glucose sensor (FreeStyle Kimi 14 Day Sensor) kit CHECK BLOOD SUGAR 8 TIMES A DAY AND CHANGE SENSOR EVERY 14 DAYS AS DIRECTED    flash glucose scanning reader (FreeStyle Kimi 14 Day [place]) [facility] DX M21.96 check blood sugar 8 times a day due to insulin pump and labile blood sugar    spironolactone (ALDACTONE) 25 mg tablet TAKE 1 TABLET BY MOUTH  DAILY    atorvastatin (LIPITOR) 20 mg tablet TAKE 1 TABLET BY MOUTH  DAILY  Indications: treatment to slow progression of coronary artery disease    amLODIPine (NORVASC) 10 mg tablet Take 1 Tab by mouth daily.  aspirin delayed-release 81 mg tablet Take 81 mg by mouth daily.  fish oil-omega-3 fatty acids (Fish Oil) 340-1,000 mg capsule Take 1 Cap by mouth daily.  MINIMED INFUSION SET iset CHANGE EVERY 3 DAYS    cholecalciferol, vitamin D3, (VITAMIN D3) 2,000 unit tab Take 1 Tab by mouth daily.  multivitamin (ONE A DAY) tablet Take 1 Tab by mouth daily.  insulin aspart U-100 (NovoLOG U-100 Insulin aspart) 100 unit/mL injection INJECT 38 UNITS ONCE DAILY VIA INSULIN PUMP  Dx E10.42 (Patient not taking: Reported on 1/13/2022)    methocarbamoL (ROBAXIN) 750 mg tablet Take 0.5-1 Tabs by mouth two (2) times daily as needed for Muscle Spasm(s) or Pain. (Patient not taking: Reported on 10/1/2021)    diclofenac (VOLTAREN) 1 % gel Apply 4 g to affected area four (4) times daily.  left hand and fingers (Patient not taking: Reported on 1/13/2022)    Blood-Glucose Transmitter (DEXCOM G6 TRANSMITTER) albert E10.42 (Patient not taking: Reported on 10/1/2021)    Blood-Glucose Meter,Continuous (DEXCOM G6 ) misc E10.42 (Patient not taking: Reported on 10/1/2021)    Blood-Glucose Sensor (DEXCOM G6 SENSOR) albert E10.42 (Patient not taking: Reported on 10/1/2021)    lancets (ONE TOUCH DELICA) 33 gauge misc CHECK BLOOD SUGAR 8 TIMES  DAILY DUE TO INSULIN PUMP  AND LABILE BLOOD SUGAR (Patient not taking: Reported on 10/1/2021)    Blood-Glucose Meter (ONETOUCH VERIO FLEX) misc DX E10.42 check blood sugar 8 times a day due to insulin pump and labile blood sugar (Patient not taking: Reported on 10/1/2021)    glucose blood VI test strips (ONETOUCH VERIO) strip DX E10.42 check blood sugar 8 times a day due to insulin pump and labile blood sugar (Patient not taking: Reported on 10/1/2021)    Blood-Glucose Meter (CONTOUR NEXT EZ METER) monitoring kit DX E10.42 check blood sugar 8 times a day due to insulin pump and labile blood sugar. (Patient not taking: Reported on 1/13/2022)    CONTOUR NEXT STRIPS strip CHECK BLOOD SUGAR 8 TIMES A DAY DUE TO INSULIN PUMP AND LABILE BLOOD SUGAR (Patient not taking: Reported on 10/1/2021)   [address]  (Patient not taking: Reported on 10/1/2021)     No current facility-administered medications for this visit.              Review of Systems  Respiratory: negative for dyspnea on exertion  Cardiovascular: negative for chest pain    Objective:     Visit Vitals  /64 (BP 1 Location: Left arm, BP Patient Position: Sitting, BP Cuff Size: Adult)   Pulse 81   Temp 97.8 °F (36.6 °C) (Temporal)   Resp 20   Ht 6' 1\" (1.854 m)   Wt 223 lb (101.2 kg)   SpO2 98%   BMI 29.42 kg/m²        General appearance - alert, well appearing, and in no distress  HEENT bilateral nasal mucosal edema and cobblestoning in posterior pharynx  Chest - clear to auscultation, no wheezes, rales or rhonchi, symmetric air entry  Heart - normal rate, regular rhythm, normal S1, S2, no murmurs, rubs, clicks or gallops          Labs:   Lab Results   Component Value Date/Time    Cholesterol, total 153 01/07/2022 10:38 AM HDL Cholesterol 51 01/07/2022 10:38 AM    LDL, calculated 81 01/07/2022 10:38 AM    LDL-C, External 72 08/20/2015 12:00 AM    Triglyceride 104 01/07/2022 10:38 AM    CHOL/HDL Ratio 2.3 02/19/2021 01:17 PM     Lab Results   Component Value Date/Time    Prostate Specific Ag 0.5 09/03/2021 03:04 PM    Prostate Specific Ag 0.4 01/04/2021 09:02 AM    Prostate Specific Ag 0.4 06/24/2020 09:32 AM    Prostate Specific Ag 0.3 03/21/2018 07:54 AM    Prostate Specific Ag 0.251 12/17/2012 03:42 PM    PSA 0.3 09/30/2010 10:09 AM    PSA, FREE 0.2 09/30/2010 10:09 AM    % FREE PSA 67 09/30/2010 10:09 AM     Lab Results   Component Value Date/Time    Sodium 141 01/07/2022 10:38 AM    Potassium 4.7 01/07/2022 10:38 AM    Chloride 106 01/07/2022 10:38 AM    CO2 25 01/07/2022 10:38 AM    Anion gap 10.0 01/07/2022 10:38 AM    Glucose 116 (H) 01/07/2022 10:38 AM    BUN 14 01/07/2022 10:38 AM    Creatinine 1.3 01/07/2022 10:38 AM    BUN/Creatinine ratio 16 09/24/2021 12:00 AM    GFR est AA 73 09/24/2021 12:00 AM    GFR est non-AA 63 09/24/2021 12:00 AM    Calcium 10.1 01/07/2022 10:38 AM    Bilirubin, total 0.2 01/07/2022 10:38 AM    ALT (SGPT) 18 01/07/2022 10:38 AM    Alk. phosphatase 106 01/07/2022 10:38 AM    Protein, total 6.6 01/07/2022 10:38 AM    Albumin 3.9 01/07/2022 10:38 AM    Globulin 2.7 01/07/2022 10:38 AM    A-G Ratio 1.4 01/07/2022 10:38 AM      Lab Results   Component Value Date/Time    Hemoglobin A1c 8.6 (H) 01/07/2022 10:38 AM    Hemoglobin A1c (POC) 9.1 06/11/2015 09:54 AM    Hemoglobin A1c, External 9.3 12/10/2015 12:00 AM                             Assessment / Plan     Diabetes uncontrolled, pt remains on insulin pump and will follow up with new endocrinologist for management. Patient needs to bolus with his meals better and to improve his diet with less starches and sweets overall. Hypertension well controlled on Benicar 40 mg daily and Norvasc 10 mg and aldactone 25 mg.    Hyperlipidemia well controlled on Lipitor 20 mg      ICD-10-CM ICD-9-CM    1. Medicare annual wellness visit, subsequent  Z00.00 V70.0    2. Type 1 diabetes mellitus with diabetic polyneuropathy (HCC)  E10.42 250.61 REFERRAL TO ENDOCRINOLOGY     357.2 HEMOGLOBIN A1C W/O EAG   3. Essential hypertension  F72 662.1 METABOLIC PANEL, COMPREHENSIVE   4. High cholesterol  E78.00 272.0 LIPID PANEL   5. Encounter for immunization  Z23 V03.89 ADMIN INFLUENZA VIRUS VAC      PNEUMOCOCCAL POLYSACCHARIDE VACCINE, 23-VALENT, ADULT OR IMMUNOSUPPRESSED PT DOSE,              Diabetic issues reviewed with him: diabetic diet discussed in detail, and low cholesterol diet, weight control and daily exercise discussed. Follow-up and Dispositions    · Return in about 3 months (around 4/13/2022) for labs 1 week before. Reviewed plan of care. Patient has provided input and agrees with goals.

## 2022-01-13 NOTE — PATIENT INSTRUCTIONS
Diabetes Foot Health: Care Instructions  Your Care Instructions     When you have diabetes, your feet need extra care and attention. Diabetes can damage the nerve endings and blood vessels in your feet, making you less likely to notice when your feet are injured. Diabetes also limits your body's ability to fight infection and get blood to areas that need it. If you get a minor foot injury, it could become an ulcer or a serious infection. With good foot care, you can prevent most of these problems. Caring for your feet can be quick and easy. Most of the care can be done when you are bathing or getting ready for bed. Follow-up care is a key part of your treatment and safety. Be sure to make and go to all appointments, and call your doctor if you are having problems. It's also a good idea to know your test results and keep a list of the medicines you take. How can you care for yourself at home? · Keep your blood sugar close to normal by watching what and how much you eat, monitoring blood sugar, taking medicines if prescribed, and getting regular exercise. · Do not smoke. Smoking affects blood flow and can make foot problems worse. If you need help quitting, talk to your doctor about stop-smoking programs and medicines. These can increase your chances of quitting for good. · Eat a diet that is low in fats. High fat intake can cause fat to build up in your blood vessels and decrease blood flow. · Inspect your feet daily for blisters, cuts, cracks, or sores. If you cannot see well, use a mirror or have someone help you. · Take care of your feet:  ? Wash your feet every day. Use warm (not hot) water. Check the water temperature with your wrists or other part of your body, not your feet. ? Dry your feet well. Pat them dry. Do not rub the skin on your feet too hard. Dry well between your toes. If the skin on your feet stays moist, bacteria or a fungus can grow, which can lead to infection. ?  Keep your skin soft. Use moisturizing skin cream to keep the skin on your feet soft and prevent calluses and cracks. But do not put the cream between your toes, and stop using any cream that causes a rash. ? Clean underneath your toenails carefully. Do not use a sharp object to clean underneath your toenails. Use the blunt end of a nail file or other rounded tool. ? Trim and file your toenails straight across to prevent ingrown toenails. Use a nail clipper, not scissors. Use an emery board to smooth the edges. · Change socks daily. Socks without seams are best, because seams often rub the feet. You can find socks for people with diabetes from specialty catalogs. · Look inside your shoes every day for things like gravel or torn linings, which could cause blisters or sores. · Buy shoes that fit well:  ? Look for shoes that have plenty of space around the toes. This helps prevent bunions and blisters. ? Try on shoes while wearing the kind of socks you will usually wear with the shoes. ? Avoid plastic shoes. They may rub your feet and cause blisters. Good shoes should be made of materials that are flexible and breathable, such as leather or cloth. ? Break in new shoes slowly by wearing them for no more than an hour a day for several days. Take extra time to check your feet for red areas, blisters, or other problems after you wear new shoes. · Do not go barefoot. Do not wear sandals, and do not wear shoes with very thin soles. Thin soles are easy to puncture. They also do not protect your feet from hot pavement or cold weather. · Have your doctor check your feet during each visit. If you have a foot problem, see your doctor. Do not try to treat an early foot problem at home. Home remedies or treatments that you can buy without a prescription (such as corn removers) can be harmful. · Always get early treatment for foot problems. A minor irritation can lead to a major problem if not properly cared for early.   When should you call for help? Call your doctor now or seek immediate medical care if:    · You have a foot sore, an ulcer or break in the skin that is not healing after 4 days, bleeding corns or calluses, or an ingrown toenail.     · You have blue or black areas, which can mean bruising or blood flow problems.     · You have peeling skin or tiny blisters between your toes or cracking or oozing of the skin.     · You have a fever for more than 24 hours and a foot sore.     · You have new numbness or tingling in your feet that does not go away after you move your feet or change positions.     · You have unexplained or unusual swelling of the foot or ankle. Watch closely for changes in your health, and be sure to contact your doctor if:    · You cannot do proper foot care. Where can you learn more? Go to http://www.gray.com/  Enter A739 in the search box to learn more about \"Diabetes Foot Health: Care Instructions. \"  Current as of: August 31, 2020               Content Version: 13.0  © 2006-2021 [employer]. Care instructions adapted under license by [employer] (which disclaims liability or warranty for this information). If you have questions about a medical condition or this instruction, always ask your healthcare professional. [address] any warranty or liability for your use of this information. Medicare Wellness Visit, Male    The best way to live healthy is to have a lifestyle where you eat a well-balanced diet, exercise regularly, limit alcohol use, and quit all forms of tobacco/nicotine, if applicable. Regular preventive services are another way to keep healthy. Preventive services (vaccines, screening tests, monitoring & exams) can help personalize your care plan, which helps you manage your own care. Screening tests can find health problems at the earliest stages, when they are easiest to treat.    [facility] follows the current, evidence-based guidelines published by the [place] (USPSTF) when recommending preventive services for our patients. Because we follow these guidelines, sometimes recommendations change over time as research supports it. (For example, a prostate screening blood test is no longer routinely recommended for men with no symptoms). Of course, you and your doctor may decide to screen more often for some diseases, based on your risk and co-morbidities (chronic disease you are already diagnosed with). Preventive services for you include:  - Medicare offers their members a free annual wellness visit, which is time for you and your primary care provider to discuss and plan for your preventive service needs. Take advantage of this benefit every year!  -All adults over age 72 should receive the recommended pneumonia vaccines. Current USPSTF guidelines recommend a series of two vaccines for the best pneumonia protection.   -All adults should have a flu vaccine yearly and tetanus vaccine every 10 years.  -All adults age 48 and older should receive the shingles vaccines (series of two vaccines). -All adults age 38-68 who are overweight should have a diabetes screening test once every three years.   -Other screening tests & preventive services for persons with diabetes include: an eye exam to screen for diabetic retinopathy, a kidney function test, a foot exam, and stricter control over your cholesterol.   -Cardiovascular screening for adults with routine risk involves an electrocardiogram (ECG) at intervals determined by the provider.   -Colorectal cancer screening should be done for adults age 54-65 with no increased risk factors for colorectal cancer. There are a number of acceptable methods of screening for this type of cancer. Each test has its own benefits and drawbacks.  Discuss with your provider what is most appropriate for you during your annual wellness visit. The different tests include: colonoscopy (considered the best screening method), a fecal occult blood test, a fecal DNA test, and sigmoidoscopy.  -All adults born between [facility] should be screened once for Hepatitis C.  -An Abdominal Aortic Aneurysm (AAA) Screening is recommended for men age 73-68 who has ever smoked in their lifetime. Here is a list of your current Health Maintenance items (your personalized list of preventive services) with a due date:  Health Maintenance Due   Topic Date Due    COVID-19 Vaccine (1) Never done    Shingles Vaccine (1 of 2) Never done    Diabetic Foot Care  05/08/2018    Eye Exam  06/15/2019     Vaccine Information Statement    Influenza (Flu) Vaccine (Inactivated or Recombinant): What You Need to Know    Many vaccine information statements are available in Pashto and other languages. See www.immunize.org/vis. Hojas de información sobre vacunas están disponibles en español y en muchos otros idiomas. Visite www.immunize.org/vis. 1. Why get vaccinated? Influenza vaccine can prevent influenza (flu). Flu is a contagious disease that spreads around the [place] every year, usually between October and May. Anyone can get the flu, but it is more dangerous for some people. Infants and young children, people 72 years and older, pregnant people, and people with certain health conditions or a weakened immune system are at greatest risk of flu complications. Pneumonia, bronchitis, sinus infections, and ear infections are examples of flu-related complications. If you have a medical condition, such as heart disease, cancer, or diabetes, flu can make it worse. Flu can cause fever and chills, sore throat, muscle aches, fatigue, cough, headache, and runny or stuffy nose. Some people may have vomiting and diarrhea, though this is more common in children than adults.      In an average year, thousands of people in the [facility] die from flu, and many more are hospitalized. Flu vaccine prevents millions of illnesses and flu-related visits to the doctor each year. 2. Influenza vaccines     CDC recommends everyone 6 months and older get vaccinated every flu season. Children 6 months through 6years of age may need 2 doses during a single flu season. Everyone else needs only 1 dose each flu season. It takes about 2 weeks for protection to develop after vaccination. There are many flu viruses, and they are always changing. Each year a new flu vaccine is made to protect against the influenza viruses believed to be likely to cause disease in the upcoming flu season. Even when the vaccine doesnt exactly match these viruses, it may still provide some protection. Influenza vaccine does not cause flu. Influenza vaccine may be given at the same time as other vaccines. 3. Talk with your health care provider    Tell your vaccination provider if the person getting the vaccine:   Has had an allergic reaction after a previous dose of influenza vaccine, or has any severe, life-threatening allergies    Has ever had Guillain-Barré Syndrome (also called GBS)    In some cases, your health care provider may decide to postpone influenza vaccination until a future visit. Influenza vaccine can be administered at any time during pregnancy. People who are or will be pregnant during influenza season should receive inactivated influenza vaccine. People with minor illnesses, such as a cold, may be vaccinated. People who are moderately or severely ill should usually wait until they recover before getting influenza vaccine. Your health care provider can give you more information. 4. Risks of a vaccine reaction     Soreness, redness, and swelling where the shot is given, fever, muscle aches, and headache can happen after influenza vaccination.    There may be a very small increased risk of Guillain-Barré Syndrome (GBS) after inactivated influenza vaccine (the flu shot).    Young children who get the flu shot along with pneumococcal vaccine (PCV13) and/or DTaP vaccine at the same time might be slightly more likely to have a seizure caused by fever. Tell your health care provider if a child who is getting flu vaccine has ever had a seizure. People sometimes faint after medical procedures, including vaccination. Tell your provider if you feel dizzy or have vision changes or ringing in the ears. As with any medicine, there is a very remote chance of a vaccine causing a severe allergic reaction, other serious injury, or death. 5. What if there is a serious problem? An allergic reaction could occur after the vaccinated person leaves the clinic. If you see signs of a severe allergic reaction (hives, swelling of the face and throat, difficulty breathing, a fast heartbeat, dizziness, or weakness), call 9-1-1 and get the person to the nearest hospital.    For other signs that concern you, call your health care provider. Adverse reactions should be reported to the Vaccine Adverse Event Reporting System (VAERS). Your health care provider will usually file this report, or you can do it yourself. Visit the VAERS website at www.vaers. hhs.gov or call 8-926.454.3137. VAERS is only for reporting reactions, and VAERS staff members do not give medical advice. 6. The National Vaccine Injury Compensation Program    The [place] Vaccine Injury Compensation Program (VICP) is a federal program that was created to compensate people who may have been injured by certain vaccines. Claims regarding alleged injury or death due to vaccination have a time limit for filing, which may be as short as two years. Visit the VICP website at www.hrsa.gov/vaccinecompensation or call 5-592.759.7202 to learn about the program and about filing a claim. 7. How can I learn more?  Ask your health care provider.  Call your local or state health department.     Visit the website of the Food and Drug Administration (FDA) for vaccine package inserts and additional information at www.fda.gov/vaccines-blood-biologics/vaccines.  Contact the Centers for Disease Control and Prevention (CDC):  - Call 4-320.600.2607 (1-800-CDC-INFO) or  - Visit CDCs influenza website at www.cdc.gov/flu. Vaccine Information Statement   Inactivated Influenza Vaccine   8/6/2021  [address] [id number]   Department of Health and Human Services  Centers for Disease Control and Prevention    Office Use Only

## 2022-01-17 ENCOUNTER — TELEPHONE (OUTPATIENT)
Dept: INTERNAL MEDICINE CLINIC | Age: 62
End: 2022-01-17

## 2022-01-17 NOTE — TELEPHONE ENCOUNTER
Candida Gracia with Dr Domonique Joel called, said pt was referred to them and she is requesting last office note so they can get patient scheduled. Without it, they won't schedule him.   Note hasn't been finalized yet but when it is ready please fax to [phone]

## 2022-01-20 ENCOUNTER — TELEPHONE (OUTPATIENT)
Dept: INTERNAL MEDICINE CLINIC | Age: 62
End: 2022-01-20

## 2022-01-20 NOTE — TELEPHONE ENCOUNTER
----- Message from Coralmelissa Huerta sent at 1/20/2022  1:55 PM EST -----  Subject: Referral Request    QUESTIONS   Reason for referral request? [facility] (   Dr. Kayla Patten), states that they received the referral for the pt, but   before they added it to his chart they lost it. They want it to be faxed   back @ fax? [phone] if possible. Has the physician seen you for this condition before? No   Preferred Specialist (if applicable)? Do you already have an appointment scheduled? Additional Information for Provider? [phone] to reach their   office. ---------------------------------------------------------------------------  --------------  Keena Drain INFO  What is the best way for the office to contact you? OK to leave message on   voicemail  Preferred Call Back Phone Number?  [phone]

## 2022-02-02 DIAGNOSIS — I10 ESSENTIAL HYPERTENSION: ICD-10-CM

## 2022-02-02 DIAGNOSIS — E78.00 HIGH CHOLESTEROL: ICD-10-CM

## 2022-02-02 RX ORDER — ATORVASTATIN CALCIUM 20 MG/1
TABLET, FILM COATED ORAL
Qty: 90 TABLET | Refills: 3 | Status: SHIPPED | OUTPATIENT
Start: 2022-02-02 | End: 2022-02-07 | Stop reason: SDUPTHER

## 2022-02-02 RX ORDER — SPIRONOLACTONE 25 MG/1
TABLET ORAL
Qty: 90 TABLET | Refills: 3 | Status: SHIPPED | OUTPATIENT
Start: 2022-02-02 | End: 2022-02-07 | Stop reason: SDUPTHER

## 2022-02-02 RX ORDER — AMLODIPINE BESYLATE 10 MG/1
TABLET ORAL
Qty: 90 TABLET | Refills: 3 | Status: SHIPPED | OUTPATIENT
Start: 2022-02-02 | End: 2022-02-07 | Stop reason: SDUPTHER

## 2022-02-07 DIAGNOSIS — E78.00 HIGH CHOLESTEROL: ICD-10-CM

## 2022-02-07 DIAGNOSIS — I10 ESSENTIAL HYPERTENSION: ICD-10-CM

## 2022-02-07 RX ORDER — SPIRONOLACTONE 25 MG/1
25 TABLET ORAL DAILY
Qty: 90 TABLET | Refills: 3 | Status: SHIPPED | OUTPATIENT
Start: 2022-02-07

## 2022-02-07 RX ORDER — AMLODIPINE BESYLATE 10 MG/1
10 TABLET ORAL DAILY
Qty: 90 TABLET | Refills: 3 | Status: SHIPPED | OUTPATIENT
Start: 2022-02-07

## 2022-02-07 RX ORDER — OLMESARTAN MEDOXOMIL 40 MG/1
40 TABLET ORAL DAILY
Qty: 90 TABLET | Refills: 3 | Status: SHIPPED | OUTPATIENT
Start: 2022-02-07

## 2022-02-07 RX ORDER — ATORVASTATIN CALCIUM 20 MG/1
20 TABLET, FILM COATED ORAL DAILY
Qty: 90 TABLET | Refills: 3 | Status: SHIPPED | OUTPATIENT
Start: 2022-02-07

## 2022-02-07 RX ORDER — OMEPRAZOLE 40 MG/1
40 CAPSULE, DELAYED RELEASE ORAL DAILY
Qty: 90 CAPSULE | Refills: 3 | Status: SHIPPED | OUTPATIENT
Start: 2022-02-07

## 2022-02-07 NOTE — TELEPHONE ENCOUNTER
Previous Rx's were [place] [facility]    Last Visit: 1/13/22 with MD Ibanez  Next Appointment: 4/15/22 with MD Ibanez    Requested Prescriptions     Pending Prescriptions Disp Refills    omeprazole (PRILOSEC) 40 mg capsule 90 Capsule 3     Sig: Take 1 Capsule by mouth daily.  olmesartan (BENICAR) 40 mg tablet 90 Tablet 3     Sig: Take 1 Tablet by mouth daily.  spironolactone (ALDACTONE) 25 mg tablet 90 Tablet 3     Sig: Take 1 Tablet by mouth daily.  amLODIPine (NORVASC) 10 mg tablet 90 Tablet 3     Sig: Take 1 Tablet by mouth daily.  atorvastatin (LIPITOR) 20 mg tablet 90 Tablet 3     Sig: Take 1 Tablet by mouth daily.  Indications: treatment to slow progression of coronary artery disease

## 2022-02-07 NOTE — TELEPHONE ENCOUNTER
PCP: Maryam Park MD    Last appt: 2022  Future Appointments   Date Time Provider Enriqueta Beauchamp   2/10/2022  1:00 PM [facility] CHENG Robreto    3/7/2022 10:00 AM Ronna Arteaga MD [id number]   2022 11:15 AM IO LAB VISIT IO BS AMB   4/15/2022  9:20 AM Taina Ibanez MD [facility] BS AMB       Requested Prescriptions     Pending Prescriptions Disp Refills    omeprazole (PRILOSEC) 40 mg capsule 90 Capsule 3     Sig: Take 1 Capsule by mouth daily.  olmesartan (BENICAR) 40 mg tablet 90 Tablet 3     Sig: Take 1 Tablet by mouth daily.  spironolactone (ALDACTONE) 25 mg tablet 90 Tablet 3     Sig: TAKE 1 TABLET EVERY DAY    amLODIPine (NORVASC) 10 mg tablet 90 Tablet 3     Si Tablet daily.     atorvastatin (LIPITOR) 20 mg tablet 90 Tablet 3     Sig: Indications: treatment to slow progression of coronary artery disease

## 2022-02-14 RX ORDER — FLASH GLUCOSE SENSOR
KIT MISCELLANEOUS
Qty: 6 KIT | Refills: 3 | Status: SHIPPED | OUTPATIENT
Start: 2022-02-14 | End: 2022-10-25

## 2022-02-16 ENCOUNTER — TELEPHONE (OUTPATIENT)
Dept: INTERNAL MEDICINE CLINIC | Age: 62
End: 2022-02-16

## 2022-02-16 RX ORDER — INSULIN ASPART 100 [IU]/ML
INJECTION, SOLUTION INTRAVENOUS; SUBCUTANEOUS
Qty: 90 ML | Refills: 3 | Status: SHIPPED | OUTPATIENT
Start: 2022-02-16

## 2022-03-18 PROBLEM — E10.42 TYPE 1 DIABETES MELLITUS WITH DIABETIC POLYNEUROPATHY (HCC): Status: ACTIVE | Noted: 2017-03-22

## 2022-03-18 PROBLEM — H11.31 SUBCONJUNCTIVAL HEMORRHAGE OF RIGHT EYE: Status: ACTIVE | Noted: 2017-02-17

## 2022-03-19 PROBLEM — I95.1 ORTHOSTATIC HYPOTENSION: Status: ACTIVE | Noted: 2018-04-18

## 2022-03-19 PROBLEM — N52.9 ED (ERECTILE DYSFUNCTION) OF ORGANIC ORIGIN: Status: ACTIVE | Noted: 2018-02-14

## 2022-03-19 PROBLEM — E11.21 TYPE 2 DIABETES WITH NEPHROPATHY (HCC): Status: ACTIVE | Noted: 2019-07-01

## 2022-04-03 NOTE — TELEPHONE ENCOUNTER
Pt calling to see if something else could be called in to replace his b/p medication. Pt states that the medication he's currently on lisinopril 5 mg is not working. Current b/p readings:     Today 187/79    Yesterday 03/04/2018  165/84   Saturday 03/03/2018  165/90. Pl using the [facility] on [address]     Please call and advise if something else should be called in. Statement Selected

## 2022-04-06 ENCOUNTER — OFFICE VISIT (OUTPATIENT)
Dept: CARDIOLOGY CLINIC | Age: 62
End: 2022-04-06
Payer: MEDICARE

## 2022-04-06 ENCOUNTER — PATIENT MESSAGE (OUTPATIENT)
Dept: INTERNAL MEDICINE CLINIC | Age: 62
End: 2022-04-06

## 2022-04-06 VITALS
BODY MASS INDEX: 29.55 KG/M2 | SYSTOLIC BLOOD PRESSURE: 148 MMHG | DIASTOLIC BLOOD PRESSURE: 72 MMHG | HEIGHT: 73 IN | HEART RATE: 80 BPM | WEIGHT: 223 LBS | OXYGEN SATURATION: 97 %

## 2022-04-06 DIAGNOSIS — R06.02 SOB (SHORTNESS OF BREATH): Primary | ICD-10-CM

## 2022-04-06 DIAGNOSIS — I95.1 ORTHOSTATIC HYPOTENSION: ICD-10-CM

## 2022-04-06 DIAGNOSIS — I11.9 BENIGN HYPERTENSIVE HEART DISEASE WITHOUT HEART FAILURE: ICD-10-CM

## 2022-04-06 PROCEDURE — 93000 ELECTROCARDIOGRAM COMPLETE: CPT | Performed by: INTERNAL MEDICINE

## 2022-04-06 PROCEDURE — G8427 DOCREV CUR MEDS BY ELIG CLIN: HCPCS | Performed by: INTERNAL MEDICINE

## 2022-04-06 PROCEDURE — G8753 SYS BP > OR = 140: HCPCS | Performed by: INTERNAL MEDICINE

## 2022-04-06 PROCEDURE — 3017F COLORECTAL CA SCREEN DOC REV: CPT | Performed by: INTERNAL MEDICINE

## 2022-04-06 PROCEDURE — 99214 OFFICE O/P EST MOD 30 MIN: CPT | Performed by: INTERNAL MEDICINE

## 2022-04-06 PROCEDURE — G8510 SCR DEP NEG, NO PLAN REQD: HCPCS | Performed by: INTERNAL MEDICINE

## 2022-04-06 PROCEDURE — G8419 CALC BMI OUT NRM PARAM NOF/U: HCPCS | Performed by: INTERNAL MEDICINE

## 2022-04-06 PROCEDURE — G8754 DIAS BP LESS 90: HCPCS | Performed by: INTERNAL MEDICINE

## 2022-04-06 NOTE — PROGRESS NOTES
Sejal Rebolledo. presents today for   Chief Complaint   Patient presents with    Follow-up     overdue follow up - last seen 10-28-20 - c/o SOB       Sejal Rebolledo. preferred language for health care discussion is english/other. Is someone accompanying this pt? no    Is the patient using any DME equipment during [address]? no    Depression Screening:  3 most recent PHQ Screens 4/6/2022   PHQ Not Done -   Little interest or pleasure in doing things Not at all   Feeling down, depressed, irritable, or hopeless Not at all   Total Score PHQ 2 0       Learning Assessment:  Learning Assessment 4/6/2022   PRIMARY LEARNER Patient   BARRIERS PRIMARY LEARNER -   PRIMARY LANGUAGE ENGLISH   LEARNER PREFERENCE PRIMARY DEMONSTRATION     -   ANSWERED BY patient   RELATIONSHIP SELF       Abuse Screening:  Abuse Screening Questionnaire 4/6/2022   Do you ever feel afraid of your partner? N   Are you in a relationship with someone who physically or mentally threatens you? N   Is it safe for you to go home? Y       Fall Risk  Fall Risk Assessment, last 12 mths 3/8/2021   Able to walk? Yes   Fall in past 12 months? 0   Do you feel unsteady? 0   Are you worried about falling 0           Pt currently taking Anticoagulant therapy? no    Pt currently taking Antiplatelet therapy ? ASA 81 mg once a day      Coordination of Care:  1. Have you been to the ER, urgent care clinic since your last visit? Hospitalized since your last visit? yes    2. Have you seen or consulted any other health care providers outside of the [address] [place] [address] since your last visit? Include any pap smears or colon screening.  no

## 2022-04-08 ENCOUNTER — APPOINTMENT (OUTPATIENT)
Dept: INTERNAL MEDICINE CLINIC | Age: 62
End: 2022-04-08

## 2022-04-15 ENCOUNTER — OFFICE VISIT (OUTPATIENT)
Dept: INTERNAL MEDICINE CLINIC | Age: 62
End: 2022-04-15
Payer: MEDICARE

## 2022-04-15 VITALS
SYSTOLIC BLOOD PRESSURE: 138 MMHG | TEMPERATURE: 97.5 F | RESPIRATION RATE: 20 BRPM | HEART RATE: 71 BPM | WEIGHT: 225 LBS | HEIGHT: 73 IN | BODY MASS INDEX: 29.82 KG/M2 | OXYGEN SATURATION: 99 % | DIASTOLIC BLOOD PRESSURE: 68 MMHG

## 2022-04-15 DIAGNOSIS — I10 ESSENTIAL HYPERTENSION: ICD-10-CM

## 2022-04-15 DIAGNOSIS — E10.42 TYPE 1 DIABETES MELLITUS WITH DIABETIC POLYNEUROPATHY (HCC): Primary | ICD-10-CM

## 2022-04-15 DIAGNOSIS — E78.00 HIGH CHOLESTEROL: ICD-10-CM

## 2022-04-15 PROCEDURE — 2022F DILAT RTA XM EVC RTNOPTHY: CPT | Performed by: INTERNAL MEDICINE

## 2022-04-15 PROCEDURE — G8427 DOCREV CUR MEDS BY ELIG CLIN: HCPCS | Performed by: INTERNAL MEDICINE

## 2022-04-15 PROCEDURE — G8752 SYS BP LESS 140: HCPCS | Performed by: INTERNAL MEDICINE

## 2022-04-15 PROCEDURE — G8419 CALC BMI OUT NRM PARAM NOF/U: HCPCS | Performed by: INTERNAL MEDICINE

## 2022-04-15 PROCEDURE — 3017F COLORECTAL CA SCREEN DOC REV: CPT | Performed by: INTERNAL MEDICINE

## 2022-04-15 PROCEDURE — 3052F HG A1C>EQUAL 8.0%<EQUAL 9.0%: CPT | Performed by: INTERNAL MEDICINE

## 2022-04-15 PROCEDURE — 99214 OFFICE O/P EST MOD 30 MIN: CPT | Performed by: INTERNAL MEDICINE

## 2022-04-15 PROCEDURE — G8754 DIAS BP LESS 90: HCPCS | Performed by: INTERNAL MEDICINE

## 2022-04-15 PROCEDURE — G8510 SCR DEP NEG, NO PLAN REQD: HCPCS | Performed by: INTERNAL MEDICINE

## 2022-04-15 NOTE — PATIENT INSTRUCTIONS
Learning About High Blood Pressure  What is high blood pressure? Blood pressure is a measure of how hard the blood pushes against the walls of your arteries. It's normal for blood pressure to go up and down throughout the day. But if it stays up, you have high blood pressure. Another name for high blood pressure is hypertension. Two numbers tell you your blood pressure. The first number is the systolic pressure (top number). It shows how hard the blood pushes when your heart is pumping. The second number is the diastolic pressure (bottom number). It shows how hard the blood pushes between heartbeats, when your heart is relaxed and filling with blood. Your doctor will give you a goal for your blood pressure based on your health and your age. High blood pressure (hypertension) means that the top number stays high, or the bottom number stays high, or both. High blood pressure increases the risk of stroke, heart attack, and other problems. What happens when you have high blood pressure? · Blood flows through your arteries with too much force. Over time, this can damage the heart and the walls of your arteries. But you can't feel it. High blood pressure usually doesn't cause symptoms. · High blood pressure makes your heart work harder. And that can lead to heart failure, which means your heart doesn't pump as much blood as your body needs. · Fat and calcium start to build up in your arteries. This buildup is called hardening of the arteries. It can cause many problems including a heart attack and stroke. · Arteries also carry blood and oxygen to organs like your eyes, kidneys, and brain. If high blood pressure damages those arteries, it can lead to vision loss, kidney disease, stroke, and a higher risk of dementia. How can you prevent high blood pressure? · Stay at a healthy weight. · Try to limit how much sodium you eat to less than 2,300 milligrams (mg) a day.  If you limit your sodium to 1,500 mg a day, you can lower your blood pressure even more. ? Buy foods that are labeled \"unsalted,\" \"sodium-free,\" or \"low-sodium. \" Foods labeled \"reduced-sodium\" and \"light sodium\" may still have too much sodium. ? Flavor your food with garlic, lemon juice, onion, vinegar, herbs, and spices instead of salt. Do not use soy sauce, steak sauce, onion salt, garlic salt, mustard, or ketchup on your food. ? Use less salt (or none) when recipes call for it. You can often use half the salt a recipe calls for without losing flavor. · Be physically active. Get at least 30 minutes of exercise on most days of the week. Walking is a good choice. You also may want to do other activities, such as running, swimming, cycling, or playing tennis or team sports. · Limit alcohol to 2 drinks a day for men and 1 drink a day for women. · Eat plenty of fruits, vegetables, and low-fat dairy products. Eat less saturated and total fats. How is high blood pressure treated? · Your doctor will suggest making lifestyle changes to help your heart. For example, your doctor may ask you to eat healthy foods, quit smoking, lose extra weight, and be more active. · If lifestyle changes don't help enough, your doctor may recommend that you take medicine. · When blood pressure is very high, medicines are needed to lower it. Follow-up care is a key part of your treatment and safety. Be sure to make and go to all appointments, and call your doctor if you are having problems. It's also a good idea to know your test results and keep a list of the medicines you take. Where can you learn more? Go to http://www.[employer].com/  Enter P501 in the search box to learn more about \"Learning About High Blood Pressure. \"  Current as of: January 10, 2022               Content Version: 13.2  © 3058-7209 [employer]wise, Incorporated.    Care instructions adapted under license by [employer] (which disclaims liability or warranty for this information). If you have questions about a medical condition or this instruction, always ask your healthcare professional. [place] [address] any warranty or liability for your use of this information.

## 2022-04-15 NOTE — PROGRESS NOTES
Patient is in the office today for a 3 month follow up. 1. \"Have you been to the ER, urgent care clinic since your last visit? Hospitalized since your last visit? \" No    2. \"Have you seen or consulted any other health care providers outside of the [address] [place] [address] since your last visit? \" yes, Dr. Tereso Quijano. 3. For patients aged 39-70: Has the patient had a colonoscopy / FIT/ Cologuard? Yes - no Care Gap present      If the patient is female:    4. For patients aged 41-77: Has the patient had a mammogram within the past 2 years? NA - based on age or sex      11. For patients aged 21-65: Has the patient had a pap smear?  NA - based on age or sex

## 2022-04-15 NOTE — PROGRESS NOTES
Subjective:       Chief Complaint  The patient presents for follow up of diabetes, hypertension and high cholesterol. HPI  Marjorie Alvarenga. is a 64 y.o. male seen for follow up of diabetes. Hunter has hypertension and hyperlipidemia. Diabetes Type 3since 25years old, no significant medication side effects noted, uncontrolled on insulin pump and is now on a continuous glucose monitor since he checks his blood sugars 6-8 times a day, he is followed by Endo (Dr Russ Ruby) and they are adjusting his insulin. He still needs to cut back some of the sugar and starches in his diet. hypertension well controlled on Benicar 40 mg and Norvasc 10 mg and aldactone 25 mg,  hyperlipidemia well controlled, no significant medication side effects noted, on Lipitor 20 mg    Diet and Lifestyle: generally follows a low fat low cholesterol diet, does not rigorously follow a diabetic diet, exercises sporadically    Home BP Monitoring: is controlled at home. Diabetic Review of Systems - home glucose monitoring: is performed regularly, 6x-8x/day. He now has CGM. Other symptoms and concerns: pt will try to exercise more. He has problems with his feet which limits him. Pt is candidate for diabetic shoes due to peripheral neuropathy and ulcerative callus. He is followed by Podiatry. he sees Dr Brigid De La Fuente now. Patient uses Neurontin as needed        Current Outpatient Medications   Medication Sig    tadalafiL (CIALIS) 5 mg tablet Take 1 Tablet by mouth daily as needed for Erectile Dysfunction.  insulin aspart U-100 (NovoLOG U-100 Insulin aspart) 100 unit/mL injection INJECT 90 UNITS ONCE DAILY VIA INSULIN PUMP  Dx E10.42    flash glucose sensor (FreeStyle Kimi 2 Sensor) kit CHECK BLOOD SUGAR 8 TIMES A DAY AND CHANGE SENSOR EVERY 14 DAYS AS DIRECTED    Infusion Set for Insulin Pump (Minimed Infusion Set) iset Change every 3 days    omeprazole (PRILOSEC) 40 mg capsule Take 1 Capsule by mouth daily.     olmesartan (BENICAR) 40 mg tablet Take 1 Tablet by mouth daily.  spironolactone (ALDACTONE) 25 mg tablet Take 1 Tablet by mouth daily.  amLODIPine (NORVASC) 10 mg tablet Take 1 Tablet by mouth daily.  atorvastatin (LIPITOR) 20 mg tablet Take 1 Tablet by mouth daily. Indications: treatment to slow progression of coronary artery disease    flash glucose sensor (FreeStyle Kimi 14 Day Sensor) kit CHECK BLOOD SUGAR 8 TIMES A DAY AND CHANGE SENSOR EVERY 14 DAYS AS DIRECTED    flash glucose scanning reader (FreeStyle Kimi 14 Day [place]) [place] DX R55.44 check blood sugar 8 times a day due to insulin pump and labile blood sugar    aspirin delayed-release 81 mg tablet Take 81 mg by mouth daily.  fish oil-omega-3 fatty acids (Fish Oil) 340-1,000 mg capsule Take 1 Cap by mouth daily.  multivitamin (ONE A DAY) tablet Take 1 Tab by mouth daily. No current facility-administered medications for this visit.              Review of Systems  Respiratory: negative for dyspnea on exertion  Cardiovascular: negative for chest pain    Objective:     Visit Vitals  /68 (BP 1 Location: Left arm, BP Patient Position: Sitting, BP Cuff Size: Adult)   Pulse 71   Temp 97.5 °F (36.4 °C) (Temporal)   Resp 20   Ht 6' 1\" (1.854 m)   Wt 225 lb (102.1 kg)   SpO2 99%   BMI 29.69 kg/m²        General appearance - alert, well appearing, and in no distress  HEENT bilateral nasal mucosal edema and cobblestoning in posterior pharynx  Chest - clear to auscultation, no wheezes, rales or rhonchi, symmetric air entry  Heart - normal rate, regular rhythm, normal S1, S2, no murmurs, rubs, clicks or gallops          Labs:   Lab Results   Component Value Date/Time    Cholesterol, total 153 01/07/2022 10:38 AM    HDL Cholesterol 51 01/07/2022 10:38 AM    LDL, calculated 81 01/07/2022 10:38 AM    LDL-C, External 72 08/20/2015 12:00 AM    Triglyceride 104 01/07/2022 10:38 AM    CHOL/HDL Ratio 2.3 02/19/2021 01:17 PM     Lab Results   Component Value Date/Time Prostate Specific Ag 0.5 09/03/2021 03:04 PM    Prostate Specific Ag 0.4 01/04/2021 09:02 AM    Prostate Specific Ag 0.4 06/24/2020 09:32 AM    Prostate Specific Ag 0.3 03/21/2018 07:54 AM    Prostate Specific Ag 0.251 12/17/2012 03:42 PM    PSA 0.3 09/30/2010 10:09 AM    PSA, FREE 0.2 09/30/2010 10:09 AM    % FREE PSA 67 09/30/2010 10:09 AM     Lab Results   Component Value Date/Time    Sodium 141 01/07/2022 10:38 AM    Potassium 4.7 01/07/2022 10:38 AM    Chloride 106 01/07/2022 10:38 AM    CO2 25 01/07/2022 10:38 AM    Anion gap 10.0 01/07/2022 10:38 AM    Glucose 116 (H) 01/07/2022 10:38 AM    BUN 14 01/07/2022 10:38 AM    Creatinine 1.3 01/07/2022 10:38 AM    BUN/Creatinine ratio 16 09/24/2021 12:00 AM    GFR est AA 73 09/24/2021 12:00 AM    GFR est non-AA 63 09/24/2021 12:00 AM    Calcium 10.1 01/07/2022 10:38 AM    Bilirubin, total 0.2 01/07/2022 10:38 AM    ALT (SGPT) 18 01/07/2022 10:38 AM    Alk. phosphatase 106 01/07/2022 10:38 AM    Protein, total 6.6 01/07/2022 10:38 AM    Albumin 3.9 01/07/2022 10:38 AM    Globulin 2.7 01/07/2022 10:38 AM    A-G Ratio 1.4 01/07/2022 10:38 AM      Lab Results   Component Value Date/Time    Hemoglobin A1c 8.6 (H) 01/07/2022 10:38 AM    Hemoglobin A1c (POC) 9.1 06/11/2015 09:54 AM    Hemoglobin A1c, External 9.3 12/10/2015 12:00 AM      Recent labs done 4/15/22 but did not come into the system. Hba1c 8.3          Assessment / Plan     Diabetes uncontrolled, pt remains on insulin pump and will follow up with Endocrine for adjustment of insulin. He will continue to work on cutting back starches and sugar in his diet. Hypertension well controlled on Benicar 40 mg daily and Norvasc 10 mg and aldactone 25 mg. Hyperlipidemia well controlled on Lipitor 20 mg      ICD-10-CM ICD-9-CM    1. Type 1 diabetes mellitus with diabetic polyneuropathy (HCC)  E10.42 250.61 HEMOGLOBIN A1C W/O EAG     357.2 MICROALBUMIN, UR, RAND W/ MICROALB/CREAT RATIO   2.  Essential hypertension  I10 849.5 METABOLIC PANEL, COMPREHENSIVE   3. High cholesterol  E78.00 272.0 LIPID PANEL              Diabetic issues reviewed with him: diabetic diet discussed in detail, and low cholesterol diet, weight control and daily exercise discussed. Follow-up and Dispositions    · Return in about 3 months (around 7/15/2022) for labs 1 week before. Reviewed plan of care. Patient has provided input and agrees with goals.

## 2022-05-17 ENCOUNTER — TELEPHONE (OUTPATIENT)
Dept: CARDIOLOGY CLINIC | Age: 62
End: 2022-05-17

## 2022-05-17 DIAGNOSIS — E10.42 TYPE 1 DIABETES MELLITUS WITH DIABETIC POLYNEUROPATHY (HCC): ICD-10-CM

## 2022-05-17 DIAGNOSIS — I10 ESSENTIAL HYPERTENSION: ICD-10-CM

## 2022-05-17 DIAGNOSIS — E78.00 HIGH CHOLESTEROL: ICD-10-CM

## 2022-05-19 ENCOUNTER — TELEPHONE (OUTPATIENT)
Dept: CARDIOLOGY CLINIC | Age: 62
End: 2022-05-19

## 2022-05-19 NOTE — TELEPHONE ENCOUNTER
----- Message from Richie Counter, DO sent at 5/19/2022  8:07 AM EDT -----  Low risk nuc and normal echo :)  ----- Message -----  From: Chirag Benson RN  Sent: 5/18/2022   1:19 PM EDT  To: Richie Counter, DO    Per your last note \"    1.) SOB/ BURDEN, worsening with fatigue and \"heart racing\"  2.) DM1, longstanding  3.) HTN  4.) HL  5.) +FH premature ASCVD  6.) Normal LV function 2018, neg stress 2016     1.) Pharm nuc  2.) Echo  3.) RTC close follow up with NP, if above WNL and still having racing heart beat can decide if MCT warranted  4.) RTC with me ~ 8 months, recs to follow

## 2022-05-20 NOTE — TELEPHONE ENCOUNTER
----- Message from Efren Jules DO sent at 5/19/2022  8:07 AM EDT -----  Low risk nuc and normal echo :)  ----- Message -----  From: Anthony Bee RN  Sent: 5/18/2022   1:19 PM EDT  To: Efren Jules DO    Per your last note \"    1.) SOB/ BURDEN, worsening with fatigue and \"heart racing\"  2.) DM1, longstanding  3.) HTN  4.) HL  5.) +FH premature ASCVD  6.) Normal LV function 2018, neg stress 2016     1.) Pharm nuc  2.) Echo  3.) RTC close follow up with NP, if above WNL and still having racing heart beat can decide if MCT warranted  4.) RTC with me ~ 8 months, recs to follow

## 2022-07-13 ENCOUNTER — APPOINTMENT (OUTPATIENT)
Dept: INTERNAL MEDICINE CLINIC | Age: 62
End: 2022-07-13

## 2022-07-13 DIAGNOSIS — I10 ESSENTIAL HYPERTENSION: ICD-10-CM

## 2022-07-13 DIAGNOSIS — E78.00 HIGH CHOLESTEROL: ICD-10-CM

## 2022-07-13 DIAGNOSIS — E10.42 TYPE 1 DIABETES MELLITUS WITH DIABETIC POLYNEUROPATHY (HCC): ICD-10-CM

## 2022-07-14 LAB
A-G RATIO,AGRAT: 1.7 RATIO (ref 1.1–2.6)
ALBUMIN SERPL-MCNC: 4 G/DL (ref 3.5–5)
ALP SERPL-CCNC: 116 U/L (ref 40–125)
ALT SERPL-CCNC: 21 U/L (ref 5–40)
ANION GAP SERPL CALC-SCNC: 12 MMOL/L (ref 3–15)
AST SERPL W P-5'-P-CCNC: 16 U/L (ref 10–37)
AVG GLU, 10930: 193 MG/DL (ref 91–123)
BILIRUB SERPL-MCNC: 0.3 MG/DL (ref 0.2–1.2)
BUN SERPL-MCNC: 24 MG/DL (ref 6–22)
CALCIUM SERPL-MCNC: 11 MG/DL (ref 8.4–10.5)
CHLORIDE SERPL-SCNC: 104 MMOL/L (ref 98–110)
CHOLEST SERPL-MCNC: 158 MG/DL (ref 110–200)
CO2 SERPL-SCNC: 25 MMOL/L (ref 20–32)
CREAT SERPL-MCNC: 1.4 MG/DL (ref 0.8–1.6)
GLOBULIN,GLOB: 2.4 G/DL (ref 2–4)
GLOMERULAR FILTRATION RATE: 55.7 ML/MIN/1.73 SQ.M.
GLUCOSE SERPL-MCNC: 146 MG/DL (ref 70–99)
HBA1C MFR BLD HPLC: 8.4 % (ref 4.8–5.6)
HDLC SERPL-MCNC: 2.9 MG/DL (ref 0–5)
HDLC SERPL-MCNC: 54 MG/DL
LDL/HDL RATIO,LDHD: 1.6
LDLC SERPL CALC-MCNC: 86 MG/DL (ref 50–99)
NON-HDL CHOLESTEROL, 011976: 104 MG/DL
POTASSIUM SERPL-SCNC: 5 MMOL/L (ref 3.5–5.5)
PROT SERPL-MCNC: 6.4 G/DL (ref 6.2–8.1)
SODIUM SERPL-SCNC: 141 MMOL/L (ref 133–145)
TRIGL SERPL-MCNC: 92 MG/DL (ref 40–149)
VLDLC SERPL CALC-MCNC: 18 MG/DL (ref 8–30)
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
## 2022-07-20 ENCOUNTER — OFFICE VISIT (OUTPATIENT)
Dept: CARDIOLOGY CLINIC | Age: 62
End: 2022-07-20
Payer: MEDICARE

## 2022-07-20 VITALS
HEART RATE: 92 BPM | OXYGEN SATURATION: 97 % | SYSTOLIC BLOOD PRESSURE: 132 MMHG | WEIGHT: 224.8 LBS | HEIGHT: 73 IN | BODY MASS INDEX: 29.79 KG/M2 | DIASTOLIC BLOOD PRESSURE: 70 MMHG

## 2022-07-20 DIAGNOSIS — R06.02 SOB (SHORTNESS OF BREATH): Primary | ICD-10-CM

## 2022-07-20 DIAGNOSIS — I11.9 BENIGN HYPERTENSIVE HEART DISEASE WITHOUT HEART FAILURE: ICD-10-CM

## 2022-07-20 DIAGNOSIS — E78.5 DYSLIPIDEMIA: ICD-10-CM

## 2022-07-20 DIAGNOSIS — I95.1 ORTHOSTATIC HYPOTENSION: ICD-10-CM

## 2022-07-20 DIAGNOSIS — R00.2 PALPITATIONS: ICD-10-CM

## 2022-07-20 PROCEDURE — G8754 DIAS BP LESS 90: HCPCS | Performed by: NURSE PRACTITIONER

## 2022-07-20 PROCEDURE — 3017F COLORECTAL CA SCREEN DOC REV: CPT | Performed by: NURSE PRACTITIONER

## 2022-07-20 PROCEDURE — G8432 DEP SCR NOT DOC, RNG: HCPCS | Performed by: NURSE PRACTITIONER

## 2022-07-20 PROCEDURE — G8419 CALC BMI OUT NRM PARAM NOF/U: HCPCS | Performed by: NURSE PRACTITIONER

## 2022-07-20 PROCEDURE — G8752 SYS BP LESS 140: HCPCS | Performed by: NURSE PRACTITIONER

## 2022-07-20 PROCEDURE — 99213 OFFICE O/P EST LOW 20 MIN: CPT | Performed by: NURSE PRACTITIONER

## 2022-07-20 PROCEDURE — G8427 DOCREV CUR MEDS BY ELIG CLIN: HCPCS | Performed by: NURSE PRACTITIONER

## 2022-07-20 NOTE — PATIENT INSTRUCTIONS
2 week event monitor; Dx:  Palpitations  Continue present medication regimen  Follow-up with Dr. Drake Hill as scheduled and as needed  Low sodium diet, 1500mg per day     Low Sodium Diet (2,000 Milligram): Care Instructions  Overview     Limiting sodium can be an important part of managing some health problems. The most common source of sodium is salt. People get most of the salt in their diet from canned, prepared, and packaged foods. Fast food and restaurant meals also are very high in sodium. Your doctor will probably limit your sodium to less than 2,000 milligrams (mg) a day. This limit counts all the sodium in prepared and packaged foods and any salt you add to your food. Follow-up care is a key part of your treatment and safety. Be sure to make and go to all appointments, and call your doctor if you are having problems. It's also a good idea to know your test results and keep a list of the medicines you take. How can you care for yourself at home? Read food labels  Read labels on cans and food packages. The labels tell you how much sodium is in each serving. Make sure that you look at the serving size. If you eat more than the serving size, you have eaten more sodium. Food labels also tell you the Percent Daily Value for sodium. Choose products with low Percent Daily Values for sodium. Be aware that sodium can come in forms other than salt, including monosodium glutamate (MSG), sodium citrate, and sodium bicarbonate (baking soda). MSG is often added to Asian food. When you eat out, you can sometimes ask for food without MSG or added salt. Buy low-sodium foods  Buy foods that are labeled \"unsalted\" (no salt added), \"sodium-free\" (less than 5 mg of sodium per serving), or \"low-sodium\" (140 mg or less of sodium per serving). Foods labeled \"reduced-sodium\" and \"light sodium\" may still have too much sodium. Be sure to read the label to see how much sodium you are getting.   Buy fresh vegetables, or frozen vegetables without added sauces. Buy low-sodium versions of canned vegetables, soups, and other canned goods. Prepare low-sodium meals  Cut back on the amount of salt you use in cooking. This will help you adjust to the taste. Do not add salt after cooking. One teaspoon of salt has about 2,300 mg of sodium. Take the salt shaker off the table. Flavor your food with garlic, lemon juice, onion, vinegar, herbs, and spices. Do not use soy sauce, lite soy sauce, steak sauce, onion salt, garlic salt, celery salt, or ketchup on your food. Use low-sodium salad dressings, sauces, and ketchup. Or make your own salad dressings and sauces without adding salt. Use less salt (or none) when recipes call for it. You can often use half the salt a recipe calls for without losing flavor. Other foods such as rice, pasta, and grains do not need added salt. Rinse canned vegetables, and cook them in fresh water. This removes some--but not all--of the salt. Avoid water that is naturally high in sodium or that has been treated with water softeners, which add sodium. If you buy bottled water, read the label and choose a sodium-free brand. Avoid high-sodium foods  Avoid eating:  Smoked, cured, salted, and canned meat, fish, and poultry. Ham, marlow, hot dogs, and luncheon meats. Regular, hard, and processed cheese and regular peanut butter. Crackers with salted tops, and other salted snack foods such as pretzels, chips, and salted popcorn. Frozen prepared meals, unless labeled low-sodium. Canned and dried soups, broths, and bouillon, unless labeled sodium-free or low-sodium. Canned vegetables, unless labeled sodium-free or low-sodium. [place] fries, pizza, tacos, and other fast foods. Pickles, olives, ketchup, and other condiments, especially soy sauce, unless labeled sodium-free or low-sodium. Where can you learn more?   Go to http://www.gray.com/  Enter V847 in the search box to learn more about \"Low Sodium Diet (2,000 Milligram): Care Instructions. \"  Current as of: September 8, 2021               Content Version: 13.2  © 2006-2022 [employer]. Care instructions adapted under license by [employer] (which disclaims liability or warranty for this information). If you have questions about a medical condition or this instruction, always ask your healthcare professional. [place] [address] any warranty or liability for your use of this information.

## 2022-07-22 ENCOUNTER — OFFICE VISIT (OUTPATIENT)
Dept: INTERNAL MEDICINE CLINIC | Age: 62
End: 2022-07-22
Payer: MEDICARE

## 2022-07-22 VITALS
SYSTOLIC BLOOD PRESSURE: 148 MMHG | RESPIRATION RATE: 16 BRPM | HEIGHT: 73 IN | DIASTOLIC BLOOD PRESSURE: 75 MMHG | HEART RATE: 74 BPM | BODY MASS INDEX: 29.82 KG/M2 | WEIGHT: 225 LBS | OXYGEN SATURATION: 99 % | TEMPERATURE: 97.6 F

## 2022-07-22 DIAGNOSIS — E10.42 TYPE 1 DIABETES MELLITUS WITH DIABETIC POLYNEUROPATHY (HCC): Primary | ICD-10-CM

## 2022-07-22 DIAGNOSIS — I10 ESSENTIAL HYPERTENSION: ICD-10-CM

## 2022-07-22 DIAGNOSIS — E78.00 HIGH CHOLESTEROL: ICD-10-CM

## 2022-07-22 PROCEDURE — G8754 DIAS BP LESS 90: HCPCS | Performed by: INTERNAL MEDICINE

## 2022-07-22 PROCEDURE — 99214 OFFICE O/P EST MOD 30 MIN: CPT | Performed by: INTERNAL MEDICINE

## 2022-07-22 PROCEDURE — 2022F DILAT RTA XM EVC RTNOPTHY: CPT | Performed by: INTERNAL MEDICINE

## 2022-07-22 PROCEDURE — G8753 SYS BP > OR = 140: HCPCS | Performed by: INTERNAL MEDICINE

## 2022-07-22 PROCEDURE — G8510 SCR DEP NEG, NO PLAN REQD: HCPCS | Performed by: INTERNAL MEDICINE

## 2022-07-22 PROCEDURE — 3017F COLORECTAL CA SCREEN DOC REV: CPT | Performed by: INTERNAL MEDICINE

## 2022-07-22 PROCEDURE — 3052F HG A1C>EQUAL 8.0%<EQUAL 9.0%: CPT | Performed by: INTERNAL MEDICINE

## 2022-07-22 PROCEDURE — G8427 DOCREV CUR MEDS BY ELIG CLIN: HCPCS | Performed by: INTERNAL MEDICINE

## 2022-07-22 PROCEDURE — G8419 CALC BMI OUT NRM PARAM NOF/U: HCPCS | Performed by: INTERNAL MEDICINE

## 2022-07-22 NOTE — PROGRESS NOTES
Subjective:       Chief Complaint  The patient presents for follow up of diabetes, hypertension and high cholesterol. JANESSA Bills is a 64 y.o. male seen for follow up of diabetes. Healso has hypertension and hyperlipidemia. Diabetes Type 3since 25years old, no significant medication side effects noted, uncontrolled on insulin pump and is now on a continuous glucose monitor since he checks his blood sugars 6-8 times a day, he is followed by Endo (Dr Pako Poon) and they are adjusting his insulin. Patient educated on trying to cut back drinking sodas and substituted for protein drinks which have no carbs. Hypertension uncontrolled on Benicar 40 mg and Norvasc 10 mg and aldactone 25 mg due to poor compliance with taking blood pressure medicines daily, hyperlipidemia borderline controlled, no significant medication side effects noted, on Lipitor 20 mg    Diet and Lifestyle: generally follows a low fat low cholesterol diet, does not rigorously follow a diabetic diet, exercises sporadically    Home BP Monitoring: is controlled at home. Diabetic Review of Systems - home glucose monitoring: is performed regularly, 6x-8x/day. He now has CGM. Other symptoms and concerns: pt will try to exercise more. He has problems with his feet which limits him. Pt is candidate for diabetic shoes due to peripheral neuropathy and ulcerative callus. He is followed by Podiatry. he sees Dr Mitch Lazcano now. Patient uses Neurontin as needed. Patient needs foot surgery but has to get his A1c under 7        Current Outpatient Medications   Medication Sig    sildenafiL (Revatio) 20 mg tablet Take 1-5 tabs by mouth one hour prior to sexual activity. Not to exceed 5 tabs daily.     insulin aspart U-100 (NovoLOG U-100 Insulin aspart) 100 unit/mL injection INJECT 90 UNITS ONCE DAILY VIA INSULIN PUMP  Dx E10.42    flash glucose sensor (FreeStyle Kimi 2 Sensor) kit CHECK BLOOD SUGAR 8 TIMES A DAY AND CHANGE SENSOR EVERY 14 DAYS AS DIRECTED    Infusion Set for Insulin Pump (Minimed Infusion Set) iset Change every 3 days    omeprazole (PRILOSEC) 40 mg capsule Take 1 Capsule by mouth daily. olmesartan (BENICAR) 40 mg tablet Take 1 Tablet by mouth daily. spironolactone (ALDACTONE) 25 mg tablet Take 1 Tablet by mouth daily. amLODIPine (NORVASC) 10 mg tablet Take 1 Tablet by mouth daily. atorvastatin (LIPITOR) 20 mg tablet Take 1 Tablet by mouth daily. Indications: treatment to slow progression of coronary artery disease    flash glucose sensor (FreeStyle Kimi 14 Day Sensor) kit CHECK BLOOD SUGAR 8 TIMES A DAY AND CHANGE SENSOR EVERY 14 DAYS AS DIRECTED    flash glucose scanning reader (FreeStyle Kimi 14 Day [place]) [facility] DX K44.81 check blood sugar 8 times a day due to insulin pump and labile blood sugar    fish oil-omega-3 fatty acids 340-1,000 mg capsule Take 1 Cap by mouth daily. multivitamin (ONE A DAY) tablet Take 1 Tab by mouth daily. aspirin delayed-release 81 mg tablet Take 81 mg by mouth daily. (Patient not taking: Reported on 7/22/2022)     No current facility-administered medications for this visit.              Review of Systems  Respiratory: negative for dyspnea on exertion  Cardiovascular: negative for chest pain    Objective:     Visit Vitals  BP (!) 148/75   Pulse 74   Temp 97.6 °F (36.4 °C) (Temporal)   Resp 16   Ht 6' 1\" (1.854 m)   Wt 225 lb (102.1 kg)   SpO2 99%   BMI 29.69 kg/m²        General appearance - alert, well appearing, and in no distress  HEENT bilateral nasal mucosal edema and cobblestoning in posterior pharynx  Chest - clear to auscultation, no wheezes, rales or rhonchi, symmetric air entry  Heart - normal rate, regular rhythm, normal S1, S2, no murmurs, rubs, clicks or gallops          Labs:   Lab Results   Component Value Date/Time    Cholesterol, total 158 07/13/2022 08:43 AM    HDL Cholesterol 54 07/13/2022 08:43 AM    LDL, calculated 86 07/13/2022 08:43 AM    LDL-C, External 72 08/20/2015 12:00 AM    Triglyceride 92 07/13/2022 08:43 AM    CHOL/HDL Ratio 2.3 02/19/2021 01:17 PM     Lab Results   Component Value Date/Time    Prostate Specific Ag 0.5 09/03/2021 03:04 PM    Prostate Specific Ag 0.4 01/04/2021 09:02 AM    Prostate Specific Ag 0.4 06/24/2020 09:32 AM    Prostate Specific Ag 0.3 03/21/2018 07:54 AM    Prostate Specific Ag 0.251 12/17/2012 03:42 PM    PSA 0.3 09/30/2010 10:09 AM    PSA, FREE 0.2 09/30/2010 10:09 AM    % FREE PSA 67 09/30/2010 10:09 AM     Lab Results   Component Value Date/Time    Sodium 141 07/13/2022 08:43 AM    Potassium 5.0 07/13/2022 08:43 AM    Chloride 104 07/13/2022 08:43 AM    CO2 25 07/13/2022 08:43 AM    Anion gap 12.0 07/13/2022 08:43 AM    Glucose 146 (H) 07/13/2022 08:43 AM    BUN 24 (H) 07/13/2022 08:43 AM    Creatinine 1.4 07/13/2022 08:43 AM    BUN/Creatinine ratio 16 09/24/2021 12:00 AM    GFR est AA 73 09/24/2021 12:00 AM    GFR est non-AA 63 09/24/2021 12:00 AM    Calcium 11.0 (H) 07/13/2022 08:43 AM    Bilirubin, total 0.3 07/13/2022 08:43 AM    ALT (SGPT) 21 07/13/2022 08:43 AM    Alk. phosphatase 116 07/13/2022 08:43 AM    Protein, total 6.4 07/13/2022 08:43 AM    Albumin 4.0 07/13/2022 08:43 AM    Globulin 2.4 07/13/2022 08:43 AM    A-G Ratio 1.7 07/13/2022 08:43 AM      Lab Results   Component Value Date/Time    Hemoglobin A1c 8.4 (H) 07/13/2022 08:43 AM    Hemoglobin A1c (POC) 9.1 06/11/2015 09:54 AM    Hemoglobin A1c, External 9.3 12/10/2015 12:00 AM               Assessment / Plan     Diabetes uncontrolled, pt remains on insulin pump and will follow up with Endocrine for adjustment of insulin. He will continue to work on cutting back starches and sugar in his diet, especially sodas. Hypertension uncontrolled on Benicar 40 mg daily and Norvasc 10 mg and aldactone 25 mg due to poor compliance. Hyperlipidemia borderline controlled on Lipitor 20 mg probably due to poor compliance      ICD-10-CM ICD-9-CM    1.  Type 1 diabetes mellitus with diabetic polyneuropathy ([facility] [address]  E10.42 250.61 HEMOGLOBIN A1C W/O EAG     357.2 MICROALBUMIN, UR, RAND W/ MICROALB/CREAT RATIO      2. Essential hypertension  T40 721.1 METABOLIC PANEL, COMPREHENSIVE      3. High cholesterol  E78.00 272.0 LIPID PANEL                   Diabetic issues reviewed with him: diabetic diet discussed in detail, and low cholesterol diet, weight control and daily exercise discussed. Follow-up and Dispositions    Return in about 3 months (around 10/22/2022) for labs 1 week before. Reviewed plan of care. Patient has provided input and agrees with goals.

## 2022-07-22 NOTE — PROGRESS NOTES
Hannah Mendoza. presents today for   Chief Complaint   Patient presents with    Follow-up    Cholesterol Problem       1. \"Have you been to the ER, urgent care clinic since your last visit? Hospitalized since your last visit? \" no    2. \"Have you seen or consulted any other health care providers outside of the [address] [place] [address] since your last visit? \" no     3. For patients aged 39-70: Has the patient had a colonoscopy / FIT/ Cologuard? Yes - no Care Gap present      If the patient is female:    4. For patients aged 41-77: Has the patient had a mammogram within the past 2 years? NA - based on age or sex  See top three    5. For patients aged 21-65: Has the patient had a pap smear? NA - based on age or sex  Hannah Mendoza. presents today for   Chief Complaint   Patient presents with    Follow-up    Cholesterol Problem       1. \"Have you been to the ER, urgent care clinic since your last visit? Hospitalized since your last visit? \" no    2. \"Have you seen or consulted any other health care providers outside of the [address] [place] [address] since your last visit? \" no     3. For patients aged 39-70: Has the patient had a colonoscopy / FIT/ Cologuard? Yes - no Care Gap present      If the patient is female:    4. For patients aged 41-77: Has the patient had a mammogram within the past 2 years? NA - based on age or sex  See top three    5. For patients aged 21-65: Has the patient had a pap smear?  NA - based on age or sex

## 2022-08-12 ENCOUNTER — TELEPHONE (OUTPATIENT)
Dept: INTERNAL MEDICINE CLINIC | Age: 62
End: 2022-08-12

## 2022-08-12 NOTE — TELEPHONE ENCOUNTER
Patient states Endocrine told him he had a lot of fluid in his body. Patient states wanted to know if he can get a fluid pill. Patient states Endocrine told him to take spirolactone 50 mg at night.

## 2022-08-12 NOTE — TELEPHONE ENCOUNTER
Patient is aware Aldactone is a fluid pill. Patient states he will give it a try. Patient was advise if symptoms do not get any better or worsen then call the office. Patient verbalizes understanding.

## 2022-09-01 ENCOUNTER — TELEPHONE (OUTPATIENT)
Dept: CARDIOLOGY CLINIC | Age: 62
End: 2022-09-01

## 2022-09-02 ENCOUNTER — TELEPHONE (OUTPATIENT)
Dept: CARDIOLOGY CLINIC | Age: 62
End: 2022-09-02

## 2022-09-02 NOTE — TELEPHONE ENCOUNTER
S/W patient in regards to event monitor. Verbal order and read back per Sedrick Orta, DO  No significant events. Patient verbalized understanding.

## 2022-09-07 DIAGNOSIS — R00.2 PALPITATIONS: ICD-10-CM

## 2022-09-28 ENCOUNTER — OFFICE VISIT (OUTPATIENT)
Dept: INTERNAL MEDICINE CLINIC | Age: 62
End: 2022-09-28
Payer: MEDICARE

## 2022-09-28 VITALS
DIASTOLIC BLOOD PRESSURE: 69 MMHG | WEIGHT: 229 LBS | SYSTOLIC BLOOD PRESSURE: 166 MMHG | HEART RATE: 74 BPM | TEMPERATURE: 98.4 F | BODY MASS INDEX: 31.02 KG/M2 | RESPIRATION RATE: 20 BRPM | OXYGEN SATURATION: 99 % | HEIGHT: 72 IN

## 2022-09-28 DIAGNOSIS — H25.013 CORTICAL AGE-RELATED CATARACT OF BOTH EYES: ICD-10-CM

## 2022-09-28 DIAGNOSIS — E10.42 TYPE 1 DIABETES MELLITUS WITH DIABETIC POLYNEUROPATHY (HCC): Primary | ICD-10-CM

## 2022-09-28 DIAGNOSIS — E78.00 HIGH CHOLESTEROL: ICD-10-CM

## 2022-09-28 DIAGNOSIS — I10 ESSENTIAL HYPERTENSION: ICD-10-CM

## 2022-09-28 DIAGNOSIS — R04.0 EPISTAXIS: ICD-10-CM

## 2022-09-28 PROCEDURE — 3052F HG A1C>EQUAL 8.0%<EQUAL 9.0%: CPT | Performed by: INTERNAL MEDICINE

## 2022-09-28 PROCEDURE — G8432 DEP SCR NOT DOC, RNG: HCPCS | Performed by: INTERNAL MEDICINE

## 2022-09-28 PROCEDURE — G8427 DOCREV CUR MEDS BY ELIG CLIN: HCPCS | Performed by: INTERNAL MEDICINE

## 2022-09-28 PROCEDURE — 3017F COLORECTAL CA SCREEN DOC REV: CPT | Performed by: INTERNAL MEDICINE

## 2022-09-28 PROCEDURE — 99214 OFFICE O/P EST MOD 30 MIN: CPT | Performed by: INTERNAL MEDICINE

## 2022-09-28 PROCEDURE — G8753 SYS BP > OR = 140: HCPCS | Performed by: INTERNAL MEDICINE

## 2022-09-28 PROCEDURE — G8754 DIAS BP LESS 90: HCPCS | Performed by: INTERNAL MEDICINE

## 2022-09-28 PROCEDURE — G8417 CALC BMI ABV UP PARAM F/U: HCPCS | Performed by: INTERNAL MEDICINE

## 2022-09-28 PROCEDURE — 2022F DILAT RTA XM EVC RTNOPTHY: CPT | Performed by: INTERNAL MEDICINE
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
## 2022-09-28 NOTE — PROGRESS NOTES
Patient is in the office today for a pre op clearance. 1. \"Have you been to the ER, urgent care clinic since your last visit? Hospitalized since your last visit? \"  Yes, [facility] for COVID    2. \"Have you seen or consulted any other health care providers outside of the [address] [place] [address] since your last visit? \"  Yes, Dr. Debra Goddard      3. For patients aged 39-70: Has the patient had a colonoscopy / FIT/ Cologuard? Yes - no Care Gap present      If the patient is female:    4. For patients aged 41-77: Has the patient had a mammogram within the past 2 years? NA - based on age or sex      11. For patients aged 21-65: Has the patient had a pap smear?  NA - based on age or sex

## 2022-10-22 DIAGNOSIS — E10.42 TYPE 1 DIABETES MELLITUS WITH DIABETIC POLYNEUROPATHY (HCC): ICD-10-CM

## 2022-10-22 DIAGNOSIS — I10 ESSENTIAL HYPERTENSION: ICD-10-CM

## 2022-10-22 DIAGNOSIS — E78.00 HIGH CHOLESTEROL: ICD-10-CM

## 2022-10-24 ENCOUNTER — APPOINTMENT (OUTPATIENT)
Dept: INTERNAL MEDICINE CLINIC | Age: 62
End: 2022-10-24

## 2022-10-25 LAB
A-G RATIO,AGRAT: 1.5 RATIO (ref 1.1–2.6)
ALBUMIN SERPL-MCNC: 4.1 G/DL (ref 3.5–5)
ALP SERPL-CCNC: 100 U/L (ref 40–125)
ALT SERPL-CCNC: 15 U/L (ref 5–40)
ANION GAP SERPL CALC-SCNC: 9 MMOL/L (ref 3–15)
AST SERPL W P-5'-P-CCNC: 16 U/L (ref 10–37)
AVG GLU, 10930: 159 MG/DL (ref 91–123)
BILIRUB SERPL-MCNC: 0.4 MG/DL (ref 0.2–1.2)
BUN SERPL-MCNC: 14 MG/DL (ref 6–22)
CALCIUM SERPL-MCNC: 10.4 MG/DL (ref 8.4–10.5)
CHLORIDE SERPL-SCNC: 109 MMOL/L (ref 98–110)
CHOLEST SERPL-MCNC: 143 MG/DL (ref 110–200)
CO2 SERPL-SCNC: 27 MMOL/L (ref 20–32)
CREAT SERPL-MCNC: 1.2 MG/DL (ref 0.8–1.6)
CREATININE, URINE: 130 MG/DL
GLOBULIN,GLOB: 2.8 G/DL (ref 2–4)
GLOMERULAR FILTRATION RATE: >60 ML/MIN/1.73 SQ.M.
GLUCOSE SERPL-MCNC: 78 MG/DL (ref 70–99)
HBA1C MFR BLD HPLC: 7.2 % (ref 4.8–5.6)
HDLC SERPL-MCNC: 2.4 MG/DL (ref 0–5)
HDLC SERPL-MCNC: 59 MG/DL
LDL/HDL RATIO,LDHD: 1.2
LDLC SERPL CALC-MCNC: 72 MG/DL (ref 50–99)
MICROALB/CREAT RATIO, 140286: 42.3 (ref 0–30)
MICROALBUMIN,URINE RANDOM 140054: 55 MG/L (ref 0.1–17)
NON-HDL CHOLESTEROL, 011976: 84 MG/DL
POTASSIUM SERPL-SCNC: 4.4 MMOL/L (ref 3.5–5.5)
PROT SERPL-MCNC: 6.9 G/DL (ref 6.2–8.1)
SODIUM SERPL-SCNC: 145 MMOL/L (ref 133–145)
TRIGL SERPL-MCNC: 59 MG/DL (ref 40–149)
VLDLC SERPL CALC-MCNC: 12 MG/DL (ref 8–30)

## 2022-10-25 RX ORDER — FLASH GLUCOSE SENSOR
KIT MISCELLANEOUS
Qty: 6 KIT | Refills: 3 | Status: SHIPPED | OUTPATIENT
Start: 2022-10-25

## 2022-10-31 ENCOUNTER — OFFICE VISIT (OUTPATIENT)
Dept: INTERNAL MEDICINE CLINIC | Age: 62
End: 2022-10-31
Payer: MEDICARE

## 2022-10-31 VITALS
HEIGHT: 72 IN | SYSTOLIC BLOOD PRESSURE: 116 MMHG | BODY MASS INDEX: 30.88 KG/M2 | HEART RATE: 77 BPM | OXYGEN SATURATION: 97 % | TEMPERATURE: 98 F | RESPIRATION RATE: 16 BRPM | WEIGHT: 228 LBS | DIASTOLIC BLOOD PRESSURE: 57 MMHG

## 2022-10-31 DIAGNOSIS — E78.00 HIGH CHOLESTEROL: ICD-10-CM

## 2022-10-31 DIAGNOSIS — E10.42 TYPE 1 DIABETES MELLITUS WITH DIABETIC POLYNEUROPATHY (HCC): Primary | ICD-10-CM

## 2022-10-31 DIAGNOSIS — I10 ESSENTIAL HYPERTENSION: ICD-10-CM

## 2022-10-31 PROCEDURE — 99214 OFFICE O/P EST MOD 30 MIN: CPT | Performed by: INTERNAL MEDICINE

## 2022-10-31 PROCEDURE — G8417 CALC BMI ABV UP PARAM F/U: HCPCS | Performed by: INTERNAL MEDICINE

## 2022-10-31 PROCEDURE — 3051F HG A1C>EQUAL 7.0%<8.0%: CPT | Performed by: INTERNAL MEDICINE

## 2022-10-31 PROCEDURE — G8427 DOCREV CUR MEDS BY ELIG CLIN: HCPCS | Performed by: INTERNAL MEDICINE

## 2022-10-31 PROCEDURE — 3078F DIAST BP <80 MM HG: CPT | Performed by: INTERNAL MEDICINE

## 2022-10-31 PROCEDURE — 2022F DILAT RTA XM EVC RTNOPTHY: CPT | Performed by: INTERNAL MEDICINE

## 2022-10-31 PROCEDURE — 3074F SYST BP LT 130 MM HG: CPT | Performed by: INTERNAL MEDICINE

## 2022-10-31 PROCEDURE — 3017F COLORECTAL CA SCREEN DOC REV: CPT | Performed by: INTERNAL MEDICINE

## 2022-10-31 PROCEDURE — G8510 SCR DEP NEG, NO PLAN REQD: HCPCS | Performed by: INTERNAL MEDICINE

## 2022-10-31 PROCEDURE — G8754 DIAS BP LESS 90: HCPCS | Performed by: INTERNAL MEDICINE

## 2022-10-31 PROCEDURE — G8752 SYS BP LESS 140: HCPCS | Performed by: INTERNAL MEDICINE

## 2022-10-31 NOTE — PROGRESS NOTES
Patient is in the office today for a 3 month follow up. 1. \"Have you been to the ER, urgent care clinic since your last visit? Hospitalized since your last visit? \" No    2. \"Have you seen or consulted any other health care providers outside of the [address] [place] [address] since your last visit? \"  Yes, Dr. Na Pa. 3. For patients aged 39-70: Has the patient had a colonoscopy / FIT/ Cologuard? Yes - no Care Gap present      If the patient is female:    4. For patients aged 41-77: Has the patient had a mammogram within the past 2 years? NA - based on age or sex      11. For patients aged 21-65: Has the patient had a pap smear?  NA - based on age or sex

## 2022-10-31 NOTE — PROGRESS NOTES
Subjective:       Chief Complaint  The patient presents for follow up of diabetes, hypertension and high cholesterol. JANESSA Mart is a 58 y.o. male seen for follow up of diabetes. Hunter has hypertension and hyperlipidemia. Diabetes Type 3since 25years old, no significant medication side effects noted, better controlled on insulin pump and is now on a continuous glucose monitor since he checks his blood sugars 6-8 times a day, he is followed by Endo (Dr Jeremi Luna) and they are adjusting his insulin. Patient educated on trying to cut back drinking sodas and substituted for protein drinks which have no carbs. Hypertension well controlled on Benicar 40 mg and Norvasc 10 mg and aldactone 25 mg  hyperlipidemia well controlled, no significant medication side effects noted, on Lipitor 20 mg    Diet and Lifestyle: generally follows a low fat low cholesterol diet, does not rigorously follow a diabetic diet, exercises sporadically    Home BP Monitoring: is controlled at home. Diabetic Review of Systems - home glucose monitoring: is performed regularly, 6x-8x/day. He now has CGM. Other symptoms and concerns: pt will try to exercise more. He has problems with his feet which limits him. Pt is candidate for diabetic shoes due to peripheral neuropathy and ulcerative callus. He is followed by Podiatry. Patient uses Neurontin as needed. Current Outpatient Medications   Medication Sig    FreeStyle Kimi 2 Sensor kit CHECK BLOOD SUGAR 8 TIMES A DAY AND CHANGE SENSOR EVERY 14 DAYS AS DIRECTED    sildenafiL (Revatio) 20 mg tablet Take 1-5 tabs by mouth one hour prior to sexual activity. Not to exceed 5 tabs daily. insulin aspart U-100 (NovoLOG U-100 Insulin aspart) 100 unit/mL injection INJECT 90 UNITS ONCE DAILY VIA INSULIN PUMP  Dx E10.42    Infusion Set for Insulin Pump (Minimed Infusion Set) iset Change every 3 days    omeprazole (PRILOSEC) 40 mg capsule Take 1 Capsule by mouth daily. olmesartan (BENICAR) 40 mg tablet Take 1 Tablet by mouth daily. spironolactone (ALDACTONE) 25 mg tablet Take 1 Tablet by mouth daily. amLODIPine (NORVASC) 10 mg tablet Take 1 Tablet by mouth daily. atorvastatin (LIPITOR) 20 mg tablet Take 1 Tablet by mouth daily. Indications: treatment to slow progression of coronary artery disease    flash glucose sensor (FreeStyle Kimi 14 Day Sensor) kit CHECK BLOOD SUGAR 8 TIMES A DAY AND CHANGE SENSOR EVERY 14 DAYS AS DIRECTED    flash glucose scanning reader (FreeStyle Kimi 14 Day [place]) misc DX C47.55 check blood sugar 8 times a day due to insulin pump and labile blood sugar    fish oil-omega-3 fatty acids 340-1,000 mg capsule Take 1 Cap by mouth daily. multivitamin (ONE A DAY) tablet Take 1 Tab by mouth daily. No current facility-administered medications for this visit.              Review of Systems  Respiratory: negative for dyspnea on exertion  Cardiovascular: negative for chest pain    Objective:     Visit Vitals  BP (!) 116/57 (BP 1 Location: Left arm, BP Patient Position: Sitting, BP Cuff Size: Adult)   Pulse 77   Temp 98 °F (36.7 °C) (Temporal)   Resp 16   Ht 6' (1.829 m)   Wt 228 lb (103.4 kg)   SpO2 97%   BMI 30.92 kg/m²        General appearance - alert, well appearing, and in no distress  Chest - clear to auscultation, no wheezes, rales or rhonchi, symmetric air entry  Heart - normal rate, regular rhythm, normal S1, S2, no murmurs, rubs, clicks or gallops          Labs:   Lab Results   Component Value Date/Time    Cholesterol, total 143 10/24/2022 01:21 PM    HDL Cholesterol 59 10/24/2022 01:21 PM    LDL, calculated 72 10/24/2022 01:21 PM    LDL-C, External 72 08/20/2015 12:00 AM    Triglyceride 59 10/24/2022 01:21 PM    CHOL/HDL Ratio 2.3 02/19/2021 01:17 PM     Lab Results   Component Value Date/Time    Prostate Specific Ag 0.5 09/03/2021 03:04 PM    Prostate Specific Ag 0.4 01/04/2021 09:02 AM    Prostate Specific Ag 0.4 06/24/2020 09:32 AM Prostate Specific Ag 0.3 03/21/2018 07:54 AM    Prostate Specific Ag 0.251 12/17/2012 03:42 PM    PSA 0.3 09/30/2010 10:09 AM    PSA, FREE 0.2 09/30/2010 10:09 AM    % FREE PSA 67 09/30/2010 10:09 AM     Lab Results   Component Value Date/Time    Sodium 145 10/24/2022 01:21 PM    Potassium 4.4 10/24/2022 01:21 PM    Chloride 109 10/24/2022 01:21 PM    CO2 27 10/24/2022 01:21 PM    Anion gap 9.0 10/24/2022 01:21 PM    Glucose 78 10/24/2022 01:21 PM    BUN 14 10/24/2022 01:21 PM    Creatinine 1.2 10/24/2022 01:21 PM    BUN/Creatinine ratio 16 09/24/2021 12:00 AM    GFR est AA 73 09/24/2021 12:00 AM    GFR est non-AA 63 09/24/2021 12:00 AM    Calcium 10.4 10/24/2022 01:21 PM    Bilirubin, total 0.4 10/24/2022 01:21 PM    ALT (SGPT) 15 10/24/2022 01:21 PM    Alk. phosphatase 100 10/24/2022 01:21 PM    Protein, total 6.9 10/24/2022 01:21 PM    Albumin 4.1 10/24/2022 01:21 PM    Globulin 2.8 10/24/2022 01:21 PM    A-G Ratio 1.5 10/24/2022 01:21 PM      Lab Results   Component Value Date/Time    Hemoglobin A1c 7.2 (H) 10/24/2022 01:21 PM    Hemoglobin A1c (POC) 9.1 06/11/2015 09:54 AM    Hemoglobin A1c, External 9.3 12/10/2015 12:00 AM               Assessment / Plan     Diabetes much better controlled but he is having a lot of low BS which is triggered by high sugar diet. , pt remains on insulin pump and will follow up with Endocrine for adjustment of insulin. He will continue to work on cutting back starches and sugar in his diet, especially sodas. Hypertension well controlled on Benicar 40 mg daily and Norvasc 10 mg and aldactone 25 mg due to poor compliance. Hyperlipidemia well controlled on Lipitor 20 mg probably due to poor compliance      ICD-10-CM ICD-9-CM    1. Type 1 diabetes mellitus with diabetic polyneuropathy (HCC)  E10.42 250.61 HEMOGLOBIN A1C W/O EAG     357.2       2. Essential hypertension  A59 107.4 METABOLIC PANEL, COMPREHENSIVE      3.  High cholesterol  E78.00 272.0 LIPID PANEL Diabetic issues reviewed with him: diabetic diet discussed in detail, and low cholesterol diet, weight control and daily exercise discussed. Follow-up and Dispositions    Return in about 3 months (around 1/31/2023) for labs 1 week before. Reviewed plan of care. Patient has provided input and agrees with goals.

## 2023-01-20 ENCOUNTER — APPOINTMENT (OUTPATIENT)
Dept: INTERNAL MEDICINE CLINIC | Age: 63
End: 2023-01-20

## 2023-01-20 DIAGNOSIS — I10 ESSENTIAL HYPERTENSION: ICD-10-CM

## 2023-01-20 DIAGNOSIS — E10.42 TYPE 1 DIABETES MELLITUS WITH DIABETIC POLYNEUROPATHY (HCC): ICD-10-CM

## 2023-01-20 DIAGNOSIS — E78.00 HIGH CHOLESTEROL: ICD-10-CM

## 2023-01-21 LAB
A-G RATIO,AGRAT: 1.7 RATIO (ref 1.1–2.6)
ALBUMIN SERPL-MCNC: 4 G/DL (ref 3.5–5)
ALP SERPL-CCNC: 99 U/L (ref 40–125)
ALT SERPL-CCNC: 17 U/L (ref 5–40)
ANION GAP SERPL CALC-SCNC: 11 MMOL/L (ref 3–15)
AST SERPL W P-5'-P-CCNC: 15 U/L (ref 10–37)
AVG GLU, 10930: 175 MG/DL (ref 91–123)
BILIRUB SERPL-MCNC: 0.5 MG/DL (ref 0.2–1.2)
BUN SERPL-MCNC: 24 MG/DL (ref 6–22)
CALCIUM SERPL-MCNC: 10 MG/DL (ref 8.4–10.5)
CHLORIDE SERPL-SCNC: 110 MMOL/L (ref 98–110)
CHOLEST SERPL-MCNC: 128 MG/DL (ref 110–200)
CO2 SERPL-SCNC: 23 MMOL/L (ref 20–32)
CREAT SERPL-MCNC: 1.5 MG/DL (ref 0.8–1.6)
GLOBULIN,GLOB: 2.4 G/DL (ref 2–4)
GLOMERULAR FILTRATION RATE: 51.1 ML/MIN/1.73 SQ.M.
GLUCOSE SERPL-MCNC: 94 MG/DL (ref 70–99)
HBA1C MFR BLD HPLC: 7.7 % (ref 4.8–5.6)
HDLC SERPL-MCNC: 2.9 MG/DL (ref 0–5)
HDLC SERPL-MCNC: 44 MG/DL
LDL/HDL RATIO,LDHD: 1.4
LDLC SERPL CALC-MCNC: 60 MG/DL (ref 50–99)
NON-HDL CHOLESTEROL, 011976: 84 MG/DL
POTASSIUM SERPL-SCNC: 4.4 MMOL/L (ref 3.5–5.5)
PROT SERPL-MCNC: 6.4 G/DL (ref 6.2–8.1)
SODIUM SERPL-SCNC: 144 MMOL/L (ref 133–145)
TRIGL SERPL-MCNC: 120 MG/DL (ref 40–149)
VLDLC SERPL CALC-MCNC: 24 MG/DL (ref 8–30)

## 2023-01-30 ENCOUNTER — OFFICE VISIT (OUTPATIENT)
Dept: INTERNAL MEDICINE CLINIC | Age: 63
End: 2023-01-30
Payer: MEDICARE

## 2023-01-30 VITALS
OXYGEN SATURATION: 100 % | SYSTOLIC BLOOD PRESSURE: 133 MMHG | HEART RATE: 59 BPM | BODY MASS INDEX: 31.83 KG/M2 | TEMPERATURE: 97.8 F | HEIGHT: 72 IN | DIASTOLIC BLOOD PRESSURE: 61 MMHG | RESPIRATION RATE: 16 BRPM | WEIGHT: 235 LBS

## 2023-01-30 DIAGNOSIS — Z00.00 MEDICARE ANNUAL WELLNESS VISIT, SUBSEQUENT: Primary | ICD-10-CM

## 2023-01-30 DIAGNOSIS — E78.00 HIGH CHOLESTEROL: ICD-10-CM

## 2023-01-30 DIAGNOSIS — Z12.5 PROSTATE CANCER SCREENING: ICD-10-CM

## 2023-01-30 DIAGNOSIS — I10 ESSENTIAL HYPERTENSION: ICD-10-CM

## 2023-01-30 DIAGNOSIS — E10.42 TYPE 1 DIABETES MELLITUS WITH DIABETIC POLYNEUROPATHY (HCC): ICD-10-CM

## 2023-01-30 PROCEDURE — 99213 OFFICE O/P EST LOW 20 MIN: CPT | Performed by: INTERNAL MEDICINE

## 2023-01-30 PROCEDURE — G8417 CALC BMI ABV UP PARAM F/U: HCPCS | Performed by: INTERNAL MEDICINE

## 2023-01-30 PROCEDURE — 3017F COLORECTAL CA SCREEN DOC REV: CPT | Performed by: INTERNAL MEDICINE

## 2023-01-30 PROCEDURE — G0439 PPPS, SUBSEQ VISIT: HCPCS | Performed by: INTERNAL MEDICINE

## 2023-01-30 PROCEDURE — G8510 SCR DEP NEG, NO PLAN REQD: HCPCS | Performed by: INTERNAL MEDICINE

## 2023-01-30 PROCEDURE — 2022F DILAT RTA XM EVC RTNOPTHY: CPT | Performed by: INTERNAL MEDICINE

## 2023-01-30 PROCEDURE — G8427 DOCREV CUR MEDS BY ELIG CLIN: HCPCS | Performed by: INTERNAL MEDICINE

## 2023-01-30 PROCEDURE — 3051F HG A1C>EQUAL 7.0%<8.0%: CPT | Performed by: INTERNAL MEDICINE

## 2023-01-30 PROCEDURE — 3078F DIAST BP <80 MM HG: CPT | Performed by: INTERNAL MEDICINE

## 2023-01-30 PROCEDURE — 3074F SYST BP LT 130 MM HG: CPT | Performed by: INTERNAL MEDICINE

## 2023-01-30 RX ORDER — OMEPRAZOLE 40 MG/1
40 CAPSULE, DELAYED RELEASE ORAL DAILY
Qty: 90 CAPSULE | Refills: 3 | Status: SHIPPED | OUTPATIENT
Start: 2023-01-30

## 2023-01-30 RX ORDER — AMLODIPINE BESYLATE 10 MG/1
10 TABLET ORAL DAILY
Qty: 90 TABLET | Refills: 3 | Status: SHIPPED | OUTPATIENT
Start: 2023-01-30

## 2023-01-30 RX ORDER — ATORVASTATIN CALCIUM 20 MG/1
20 TABLET, FILM COATED ORAL DAILY
Qty: 90 TABLET | Refills: 3 | Status: SHIPPED | OUTPATIENT
Start: 2023-01-30
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
## 2023-01-30 NOTE — PATIENT INSTRUCTIONS
Medicare Wellness Visit, Male    The best way to live healthy is to have a lifestyle where you eat a well-balanced diet, exercise regularly, limit alcohol use, and quit all forms of tobacco/nicotine, if applicable. Regular preventive services are another way to keep healthy. Preventive services (vaccines, screening tests, monitoring & exams) can help personalize your care plan, which helps you manage your own care. Screening tests can find health problems at the earliest stages, when they are easiest to treat. [facility] follows the current, evidence-based guidelines published by the [facility] ([facility]) when recommending preventive services for our patients. Because we follow these guidelines, sometimes recommendations change over time as research supports it. (For example, a prostate screening blood test is no longer routinely recommended for men with no symptoms). Of course, you and your doctor may decide to screen more often for some diseases, based on your risk and co-morbidities (chronic disease you are already diagnosed with). Preventive services for you include:  - Medicare offers their members a free annual wellness visit, which is time for you and your primary care provider to discuss and plan for your preventive service needs.  Take advantage of this benefit every year!    -Over the age of 72 should receive the recommended pneumonia vaccines.   -All adults should have a flu vaccine yearly.  -All adults should receive a tetanus vaccine every 10 years.  -Over the age of 48 should receive the shingles vaccines.    -All adults should be screened once for Hepatitis C.  -All adults age 38-68 who are overweight should have a diabetes screening test once every three years.   -Other screening tests & preventive services for persons with diabetes include: an eye exam to screen for diabetic retinopathy, a kidney function test, a foot exam, and stricter control over your cholesterol.   -Cardiovascular screening for adults with routine risk involves an electrocardiogram (ECG) at intervals determined by the provider.     -Colorectal cancer screening should be done for adults age 43-69 with no increased risk factors for colorectal cancer. There are a number of acceptable methods of screening for this type of cancer. Each test has its own benefits and drawbacks. Discuss with your provider what is most appropriate for you during your annual wellness visit. The different tests include: colonoscopy (considered the best screening method), a fecal occult blood test, a fecal DNA test, and sigmoidoscopy.    -Lung cancer screening is recommended annually with a low dose CT scan for adults between age 54 and 68, who have smoked at least 30 pack years (equivalent of 1 pack per day for 30 days), and who is a current smoker or quit less than 15 years ago. -An Abdominal Aortic Aneurysm (AAA) Screening is recommended for men age 73-68 who has ever smoked in their lifetime.      Here is a list of your current Health Maintenance items (your personalized list of preventive services) with a due date:  Health Maintenance Due   Topic Date Due    COVID-19 Vaccine (1) Never done    Shingles Vaccine (1 of 2) Never done    Eye Exam  06/15/2019    Yearly Flu Vaccine (1) 08/01/2022    Pneumococcal Vaccine (2 - PCV) 01/13/2023    Diabetic Foot Care  02/02/2023

## 2023-01-30 NOTE — PROGRESS NOTES
Subjective:       Chief Complaint  The patient presents for follow up of diabetes, hypertension and high cholesterol. HPI  Johnathan Qiu. is a 58 y.o. male seen for follow up of diabetes. Hunter has hypertension and hyperlipidemia. Diabetes Type 3since 25years old, no significant medication side effects noted, better controlled on insulin pump and is now on a continuous glucose monitor since he checks his blood sugars 6-8 times a day, he is followed by Endo (Dr Zabrina Puga) and they are adjusting his insulin. Patient educated on trying to cut back drinking sodas and substituted for protein drinks which have no carbs. Hypertension well controlled on Benicar 40 mg and Norvasc 10 mg and aldactone 25 mg  hyperlipidemia well controlled, no significant medication side effects noted, on Lipitor 20 mg    Diet and Lifestyle: generally follows a low fat low cholesterol diet, does not rigorously follow a diabetic diet, exercises sporadically    Home BP Monitoring: is controlled at home. Diabetic Review of Systems - home glucose monitoring: is performed regularly, 6x-8x/day. He now has CGM. Other symptoms and concerns: pt will try to exercise more. He has problems with his feet which limits him. Pt is candidate for diabetic shoes due to peripheral neuropathy and ulcerative callus. He is followed by Podiatry. Patient uses Neurontin as needed. Current Outpatient Medications   Medication Sig    amLODIPine (NORVASC) 10 mg tablet Take 1 Tablet by mouth daily. atorvastatin (LIPITOR) 20 mg tablet Take 1 Tablet by mouth daily. Indications: treatment to slow progression of coronary artery disease    omeprazole (PRILOSEC) 40 mg capsule Take 1 Capsule by mouth daily. FreeStyle Kimi 2 Sensor kit CHECK BLOOD SUGAR 8 TIMES A DAY AND CHANGE SENSOR EVERY 14 DAYS AS DIRECTED    sildenafiL (Revatio) 20 mg tablet Take 1-5 tabs by mouth one hour prior to sexual activity. Not to exceed 5 tabs daily.     insulin aspart U-100 (NovoLOG U-100 Insulin aspart) 100 unit/mL injection INJECT 90 UNITS ONCE DAILY VIA INSULIN PUMP  Dx E10.42    Infusion Set for Insulin Pump (Minimed Infusion Set) iset Change every 3 days    olmesartan (BENICAR) 40 mg tablet Take 1 Tablet by mouth daily. spironolactone (ALDACTONE) 25 mg tablet Take 1 Tablet by mouth daily. (Patient taking differently: Take 50 mg by mouth daily. Increased by Milton Bermudez NP Endocrinology)    flash glucose sensor (FreeStyle Kimi 14 Day Sensor) kit CHECK BLOOD SUGAR 8 TIMES A DAY AND CHANGE SENSOR EVERY 14 DAYS AS DIRECTED    flash glucose scanning reader (FreeStyle Kimi 14 Day [place]) misc DX E10.42 check blood sugar 8 times a day due to insulin pump and labile blood sugar    fish oil-omega-3 fatty acids 340-1,000 mg capsule Take 1 Cap by mouth daily. multivitamin (ONE A DAY) tablet Take 1 Tab by mouth daily. No current facility-administered medications for this visit.              Review of Systems  Respiratory: negative for dyspnea on exertion  Cardiovascular: negative for chest pain    Objective:     Visit Vitals  /61 (BP 1 Location: Right arm, BP Patient Position: Sitting, BP Cuff Size: Adult)   Pulse (!) 59   Temp 97.8 °F (36.6 °C) (Temporal)   Resp 16   Ht 6' (1.829 m)   Wt 235 lb (106.6 kg)   SpO2 100%   BMI 31.87 kg/m²        General appearance - alert, well appearing, and in no distress  Chest - clear to auscultation, no wheezes, rales or rhonchi, symmetric air entry  Heart - normal rate, regular rhythm, normal S1, S2, no murmurs, rubs, clicks or gallops          Labs:   Lab Results   Component Value Date/Time    Cholesterol, total 128 01/20/2023 01:15 PM    HDL Cholesterol 44 01/20/2023 01:15 PM    LDL, calculated 60 01/20/2023 01:15 PM    LDL-C, External 72 08/20/2015 12:00 AM    Triglyceride 120 01/20/2023 01:15 PM    CHOL/HDL Ratio 2.3 02/19/2021 01:17 PM     Lab Results   Component Value Date/Time    Prostate Specific Ag 0.5 09/03/2021 03:04 PM    Prostate Specific Ag 0.4 01/04/2021 09:02 AM    Prostate Specific Ag 0.4 06/24/2020 09:32 AM    Prostate Specific Ag 0.3 03/21/2018 07:54 AM    Prostate Specific Ag 0.251 12/17/2012 03:42 PM    PSA 0.3 09/30/2010 10:09 AM    PSA, FREE 0.2 09/30/2010 10:09 AM    % FREE PSA 67 09/30/2010 10:09 AM     Lab Results   Component Value Date/Time    Sodium 144 01/20/2023 01:15 PM    Potassium 4.4 01/20/2023 01:15 PM    Chloride 110 01/20/2023 01:15 PM    CO2 23 01/20/2023 01:15 PM    Anion gap 11.0 01/20/2023 01:15 PM    Glucose 94 01/20/2023 01:15 PM    BUN 24 (H) 01/20/2023 01:15 PM    Creatinine 1.5 01/20/2023 01:15 PM    BUN/Creatinine ratio 16 09/24/2021 12:00 AM    GFR est AA 73 09/24/2021 12:00 AM    GFR est non-AA 63 09/24/2021 12:00 AM    Calcium 10.0 01/20/2023 01:15 PM    Bilirubin, total 0.5 01/20/2023 01:15 PM    ALT (SGPT) 17 01/20/2023 01:15 PM    Alk. phosphatase 99 01/20/2023 01:15 PM    Protein, total 6.4 01/20/2023 01:15 PM    Albumin 4.0 01/20/2023 01:15 PM    Globulin 2.4 01/20/2023 01:15 PM    A-G Ratio 1.7 01/20/2023 01:15 PM      Lab Results   Component Value Date/Time    Hemoglobin A1c 7.7 (H) 01/20/2023 01:15 PM    Hemoglobin A1c (POC) 9.1 06/11/2015 09:54 AM    Hemoglobin A1c, External 9.3 12/10/2015 12:00 AM               Assessment / Plan     Diabetes much better controlled but he is having a lot of low BS which is triggered by high sugar diet. , pt remains on insulin pump and will follow up with Endocrine for adjustment of insulin. He will continue to work on cutting back starches and sugar in his diet, especially sodas. Hypertension well controlled on Benicar 40 mg daily and Norvasc 10 mg and aldactone 25 mg due to poor compliance. Hyperlipidemia well controlled on Lipitor 20 mg probably due to poor compliance      ICD-10-CM ICD-9-CM    1. Essential hypertension  I10 401.9 amLODIPine (NORVASC) 10 mg tablet      2.  High cholesterol  E78.00 272.0 atorvastatin (LIPITOR) 20 mg tablet Diabetic issues reviewed with him: diabetic diet discussed in detail, and low cholesterol diet, weight control and daily exercise discussed. Reviewed plan of care. Patient has provided input and agrees with goals.

## 2023-01-30 NOTE — PROGRESS NOTES
Patient is in the office today for a 3 month follow up. 1. \"Have you been to the ER, urgent care clinic since your last visit? Hospitalized since your last visit? \" No    2. \"Have you seen or consulted any other health care providers outside of the [address] [place] [address] since your last visit? \"  Yes, Endocrinology Royce Bell NP      3. For patients aged 39-70: Has the patient had a colonoscopy / FIT/ Cologuard? Yes - no Care Gap present      If the patient is female:    4. For patients aged 41-77: Has the patient had a mammogram within the past 2 years? NA - based on age or sex      11. For patients aged 21-65: Has the patient had a pap smear?  NA - based on age or sex

## 2023-02-02 NOTE — PROGRESS NOTES
Subjective:       Chief Complaint  The patient presents for follow up of diabetes, hypertension and high cholesterol. HPI  Geraldo Mora. is a 58 y.o. male seen for follow up of diabetes. Healso has hypertension and hyperlipidemia. Diabetes Type 3since 25years old, no significant medication side effects noted, still uncontrolled on insulin pump and is now on a continuous glucose monitor (Dexcom 6) since he checks his blood sugars 6-8 times a day, he is followed by Endo (Dr Cristian Shah) and they are adjusting his insulin. Patient educated on trying to cut back drinking sodas and substitute for protein drinks which have no carbs. Hypertension well controlled on Benicar 40 mg and Norvasc 10 mg and aldactone 25 mg  hyperlipidemia well controlled, no significant medication side effects noted, on Lipitor 20 mg    Diet and Lifestyle: generally follows a low fat low cholesterol diet, does not rigorously follow a diabetic diet, exercises sporadically    Home BP Monitoring: is controlled at home. Diabetic Review of Systems - home glucose monitoring: is performed regularly, 6x-8x/day. He now has CGM. Other symptoms and concerns: pt will try to exercise more. He has problems with his feet which limits him. Pt is candidate for diabetic shoes due to peripheral neuropathy and ulcerative callus. He is followed by Podiatry. Patient uses Neurontin as needed. Current Outpatient Medications   Medication Sig    amLODIPine (NORVASC) 10 mg tablet Take 1 Tablet by mouth daily. atorvastatin (LIPITOR) 20 mg tablet Take 1 Tablet by mouth daily. Indications: treatment to slow progression of coronary artery disease    omeprazole (PRILOSEC) 40 mg capsule Take 1 Capsule by mouth daily. FreeStyle Kimi 2 Sensor kit CHECK BLOOD SUGAR 8 TIMES A DAY AND CHANGE SENSOR EVERY 14 DAYS AS DIRECTED    sildenafiL (Revatio) 20 mg tablet Take 1-5 tabs by mouth one hour prior to sexual activity. Not to exceed 5 tabs daily. insulin aspart U-100 (NovoLOG U-100 Insulin aspart) 100 unit/mL injection INJECT 90 UNITS ONCE DAILY VIA INSULIN PUMP  Dx E10.42    Infusion Set for Insulin Pump (Minimed Infusion Set) iset Change every 3 days    olmesartan (BENICAR) 40 mg tablet Take 1 Tablet by mouth daily. spironolactone (ALDACTONE) 25 mg tablet Take 1 Tablet by mouth daily. (Patient taking differently: Take 50 mg by mouth daily. Increased by Ana Pemberton NP Endocrinology)    flash glucose sensor (FreeStyle Kimi 14 Day Sensor) kit CHECK BLOOD SUGAR 8 TIMES A DAY AND CHANGE SENSOR EVERY 14 DAYS AS DIRECTED    flash glucose scanning reader (FreeStyle Kimi 14 Day [place]) misc DX E10.42 check blood sugar 8 times a day due to insulin pump and labile blood sugar    fish oil-omega-3 fatty acids 340-1,000 mg capsule Take 1 Cap by mouth daily. multivitamin (ONE A DAY) tablet Take 1 Tab by mouth daily. No current facility-administered medications for this visit.              Review of Systems  Respiratory: negative for dyspnea on exertion  Cardiovascular: negative for chest pain    Objective:     Visit Vitals  /61 (BP 1 Location: Right arm, BP Patient Position: Sitting, BP Cuff Size: Adult)   Pulse (!) 59   Temp 97.8 °F (36.6 °C) (Temporal)   Resp 16   Ht 6' (1.829 m)   Wt 235 lb (106.6 kg)   SpO2 100%   BMI 31.87 kg/m²        General appearance - alert, well appearing, and in no distress  Chest - clear to auscultation, no wheezes, rales or rhonchi, symmetric air entry  Heart - normal rate, regular rhythm, normal S1, S2, no murmurs, rubs, clicks or gallops          Labs:   Lab Results   Component Value Date/Time    Cholesterol, total 128 01/20/2023 01:15 PM    HDL Cholesterol 44 01/20/2023 01:15 PM    LDL, calculated 60 01/20/2023 01:15 PM    LDL-C, External 72 08/20/2015 12:00 AM    Triglyceride 120 01/20/2023 01:15 PM    CHOL/HDL Ratio 2.3 02/19/2021 01:17 PM     Lab Results   Component Value Date/Time    Prostate Specific Ag 0.5 09/03/2021 03:04 PM    Prostate Specific Ag 0.4 01/04/2021 09:02 AM    Prostate Specific Ag 0.4 06/24/2020 09:32 AM    Prostate Specific Ag 0.3 03/21/2018 07:54 AM    Prostate Specific Ag 0.251 12/17/2012 03:42 PM    PSA 0.3 09/30/2010 10:09 AM    PSA, FREE 0.2 09/30/2010 10:09 AM    % FREE PSA 67 09/30/2010 10:09 AM     Lab Results   Component Value Date/Time    Sodium 144 01/20/2023 01:15 PM    Potassium 4.4 01/20/2023 01:15 PM    Chloride 110 01/20/2023 01:15 PM    CO2 23 01/20/2023 01:15 PM    Anion gap 11.0 01/20/2023 01:15 PM    Glucose 94 01/20/2023 01:15 PM    BUN 24 (H) 01/20/2023 01:15 PM    Creatinine 1.5 01/20/2023 01:15 PM    BUN/Creatinine ratio 16 09/24/2021 12:00 AM    GFR est AA 73 09/24/2021 12:00 AM    GFR est non-AA 63 09/24/2021 12:00 AM    Calcium 10.0 01/20/2023 01:15 PM    Bilirubin, total 0.5 01/20/2023 01:15 PM    ALT (SGPT) 17 01/20/2023 01:15 PM    Alk. phosphatase 99 01/20/2023 01:15 PM    Protein, total 6.4 01/20/2023 01:15 PM    Albumin 4.0 01/20/2023 01:15 PM    Globulin 2.4 01/20/2023 01:15 PM    A-G Ratio 1.7 01/20/2023 01:15 PM      Lab Results   Component Value Date/Time    Hemoglobin A1c 7.7 (H) 01/20/2023 01:15 PM    Hemoglobin A1c (POC) 9.1 06/11/2015 09:54 AM    Hemoglobin A1c, External 9.3 12/10/2015 12:00 AM               Assessment / Plan     Diabetes uncontrolled on insulin pump. Patient to follow-up with endocrine for further adjustment   . hypertension well controlled on Benicar 40 mg daily and Norvasc 10 mg and aldactone 25 mg . Hyperlipidemia well controlled on Lipitor 20 mg probably due to poor compliance      ICD-10-CM ICD-9-CM    1. Medicare annual wellness visit, subsequent  Z00.00 V70.0       2. Type 1 diabetes mellitus with diabetic polyneuropathy (HCC)  E10.42 250.61 HEMOGLOBIN A1C W/O EAG     357.2       3. Essential hypertension  I10 401.9 amLODIPine (NORVASC) 10 mg tablet      METABOLIC PANEL, COMPREHENSIVE      4.  High cholesterol  E78.00 272.0 atorvastatin (LIPITOR) 20 mg tablet      LIPID PANEL      5. Prostate cancer screening  Z12.5 V76.44 PSA SCREENING (SCREENING)                   Diabetic issues reviewed with him: diabetic diet discussed in detail, and low cholesterol diet, weight control and daily exercise discussed. Follow-up and Dispositions    Return in about 4 months (around 5/30/2023) for labs 1 week before. Reviewed plan of care. Patient has provided input and agrees with goals.

## 2023-02-05 DIAGNOSIS — Z12.5 PROSTATE CANCER SCREENING: Primary | ICD-10-CM

## 2023-02-05 DIAGNOSIS — E78.00 HIGH CHOLESTEROL: Primary | ICD-10-CM

## 2023-02-05 DIAGNOSIS — I10 ESSENTIAL HYPERTENSION: Primary | ICD-10-CM

## 2023-03-06 ENCOUNTER — OFFICE VISIT (OUTPATIENT)
Age: 63
End: 2023-03-06
Payer: MEDICARE

## 2023-03-06 VITALS
SYSTOLIC BLOOD PRESSURE: 122 MMHG | DIASTOLIC BLOOD PRESSURE: 68 MMHG | HEART RATE: 71 BPM | OXYGEN SATURATION: 98 % | BODY MASS INDEX: 31.02 KG/M2 | WEIGHT: 229 LBS | HEIGHT: 72 IN

## 2023-03-06 DIAGNOSIS — R00.2 PALPITATIONS: ICD-10-CM

## 2023-03-06 DIAGNOSIS — R06.02 SHORTNESS OF BREATH: Primary | ICD-10-CM

## 2023-03-06 PROCEDURE — 99214 OFFICE O/P EST MOD 30 MIN: CPT | Performed by: INTERNAL MEDICINE

## 2023-03-06 ASSESSMENT — ANXIETY QUESTIONNAIRES
4. TROUBLE RELAXING: 0
3. WORRYING TOO MUCH ABOUT DIFFERENT THINGS: 0
5. BEING SO RESTLESS THAT IT IS HARD TO SIT STILL: 0
2. NOT BEING ABLE TO STOP OR CONTROL WORRYING: 0
7. FEELING AFRAID AS IF SOMETHING AWFUL MIGHT HAPPEN: 0
1. FEELING NERVOUS, ANXIOUS, OR ON EDGE: 0

## 2023-03-06 ASSESSMENT — PATIENT HEALTH QUESTIONNAIRE - PHQ9
SUM OF ALL RESPONSES TO PHQ QUESTIONS 1-9: 0
1. LITTLE INTEREST OR PLEASURE IN DOING THINGS: 0
SUM OF ALL RESPONSES TO PHQ QUESTIONS 1-9: 0
SUM OF ALL RESPONSES TO PHQ QUESTIONS 1-9: 0
2. FEELING DOWN, DEPRESSED OR HOPELESS: 0
SUM OF ALL RESPONSES TO PHQ QUESTIONS 1-9: 0
SUM OF ALL RESPONSES TO PHQ9 QUESTIONS 1 & 2: 0
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
## 2023-03-06 NOTE — PROGRESS NOTES
Ana Luisa Mcguire. presents today for   Chief Complaint   Patient presents with    Follow-up     8 month       Ana Luisa Mcguire. preferred language for health care discussion is english/other. Is someone accompanying this pt? no    Is the patient using any DME equipment during OV? no    Depression Screening:  Depression: Not at risk    PHQ-2 Score: 0        Learning Assessment:  Who is the primary learner? Patient    What is the preferred language for health care of the primary learner? ENGLISH    How does the primary learner prefer to learn new concepts? DEMONSTRATION    Answered By patient    Relationship to Learner SELF           Pt currently taking Anticoagulant therapy? no    Pt currently taking Antiplatelet therapy ? no      Coordination of Care:  1. Have you been to the ER, urgent care clinic since your last visit? Hospitalized since your last visit? no    2. Have you seen or consulted any other health care providers outside of the [address] [place] [address] since your last visit? Include any pap smears or colon screening.  no

## 2023-03-15 ENCOUNTER — TELEPHONE (OUTPATIENT)
Age: 63
End: 2023-03-15

## 2023-03-15 NOTE — TELEPHONE ENCOUNTER
Pt asking about getting an RX for Ozempic  He is diabetic. He has heard it can also help you loose weight    Asking if approved send to [facility].

## 2023-03-15 NOTE — TELEPHONE ENCOUNTER
Rhoda Natasha is not used for patients who have Type 1 DM like himself. The body has to make some insulin for it to work.  Type 1 as he knows do not make any insulin

## 2023-03-29 ENCOUNTER — TELEPHONE (OUTPATIENT)
Age: 63
End: 2023-03-29

## 2023-03-29 DIAGNOSIS — E10.42 TYPE 1 DIABETES MELLITUS WITH DIABETIC POLYNEUROPATHY (HCC): Primary | ICD-10-CM

## 2023-03-29 RX ORDER — INSULIN ASPART 100 [IU]/ML
INJECTION, SOLUTION INTRAVENOUS; SUBCUTANEOUS
Qty: 100 ML | Refills: 3 | Status: SHIPPED | OUTPATIENT
Start: 2023-03-29

## 2023-03-29 NOTE — TELEPHONE ENCOUNTER
Patient called in stating that he is having side pain. He would like to be referred to a kidney dr or would like an appt to see you. Please Advise.

## 2023-03-31 ENCOUNTER — OFFICE VISIT (OUTPATIENT)
Age: 63
End: 2023-03-31
Payer: MEDICARE

## 2023-03-31 VITALS
BODY MASS INDEX: 31.15 KG/M2 | SYSTOLIC BLOOD PRESSURE: 116 MMHG | DIASTOLIC BLOOD PRESSURE: 66 MMHG | OXYGEN SATURATION: 100 % | HEART RATE: 84 BPM | RESPIRATION RATE: 20 BRPM | WEIGHT: 230 LBS | TEMPERATURE: 98.1 F | HEIGHT: 72 IN

## 2023-03-31 DIAGNOSIS — E10.42 TYPE 1 DIABETES MELLITUS WITH DIABETIC POLYNEUROPATHY (HCC): ICD-10-CM

## 2023-03-31 DIAGNOSIS — S39.012A BACK STRAIN, INITIAL ENCOUNTER: Primary | ICD-10-CM

## 2023-03-31 DIAGNOSIS — N18.31 STAGE 3A CHRONIC KIDNEY DISEASE (HCC): ICD-10-CM

## 2023-03-31 PROCEDURE — 99214 OFFICE O/P EST MOD 30 MIN: CPT | Performed by: INTERNAL MEDICINE

## 2023-03-31 PROCEDURE — 99211 OFF/OP EST MAY X REQ PHY/QHP: CPT | Performed by: INTERNAL MEDICINE

## 2023-03-31 PROCEDURE — 3051F HG A1C>EQUAL 7.0%<8.0%: CPT | Performed by: INTERNAL MEDICINE

## 2023-03-31 RX ORDER — CYCLOBENZAPRINE HCL 10 MG
10 TABLET ORAL
Qty: 30 TABLET | Refills: 1 | Status: SHIPPED | OUTPATIENT
Start: 2023-03-31

## 2023-03-31 SDOH — ECONOMIC STABILITY: FOOD INSECURITY: WITHIN THE PAST 12 MONTHS, THE FOOD YOU BOUGHT JUST DIDN'T LAST AND YOU DIDN'T HAVE MONEY TO GET MORE.: PATIENT DECLINED

## 2023-03-31 SDOH — ECONOMIC STABILITY: FOOD INSECURITY: WITHIN THE PAST 12 MONTHS, YOU WORRIED THAT YOUR FOOD WOULD RUN OUT BEFORE YOU GOT MONEY TO BUY MORE.: PATIENT DECLINED

## 2023-03-31 SDOH — ECONOMIC STABILITY: HOUSING INSECURITY
IN THE LAST 12 MONTHS, WAS THERE A TIME WHEN YOU DID NOT HAVE A STEADY PLACE TO SLEEP OR SLEPT IN A SHELTER (INCLUDING NOW)?: PATIENT REFUSED

## 2023-03-31 SDOH — ECONOMIC STABILITY: INCOME INSECURITY: HOW HARD IS IT FOR YOU TO PAY FOR THE VERY BASICS LIKE FOOD, HOUSING, MEDICAL CARE, AND HEATING?: PATIENT DECLINED

## 2023-03-31 ASSESSMENT — PATIENT HEALTH QUESTIONNAIRE - PHQ9
SUM OF ALL RESPONSES TO PHQ QUESTIONS 1-9: 0
SUM OF ALL RESPONSES TO PHQ QUESTIONS 1-9: 0
SUM OF ALL RESPONSES TO PHQ9 QUESTIONS 1 & 2: 0
SUM OF ALL RESPONSES TO PHQ QUESTIONS 1-9: 0
SUM OF ALL RESPONSES TO PHQ QUESTIONS 1-9: 0
2. FEELING DOWN, DEPRESSED OR HOPELESS: 0
1. LITTLE INTEREST OR PLEASURE IN DOING THINGS: 0

## 2023-04-05 ASSESSMENT — ENCOUNTER SYMPTOMS: BACK PAIN: 1

## 2023-05-30 ENCOUNTER — NURSE ONLY (OUTPATIENT)
Age: 63
End: 2023-05-30

## 2023-05-30 DIAGNOSIS — Z12.5 PROSTATE CANCER SCREENING: ICD-10-CM

## 2023-05-30 DIAGNOSIS — E78.00 HIGH CHOLESTEROL: ICD-10-CM

## 2023-05-30 DIAGNOSIS — I10 ESSENTIAL HYPERTENSION: ICD-10-CM

## 2023-05-31 LAB
A/G RATIO: 1.7 RATIO (ref 1.1–2.6)
ALBUMIN SERPL-MCNC: 4.2 G/DL (ref 3.5–5)
ALP BLD-CCNC: 110 U/L (ref 40–125)
ALT SERPL-CCNC: 14 U/L (ref 5–40)
ANION GAP SERPL CALCULATED.3IONS-SCNC: 8 MMOL/L (ref 3–15)
AST SERPL-CCNC: 12 U/L (ref 10–37)
BILIRUB SERPL-MCNC: 0.5 MG/DL (ref 0.2–1.2)
BUN BLDV-MCNC: 22 MG/DL (ref 6–22)
CALCIUM SERPL-MCNC: 11 MG/DL (ref 8.4–10.5)
CHLORIDE BLD-SCNC: 104 MMOL/L (ref 98–110)
CHOLESTEROL/HDL RATIO: 3.2 (ref 0–5)
CHOLESTEROL: 135 MG/DL (ref 110–200)
CO2: 27 MMOL/L (ref 20–32)
CREAT SERPL-MCNC: 1.5 MG/DL (ref 0.8–1.6)
GLOBULIN: 2.5 G/DL (ref 2–4)
GLOMERULAR FILTRATION RATE: 51.1 ML/MIN/1.73 SQ.M.
GLUCOSE: 83 MG/DL (ref 70–99)
HDLC SERPL-MCNC: 42 MG/DL
LDL CHOLESTEROL CALCULATED: 78 MG/DL (ref 50–99)
LDL/HDL RATIO: 1.9
NON-HDL CHOLESTEROL: 93 MG/DL
POTASSIUM SERPL-SCNC: 4.7 MMOL/L (ref 3.5–5.5)
PROSTATE SPECIFIC ANTIGEN: 0.68 NG/ML
SODIUM BLD-SCNC: 139 MMOL/L (ref 133–145)
TOTAL PROTEIN: 6.7 G/DL (ref 6.2–8.1)
TRIGL SERPL-MCNC: 75 MG/DL (ref 40–149)
VLDLC SERPL CALC-MCNC: 15 MG/DL (ref 8–30)

## 2023-06-01 DIAGNOSIS — I10 ESSENTIAL HYPERTENSION: ICD-10-CM

## 2023-06-01 DIAGNOSIS — E10.42 TYPE 1 DIABETES MELLITUS WITH DIABETIC POLYNEUROPATHY (HCC): ICD-10-CM

## 2023-06-01 DIAGNOSIS — Z12.5 PROSTATE CANCER SCREENING: ICD-10-CM

## 2023-06-01 DIAGNOSIS — E78.00 HIGH CHOLESTEROL: ICD-10-CM

## 2023-06-05 ENCOUNTER — OFFICE VISIT (OUTPATIENT)
Age: 63
End: 2023-06-05
Payer: COMMERCIAL

## 2023-06-05 VITALS
RESPIRATION RATE: 18 BRPM | OXYGEN SATURATION: 97 % | SYSTOLIC BLOOD PRESSURE: 103 MMHG | WEIGHT: 225 LBS | HEART RATE: 80 BPM | BODY MASS INDEX: 30.48 KG/M2 | DIASTOLIC BLOOD PRESSURE: 62 MMHG | TEMPERATURE: 98.6 F | HEIGHT: 72 IN

## 2023-06-05 DIAGNOSIS — I10 ESSENTIAL HYPERTENSION: ICD-10-CM

## 2023-06-05 DIAGNOSIS — M25.552 LEFT HIP PAIN: ICD-10-CM

## 2023-06-05 DIAGNOSIS — E78.00 HIGH CHOLESTEROL: ICD-10-CM

## 2023-06-05 DIAGNOSIS — N18.31 STAGE 3A CHRONIC KIDNEY DISEASE (HCC): ICD-10-CM

## 2023-06-05 DIAGNOSIS — E83.52 HYPERCALCEMIA: ICD-10-CM

## 2023-06-05 DIAGNOSIS — E10.42 TYPE 1 DIABETES MELLITUS WITH DIABETIC POLYNEUROPATHY (HCC): Primary | ICD-10-CM

## 2023-06-05 LAB — HBA1C MFR BLD: 7.5 %

## 2023-06-05 PROCEDURE — 3078F DIAST BP <80 MM HG: CPT | Performed by: INTERNAL MEDICINE

## 2023-06-05 PROCEDURE — 3074F SYST BP LT 130 MM HG: CPT | Performed by: INTERNAL MEDICINE

## 2023-06-05 PROCEDURE — 99214 OFFICE O/P EST MOD 30 MIN: CPT | Performed by: INTERNAL MEDICINE

## 2023-06-05 PROCEDURE — 83036 HEMOGLOBIN GLYCOSYLATED A1C: CPT | Performed by: INTERNAL MEDICINE

## 2023-06-05 PROCEDURE — 3051F HG A1C>EQUAL 7.0%<8.0%: CPT | Performed by: INTERNAL MEDICINE

## 2023-06-10 NOTE — PROGRESS NOTES
Debra Chacko. presents today for   Chief Complaint   Patient presents with    Hypertension    Diabetes    Cholesterol Problem     4 month follow up      1. \"Have you been to the ER, urgent care clinic since your last visit? Hospitalized since your last visit? \" no    2. \"Have you seen or consulted any other health care providers outside of the [address] [place] [address] since your last visit? \" no     3. For patients aged 39-70: Has the patient had a colonoscopy / FIT/ Cologuard? Yes - no Care Gap present      If the patient is female:    4. For patients aged 41-77: Has the patient had a mammogram within the past 2 years? NA - based on age or sex      11. For patients aged 21-65: Has the patient had a pap smear?  NA - based on age or sex
AST 12 05/30/2023 02:43 PM    ALT 14 05/30/2023 02:43 PM     HgBA1c:    Lab Results   Component Value Date/Time    LABA1C 7.7 01/20/2023 01:15 PM     FLP:    Lab Results   Component Value Date/Time    TRIG 75 05/30/2023 02:43 PM    HDL 42 05/30/2023 02:43 PM    LDLCALC 78 05/30/2023 02:43 PM    LABVLDL 15 05/30/2023 02:43 PM        PSA:   Lab Results   Component Value Date/Time    PSA 0.675 05/30/2023 02:43 PM           Assessment / Plan     Diabetes much better controlled  pt remains on insulin pump and will follow up with Endocrine for adjustment of insulin. He will continue to work on cutting back starches and sugar in his diet, especially sodas. Patient is on [place] for his chronic kidney disease. Hypertension well controlled on Benicar 40 mg daily and Norvasc 10 mg and aldactone 25 mg. Hyperlipidemia fairly well controlled on Lipitor 20 mg. ICD-10-CM    1. Type 1 diabetes mellitus with diabetic polyneuropathy (HCC)  E10.42 AMB POC HEMOGLOBIN A1C     Hemoglobin A1C      2. Essential hypertension  I10 Comprehensive Metabolic Panel      3. High cholesterol  E78.00 Lipid Panel      4. Stage 3a chronic kidney disease (HCC)  N18.31 We will continue on SGLT2 and recheck microalbumin / Creatinine Urine Ratio      5. Hypercalcemia  E83.52 Etiology unclear we will check PTH, Intact. Will refer to endocrine if calcium remains elevated      6. Left hip pain  M25.552 [facility] - Steff Viveros DO, Orthopedic Surgery(General/Total Joint), [place] ([facility])                      Diabetic issues reviewed with him: diabetic diet discussed in detail, and low cholesterol diet, weight control and daily exercise discussed. Return in about 3 months (around 9/5/2023) for labs 1 week before. Reviewed plan of care. Patient has provided input and agrees with goals.

## 2023-06-19 ENCOUNTER — OFFICE VISIT (OUTPATIENT)
Age: 63
End: 2023-06-19
Payer: COMMERCIAL

## 2023-06-19 VITALS — WEIGHT: 222 LBS | HEIGHT: 72 IN | TEMPERATURE: 98.1 F | BODY MASS INDEX: 30.07 KG/M2

## 2023-06-19 DIAGNOSIS — M54.50 MIDLINE LOW BACK PAIN, UNSPECIFIED CHRONICITY, UNSPECIFIED WHETHER SCIATICA PRESENT: ICD-10-CM

## 2023-06-19 DIAGNOSIS — M25.551 HIP PAIN, RIGHT: Primary | ICD-10-CM

## 2023-06-19 PROCEDURE — 99213 OFFICE O/P EST LOW 20 MIN: CPT | Performed by: SPECIALIST

## 2023-06-19 PROCEDURE — 20552 NJX 1/MLT TRIGGER POINT 1/2: CPT | Performed by: SPECIALIST

## 2023-06-19 PROCEDURE — 73521 X-RAY EXAM HIPS BI 2 VIEWS: CPT | Performed by: SPECIALIST

## 2023-06-19 RX ORDER — BETAMETHASONE SODIUM PHOSPHATE AND BETAMETHASONE ACETATE 3; 3 MG/ML; MG/ML
3 INJECTION, SUSPENSION INTRA-ARTICULAR; INTRALESIONAL; INTRAMUSCULAR; SOFT TISSUE ONCE
Status: COMPLETED | OUTPATIENT
Start: 2023-06-19 | End: 2023-06-19

## 2023-06-19 RX ADMIN — BETAMETHASONE SODIUM PHOSPHATE AND BETAMETHASONE ACETATE 3 MG: 3; 3 INJECTION, SUSPENSION INTRA-ARTICULAR; INTRALESIONAL; INTRAMUSCULAR; SOFT TISSUE at 13:39

## 2023-07-06 ENCOUNTER — TELEPHONE (OUTPATIENT)
Age: 63
End: 2023-07-06

## 2023-07-06 NOTE — TELEPHONE ENCOUNTER
----- Message from Alfonso Link sent at 7/6/2023 11:06 AM EDT -----  Subject: Appointment Request    Reason for Call: Established Patient Appointment needed: Routine ED Follow   Up Visit    QUESTIONS    Reason for appointment request? No appointments available during search     Additional Information for Provider? Patient was seen in the ER last week   for a cold and cough. He was given meds and antibiotics but says he still   feels bad and has a bad cough. Symptoms are not worsening but are still   persistent.  Please advise.   ---------------------------------------------------------------------------  --------------  Carole DESAI  [phone]; OK to leave message on voicemail  ---------------------------------------------------------------------------  --------------  SCRIPT ANSWERS

## 2023-07-13 ENCOUNTER — TELEMEDICINE (OUTPATIENT)
Age: 63
End: 2023-07-13
Payer: COMMERCIAL

## 2023-07-13 DIAGNOSIS — J40 BRONCHITIS: Primary | ICD-10-CM

## 2023-07-13 DIAGNOSIS — R05.2 SUBACUTE COUGH: ICD-10-CM

## 2023-07-13 PROCEDURE — 99213 OFFICE O/P EST LOW 20 MIN: CPT | Performed by: INTERNAL MEDICINE

## 2023-07-13 RX ORDER — HYDROCODONE POLISTIREX AND CHLORPHENIRAMINE POLISTIREX 10; 8 MG/5ML; MG/5ML
5 SUSPENSION, EXTENDED RELEASE ORAL EVERY 12 HOURS PRN
Qty: 120 ML | Refills: 0 | Status: SHIPPED | OUTPATIENT
Start: 2023-07-13 | End: 2023-07-25

## 2023-07-13 RX ORDER — PREDNISONE 10 MG/1
TABLET ORAL
Qty: 1 EACH | Refills: 0 | Status: SHIPPED | OUTPATIENT
Start: 2023-07-13

## 2023-07-13 ASSESSMENT — ENCOUNTER SYMPTOMS
RHINORRHEA: 0
SINUS PAIN: 0
COUGH: 1

## 2023-07-13 NOTE — PROGRESS NOTES
Sandra Mercer (:  1960) is a Established patient, presenting virtually for evaluation of the following:    Assessment & Plan   Below is the assessment and plan developed based on review of pertinent history, physical exam, labs, studies, and medications. ICD-10-CM    1. Bronchitis  J40 Will treat with predniSONE 10 MG (21) TBPK. If not improving can refer top pulmonary        2. Subacute cough  R05.2 Will treat again with HYDROcodone-chlorpheniramine (TUSSIONEX PENNKINETIC ER) 10-8 MG/5ML SUER           Return if symptoms worsen or fail to improve. Subjective   Patient has had a cough productive of sputum for the last 2 weeks. He went to emergency room  [facility] on  and was treated with doxycycline and Tussionex and has had mild improvement in his symptoms. COVID test was negative as well as flu test was negative. Pt also using Mucinex with mild improvement. CXR was alos normal o     Review of Systems   HENT:  Positive for congestion. Negative for postnasal drip, rhinorrhea and sinus pain. Respiratory:  Positive for cough.          Objective   Patient-Reported Vitals  No data recorded     Physical Exam  [INSTRUCTIONS:  \"[x]\" Indicates a positive item  \"[]\" Indicates a negative item  -- DELETE ALL ITEMS NOT EXAMINED]    Constitutional: [x] Appears well-developed and well-nourished [x] No apparent distress      [] Abnormal -     Mental status: [x] Alert and awake  [x] Oriented to person/place/time [x] Able to follow commands    [] Abnormal -     Eyes:   EOM    [x]  Normal    [] Abnormal -   Sclera  [x]  Normal    [] Abnormal -          Discharge [x]  None visible   [] Abnormal -     HENT: [x] Normocephalic, atraumatic  [] Abnormal -   [x] Mouth/Throat: Mucous membranes are moist    External Ears [x] Normal  [] Abnormal -    Neck: [x] No visualized mass [] Abnormal -     Pulmonary/Chest: [x] Respiratory effort normal   [x] No visualized signs of difficulty breathing or respiratory

## 2023-09-07 ENCOUNTER — NURSE ONLY (OUTPATIENT)
Age: 63
End: 2023-09-07

## 2023-09-07 DIAGNOSIS — E83.52 HYPERCALCEMIA: ICD-10-CM

## 2023-09-07 DIAGNOSIS — I10 ESSENTIAL HYPERTENSION: ICD-10-CM

## 2023-09-07 DIAGNOSIS — E10.42 TYPE 1 DIABETES MELLITUS WITH DIABETIC POLYNEUROPATHY (HCC): ICD-10-CM

## 2023-09-07 DIAGNOSIS — N18.31 STAGE 3A CHRONIC KIDNEY DISEASE (HCC): ICD-10-CM

## 2023-09-07 DIAGNOSIS — E78.00 HIGH CHOLESTEROL: ICD-10-CM

## 2023-09-08 LAB
A/G RATIO: 1.6 RATIO (ref 1.1–2.6)
ALBUMIN SERPL-MCNC: 4.1 G/DL (ref 3.5–5)
ALP BLD-CCNC: 108 U/L (ref 40–125)
ALT SERPL-CCNC: 15 U/L (ref 5–40)
ANION GAP SERPL CALCULATED.3IONS-SCNC: 11 MMOL/L (ref 3–15)
AST SERPL-CCNC: 15 U/L (ref 10–37)
AVERAGE GLUCOSE: 198 MG/DL (ref 91–123)
BILIRUB SERPL-MCNC: 0.4 MG/DL (ref 0.2–1.2)
BUN BLDV-MCNC: 26 MG/DL (ref 6–22)
CALCIUM SERPL-MCNC: 10.6 MG/DL (ref 8.4–10.5)
CHLORIDE BLD-SCNC: 103 MMOL/L (ref 98–110)
CHOLESTEROL/HDL RATIO: 3.4 (ref 0–5)
CHOLESTEROL: 145 MG/DL (ref 110–200)
CO2: 22 MMOL/L (ref 20–32)
CREAT SERPL-MCNC: 1.6 MG/DL (ref 0.8–1.6)
CREATININE URINE: 99 MG/DL
GLOBULIN: 2.6 G/DL (ref 2–4)
GLOMERULAR FILTRATION RATE: 47.1 ML/MIN/1.73 SQ.M.
GLUCOSE: 116 MG/DL (ref 70–99)
HBA1C MFR BLD: 8.5 % (ref 4.8–5.6)
HDLC SERPL-MCNC: 43 MG/DL
LDL CHOLESTEROL CALCULATED: 85 MG/DL (ref 50–99)
LDL/HDL RATIO: 2
MICROALB/CREAT RATIO (UG/MG CREAT.): NORMAL
MICROALBUMIN/CREAT 24H UR: <12 MG/L (ref 0.1–17)
NON-HDL CHOLESTEROL: 102 MG/DL
POTASSIUM SERPL-SCNC: 5 MMOL/L (ref 3.5–5.5)
PTH INTACT: 100 PG/ML (ref 15–65)
SODIUM BLD-SCNC: 136 MMOL/L (ref 133–145)
TOTAL PROTEIN: 6.7 G/DL (ref 6.2–8.1)
TRIGL SERPL-MCNC: 83 MG/DL (ref 40–149)
VLDLC SERPL CALC-MCNC: 17 MG/DL (ref 8–30)

## 2023-09-14 ENCOUNTER — OFFICE VISIT (OUTPATIENT)
Age: 63
End: 2023-09-14
Payer: COMMERCIAL

## 2023-09-14 VITALS
BODY MASS INDEX: 29.53 KG/M2 | SYSTOLIC BLOOD PRESSURE: 123 MMHG | RESPIRATION RATE: 18 BRPM | HEART RATE: 84 BPM | TEMPERATURE: 98.2 F | OXYGEN SATURATION: 97 % | WEIGHT: 218 LBS | HEIGHT: 72 IN | DIASTOLIC BLOOD PRESSURE: 60 MMHG

## 2023-09-14 DIAGNOSIS — E10.42 TYPE 1 DIABETES MELLITUS WITH DIABETIC POLYNEUROPATHY (HCC): Primary | ICD-10-CM

## 2023-09-14 DIAGNOSIS — N18.31 STAGE 3A CHRONIC KIDNEY DISEASE (HCC): ICD-10-CM

## 2023-09-14 DIAGNOSIS — E83.52 HYPERCALCEMIA: ICD-10-CM

## 2023-09-14 DIAGNOSIS — E78.00 HIGH CHOLESTEROL: ICD-10-CM

## 2023-09-14 DIAGNOSIS — E21.3 HYPERPARATHYROIDISM (HCC): ICD-10-CM

## 2023-09-14 DIAGNOSIS — I10 ESSENTIAL HYPERTENSION: ICD-10-CM

## 2023-09-14 DIAGNOSIS — D35.2 PITUITARY ADENOMA (HCC): ICD-10-CM

## 2023-09-14 PROCEDURE — 99214 OFFICE O/P EST MOD 30 MIN: CPT | Performed by: INTERNAL MEDICINE

## 2023-09-14 PROCEDURE — 3078F DIAST BP <80 MM HG: CPT | Performed by: INTERNAL MEDICINE

## 2023-09-14 PROCEDURE — 3074F SYST BP LT 130 MM HG: CPT | Performed by: INTERNAL MEDICINE

## 2023-09-14 PROCEDURE — 3052F HG A1C>EQUAL 8.0%<EQUAL 9.0%: CPT | Performed by: INTERNAL MEDICINE

## 2023-09-18 ENCOUNTER — OFFICE VISIT (OUTPATIENT)
Age: 63
End: 2023-09-18
Payer: COMMERCIAL

## 2023-09-18 VITALS
TEMPERATURE: 97.3 F | HEIGHT: 73 IN | WEIGHT: 218 LBS | SYSTOLIC BLOOD PRESSURE: 121 MMHG | HEART RATE: 73 BPM | DIASTOLIC BLOOD PRESSURE: 65 MMHG | OXYGEN SATURATION: 93 % | BODY MASS INDEX: 28.89 KG/M2

## 2023-09-18 DIAGNOSIS — G62.9 NEUROPATHY: ICD-10-CM

## 2023-09-18 DIAGNOSIS — R10.31 RIGHT GROIN PAIN: ICD-10-CM

## 2023-09-18 DIAGNOSIS — M47.816 LUMBAR SPONDYLOSIS: Primary | ICD-10-CM

## 2023-09-18 PROBLEM — M53.3 SACROILIAC JOINT PAIN: Status: ACTIVE | Noted: 2023-06-01

## 2023-09-18 PROBLEM — M54.50 LOW BACK PAIN: Status: ACTIVE | Noted: 2023-06-01

## 2023-09-18 PROCEDURE — 99204 OFFICE O/P NEW MOD 45 MIN: CPT | Performed by: PHYSICAL MEDICINE & REHABILITATION

## 2023-09-18 PROCEDURE — 72110 X-RAY EXAM L-2 SPINE 4/>VWS: CPT | Performed by: PHYSICAL MEDICINE & REHABILITATION

## 2023-09-18 RX ORDER — PREGABALIN 50 MG/1
CAPSULE ORAL
Qty: 60 CAPSULE | Refills: 2 | Status: SHIPPED | OUTPATIENT
Start: 2023-09-18 | End: 2023-12-18
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
## 2023-10-04 ENCOUNTER — PHARMACY VISIT (OUTPATIENT)
Age: 63
End: 2023-10-04

## 2023-10-04 DIAGNOSIS — N18.31 STAGE 3A CHRONIC KIDNEY DISEASE (HCC): ICD-10-CM

## 2023-10-19 ENCOUNTER — HOSPITAL ENCOUNTER (OUTPATIENT)
Facility: HOSPITAL | Age: 63
Setting detail: RECURRING SERIES
Discharge: HOME | End: 2023-10-22
Payer: MEDICARE

## 2023-10-19 PROCEDURE — 97162 PT EVAL MOD COMPLEX 30 MIN: CPT

## 2023-10-19 NOTE — PROGRESS NOTES
No Dysfunction of bowel or bladder  [] Yes [x] No Recent illness within past 3 weeks (i.e, cold, flu)  [] Yes [x] No Numbness/tingling in buttock/genitalia region    OBJECTIVE/EXAMINATION    37 min [x]Eval        - untimed      [x]  Patient Education billed concurrently with other procedures       Physical Therapy Evaluation - Lumbar Spine ([facility])    Posture:  Lateral Shift: [] R    [] L     [] +  [x] -  Kyphosis: [] Increased [] Decreased   [x]  WNL  Lordosis:  [] Increased [] Decreased   [x] WNL  Pelvic symmetry: [x] WNL    [] Other:    Palpation: TTP right ASIS, sartorius mm, and with PA mobs at sacrum    Lumbar AROM:  WFL    Strength:   Right (/5) Left (/5)   Hip     Flexion 4- 4             Abduction 3+ 3+             Adduction 3+ 4-             Extension 3+ 3+             ER 4- 4-             IR 4- 4-   Knee   Extension 4 4+              Flexion 4- 4   Ankle   Dorsiflexion 4 4               PF 4 4                Inversion 4 4               Eversion 4 4       Neuro Screen [] WNL  Myotome/Dermatome/Reflexes: Reflexes: L4 - 1+ bilaterally, S1 - 1+ bilaterally  Comments:    Gait:  [] Normal     [x] Abnormal: increased toe out, decreased stride length bilaterally, decreased heel strike bilaterally      Special Tests        Right Left   Slump   neg neg   SLR neg neg   -   Right Left   Hamstring 90/90 pos   pos   Ely pos pos   Piriformis pos pos     -   Right Left   CM   neg neg   Hip Scour neg neg     Other Objective/Functional Measures:   SLS: 13 seconds bilaterally  30 second sit to stand: 10 repetitions without UE support, toed out position with hip ER, decreased eccentric control, demonstrated fatigue with terminal repetitions  Hip AROM: ER - right = 28 degrees, left = 32 degrees; IR - right = 9 degrees, left = 12 degrees     Other tests/ comments: Reproduction of pain only possible with passive movement into abduction and active return to midline with resistance     Justification for Eval Code
AROM by >10 degrees in all directions to improve ability to squat down to lift objects. Status at last note/certification: ER - right = 28 degrees, left = 32 degrees; IR - right = 9 degrees, left = 12 degrees     Long Term Goals: To be accomplished in 12 treatments:  1- Goal: Pt to improve performance of 30 second sit to stand to >14 reps without UE support and minimal deviations to improve functional LE strength. Status at last note/certification: 10 repetitions without UE support, toed out position with hip ER, decreased eccentric control, demonstrated fatigue with terminal repetitions  2- Goal: Pt to improve bilateral SLS to >30 seconds with minimal deviations to improve dynamic single limb stability when performing yardwork. Status at last note/certification: 13 seconds bilaterally with moderate deviations  3- Goal: Pt to improve bilateral hip strength to 5/5 to improve ability to stand and walk for prolonged periods while performing work duties. Status at last note/certification:    Strength: Right (/5) Left (/5)   Hip     Flexion 4- 4             Abduction 3+ 3+             Adduction 3+ 4-             Extension 3+ 3+             ER 4- 4-             IR 4- 4-     4- Goal: Pt to report < 3/10 pain at worst to increase ease with ADLs. Status at last note/certification: 9/61 pain at worst  5- Goal: Pt to report FOTO score of 68 to show improved function and quality of life. Status at last note/certification: FOTO 56 pts      Frequency / Duration: Patient would benefit from skilled PT 1-2 times per week for up to 12 sessions as needed in this certification period. Goals will be assigned and reassessed every 10 visits/ 30 days per guidelines .   Patient/ Caregiver education and instruction: Diagnosis, prognosis, self care and exercises [x]  Plan of care has been reviewed with PTA    Certification Period: 10/19/23 to 01/16/24    Lucio Fields PT       10/19/2023       7:03

## 2023-10-23 ENCOUNTER — HOSPITAL ENCOUNTER (OUTPATIENT)
Facility: HOSPITAL | Age: 63
Setting detail: RECURRING SERIES
Discharge: HOME | End: 2023-10-26
Payer: MEDICARE

## 2023-10-23 PROCEDURE — 97112 NEUROMUSCULAR REEDUCATION: CPT

## 2023-10-23 PROCEDURE — 97110 THERAPEUTIC EXERCISES: CPT

## 2023-10-23 PROCEDURE — 97530 THERAPEUTIC ACTIVITIES: CPT

## 2023-10-23 NOTE — PROGRESS NOTES
= 2 units; 38-52 min = 3 units; 53-67 min = 4 units; 68-82 min = 5 units   Total Total     [x]  Patient Education billed concurrently with other procedures   [x] Review HEP    [] Progressed/Changed HEP, detail:    [] Other detail:       Objective Information/Functional Measures/Assessment    Patient reporting he perfers to be addressed as \"Ray\". Patient reporting for first follow up visit, with patient reporting that he has been completing his HEP daily as instructed with no issues. Reviewed HEP consisting of Sit to stands without upper extremity support, bridges and hips x 3. Patient benefited from verbal cues to complete exercises with proper form, to prevent patient from compensating due to decreased strength in hip flexors/ abductors. Patient also benefits from cues to complete exercises slower with more control. Initiated piliates chair hip abduction/ extension, piliates reformer scooters, 1/2 prone hip extensor strengthening and step up pluses. Patient completed therapeutic session without reported adverse effects. Patient will continue to benefit from skilled PT / OT services to modify and progress therapeutic interventions, analyze and address functional mobility deficits, analyze and address strength deficits, and analyze and cue for proper movement patterns to address functional deficits and attain remaining goals. Progress toward goals / Updated goals:  []  See Progress Note/Recertification    Short Term Goals: To be accomplished in 2 treatments:  1- Goal: Pt to be compliant with initial HEP to improve performance of ADLs. Status at last note/certification: Established and reviewed with pt  2- Goal: Pt to improve bilateral hip ER/IR AROM by >10 degrees in all directions to improve ability to squat down to lift objects. Status at last note/certification: ER - right = 28 degrees, left = 32 degrees; IR - right = 9 degrees, left = 12 degrees      Long Term Goals:  To be accomplished in 12

## 2023-11-08 ENCOUNTER — OFFICE VISIT (OUTPATIENT)
Age: 63
End: 2023-11-08
Payer: MEDICARE

## 2023-11-08 VITALS
TEMPERATURE: 97.2 F | WEIGHT: 222 LBS | HEART RATE: 70 BPM | HEIGHT: 73 IN | BODY MASS INDEX: 29.42 KG/M2 | OXYGEN SATURATION: 98 %

## 2023-11-08 DIAGNOSIS — R10.31 RIGHT GROIN PAIN: Primary | ICD-10-CM

## 2023-11-08 PROCEDURE — 99213 OFFICE O/P EST LOW 20 MIN: CPT | Performed by: PHYSICAL MEDICINE & REHABILITATION

## 2023-11-08 NOTE — PROGRESS NOTES
[facility]    [address], [address] [place] [address]   Phone: [phone]  Fax: [phone]        Cha Waller  : 1960  PCP: Richard Bernard MD    PROGRESS NOTE      ASSESSMENT AND PLAN    Marie Hall was seen today for back pain. Diagnoses and all orders for this visit:    Right groin pain  -     MRI HIP RIGHT W CONTRAST; Future  -     XR INJ HIP ARTHROGRAM; Future        Rocky Jeans. is a 61 y.o. male ,works at [employer], w/hx of DM and polyarthalgia w/ 6 months of persistent pain which is now localized to his right groin. Minimal back pain, no radiculopathy. Known degenerative findings of hip OA. Concerned about labral tear. Recommend right hip MRI arthrogram for further evaluation. Failed PT and Lyrica. Follow-up and Dispositions    Return for will review MR arthrogram, poss ortho referral.           HISTORY OF PRESENT ILLNESS      Rocky Jeans. is a 61 y.o. male presents for follow up of intermittent right low back pain, lateral hip and groin pain.  2023 Referred to PT and trail of low-dose Lyrica. Patient presents today with continuing intermittent low back, right groin, and right hip pain. He describes it as a sharp pain. He states that he can feel a hard lump in the crease of his groin when he is in pain, but it becomes softer when the pain eases up. He denies that coughing or sneezing exacerbates his pain. He does not take any other additional medications besides the Lyrica. Denies abdominal pain or history of hernia. Patient reports that he has neuropathy. Patient states that PT has provided some benefit, but he still continues to have pain. Patient reports that Lyrica BID provided very minimal relief.            2023     2:45 PM   AMB PAIN ASSESSMENT   Location of Pain Back   Severity of Pain 0   Quality of Pain Sharp   Duration of Pain A few minutes   Frequency of Pain Intermittent   Aggravating

## 2023-11-08 NOTE — PROGRESS NOTES
Dayne Cuevas. presents today for   Chief Complaint   Patient presents with    Back Pain       Is someone accompanying this pt? no    Is the patient using any DME equipment during OV? no      Coordination of Care:  1. Have you been to the ER, urgent care clinic since your last visit? no  Hospitalized since your last visit? no    2. Have you seen or consulted any other health care providers outside of the [address] [place] [address] since your last visit? no Include any pap smears or colon screening.  no

## 2023-12-06 ENCOUNTER — HOSPITAL ENCOUNTER (OUTPATIENT)
Facility: HOSPITAL | Age: 63
Discharge: HOME | End: 2023-12-09
Attending: OTOLARYNGOLOGY
Payer: MEDICARE

## 2023-12-06 ENCOUNTER — HOSPITAL ENCOUNTER (OUTPATIENT)
Facility: HOSPITAL | Age: 63
Discharge: HOME | End: 2023-12-09
Attending: PHYSICAL MEDICINE & REHABILITATION
Payer: MEDICARE

## 2023-12-06 DIAGNOSIS — R10.31 RIGHT GROIN PAIN: ICD-10-CM

## 2023-12-06 DIAGNOSIS — E21.0 PRIMARY HYPERPARATHYROIDISM (HCC): ICD-10-CM

## 2023-12-06 PROCEDURE — A4216 STERILE WATER/SALINE, 10 ML: HCPCS | Performed by: PHYSICAL MEDICINE & REHABILITATION

## 2023-12-06 PROCEDURE — A9577 INJ MULTIHANCE: HCPCS | Performed by: PHYSICAL MEDICINE & REHABILITATION

## 2023-12-06 PROCEDURE — A9500 TC99M SESTAMIBI: HCPCS | Performed by: OTOLARYNGOLOGY

## 2023-12-06 PROCEDURE — 76536 US EXAM OF HEAD AND NECK: CPT

## 2023-12-06 PROCEDURE — 6360000004 HC RX CONTRAST MEDICATION: Performed by: PHYSICAL MEDICINE & REHABILITATION

## 2023-12-06 PROCEDURE — 3430000000 HC RX DIAGNOSTIC RADIOPHARMACEUTICAL: Performed by: OTOLARYNGOLOGY

## 2023-12-06 PROCEDURE — 2580000003 HC RX 258: Performed by: PHYSICAL MEDICINE & REHABILITATION

## 2023-12-06 PROCEDURE — 27093 INJECTION FOR HIP X-RAY: CPT

## 2023-12-06 PROCEDURE — 2500000003 HC RX 250 WO HCPCS: Performed by: PHYSICAL MEDICINE & REHABILITATION

## 2023-12-06 PROCEDURE — 73722 MRI JOINT OF LWR EXTR W/DYE: CPT

## 2023-12-06 RX ORDER — LIDOCAINE HYDROCHLORIDE 10 MG/ML
10 INJECTION, SOLUTION EPIDURAL; INFILTRATION; INTRACAUDAL; PERINEURAL ONCE
Status: COMPLETED | OUTPATIENT
Start: 2023-12-06 | End: 2023-12-06

## 2023-12-06 RX ORDER — SODIUM CHLORIDE 0.9 % (FLUSH) 0.9 %
5-40 SYRINGE (ML) INJECTION 2 TIMES DAILY
Status: DISCONTINUED | OUTPATIENT
Start: 2023-12-06 | End: 2023-12-10 | Stop reason: HOSPADM

## 2023-12-06 RX ORDER — TETRAKIS(2-METHOXYISOBUTYLISOCYANIDE)COPPER(I) TETRAFLUOROBORATE 1 MG/ML
27.5 INJECTION, POWDER, LYOPHILIZED, FOR SOLUTION INTRAVENOUS
Status: COMPLETED | OUTPATIENT
Start: 2023-12-06 | End: 2023-12-06

## 2023-12-06 RX ORDER — TRIAMCINOLONE ACETONIDE 40 MG/ML
40 INJECTION, SUSPENSION INTRA-ARTICULAR; INTRAMUSCULAR ONCE
Status: DISCONTINUED | OUTPATIENT
Start: 2023-12-06 | End: 2023-12-06

## 2023-12-06 RX ORDER — IOPAMIDOL 408 MG/ML
10 INJECTION, SOLUTION INTRATHECAL ONCE
Status: COMPLETED | OUTPATIENT
Start: 2023-12-06 | End: 2023-12-06

## 2023-12-06 RX ADMIN — IOPAMIDOL 10 ML: 408 INJECTION, SOLUTION INTRATHECAL at 09:38

## 2023-12-06 RX ADMIN — GADOBENATE DIMEGLUMINE 5 ML: 529 INJECTION, SOLUTION INTRAVENOUS at 09:38

## 2023-12-06 RX ADMIN — TECHNETIUM TC-99M SESTAMIBI 27.5 MILLICURIE: 1 INJECTION INTRAVENOUS at 08:15

## 2023-12-06 RX ADMIN — SODIUM CHLORIDE 10 ML: 9 INJECTION, SOLUTION INTRAMUSCULAR; INTRAVENOUS; SUBCUTANEOUS at 09:38

## 2023-12-06 RX ADMIN — LIDOCAINE HYDROCHLORIDE 10 ML: 10 INJECTION, SOLUTION EPIDURAL; INFILTRATION; INTRACAUDAL; PERINEURAL at 09:38

## 2023-12-06 NOTE — RESULT ENCOUNTER NOTE
Hip labral tear noted. Call pt, suggest Janeth Purchase or [address] for further fu. Will place referral wherever he wants to get evaluation.

## 2023-12-08 DIAGNOSIS — E10.42 TYPE 1 DIABETES MELLITUS WITH DIABETIC POLYNEUROPATHY (HCC): ICD-10-CM

## 2023-12-08 DIAGNOSIS — E78.00 HIGH CHOLESTEROL: ICD-10-CM

## 2023-12-12 LAB
A/G RATIO: 1 RATIO (ref 1.1–2.6)
ALBUMIN SERPL-MCNC: 3.3 G/DL (ref 3.5–5)
ALP BLD-CCNC: 111 U/L (ref 40–125)
ALT SERPL-CCNC: 14 U/L (ref 5–40)
ANION GAP SERPL CALCULATED.3IONS-SCNC: 11 MMOL/L (ref 3–15)
AST SERPL-CCNC: 12 U/L (ref 10–37)
BILIRUB SERPL-MCNC: 0.4 MG/DL (ref 0.2–1.2)
BUN BLDV-MCNC: 16 MG/DL (ref 6–22)
CALCIUM SERPL-MCNC: 10.5 MG/DL (ref 8.4–10.5)
CHLORIDE BLD-SCNC: 106 MMOL/L (ref 98–110)
CHOLESTEROL/HDL RATIO: 2.7 (ref 0–5)
CHOLESTEROL: 129 MG/DL (ref 110–200)
CO2: 25 MMOL/L (ref 20–32)
CREAT SERPL-MCNC: 1.2 MG/DL (ref 0.8–1.6)
ESTIMATED AVERAGE GLUCOSE: 185 MG/DL (ref 91–123)
GLOBULIN: 3.3 G/DL (ref 2–4)
GLOMERULAR FILTRATION RATE: >60 ML/MIN/1.73 SQ.M.
GLUCOSE: 120 MG/DL (ref 70–99)
HBA1C MFR BLD: 8.1 % (ref 4.8–5.6)
HDLC SERPL-MCNC: 47 MG/DL
LDL CHOLESTEROL CALCULATED: 70 MG/DL (ref 50–99)
LDL/HDL RATIO: 1.5
NON-HDL CHOLESTEROL: 82 MG/DL
POTASSIUM SERPL-SCNC: 4.5 MMOL/L (ref 3.5–5.5)
SODIUM BLD-SCNC: 142 MMOL/L (ref 133–145)
TOTAL PROTEIN: 6.6 G/DL (ref 6.2–8.1)
TRIGL SERPL-MCNC: 58 MG/DL (ref 40–149)
VLDLC SERPL CALC-MCNC: 12 MG/DL (ref 8–30)

## 2024-01-22 ENCOUNTER — OFFICE VISIT (OUTPATIENT)
Age: 64
End: 2024-01-22
Payer: MEDICARE

## 2024-01-22 VITALS
TEMPERATURE: 98.1 F | BODY MASS INDEX: 29.03 KG/M2 | HEIGHT: 73 IN | HEART RATE: 78 BPM | WEIGHT: 219 LBS | OXYGEN SATURATION: 99 %

## 2024-01-22 DIAGNOSIS — S73.191A TEAR OF RIGHT ACETABULAR LABRUM, INITIAL ENCOUNTER: Primary | ICD-10-CM

## 2024-01-22 DIAGNOSIS — M47.816 LUMBAR SPONDYLOSIS: ICD-10-CM

## 2024-01-22 PROCEDURE — 99213 OFFICE O/P EST LOW 20 MIN: CPT | Performed by: PHYSICAL MEDICINE & REHABILITATION

## 2024-01-22 RX ORDER — MELOXICAM 15 MG/1
15 TABLET ORAL DAILY
COMMUNITY
Start: 2024-01-04

## 2024-01-22 ASSESSMENT — PATIENT HEALTH QUESTIONNAIRE - PHQ9
SUM OF ALL RESPONSES TO PHQ QUESTIONS 1-9: 0
SUM OF ALL RESPONSES TO PHQ QUESTIONS 1-9: 0
SUM OF ALL RESPONSES TO PHQ9 QUESTIONS 1 & 2: 0
2. FEELING DOWN, DEPRESSED OR HOPELESS: 0
SUM OF ALL RESPONSES TO PHQ QUESTIONS 1-9: 0
1. LITTLE INTEREST OR PLEASURE IN DOING THINGS: 0
SUM OF ALL RESPONSES TO PHQ QUESTIONS 1-9: 0

## 2024-01-22 NOTE — PROGRESS NOTES
[facility]    [facility], [address]  [place] [address]  Phone: [phone]  Fax: [phone]        Devang Piper  : 1960  PCP: Rodrigo Holcomb MD    PROGRESS NOTE      ASSESSMENT AND PLAN    There are no diagnoses linked to this encounter.    Devang Piper Jr. is a 63 y.o. male ***.           HISTORY OF PRESENT ILLNESS      Devang Piper Jr. is a 63 y.o. male presents for follow up of right hip pain.  2023, ordered right hip MRI.    Patient presents today c/o ***.    Reviewed MRI with pt.            2023     2:45 PM   AMB PAIN ASSESSMENT   Location of Pain Back   Severity of Pain 0   Quality of Pain Sharp   Duration of Pain A few minutes   Frequency of Pain Intermittent   Aggravating Factors Other (Comment)   Limiting Behavior Yes   Relieving Factors Other (Comment)   Result of Injury No   Work-Related Injury No   Are there other pain locations you wish to document? No           Onset : April/May 2023, no trauma        Investigations:   R Hip MRI 2023: ***  L XR AP/lat/flex/ext 4V 2023 (I personally reviewed these images): normal alignment, no instability.  Hip x-ray 2023: Mild right joint space narrowing, left moderate joint space narrowing   R shoulder XR : mod to severe OA  Spine surgery consult: NA     Treatments:  Physical therapy: PT 10/2023, denies benefit  Spinal injections: Lumbar TPI Dr. Newby 2023-no benefit  Spinal surgery- No  Beneficial medications: Voltaren Gel  Failed medications: Gabapentin -no benefit     Work Status: SSD (for right ankle chronic pain).part time at [employer]  Pertinent PMHx:  foot ulcers, R Achilles sx, peripheral neuropathy, DM, HTN, R rotator cuff tear and surgery (), L great toe amputation.  Reports that he needs right Achilles tendon repair.    PHYSICAL EXAMINATION    There were no vitals taken for this visit.    ***  TTP: ***  LE strength: ***  SLR: ***  DTR: 
Devang Piper Jr. presents today for   Chief Complaint   Patient presents with    Hip Pain       Is someone accompanying this pt? no    Is the patient using any DME equipment during OV? no        Coordination of Care:  1. Have you been to the ER, urgent care clinic since your last visit? no  Hospitalized since your last visit? no    2. Have you seen or consulted any other health care providers outside of the [facility] since your last visit? PCP, ortho Include any pap smears or colon screening. no        
no trauma        Investigations:   R Hip MR Arthrogram 12/6/2023: Labral tears, mild Trochanteric Bursitis  L XR AP/lat/flex/ext 4V 9/18/2023 (I personally reviewed these images): normal alignment, no instability.  Hip x-ray 6/2023: Mild right joint space narrowing, left moderate joint space narrowing   R shoulder XR 2020: mod to severe OA  Spine surgery consult: NA     Treatments:  Physical therapy: PT 10/2023, denies benefit  Spinal injections: Lumbar TPI Dr. Newby 6/2023-no benefit  Spinal surgery- No  Beneficial medications: Voltaren Gel  Failed medications: Lyrica 50 BID. Gabapentin -no benefit     Work Status: SSD (for right ankle chronic pain). Part time at [employer]  Pertinent PMHx:  foot ulcers, R Achilles sx, peripheral neuropathy, DM, HTN, R rotator cuff tear and surgery (2005), L great toe amputation.  Reports that he needs right Achilles tendon repair.    PHYSICAL EXAMINATION    Pulse 78   Temp 98.1 °F (36.7 °C) (Skin)   Ht 1.854 m (6' 1\")   Wt 99.3 kg (219 lb)   SpO2 99%   BMI 28.89 kg/m²     Patient ambulates without an assistive device  TTP: Right L4-L5  LE strength: intact  SLR: negative  DTR: right groin pain with range of motion  No edema             Written by Uzma Schultz, as dictated by Marquita Simms MD.  This note was created using Dragon transcription software and may contain unintended errors.

## 2024-02-08 ENCOUNTER — PATIENT MESSAGE (OUTPATIENT)
Age: 64
End: 2024-02-08

## 2024-02-08 DIAGNOSIS — H10.33 ACUTE BACTERIAL CONJUNCTIVITIS OF BOTH EYES: Primary | ICD-10-CM

## 2024-02-08 RX ORDER — POLYMYXIN B SULFATE AND TRIMETHOPRIM 1; 10000 MG/ML; [USP'U]/ML
1 SOLUTION OPHTHALMIC EVERY 4 HOURS
Qty: 3 ML | Refills: 0 | Status: SHIPPED | OUTPATIENT
Start: 2024-02-08 | End: 2024-02-18

## 2024-02-08 NOTE — TELEPHONE ENCOUNTER
From: Devang Piper Jr.  To: Dr. Rodrigo Holcomb  Sent: 2/8/2024 8:12 AM EST  Subject: redness in my eye    I have discharge in my eye overnite and it stay and keeps coming back is there a prescription I can get for pink/red eye can you send it [facility]

## 2024-02-12 ENCOUNTER — TELEPHONE (OUTPATIENT)
Age: 64
End: 2024-02-12

## 2024-02-12 RX ORDER — OMEPRAZOLE 40 MG/1
40 CAPSULE, DELAYED RELEASE ORAL DAILY
Qty: 90 CAPSULE | Refills: 3 | Status: SHIPPED | OUTPATIENT
Start: 2024-02-12

## 2024-02-12 RX ORDER — AMLODIPINE BESYLATE 10 MG/1
10 TABLET ORAL DAILY
Qty: 90 TABLET | Refills: 3 | Status: SHIPPED | OUTPATIENT
Start: 2024-02-12

## 2024-02-12 RX ORDER — ATORVASTATIN CALCIUM 20 MG/1
TABLET, FILM COATED ORAL
Qty: 90 TABLET | Refills: 3 | Status: SHIPPED | OUTPATIENT
Start: 2024-02-12

## 2024-02-12 NOTE — TELEPHONE ENCOUNTER
Received fax requesting cardiac clearance for neck exploration removal parathyroid adenoma.   Procedure date: 5/6/24. Will discuss with Dr. Buckley.

## 2024-02-14 NOTE — TELEPHONE ENCOUNTER
Verbal order and read back per Eliane Buckley, DO  Patient can proceed with procedure at low to moderate risk from a cardiac standpoint. Risk cannot be further optimized or adjusted.   Clearance letter faxed to [phone]

## 2024-02-16 NOTE — TELEPHONE ENCOUNTER
Heidy from Dr. Oh's office left voicemail stating that the box stating \"pt cleared\" was not checked.  Corrected this error and faxed again.

## 2024-02-23 ENCOUNTER — TELEPHONE (OUTPATIENT)
Age: 64
End: 2024-02-23

## 2024-02-23 NOTE — TELEPHONE ENCOUNTER
Mr. Piper called in today asking for a new prescription of Glucometer, Test Strips and Lancets because \"something is not working the way I want it to\" (those were his exact words). Please advise.      #: [phone]

## 2024-02-23 NOTE — TELEPHONE ENCOUNTER
Patient states he is not using a dexcom meter and is requesting glucometer, test strips and lancets to be sent to [facility].

## 2024-02-25 RX ORDER — BLOOD SUGAR DIAGNOSTIC
STRIP MISCELLANEOUS
Qty: 300 EACH | Refills: 3 | Status: SHIPPED | OUTPATIENT
Start: 2024-02-25 | End: 2024-02-26 | Stop reason: ALTCHOICE

## 2024-02-25 RX ORDER — BLOOD-GLUCOSE METER
EACH MISCELLANEOUS
Qty: 1 KIT | Refills: 0 | Status: SHIPPED | OUTPATIENT
Start: 2024-02-25

## 2024-02-26 RX ORDER — BLOOD-GLUCOSE METER
EACH MISCELLANEOUS
Qty: 1 EACH | Refills: 0 | Status: SHIPPED | OUTPATIENT
Start: 2024-02-26 | End: 2024-02-29 | Stop reason: SDUPTHER

## 2024-02-26 RX ORDER — BLOOD SUGAR DIAGNOSTIC
1 STRIP MISCELLANEOUS 3 TIMES DAILY
Qty: 300 EACH | Refills: 3 | Status: SHIPPED | OUTPATIENT
Start: 2024-02-26 | End: 2024-02-29 | Stop reason: SDUPTHER

## 2024-02-26 NOTE — TELEPHONE ENCOUNTER
Pt states that insurance will not cover the type of test strips and kit that was sent in.   Requesting verio flex test strips and lancets be sent to    [address]

## 2024-03-07 ENCOUNTER — OFFICE VISIT (OUTPATIENT)
Age: 64
End: 2024-03-07
Payer: MEDICARE

## 2024-03-07 VITALS
WEIGHT: 219 LBS | BODY MASS INDEX: 29.03 KG/M2 | OXYGEN SATURATION: 98 % | SYSTOLIC BLOOD PRESSURE: 122 MMHG | DIASTOLIC BLOOD PRESSURE: 64 MMHG | HEART RATE: 76 BPM | HEIGHT: 73 IN

## 2024-03-07 DIAGNOSIS — R06.02 SHORTNESS OF BREATH: Primary | ICD-10-CM

## 2024-03-07 DIAGNOSIS — R00.2 PALPITATIONS: ICD-10-CM

## 2024-03-07 PROCEDURE — 99214 OFFICE O/P EST MOD 30 MIN: CPT | Performed by: INTERNAL MEDICINE

## 2024-03-07 PROCEDURE — 93000 ELECTROCARDIOGRAM COMPLETE: CPT | Performed by: INTERNAL MEDICINE

## 2024-03-07 ASSESSMENT — PATIENT HEALTH QUESTIONNAIRE - PHQ9
SUM OF ALL RESPONSES TO PHQ QUESTIONS 1-9: 0
2. FEELING DOWN, DEPRESSED OR HOPELESS: 0
SUM OF ALL RESPONSES TO PHQ QUESTIONS 1-9: 0
SUM OF ALL RESPONSES TO PHQ QUESTIONS 1-9: 0
1. LITTLE INTEREST OR PLEASURE IN DOING THINGS: 0
SUM OF ALL RESPONSES TO PHQ9 QUESTIONS 1 & 2: 0
SUM OF ALL RESPONSES TO PHQ QUESTIONS 1-9: 0
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
## 2024-03-07 NOTE — PROGRESS NOTES
Devang Piper Jr. presents today for   Chief Complaint   Patient presents with    Follow-up     1 year f/u. Pt has no concerns     Cardiac Clearance     Neck exploration removal parathyroid adenoma Intra op PTH with Dr.Pierre Oh 5/6/24 at [facility]       Devang Piper Jr. preferred language for health care discussion is english/other.    Is someone accompanying this pt? NO    Is the patient using any DME equipment during OV? NO    Depression Screening:  Depression: Not at risk (3/7/2024)    PHQ-2     PHQ-2 Score: 0        Learning Assessment:  Who is the primary learner? Patient    What is the preferred language for health care of the primary learner? ENGLISH    How does the primary learner prefer to learn new concepts? DEMONSTRATION    Answered By PATIENT    Relationship to Learner SELF           Pt currently taking Anticoagulant therapy? NO    Pt currently taking Antiplatelet therapy ? NO      Coordination of Care:  1. Have you been to the ER, urgent care clinic since your last visit? Hospitalized since your last visit? NO    2. Have you seen or consulted any other health care providers outside of the [facility] since your last visit? Include any pap smears or colon screening. NO

## 2024-03-18 DIAGNOSIS — E78.00 HIGH CHOLESTEROL: ICD-10-CM

## 2024-03-18 DIAGNOSIS — E10.42 TYPE 1 DIABETES MELLITUS WITH DIABETIC POLYNEUROPATHY (HCC): ICD-10-CM

## 2024-03-18 DIAGNOSIS — I10 ESSENTIAL HYPERTENSION: ICD-10-CM

## 2024-03-19 LAB
A/G RATIO: 1.6 RATIO (ref 1.1–2.6)
ALBUMIN SERPL-MCNC: 4.1 G/DL (ref 3.5–5)
ALP BLD-CCNC: 108 U/L (ref 40–125)
ALT SERPL-CCNC: 16 U/L (ref 5–40)
ANION GAP SERPL CALCULATED.3IONS-SCNC: 11 MMOL/L (ref 3–15)
AST SERPL-CCNC: 14 U/L (ref 10–37)
BILIRUB SERPL-MCNC: 0.6 MG/DL (ref 0.2–1.2)
BUN BLDV-MCNC: 17 MG/DL (ref 6–22)
CALCIUM SERPL-MCNC: 10.9 MG/DL (ref 8.4–10.5)
CHLORIDE BLD-SCNC: 105 MMOL/L (ref 98–110)
CHOLESTEROL/HDL RATIO: 3 (ref 0–5)
CHOLESTEROL: 155 MG/DL (ref 110–200)
CO2: 25 MMOL/L (ref 20–32)
CREAT SERPL-MCNC: 1.3 MG/DL (ref 0.8–1.6)
ESTIMATED AVERAGE GLUCOSE: 190 MG/DL (ref 91–123)
GLOBULIN: 2.6 G/DL (ref 2–4)
GLOMERULAR FILTRATION RATE: 59.7 ML/MIN/1.73 SQ.M.
GLUCOSE: 96 MG/DL (ref 70–99)
HBA1C MFR BLD: 8.3 % (ref 4.8–5.6)
HDLC SERPL-MCNC: 52 MG/DL
LDL CHOLESTEROL CALCULATED: 88 MG/DL (ref 50–99)
LDL/HDL RATIO: 1.7
NON-HDL CHOLESTEROL: 103 MG/DL
POTASSIUM SERPL-SCNC: 4.2 MMOL/L (ref 3.5–5.5)
SODIUM BLD-SCNC: 141 MMOL/L (ref 133–145)
TOTAL PROTEIN: 6.7 G/DL (ref 6.2–8.1)
TRIGL SERPL-MCNC: 73 MG/DL (ref 40–149)
VLDLC SERPL CALC-MCNC: 15 MG/DL (ref 8–30)

## 2024-03-25 ENCOUNTER — OFFICE VISIT (OUTPATIENT)
Age: 64
End: 2024-03-25
Payer: MEDICARE

## 2024-03-25 VITALS
SYSTOLIC BLOOD PRESSURE: 107 MMHG | WEIGHT: 221 LBS | RESPIRATION RATE: 20 BRPM | DIASTOLIC BLOOD PRESSURE: 61 MMHG | HEIGHT: 73 IN | BODY MASS INDEX: 29.29 KG/M2 | OXYGEN SATURATION: 99 % | HEART RATE: 74 BPM | TEMPERATURE: 97 F

## 2024-03-25 DIAGNOSIS — E10.42 TYPE 1 DIABETES MELLITUS WITH DIABETIC POLYNEUROPATHY (HCC): ICD-10-CM

## 2024-03-25 DIAGNOSIS — I10 ESSENTIAL HYPERTENSION: ICD-10-CM

## 2024-03-25 DIAGNOSIS — E21.3 HYPERPARATHYROIDISM (HCC): ICD-10-CM

## 2024-03-25 DIAGNOSIS — Z12.11 COLON CANCER SCREENING: ICD-10-CM

## 2024-03-25 DIAGNOSIS — N18.31 STAGE 3A CHRONIC KIDNEY DISEASE (HCC): ICD-10-CM

## 2024-03-25 DIAGNOSIS — Z00.00 MEDICARE ANNUAL WELLNESS VISIT, SUBSEQUENT: Primary | ICD-10-CM

## 2024-03-25 DIAGNOSIS — E78.00 HIGH CHOLESTEROL: ICD-10-CM

## 2024-03-25 PROCEDURE — G0439 PPPS, SUBSEQ VISIT: HCPCS | Performed by: INTERNAL MEDICINE

## 2024-03-25 PROCEDURE — 3074F SYST BP LT 130 MM HG: CPT | Performed by: INTERNAL MEDICINE

## 2024-03-25 PROCEDURE — 3078F DIAST BP <80 MM HG: CPT | Performed by: INTERNAL MEDICINE

## 2024-03-25 PROCEDURE — 3052F HG A1C>EQUAL 8.0%<EQUAL 9.0%: CPT | Performed by: INTERNAL MEDICINE

## 2024-03-25 PROCEDURE — 99214 OFFICE O/P EST MOD 30 MIN: CPT | Performed by: INTERNAL MEDICINE

## 2024-03-25 ASSESSMENT — PATIENT HEALTH QUESTIONNAIRE - PHQ9
SUM OF ALL RESPONSES TO PHQ QUESTIONS 1-9: 0
1. LITTLE INTEREST OR PLEASURE IN DOING THINGS: NOT AT ALL
SUM OF ALL RESPONSES TO PHQ QUESTIONS 1-9: 0
SUM OF ALL RESPONSES TO PHQ9 QUESTIONS 1 & 2: 0

## 2024-03-25 ASSESSMENT — LIFESTYLE VARIABLES
HOW OFTEN DO YOU HAVE A DRINK CONTAINING ALCOHOL: MONTHLY OR LESS
HOW MANY STANDARD DRINKS CONTAINING ALCOHOL DO YOU HAVE ON A TYPICAL DAY: 1 OR 2

## 2024-03-25 NOTE — PATIENT INSTRUCTIONS
These can increase your chances of quitting for good. Quitting is one of the most important things you can do to protect your heart. It is never too late to quit. Try to avoid secondhand smoke too.     Stay at a weight that's healthy for you. Talk to your doctor if you need help losing weight.     Try to get 7 to 9 hours of sleep each night.     Limit alcohol to 2 drinks a day for men and 1 drink a day for women. Too much alcohol can cause health problems.     Manage other health problems such as diabetes, high blood pressure, and high cholesterol. If you think you may have a problem with alcohol or drug use, talk to your doctor.   Medicines    Take your medicines exactly as prescribed. Call your doctor if you think you are having a problem with your medicine.     If your doctor recommends aspirin, take the amount directed each day. Make sure you take aspirin and not another kind of pain reliever, such as acetaminophen (Tylenol).   When should you call for help?   Call 911 if you have symptoms of a heart attack. These may include:    Chest pain or pressure, or a strange feeling in the chest.     Sweating.     Shortness of breath.     Pain, pressure, or a strange feeling in the back, neck, jaw, or upper belly or in one or both shoulders or arms.     Lightheadedness or sudden weakness.     A fast or irregular heartbeat.   After you call 911, the  may tell you to chew 1 adult-strength or 2 to 4 low-dose aspirin. Wait for an ambulance. Do not try to drive yourself.  Watch closely for changes in your health, and be sure to contact your doctor if you have any problems.  Where can you learn more?  Go to https://www.[employer].net/patientEd and enter F075 to learn more about \"A Healthy Heart: Care Instructions.\"  Current as of: June 24, 2023               Content Version: 14.0  © 6690-5509 [employer]wise, Incorporated.   Care instructions adapted under license by [employer]. If you have questions about a medical

## 2024-03-25 NOTE — PROGRESS NOTES
Devang Piper Jr. presents today for   Chief Complaint   Patient presents with    Diabetes    Hypertension    Cholesterol Problem     3 month follow up     Medicare AWV           \"Have you been to the ER, urgent care clinic since your last visit?  Hospitalized since your last visit?\"    NO    “Have you seen or consulted any other health care providers outside of [facility] since your last visit?”    Yes, Yoshi Thompson, and Dr. Sheridan.            
Subjective:       Chief Complaint  The patient presents for follow up of diabetes, hypertension and high cholesterol.        HPI  Devang Piper Jr. is a 63 y.o. male seen for follow up of diabetes.   Healso has hypertension and hyperlipidemia. Diabetes Type 1 since 18 years old, no significant medication side effects noted, better controlled on insulin pump and is now on a continuous glucose monitor since he checks his blood sugars 6-8 times a day, he is followed by Endo (Dr Wooten) and they are adjusting his insulin.  Patient educated on trying to cut back on drinking his food and eating more protein and high fiber foods.  He drinks a lot of sodas which she knows is keeping his blood sugar is elevated.  Patient has microalbuminuria and  chronic kidney disease stage IIIa.  He is currently on Farxiga 10 mg.. Hypertension well controlled on Benicar 40 mg and Norvasc 10 mg and aldactone 25 mg  hyperlipidemia well controlled, no significant medication side effects noted, on Lipitor 20 mg    Diet and Lifestyle: generally follows a low fat low cholesterol diet, does not rigorously follow a diabetic diet, exercises sporadically    Home BP Monitoring: is controlled at home.    Diabetic Review of Systems - home glucose monitoring: is performed regularly, 6x-8x/day. He now has CGM.    Other symptoms and concerns: pt will try to exercise more. He has problems with his feet which limits him. Pt is candidate for diabetic shoes due to peripheral neuropathy and ulcerative callus. He is followed by Podiatry. Patient uses Neurontin as needed.  Patient has hypercalcemia further testing by ENT showed he has parathyroid adenoma for which he needs surgery.  Patient will follow-up with Dr. Oh (ENT) for definitive surgery once his A1c is better controlled.  He also has chronic nasal congestion and postnasal drip with cough and mucus production.  He will consider following up with ENT for evaluation of allergies.  He also has a 
MD Eugene   insulin aspart (NOVOLOG) 100 UNIT/ML injection vial INJECT 100 UNITS ONCE DAILY VIA INSULIN PUMP  Dx E10.42 Yes Rodrigo Holcomb MD   Omega-3 Fatty Acids (FISH OIL PO) Take by mouth daily Yes ProviderEugene MD   Multiple Vitamin (MULTIVITAMIN ADULT PO) Take 1 tablet by mouth daily Yes ProviderEugene MD   spironolactone (ALDACTONE) 25 MG tablet Take 1 tablet by mouth daily Yes Automatic Reconciliation, Ar   meloxicam (MOBIC) 15 MG tablet Take 1 tablet by mouth daily  Patient not taking: Reported on 3/25/2024  ProviderEugene MD CareTeam (Including outside providers/suppliers regularly involved in providing care):   Patient Care Team:  Rodrigo Holcomb MD as PCP - General  Rodrigo Holcomb MD as PCP - Empaneled Provider     Reviewed and updated this visit:  Tobacco  Allergies  Meds  Problems  Med Hx  Surg Hx  Soc Hx  Fam Hx

## 2024-04-18 ENCOUNTER — TELEPHONE (OUTPATIENT)
Age: 64
End: 2024-04-18

## 2024-04-18 DIAGNOSIS — K76.9 LIVER LESION: ICD-10-CM

## 2024-04-18 DIAGNOSIS — N28.9 RENAL LESION: Primary | ICD-10-CM

## 2024-04-18 NOTE — TELEPHONE ENCOUNTER
----- Message from Leatha Greene sent at 4/18/2024  3:51 PM EDT -----  Subject: Message to Provider    QUESTIONS  Information for Provider? PT called stating he went to [facility] ED   last week. Found cyst on kidney and liver. Would like to know what   specialist does he need to f/u with? Please f/u.  ---------------------------------------------------------------------------  --------------  CALL BACK INFO  [phone]; OK to leave message on voicemail  ---------------------------------------------------------------------------  --------------  SCRIPT ANSWERS  Relationship to Patient? Self

## 2024-05-08 ENCOUNTER — HOSPITAL ENCOUNTER (OUTPATIENT)
Facility: HOSPITAL | Age: 64
Discharge: HOME | End: 2024-05-11
Attending: INTERNAL MEDICINE
Payer: MEDICARE

## 2024-05-08 DIAGNOSIS — N28.9 RENAL LESION: ICD-10-CM

## 2024-05-08 DIAGNOSIS — K76.9 LIVER LESION: ICD-10-CM

## 2024-05-08 LAB — CREAT UR-MCNC: 1.5 MG/DL (ref 0.6–1.3)

## 2024-05-08 PROCEDURE — 74183 MRI ABD W/O CNTR FLWD CNTR: CPT

## 2024-05-08 PROCEDURE — 6360000004 HC RX CONTRAST MEDICATION: Performed by: INTERNAL MEDICINE

## 2024-05-08 PROCEDURE — A9577 INJ MULTIHANCE: HCPCS | Performed by: INTERNAL MEDICINE

## 2024-05-08 PROCEDURE — 82565 ASSAY OF CREATININE: CPT

## 2024-05-08 RX ADMIN — GADOBENATE DIMEGLUMINE 20 ML: 529 INJECTION, SOLUTION INTRAVENOUS at 08:59

## 2024-05-09 ENCOUNTER — TELEPHONE (OUTPATIENT)
Age: 64
End: 2024-05-09

## 2024-05-09 DIAGNOSIS — N28.89 RENAL MASS, RIGHT: Primary | ICD-10-CM

## 2024-05-09 NOTE — TELEPHONE ENCOUNTER
MRI shows 4 cm Right renal mass concerning for cancer. Will refer to Urology for further evaluation. Pt ok with Urology [facility] Dr Pascual or anyone in group

## 2024-05-12 DIAGNOSIS — E10.42 TYPE 1 DIABETES MELLITUS WITH DIABETIC POLYNEUROPATHY (HCC): ICD-10-CM

## 2024-05-13 RX ORDER — INSULIN ASPART 100 [IU]/ML
INJECTION, SOLUTION INTRAVENOUS; SUBCUTANEOUS
Qty: 100 ML | Refills: 3 | Status: SHIPPED | OUTPATIENT
Start: 2024-05-13

## 2024-07-02 ENCOUNTER — OFFICE VISIT (OUTPATIENT)
Facility: CLINIC | Age: 64
End: 2024-07-02
Payer: MEDICARE

## 2024-07-02 VITALS
WEIGHT: 213 LBS | OXYGEN SATURATION: 98 % | DIASTOLIC BLOOD PRESSURE: 58 MMHG | HEIGHT: 73 IN | SYSTOLIC BLOOD PRESSURE: 114 MMHG | BODY MASS INDEX: 28.23 KG/M2 | HEART RATE: 83 BPM | TEMPERATURE: 98.2 F | RESPIRATION RATE: 18 BRPM

## 2024-07-02 DIAGNOSIS — I10 ESSENTIAL HYPERTENSION: ICD-10-CM

## 2024-07-02 DIAGNOSIS — H11.31 SUBCONJUNCTIVAL HEMATOMA, RIGHT: ICD-10-CM

## 2024-07-02 DIAGNOSIS — N28.89 RENAL MASS, RIGHT: ICD-10-CM

## 2024-07-02 DIAGNOSIS — E10.42 TYPE 1 DIABETES MELLITUS WITH DIABETIC POLYNEUROPATHY (HCC): Primary | ICD-10-CM

## 2024-07-02 DIAGNOSIS — E21.3 HYPERPARATHYROIDISM (HCC): ICD-10-CM

## 2024-07-02 DIAGNOSIS — N18.2 STAGE 2 CHRONIC KIDNEY DISEASE: ICD-10-CM

## 2024-07-02 DIAGNOSIS — R05.1 ACUTE COUGH: ICD-10-CM

## 2024-07-02 DIAGNOSIS — E78.00 HIGH CHOLESTEROL: ICD-10-CM

## 2024-07-02 PROCEDURE — 3051F HG A1C>EQUAL 7.0%<8.0%: CPT | Performed by: INTERNAL MEDICINE

## 2024-07-02 PROCEDURE — 3074F SYST BP LT 130 MM HG: CPT | Performed by: INTERNAL MEDICINE

## 2024-07-02 PROCEDURE — 3078F DIAST BP <80 MM HG: CPT | Performed by: INTERNAL MEDICINE

## 2024-07-02 PROCEDURE — 99214 OFFICE O/P EST MOD 30 MIN: CPT | Performed by: INTERNAL MEDICINE

## 2024-07-02 RX ORDER — CODEINE PHOSPHATE/GUAIFENESIN 10-100MG/5
5 LIQUID (ML) ORAL 4 TIMES DAILY PRN
Qty: 120 ML | Refills: 0 | Status: SHIPPED | OUTPATIENT
Start: 2024-07-02 | End: 2024-07-09

## 2024-07-02 SDOH — ECONOMIC STABILITY: FOOD INSECURITY: WITHIN THE PAST 12 MONTHS, THE FOOD YOU BOUGHT JUST DIDN'T LAST AND YOU DIDN'T HAVE MONEY TO GET MORE.: PATIENT DECLINED

## 2024-07-02 SDOH — ECONOMIC STABILITY: INCOME INSECURITY: HOW HARD IS IT FOR YOU TO PAY FOR THE VERY BASICS LIKE FOOD, HOUSING, MEDICAL CARE, AND HEATING?: PATIENT DECLINED

## 2024-07-02 SDOH — ECONOMIC STABILITY: HOUSING INSECURITY
IN THE LAST 12 MONTHS, WAS THERE A TIME WHEN YOU DID NOT HAVE A STEADY PLACE TO SLEEP OR SLEPT IN A SHELTER (INCLUDING NOW)?: PATIENT DECLINED

## 2024-07-02 SDOH — ECONOMIC STABILITY: FOOD INSECURITY: WITHIN THE PAST 12 MONTHS, YOU WORRIED THAT YOUR FOOD WOULD RUN OUT BEFORE YOU GOT MONEY TO BUY MORE.: PATIENT DECLINED

## 2024-07-02 NOTE — PROGRESS NOTES
Devang Piper Jr. presents today for   Chief Complaint   Patient presents with    Cholesterol Problem    Diabetes    Hypertension     3 month follow up            \"Have you been to the ER, urgent care clinic since your last visit?  Hospitalized since your last visit?\"    NO    “Have you seen or consulted any other health care providers outside of [facility] since your last visit?”    Yes, Dr. Pascual, Dr. Oh, and Dr. Sheridan.    “Have you had a colorectal cancer screening such as a colonoscopy/FIT/Cologuard?    NO-- Dr. Hudson's office to call and schedule for September 2024.    Date of last Colonoscopy: 6/26/2019  No cologuard on file  No FIT/FOBT on file   No flexible sigmoidoscopy on file            
this visit.                 Review of Systems  Respiratory: negative for dyspnea on exertion  Cardiovascular: negative for chest pain    Objective:     Visit Vitals  BP (!) 114/58 (Site: Left Upper Arm, Position: Sitting, Cuff Size: Small Adult)   Pulse 83   Temp 98.2 °F (36.8 °C) (Oral)   Resp 18   Ht 1.854 m (6' 1\")   Wt 96.6 kg (213 lb)   SpO2 98%   BMI 28.10 kg/m²         General appearance - alert, well appearing, and in no distress  Chest - clear to auscultation, no wheezes, rales or rhonchi, symmetric air entry  Heart - normal rate, regular rhythm, normal S1, S2, no murmurs, rubs, clicks or gallops  HEENT- pt has injection right nasal  conjunctiva.     CMP:    Lab Results   Component Value Date/Time     06/25/2024 01:32 PM    K 4.7 06/25/2024 01:32 PM     06/25/2024 01:32 PM    CO2 28 06/25/2024 01:32 PM    BUN 18 06/25/2024 01:32 PM    CREATININE 1.2 06/25/2024 01:32 PM    GFRAA >60.0 01/07/2022 10:38 AM    AGRATIO 1.7 06/25/2024 01:32 PM    LABGLOM >60.0 06/25/2024 01:32 PM    LABGLOM 59.7 03/18/2024 01:30 PM    LABGLOM 51.1 01/20/2023 01:15 PM    GLUCOSE 118 06/25/2024 01:32 PM    CALCIUM 10.7 06/25/2024 01:32 PM    BILITOT 0.5 06/25/2024 01:32 PM    ALKPHOS 97 06/25/2024 01:32 PM    AST 15 06/25/2024 01:32 PM    ALT 15 06/25/2024 01:32 PM     HgBA1c:    Lab Results   Component Value Date/Time    LABA1C 7.6 06/25/2024 01:32 PM     FLP:    Lab Results   Component Value Date/Time    TRIG 68 06/25/2024 01:32 PM    HDL 42 06/25/2024 01:32 PM        PSA:   Lab Results   Component Value Date/Time    PSA 0.675 05/30/2023 02:43 PM           Assessment / Plan     Diabetes much better controlled  pt remains on insulin pump and will follow up with Endocrine for adjustment of insulin.  He will continue to work on cutting back starches and sugar in his diet,   Patient is on Farxiga for his chronic kidney disease.    Hypertension well controlled on Benicar 40 mg daily and  aldactone 12.5 mg.  Hyperlipidemia

## 2024-07-03 LAB
A/G RATIO: 1.5 RATIO (ref 1.1–2.6)
ALBUMIN: 3.7 G/DL (ref 3.5–5)
ALP BLD-CCNC: 107 U/L (ref 40–125)
ALT SERPL-CCNC: 19 U/L (ref 5–40)
ANION GAP SERPL CALCULATED.3IONS-SCNC: 11 MMOL/L (ref 3–15)
AST SERPL-CCNC: 22 U/L (ref 10–37)
BASOPHILS ABSOLUTE: 0 K/UL (ref 0–0.2)
BASOPHILS RELATIVE PERCENT: 0 % (ref 0–2)
BILIRUB SERPL-MCNC: 0.6 MG/DL (ref 0.2–1.2)
BUN BLDV-MCNC: 20 MG/DL (ref 6–22)
CALCIUM SERPL-MCNC: 10 MG/DL (ref 8.4–10.5)
CHLORIDE BLD-SCNC: 100 MMOL/L (ref 98–110)
CHOLESTEROL, TOTAL: 116 MG/DL (ref 110–200)
CHOLESTEROL/HDL RATIO: 3 (ref 0–5)
CO2: 23 MMOL/L (ref 20–32)
CREAT SERPL-MCNC: 1.8 MG/DL (ref 0.8–1.6)
EOSINOPHIL # BLD: 0 % (ref 0–6)
EOSINOPHILS ABSOLUTE: 0 K/UL (ref 0–0.5)
ESTIMATED AVERAGE GLUCOSE: 168 MG/DL (ref 91–123)
GFR, ESTIMATED: 41.2 ML/MIN/1.73 SQ.M.
GLOBULIN: 2.4 G/DL (ref 2–4)
GLUCOSE: 360 MG/DL (ref 70–99)
HBA1C MFR BLD: 7.5 % (ref 4.8–5.6)
HCT VFR BLD CALC: 42.7 % (ref 39.3–51.6)
HDLC SERPL-MCNC: 39 MG/DL
HEMOGLOBIN: 13.9 G/DL (ref 13.1–17.2)
LDL CHOLESTEROL: 61 MG/DL (ref 50–99)
LDL/HDL RATIO: 1.6
LYMPHOCYTES # BLD: 19 % (ref 20–45)
LYMPHOCYTES ABSOLUTE: 0.7 K/UL (ref 1–4.8)
MCH RBC QN AUTO: 31 PG (ref 26–34)
MCHC RBC AUTO-ENTMCNC: 33 G/DL (ref 31–36)
MCV RBC AUTO: 94 FL (ref 80–95)
MONOCYTES ABSOLUTE: 0.6 K/UL (ref 0.1–1)
MONOCYTES: 17 % (ref 3–12)
NEUTROPHILS ABSOLUTE: 2.2 K/UL (ref 1.8–7.7)
NEUTROPHILS: 64 % (ref 40–75)
NON-HDL CHOLESTEROL: 77 MG/DL
PDW BLD-RTO: 13.8 % (ref 10–15.5)
PLATELET # BLD: 171 K/UL (ref 140–440)
PMV BLD AUTO: 10.9 FL (ref 9–13)
POTASSIUM SERPL-SCNC: 4.7 MMOL/L (ref 3.5–5.5)
RBC # BLD: 4.53 M/UL (ref 3.8–5.8)
SODIUM BLD-SCNC: 134 MMOL/L (ref 133–145)
TOTAL PROTEIN: 6.1 G/DL (ref 6.2–8.1)
TRIGL SERPL-MCNC: 76 MG/DL (ref 40–149)
VLDLC SERPL CALC-MCNC: 15 MG/DL (ref 8–30)
WBC # BLD: 3.5 K/UL (ref 4–11)

## 2024-08-02 ENCOUNTER — OFFICE VISIT (OUTPATIENT)
Facility: CLINIC | Age: 64
End: 2024-08-02
Payer: MEDICARE

## 2024-08-02 VITALS
HEIGHT: 73 IN | WEIGHT: 207 LBS | SYSTOLIC BLOOD PRESSURE: 126 MMHG | TEMPERATURE: 97.8 F | HEART RATE: 73 BPM | OXYGEN SATURATION: 95 % | BODY MASS INDEX: 27.43 KG/M2 | RESPIRATION RATE: 20 BRPM | DIASTOLIC BLOOD PRESSURE: 63 MMHG

## 2024-08-02 DIAGNOSIS — E10.42 TYPE 1 DIABETES MELLITUS WITH DIABETIC POLYNEUROPATHY (HCC): Primary | ICD-10-CM

## 2024-08-02 DIAGNOSIS — E21.3 HYPERPARATHYROIDISM (HCC): ICD-10-CM

## 2024-08-02 DIAGNOSIS — I10 ESSENTIAL HYPERTENSION: ICD-10-CM

## 2024-08-02 DIAGNOSIS — C64.9 PAPILLARY RENAL CELL CARCINOMA (HCC): ICD-10-CM

## 2024-08-02 DIAGNOSIS — Z09 HOSPITAL DISCHARGE FOLLOW-UP: ICD-10-CM

## 2024-08-02 DIAGNOSIS — E78.00 HIGH CHOLESTEROL: ICD-10-CM

## 2024-08-02 PROCEDURE — 99214 OFFICE O/P EST MOD 30 MIN: CPT | Performed by: INTERNAL MEDICINE

## 2024-08-02 PROCEDURE — 3051F HG A1C>EQUAL 7.0%<8.0%: CPT | Performed by: INTERNAL MEDICINE

## 2024-08-02 PROCEDURE — 3078F DIAST BP <80 MM HG: CPT | Performed by: INTERNAL MEDICINE

## 2024-08-02 PROCEDURE — 1111F DSCHRG MED/CURRENT MED MERGE: CPT | Performed by: INTERNAL MEDICINE

## 2024-08-02 PROCEDURE — 3074F SYST BP LT 130 MM HG: CPT | Performed by: INTERNAL MEDICINE

## 2024-08-02 RX ORDER — CINACALCET 30 MG/1
30 TABLET, FILM COATED ORAL DAILY
COMMUNITY
Start: 2024-08-01

## 2024-08-02 RX ORDER — AMLODIPINE BESYLATE 10 MG/1
10 TABLET ORAL DAILY
COMMUNITY
Start: 2024-07-23

## 2024-08-02 NOTE — PROGRESS NOTES
Post-Discharge Transitional Care Management Progress Note      Devang Piper Jr.   YOB: 1960    Date of Office Visit:  8/2/2024  Date of Hospital Admission: 7/24/24  Date of Hospital Discharge: 7/25/24    Care management risk score Rising risk (score 2-5) and Complex Care (Scores >=6): No Risk Score On File     Non face to face  following discharge, date last encounter closed (first attempt may have been earlier): *No documented post hospital discharge outreach found in the last 14 days *No documented post hospital discharge outreach found in the last 14 days    Call initiated 2 business days of discharge: *No response recorded in the last 14 days    ASSESSMENT/PLAN:     ICD-10-CM    1. Type 1 diabetes mellitus with diabetic polyneuropathy (HCC)  E10.42 Not optimally controlled on insulin pump.  Patient to follow-up with endocrine for further management      2. Essential hypertension  I10 Well-controlled on multiple blood pressure medicines      3. High cholesterol  E78.00 Well-controlled Lipitor 20 mg daily.  Will confirm with next office visit      4. Hyperparathyroidism (HCC)  E21.3 Patient to follow-up with ENT for definitive parathyroidectomy in the meantime he is being treated by endocrine with Sensipar      5. Papillary renal cell carcinoma (HCC)  C64.9 Patient to follow-up with urology for further management and surveillance.      6. Hospital discharge follow-up  Z09 TN DISCHARGE MEDS RECONCILED W/ CURRENT OUTPATIENT MED LIST           Medical Decision Making: moderate complexity  Return if symptoms worsen or fail to improve.         Subjective:   HPI:  Follow up of Hospital problems/diagnosis(es): Papillary renal cell carcinoma, hyperparathyroidism, type 1 diabetes, hypertension, hypercholesterolemia    Inpatient course: Discharge summary reviewed- see chart.    Interval history/Current status: Patient underwent partial nephrectomy with  for right renal mass which was found to be

## 2024-08-02 NOTE — PROGRESS NOTES
Devang Piper Jr. presents today for   Chief Complaint   Patient presents with    Follow-up     [facility]            \"Have you been to the ER, urgent care clinic since your last visit?  Hospitalized since your last visit?\"    Yes, [facility]     “Have you seen or consulted any other health care providers outside of [facility] since your last visit?”    Yes, Dr. Pascual urology amd Dr. Sheridan Endocrine.     “Have you had a colorectal cancer screening such as a colonoscopy/FIT/Cologuard?    NO per Patient Dr. Hudson's office will call in Sept. 2024 to schedule an appointment.     Date of last Colonoscopy: 6/26/2019  No cologuard on file  No FIT/FOBT on file   No flexible sigmoidoscopy on file

## 2024-08-11 PROBLEM — C64.9 PAPILLARY RENAL CELL CARCINOMA (HCC): Status: ACTIVE | Noted: 2024-08-11

## 2024-08-11 PROBLEM — E78.00 HIGH CHOLESTEROL: Status: ACTIVE | Noted: 2024-08-11

## 2024-08-11 PROBLEM — I10 ESSENTIAL HYPERTENSION: Status: ACTIVE | Noted: 2024-08-11

## 2024-09-27 ENCOUNTER — TELEPHONE (OUTPATIENT)
Facility: CLINIC | Age: 64
End: 2024-09-27

## 2024-09-27 NOTE — TELEPHONE ENCOUNTER
Patient is coming in for lab work 10/2/2024.  Please advise what labs are needed.      Patient had recent labs done 7/20204.

## 2024-09-29 DIAGNOSIS — Z12.5 PROSTATE CANCER SCREENING: ICD-10-CM

## 2024-09-29 DIAGNOSIS — E78.00 HIGH CHOLESTEROL: ICD-10-CM

## 2024-09-29 DIAGNOSIS — I10 ESSENTIAL HYPERTENSION: ICD-10-CM

## 2024-09-29 DIAGNOSIS — E10.42 TYPE 1 DIABETES MELLITUS WITH DIABETIC POLYNEUROPATHY (HCC): Primary | ICD-10-CM

## 2024-10-01 DIAGNOSIS — E78.00 HIGH CHOLESTEROL: ICD-10-CM

## 2024-10-01 DIAGNOSIS — E10.42 TYPE 1 DIABETES MELLITUS WITH DIABETIC POLYNEUROPATHY (HCC): ICD-10-CM

## 2024-10-01 DIAGNOSIS — I10 ESSENTIAL HYPERTENSION: ICD-10-CM

## 2024-10-07 ENCOUNTER — HOSPITAL ENCOUNTER (OUTPATIENT)
Facility: HOSPITAL | Age: 64
Setting detail: SPECIMEN
Discharge: HOME | End: 2024-10-10

## 2024-10-07 LAB — SENTARA SPECIMEN COLLECTION: NORMAL

## 2024-10-07 PROCEDURE — 99001 SPECIMEN HANDLING PT-LAB: CPT

## 2024-10-08 LAB
A/G RATIO: 1.6 RATIO (ref 1.1–2.6)
ALBUMIN: 3.9 G/DL (ref 3.5–5)
ALP BLD-CCNC: 105 U/L (ref 40–125)
ALT SERPL-CCNC: 13 U/L (ref 5–40)
ANION GAP SERPL CALCULATED.3IONS-SCNC: 9 MMOL/L (ref 3–15)
AST SERPL-CCNC: 11 U/L (ref 10–37)
BILIRUB SERPL-MCNC: 0.4 MG/DL (ref 0.2–1.2)
BUN BLDV-MCNC: 23 MG/DL (ref 6–22)
CALCIUM SERPL-MCNC: 9.6 MG/DL (ref 8.4–10.5)
CHLORIDE BLD-SCNC: 102 MMOL/L (ref 98–110)
CHOLESTEROL, TOTAL: 118 MG/DL (ref 110–200)
CHOLESTEROL/HDL RATIO: 2.6 (ref 0–5)
CO2: 26 MMOL/L (ref 20–32)
CREAT SERPL-MCNC: 1.9 MG/DL (ref 0.8–1.6)
ESTIMATED AVERAGE GLUCOSE: 170 MG/DL (ref 91–123)
GFR, ESTIMATED: 38.4 ML/MIN/1.73 SQ.M.
GLOBULIN: 2.4 G/DL (ref 2–4)
GLUCOSE: 102 MG/DL (ref 70–99)
HBA1C MFR BLD: 7.6 % (ref 4.8–5.6)
HDLC SERPL-MCNC: 46 MG/DL
LDL CHOLESTEROL: 63 MG/DL (ref 50–99)
LDL/HDL RATIO: 1.4
NON-HDL CHOLESTEROL: 72 MG/DL
POTASSIUM SERPL-SCNC: 5 MMOL/L (ref 3.5–5.5)
PROSTATE SPECIFIC ANTIGEN: 1.29 NG/ML
SODIUM BLD-SCNC: 137 MMOL/L (ref 133–145)
TOTAL PROTEIN: 6.3 G/DL (ref 6.2–8.1)
TRIGL SERPL-MCNC: 41 MG/DL (ref 40–149)
VLDLC SERPL CALC-MCNC: 8 MG/DL (ref 8–30)

## 2024-10-10 ENCOUNTER — OFFICE VISIT (OUTPATIENT)
Facility: CLINIC | Age: 64
End: 2024-10-10

## 2024-10-10 VITALS
RESPIRATION RATE: 20 BRPM | HEIGHT: 73 IN | SYSTOLIC BLOOD PRESSURE: 111 MMHG | HEART RATE: 74 BPM | OXYGEN SATURATION: 98 % | BODY MASS INDEX: 27.96 KG/M2 | TEMPERATURE: 98.1 F | WEIGHT: 211 LBS | DIASTOLIC BLOOD PRESSURE: 51 MMHG

## 2024-10-10 DIAGNOSIS — I10 ESSENTIAL HYPERTENSION: ICD-10-CM

## 2024-10-10 DIAGNOSIS — E10.42 TYPE 1 DIABETES MELLITUS WITH DIABETIC POLYNEUROPATHY (HCC): Primary | ICD-10-CM

## 2024-10-10 DIAGNOSIS — E78.00 HIGH CHOLESTEROL: ICD-10-CM

## 2024-10-10 DIAGNOSIS — N18.31 STAGE 3A CHRONIC KIDNEY DISEASE (HCC): ICD-10-CM

## 2024-10-10 NOTE — PROGRESS NOTES
Devang Piper Jr. presents today for   Chief Complaint   Patient presents with    Diabetes    Hypertension    Cholesterol Problem    Thyroid Problem     3 month follow up     Immunizations     Flu vaccine            \"Have you been to the ER, urgent care clinic since your last visit?  Hospitalized since your last visit?\"    NO    “Have you seen or consulted any other health care providers outside of [facility] since your last visit?”    Yes, Dr. Wooten Endocrine    “Have you had a colorectal cancer screening such as a colonoscopy/FIT/Cologuard?    NO- will Schedule in January 2025 with Dr. Hudson.     Date of last Colonoscopy: 6/26/2019  No cologuard on file  No FIT/FOBT on file   No flexible sigmoidoscopy on file        Verbal order read back per Dr. Holcomb flu vaccine.  Patient received flue vaccine in right deltoid.  Patient was tolerated and no signs or symptoms of an allergic reaction noted at this time. Patient tolerated well and left without complaints.  Patient received flu VIS.

## 2024-10-10 NOTE — PROGRESS NOTES
Subjective:       Chief Complaint  The patient presents for follow up of diabetes, hypertension and high cholesterol.        HPI  Devang Piper Jr. is a 64 y.o. male seen for follow up of diabetes.   Healso has hypertension and hyperlipidemia. Diabetes Type 1 since 18 years old, no significant medication side effects noted, better controlled on insulin pump and is now on a continuous glucose monitor since he checks his blood sugars 6-8 times a day, he is followed by Endo (Dr Wooten) and they are adjusting his insulin.  Patient educated on trying to cut back on drinking his calories and eating more protein and high fiber foods.    Patient has microalbuminuria and  chronic kidney disease stage IIIb.  He is currently on Farxiga 10 mg..  He is encouraged to increase his hydration and we will go ahead and refer him to nephrology for further management.  Especially in light of him having partial nephrectomy for renal cell CA.  Hypertension well controlled on Benicar 40 mg and  Norvasc 10 mg.    hyperlipidemia well controlled, no significant medication side effects noted, on Lipitor 20 mg    Diet and Lifestyle: generally follows a low fat low cholesterol diet, does not rigorously follow a diabetic diet, exercises sporadically    Home BP Monitoring: is controlled at home.    Diabetic Review of Systems - home glucose monitoring: is performed regularly, 6x-8x/day. He now has CGM.    Other symptoms and concerns: pt will try to exercise more. He has problems with his feet which limits him. Pt is candidate for diabetic shoes due to peripheral neuropathy and ulcerative callus. He is followed by Podiatry. Patient uses Neurontin as needed.  Patient has hypercalcemia and has parathyroid adenoma for which he needs surgery.  He is currently being managed on Sensipar 30 mg with endocrine.      Patient recently underwent a right partial nephrectomy for papillary renal cell carcinoma and is being managed by Dr Pascual.  He is having

## 2024-11-15 DIAGNOSIS — N18.31 STAGE 3A CHRONIC KIDNEY DISEASE (HCC): ICD-10-CM

## 2024-11-15 RX ORDER — DAPAGLIFLOZIN 10 MG/1
10 TABLET, FILM COATED ORAL EVERY MORNING
Qty: 120 TABLET | Refills: 3 | Status: SHIPPED | OUTPATIENT
Start: 2024-11-15

## 2025-01-06 ENCOUNTER — HOSPITAL ENCOUNTER (OUTPATIENT)
Facility: HOSPITAL | Age: 65
Setting detail: SPECIMEN
Discharge: HOME | End: 2025-01-09

## 2025-01-06 PROCEDURE — 99001 SPECIMEN HANDLING PT-LAB: CPT

## 2025-01-07 LAB
A/G RATIO: 1.6 RATIO (ref 1.1–2.6)
ALBUMIN: 3.9 G/DL (ref 3.5–5)
ALP BLD-CCNC: 126 U/L (ref 40–125)
ALT SERPL-CCNC: 13 U/L (ref 5–40)
ANION GAP SERPL CALCULATED.3IONS-SCNC: 12 MMOL/L (ref 3–15)
AST SERPL-CCNC: 14 U/L (ref 10–37)
BILIRUB SERPL-MCNC: 0.5 MG/DL (ref 0.2–1.2)
BUN BLDV-MCNC: 24 MG/DL (ref 6–22)
CALCIUM SERPL-MCNC: 10.1 MG/DL (ref 8.4–10.5)
CHLORIDE BLD-SCNC: 105 MMOL/L (ref 98–110)
CHOLESTEROL, TOTAL: 121 MG/DL (ref 110–200)
CHOLESTEROL/HDL RATIO: 3 (ref 0–5)
CO2: 26 MMOL/L (ref 20–32)
CREAT SERPL-MCNC: 1.8 MG/DL (ref 0.8–1.6)
CREATININE URINE: 123 MG/DL
ESTIMATED AVERAGE GLUCOSE: 181 MG/DL (ref 91–123)
GFR, ESTIMATED: 40.9 ML/MIN/1.73 SQ.M.
GLOBULIN: 2.5 G/DL (ref 2–4)
GLUCOSE: 111 MG/DL (ref 70–99)
HBA1C MFR BLD: 7.9 % (ref 4.8–5.6)
HDLC SERPL-MCNC: 41 MG/DL
LDL CHOLESTEROL: 65 MG/DL (ref 50–99)
LDL/HDL RATIO: 1.6
MICROALB/CREAT RATIO (UG/MG CREAT.): NORMAL
MICROALBUMIN/CREAT 24H UR: <12 MG/L (ref 0.1–17)
NON-HDL CHOLESTEROL: 80 MG/DL
POTASSIUM SERPL-SCNC: 4.8 MMOL/L (ref 3.5–5.5)
SENTARA SPECIMEN COLLECTION: NORMAL
SODIUM BLD-SCNC: 143 MMOL/L (ref 133–145)
TOTAL PROTEIN: 6.4 G/DL (ref 6.2–8.1)
TRIGL SERPL-MCNC: 74 MG/DL (ref 40–149)
VLDLC SERPL CALC-MCNC: 15 MG/DL (ref 8–30)

## 2025-01-10 DIAGNOSIS — N18.31 STAGE 3A CHRONIC KIDNEY DISEASE (HCC): ICD-10-CM

## 2025-01-10 DIAGNOSIS — I10 ESSENTIAL HYPERTENSION: ICD-10-CM

## 2025-01-10 DIAGNOSIS — E10.42 TYPE 1 DIABETES MELLITUS WITH DIABETIC POLYNEUROPATHY (HCC): ICD-10-CM

## 2025-01-10 DIAGNOSIS — E78.00 HIGH CHOLESTEROL: ICD-10-CM

## 2025-01-14 ENCOUNTER — OFFICE VISIT (OUTPATIENT)
Facility: CLINIC | Age: 65
End: 2025-01-14

## 2025-01-14 VITALS
HEIGHT: 73 IN | TEMPERATURE: 98.8 F | HEART RATE: 69 BPM | OXYGEN SATURATION: 100 % | DIASTOLIC BLOOD PRESSURE: 62 MMHG | RESPIRATION RATE: 20 BRPM | SYSTOLIC BLOOD PRESSURE: 121 MMHG | BODY MASS INDEX: 25.45 KG/M2 | WEIGHT: 192 LBS

## 2025-01-14 DIAGNOSIS — E10.42 TYPE 1 DIABETES MELLITUS WITH DIABETIC POLYNEUROPATHY (HCC): ICD-10-CM

## 2025-01-14 DIAGNOSIS — N18.32 STAGE 3B CHRONIC KIDNEY DISEASE (HCC): ICD-10-CM

## 2025-01-14 DIAGNOSIS — R97.20 RISING PSA LEVEL: ICD-10-CM

## 2025-01-14 DIAGNOSIS — I10 ESSENTIAL HYPERTENSION: ICD-10-CM

## 2025-01-14 DIAGNOSIS — Z00.00 MEDICARE ANNUAL WELLNESS VISIT, SUBSEQUENT: Primary | ICD-10-CM

## 2025-01-14 DIAGNOSIS — E78.00 HIGH CHOLESTEROL: ICD-10-CM

## 2025-01-14 RX ORDER — VALACYCLOVIR HYDROCHLORIDE 1 G/1
1000 TABLET, FILM COATED ORAL DAILY
Qty: 30 TABLET | Refills: 1 | Status: SHIPPED | OUTPATIENT
Start: 2025-01-14

## 2025-01-14 SDOH — HEALTH STABILITY: PHYSICAL HEALTH: ON AVERAGE, HOW MANY DAYS PER WEEK DO YOU ENGAGE IN MODERATE TO STRENUOUS EXERCISE (LIKE A BRISK WALK)?: 5 DAYS

## 2025-01-14 SDOH — ECONOMIC STABILITY: FOOD INSECURITY: WITHIN THE PAST 12 MONTHS, YOU WORRIED THAT YOUR FOOD WOULD RUN OUT BEFORE YOU GOT MONEY TO BUY MORE.: NEVER TRUE

## 2025-01-14 SDOH — ECONOMIC STABILITY: TRANSPORTATION INSECURITY
IN THE PAST 12 MONTHS, HAS LACK OF TRANSPORTATION KEPT YOU FROM MEETINGS, WORK, OR FROM GETTING THINGS NEEDED FOR DAILY LIVING?: NO

## 2025-01-14 SDOH — ECONOMIC STABILITY: FOOD INSECURITY: WITHIN THE PAST 12 MONTHS, THE FOOD YOU BOUGHT JUST DIDN'T LAST AND YOU DIDN'T HAVE MONEY TO GET MORE.: NEVER TRUE

## 2025-01-14 SDOH — ECONOMIC STABILITY: INCOME INSECURITY: IN THE LAST 12 MONTHS, WAS THERE A TIME WHEN YOU WERE NOT ABLE TO PAY THE MORTGAGE OR RENT ON TIME?: NO

## 2025-01-14 SDOH — HEALTH STABILITY: PHYSICAL HEALTH: ON AVERAGE, HOW MANY MINUTES DO YOU ENGAGE IN EXERCISE AT THIS LEVEL?: 30 MIN

## 2025-01-14 SDOH — ECONOMIC STABILITY: TRANSPORTATION INSECURITY
IN THE PAST 12 MONTHS, HAS THE LACK OF TRANSPORTATION KEPT YOU FROM MEDICAL APPOINTMENTS OR FROM GETTING MEDICATIONS?: NO

## 2025-01-14 ASSESSMENT — LIFESTYLE VARIABLES
HOW OFTEN DO YOU HAVE A DRINK CONTAINING ALCOHOL: 2
HOW MANY STANDARD DRINKS CONTAINING ALCOHOL DO YOU HAVE ON A TYPICAL DAY: 1 OR 2
HOW OFTEN DO YOU HAVE A DRINK CONTAINING ALCOHOL: MONTHLY OR LESS
HOW OFTEN DO YOU HAVE SIX OR MORE DRINKS ON ONE OCCASION: 1
HOW MANY STANDARD DRINKS CONTAINING ALCOHOL DO YOU HAVE ON A TYPICAL DAY: 1

## 2025-01-14 ASSESSMENT — PATIENT HEALTH QUESTIONNAIRE - PHQ9
SUM OF ALL RESPONSES TO PHQ QUESTIONS 1-9: 0
1. LITTLE INTEREST OR PLEASURE IN DOING THINGS: NOT AT ALL
SUM OF ALL RESPONSES TO PHQ9 QUESTIONS 1 & 2: 0
SUM OF ALL RESPONSES TO PHQ QUESTIONS 1-9: 0
SUM OF ALL RESPONSES TO PHQ QUESTIONS 1-9: 0
2. FEELING DOWN, DEPRESSED OR HOPELESS: NOT AT ALL
SUM OF ALL RESPONSES TO PHQ QUESTIONS 1-9: 0

## 2025-01-14 NOTE — PROGRESS NOTES
Subjective:       Chief Complaint  The patient presents for follow up of diabetes, hypertension and high cholesterol.        HPI  Devang Piper Jr. is a 64 y.o. male seen for follow up of diabetes.   Healso has hypertension and hyperlipidemia. Diabetes Type 1 since 18 years old, no significant medication side effects noted, better controlled on insulin pump and is now on a continuous glucose monitor since he checks his blood sugars 6-8 times a day, he is followed by Endo (Dr Wooten) and they are adjusting his insulin.  Patient educated on trying to cut back on drinking his calories and eating more protein and high fiber foods especially at night to decrease risk of hypoglycemia. .    Patient has microalbuminuria and  chronic kidney disease stage IIIb.  He is currently on Farxiga 10 mg..  He  was referred to Renal for further evaluation and has upcoming appt..    Hypertension well controlled on Benicar 40 mg and  Norvasc 10 mg.    hyperlipidemia well controlled, no significant medication side effects noted, on Lipitor 20 mg    Diet and Lifestyle: generally follows a low fat low cholesterol diet, does not rigorously follow a diabetic diet, exercises sporadically    Home BP Monitoring: is controlled at home.    Diabetic Review of Systems - home glucose monitoring: is performed regularly, 6x-8x/day. He now has CGM.    Other symptoms and concerns: pt will try to exercise more. He has problems with his feet which limits him. Pt is candidate for diabetic shoes due to peripheral neuropathy and ulcerative callus. He is followed by Podiatry(Dr Astudillo). Patient uses Neurontin as needed.  Patient has hypercalcemia and has parathyroid adenoma for which he needs surgery.  He is currently being managed on Sensipar 30 mg with endocrine.      Patient recently underwent a right partial nephrectomy for papillary renal cell carcinoma and is being managed by Dr Pascual.  He is having problems with urinary hesitancy concerning for BPH

## 2025-01-14 NOTE — PROGRESS NOTES
Devang Piper Jr. presents today for   Chief Complaint   Patient presents with    Diabetes    Hypertension    Cholesterol Problem     3 month follow up     Medicare AWV           \"Have you been to the ER, urgent care clinic since your last visit?  Hospitalized since your last visit?\"    NO    “Have you seen or consulted any other health care providers outside of [facility] since your last visit?”    Yes, Dr. Pascual- urology    “Have you had a colorectal cancer screening such as a colonoscopy/FIT/Cologuard?    NO--scheduled in March 2025    Date of last Colonoscopy: 6/26/2019  No cologuard on file  No FIT/FOBT on file   No flexible sigmoidoscopy on file

## 2025-01-14 NOTE — PATIENT INSTRUCTIONS
that gets worse over time or can't be cured?  What are you most afraid of that might happen? (Maybe you're afraid of having pain, losing your independence, or being kept alive by machines.)  Where would you prefer to die? (Your home? A hospital? A nursing home?)  Do you want to donate your organs when you die?  Do you want certain Hindu practices performed before you die?  When should you call for help?  Be sure to contact your doctor if you have any questions.  Where can you learn more?  Go to https://www.[employer].net/patientEd and enter R264 to learn more about \"Advance Directives: Care Instructions.\"  Current as of: November 16, 2023  Content Version: 14.3  © 2024 [employer].   Care instructions adapted under license by [employer]. If you have questions about a medical condition or this instruction, always ask your healthcare professional. [employer] disclaims any warranty or liability for your use of this information.         A Healthy Heart: Care Instructions  Overview     Coronary artery disease, also called heart disease, occurs when a substance called plaque builds up in the vessels that supply oxygen-rich blood to your heart muscle. This can narrow the blood vessels and reduce blood flow. A heart attack happens when blood flow is completely blocked. A high-fat diet, smoking, and other factors increase the risk of heart disease.  Your doctor has found that you have a chance of having heart disease. A heart-healthy lifestyle can help keep your heart healthy and prevent heart disease. This lifestyle includes eating healthy, being active, staying at a weight that's healthy for you, and not smoking or using tobacco. It also includes taking medicines as directed, managing other health conditions, and trying to get a healthy amount of sleep.  Follow-up care is a key part of your treatment and safety. Be sure to make and go to all appointments, and call your doctor if you are having

## 2025-01-14 NOTE — PROGRESS NOTES
Medicare Annual Wellness Visit    Devang Piper JrLeo is here for Diabetes, Hypertension, Cholesterol Problem (3 month follow up ), and Medicare AWV    Assessment & Plan   Medicare annual wellness visit, subsequent  Type 1 diabetes mellitus with diabetic polyneuropathy (HCC)  -     Hemoglobin A1C; Future  Essential hypertension  -     Comprehensive Metabolic Panel; Future  High cholesterol  -     Lipid Panel; Future  Stage 3b chronic kidney disease (HCC)  Rising PSA level  -     PSA, Diagnostic; Future     Return in about 3 months (around 4/14/2025) for labs 1 week before.     Subjective       Patient's complete Health Risk Assessment and screening values have been reviewed and are found in Flowsheets. The following problems were reviewed today and where indicated follow up appointments were made and/or referrals ordered.    Positive Risk Factor Screenings with Interventions:               Poor Eating Habits/Diet:  Do you eat balanced/healthy meals regularly?: (!) No  Interventions:  Pt encouraged to increase protein and cut back carbs in his diet          Advanced Directives:  Do you have a Living Will?: (!) No    Intervention:  has NO advanced directive - not interested in additional information                     Objective   Vitals:    01/14/25 1347   BP: 121/62   Site: Left Upper Arm   Position: Sitting   Cuff Size: Large Adult   Pulse: 69   Resp: 20   Temp: 98.8 °F (37.1 °C)   TempSrc: Temporal   SpO2: 100%   Weight: 87.1 kg (192 lb)   Height: 1.854 m (6' 1\")      Body mass index is 25.33 kg/m².                  No Known Allergies  Prior to Visit Medications    Medication Sig Taking? Authorizing Provider   valACYclovir (VALTREX) 1 g tablet Take 1 tablet by mouth daily Yes Rodrigo Holcomb MD   sildenafil (REVATIO) 20 MG tablet TAKE 1-5 TABLETS BY MOUTH ONE HOUR PRIOR TO SEXUAL ACTIVITY * NOT TO EXCEED 5 TABLETS DAILY Yes Gm Mary MD   dapagliflozin (FARXIGA) 10 MG tablet Take 1 tablet by mouth every

## 2025-01-20 RX ORDER — ATORVASTATIN CALCIUM 20 MG/1
TABLET, FILM COATED ORAL
Qty: 90 TABLET | Refills: 3 | Status: SHIPPED | OUTPATIENT
Start: 2025-01-20

## 2025-01-20 RX ORDER — AMLODIPINE BESYLATE 10 MG/1
10 TABLET ORAL DAILY
Qty: 90 TABLET | Refills: 3 | Status: SHIPPED | OUTPATIENT
Start: 2025-01-20

## 2025-01-20 RX ORDER — OMEPRAZOLE 40 MG/1
40 CAPSULE, DELAYED RELEASE ORAL DAILY
Qty: 90 CAPSULE | Refills: 3 | Status: SHIPPED | OUTPATIENT
Start: 2025-01-20

## 2025-01-24 ENCOUNTER — PATIENT MESSAGE (OUTPATIENT)
Facility: CLINIC | Age: 65
End: 2025-01-24

## 2025-02-05 ENCOUNTER — NURSE ONLY (OUTPATIENT)
Age: 65
End: 2025-02-05

## 2025-02-05 VITALS
BODY MASS INDEX: 28.63 KG/M2 | TEMPERATURE: 96.4 F | OXYGEN SATURATION: 98 % | WEIGHT: 216 LBS | RESPIRATION RATE: 17 BRPM | HEIGHT: 73 IN | HEART RATE: 80 BPM

## 2025-02-05 DIAGNOSIS — I10 ESSENTIAL HYPERTENSION: ICD-10-CM

## 2025-02-05 DIAGNOSIS — E10.42 TYPE 1 DIABETES MELLITUS WITH DIABETIC POLYNEUROPATHY (HCC): ICD-10-CM

## 2025-02-05 DIAGNOSIS — N18.32 STAGE 3B CHRONIC KIDNEY DISEASE (CKD) (HCC): ICD-10-CM

## 2025-02-05 DIAGNOSIS — Z12.11 COLON CANCER SCREENING: Primary | ICD-10-CM

## 2025-02-05 RX ORDER — POLYETHYLENE GLYCOL 3350 17 G/17G
POWDER, FOR SOLUTION ORAL
Qty: 255 G | Refills: 0 | Status: SHIPPED | OUTPATIENT
Start: 2025-02-05

## 2025-02-05 RX ORDER — BISACODYL 5 MG/1
TABLET, DELAYED RELEASE ORAL
Qty: 4 TABLET | Refills: 0 | Status: SHIPPED | OUTPATIENT
Start: 2025-02-05
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
## 2025-02-06 ENCOUNTER — PREP FOR PROCEDURE (OUTPATIENT)
Age: 65
End: 2025-02-06

## 2025-02-06 DIAGNOSIS — Z12.11 COLON CANCER SCREENING: ICD-10-CM

## 2025-03-08 PROBLEM — Z12.11 COLON CANCER SCREENING: Status: RESOLVED | Noted: 2025-02-06 | Resolved: 2025-03-08

## 2025-03-18 ENCOUNTER — ANESTHESIA EVENT (OUTPATIENT)
Facility: HOSPITAL | Age: 65
End: 2025-03-18
Payer: MEDICARE

## 2025-03-18 ENCOUNTER — TELEPHONE (OUTPATIENT)
Age: 65
End: 2025-03-18

## 2025-03-19 ENCOUNTER — HOSPITAL ENCOUNTER (OUTPATIENT)
Facility: HOSPITAL | Age: 65
Setting detail: OUTPATIENT SURGERY
Discharge: HOME | End: 2025-03-19
Attending: COLON & RECTAL SURGERY | Admitting: COLON & RECTAL SURGERY
Payer: MEDICARE

## 2025-03-19 ENCOUNTER — ANESTHESIA (OUTPATIENT)
Facility: HOSPITAL | Age: 65
End: 2025-03-19
Payer: MEDICARE

## 2025-03-19 VITALS
DIASTOLIC BLOOD PRESSURE: 58 MMHG | OXYGEN SATURATION: 98 % | RESPIRATION RATE: 16 BRPM | WEIGHT: 218 LBS | HEIGHT: 73 IN | HEART RATE: 79 BPM | BODY MASS INDEX: 28.89 KG/M2 | SYSTOLIC BLOOD PRESSURE: 150 MMHG | TEMPERATURE: 97.8 F

## 2025-03-19 LAB
GLUCOSE BLD STRIP.AUTO-MCNC: 144 MG/DL (ref 70–110)
GLUCOSE BLD STRIP.AUTO-MCNC: 170 MG/DL (ref 70–110)

## 2025-03-19 PROCEDURE — 3700000001 HC ADD 15 MINUTES (ANESTHESIA): Performed by: COLON & RECTAL SURGERY

## 2025-03-19 PROCEDURE — 82962 GLUCOSE BLOOD TEST: CPT

## 2025-03-19 PROCEDURE — 3700000000 HC ANESTHESIA ATTENDED CARE: Performed by: COLON & RECTAL SURGERY

## 2025-03-19 PROCEDURE — 6360000002 HC RX W HCPCS: Performed by: NURSE ANESTHETIST, CERTIFIED REGISTERED

## 2025-03-19 PROCEDURE — 2709999900 HC NON-CHARGEABLE SUPPLY: Performed by: COLON & RECTAL SURGERY

## 2025-03-19 PROCEDURE — 7100000010 HC PHASE II RECOVERY - FIRST 15 MIN: Performed by: COLON & RECTAL SURGERY

## 2025-03-19 PROCEDURE — 7100000000 HC PACU RECOVERY - FIRST 15 MIN: Performed by: COLON & RECTAL SURGERY

## 2025-03-19 PROCEDURE — G0105 COLORECTAL SCRN; HI RISK IND: HCPCS | Performed by: COLON & RECTAL SURGERY

## 2025-03-19 PROCEDURE — 2580000003 HC RX 258: Performed by: NURSE ANESTHETIST, CERTIFIED REGISTERED

## 2025-03-19 PROCEDURE — 3600007512: Performed by: COLON & RECTAL SURGERY

## 2025-03-19 PROCEDURE — 3600007502: Performed by: COLON & RECTAL SURGERY

## 2025-03-19 RX ORDER — PHENYLEPHRINE HCL IN 0.9% NACL 1 MG/10 ML
SYRINGE (ML) INTRAVENOUS
Status: DISCONTINUED | OUTPATIENT
Start: 2025-03-19 | End: 2025-03-19 | Stop reason: SDUPTHER

## 2025-03-19 RX ORDER — SODIUM CHLORIDE, SODIUM LACTATE, POTASSIUM CHLORIDE, CALCIUM CHLORIDE 600; 310; 30; 20 MG/100ML; MG/100ML; MG/100ML; MG/100ML
INJECTION, SOLUTION INTRAVENOUS CONTINUOUS
Status: DISCONTINUED | OUTPATIENT
Start: 2025-03-19 | End: 2025-03-19 | Stop reason: HOSPADM

## 2025-03-19 RX ORDER — LIDOCAINE HYDROCHLORIDE 10 MG/ML
1 INJECTION, SOLUTION EPIDURAL; INFILTRATION; INTRACAUDAL; PERINEURAL
Status: DISCONTINUED | OUTPATIENT
Start: 2025-03-19 | End: 2025-03-19 | Stop reason: HOSPADM

## 2025-03-19 RX ORDER — FAMOTIDINE 20 MG/1
20 TABLET, FILM COATED ORAL ONCE
Status: DISCONTINUED | OUTPATIENT
Start: 2025-03-19 | End: 2025-03-19

## 2025-03-19 RX ORDER — LIDOCAINE HYDROCHLORIDE 20 MG/ML
INJECTION, SOLUTION EPIDURAL; INFILTRATION; INTRACAUDAL; PERINEURAL
Status: DISCONTINUED | OUTPATIENT
Start: 2025-03-19 | End: 2025-03-19 | Stop reason: SDUPTHER

## 2025-03-19 RX ORDER — PROPOFOL 10 MG/ML
INJECTION, EMULSION INTRAVENOUS
Status: DISCONTINUED | OUTPATIENT
Start: 2025-03-19 | End: 2025-03-19 | Stop reason: SDUPTHER

## 2025-03-19 RX ADMIN — PROPOFOL 25 MG: 10 INJECTION, EMULSION INTRAVENOUS at 08:03

## 2025-03-19 RX ADMIN — PROPOFOL 25 MG: 10 INJECTION, EMULSION INTRAVENOUS at 08:08

## 2025-03-19 RX ADMIN — PROPOFOL 25 MG: 10 INJECTION, EMULSION INTRAVENOUS at 08:06

## 2025-03-19 RX ADMIN — Medication 100 MCG: at 08:10

## 2025-03-19 RX ADMIN — Medication 100 MCG: at 08:14

## 2025-03-19 RX ADMIN — SODIUM CHLORIDE, POTASSIUM CHLORIDE, SODIUM LACTATE AND CALCIUM CHLORIDE: 600; 310; 30; 20 INJECTION, SOLUTION INTRAVENOUS at 07:40

## 2025-03-19 RX ADMIN — PROPOFOL 25 MG: 10 INJECTION, EMULSION INTRAVENOUS at 08:00

## 2025-03-19 RX ADMIN — PROPOFOL 25 MG: 10 INJECTION, EMULSION INTRAVENOUS at 08:12

## 2025-03-19 RX ADMIN — PROPOFOL 25 MG: 10 INJECTION, EMULSION INTRAVENOUS at 07:57

## 2025-03-19 RX ADMIN — LIDOCAINE HYDROCHLORIDE 100 MG: 20 INJECTION, SOLUTION EPIDURAL; INFILTRATION; INTRACAUDAL; PERINEURAL at 07:55

## 2025-03-19 RX ADMIN — PROPOFOL 100 MG: 10 INJECTION, EMULSION INTRAVENOUS at 07:55

## 2025-03-19 RX ADMIN — PROPOFOL 25 MG: 10 INJECTION, EMULSION INTRAVENOUS at 08:10

## 2025-03-19 RX ADMIN — PROPOFOL 25 MG: 10 INJECTION, EMULSION INTRAVENOUS at 08:14

## 2025-03-19 ASSESSMENT — ENCOUNTER SYMPTOMS: SHORTNESS OF BREATH: 1

## 2025-03-19 NOTE — ANESTHESIA PRE PROCEDURE
daily As needed. 2/29/24  Yes Rodrigo Holcomb MD   Blood Glucose Monitoring Suppl (ONETOUCH VERIO FLEX SYSTEM) JOHN Check blood sugar 3x/day 2/29/24  Yes Rodrigo Holcomb MD OneTouch Delica Lancets 33G MISC Check BS 3x/day E10.42 2/29/24  Yes Rodrigo Holcomb MD   Blood Glucose Monitoring Suppl (ACCU-CHEK BRYAN PLUS) w/Device KIT Check BS 3x/day 2/25/24  Yes Rodrigo Holcomb MD   Continuous Blood Gluc Sensor (DEXCOM G6 SENSOR) MISC  5/21/23  Yes ProviderEugene MD   Continuous Blood Gluc Transmit (DEXCOM G6 TRANSMITTER) MISC USE AS DIRECTED 4 TIMES DAILY CHANGE TRANSMITER EVERY 3 MONTHS 6/1/23  Yes ProviderEugene MD   Glycopyrronium Tosylate (QBREXZA) 2.4 % PADS Qbrexza 2.4 % towelette   Yes Provider, MD Eugene   olmesartan (BENICAR) 20 MG tablet Take 1 tablet by mouth daily 4/1/23  Yes Provider, MD Eugene   Omega-3 Fatty Acids (FISH OIL PO) Take by mouth daily   Yes Provider, MD Eugene   Multiple Vitamin (MULTIVITAMIN ADULT PO) Take 1 tablet by mouth daily   Yes Provider, MD Eugene   spironolactone (ALDACTONE) 25 MG tablet Take 1 tablet by mouth daily 2/7/22  Yes Automatic Reconciliation, Ar       Current medications:    Current Facility-Administered Medications   Medication Dose Route Frequency Provider Last Rate Last Admin    lidocaine PF 1 % injection 1 mL  1 mL IntraDERmal Once PRN Jana Castillo APRN - CRNA        lactated ringers infusion   IntraVENous Continuous Jana Castillo APRN - CRNA 125 mL/hr at 03/19/25 0740 New Bag at 03/19/25 0740       Allergies:  No Known Allergies    Problem List:    Patient Active Problem List   Diagnosis Code    Left shoulder pain M25.512    Bursitis of left shoulder M75.52    Subconjunctival hemorrhage of right eye H11.31    ACP (advance care planning) Z71.89    Type 1 diabetes mellitus with diabetic polyneuropathy (HCC) E10.42    Orthostatic hypotension I95.1    Benign hypertensive heart disease without heart failure I11.9    PN (peripheral

## 2025-03-19 NOTE — DISCHARGE INSTRUCTIONS
(Advil, Motrin) and naproxen (Aleve).   Other instructions    For your safety, do not drive or operate machinery until the medicine wears off and you can think clearly. Your doctor may tell you not to drive or operate machinery until the day after your test.     Do not sign legal documents or make major decisions until the medicine wears off and you can think clearly. The anesthesia can make it hard for you to fully understand what you are agreeing to.   Follow-up care is a key part of your treatment and safety. Be sure to make and go to all appointments, and call your doctor if you are having problems. It's also a good idea to know your test results and keep a list of the medicines you take.  When should you call for help?   Call 911 anytime you think you may need emergency care. For example, call if:    You passed out (lost consciousness).     You pass maroon or bloody stools.     You have trouble breathing.   Call your doctor now or seek immediate medical care if:    You have pain that does not get better after you take pain medicine.     You are sick to your stomach or cannot drink fluids.     You have new or worse belly pain.     You have blood in your stools.     You have a fever.     You cannot pass stools or gas.   Watch closely for changes in your health, and be sure to contact your doctor if you have any problems.  Where can you learn more?  Go to https://www.[employer].net/[employer]ctions  Enter E264 in the search box to learn more about \"Colonoscopy: What to Expect at Home.\"  Current as of: September 8, 2021               Content Version: 13.2  © 2006-2022 [employer].   Care instructions adapted under license by [employer] (which disclaims liability or warranty for this information). If you have questions about a medical condition or this instruction, always ask your healthcare professional. [employer] disclaims any warranty or liability for your use of this

## 2025-03-19 NOTE — ANESTHESIA POSTPROCEDURE EVALUATION
Department of Anesthesiology  Postprocedure Note    Patient: Devang Piper Jr.  MRN: [id number]  YOB: 1960  Date of evaluation: 3/19/2025    Procedure Summary       Date: 03/19/25 Room / Location: [facility] / [facility]    Anesthesia Start: 0750 Anesthesia Stop: 0816    Procedure: COLONOSCOPY DIAGNOSTIC (Abdomen) Diagnosis:       Colon cancer screening      (Colon cancer screening [Z12.11])    Surgeons: Aristeo Hudson MD Responsible Provider: Arcenio Wade MD    Anesthesia Type: MAC ASA Status: 3            Anesthesia Type: MAC    Rubin Phase I: Rubin Score: 9    Rubin Phase II: Rubin Score: 10    Anesthesia Post Evaluation    Patient location during evaluation: bedside  Patient participation: complete - patient participated  Level of consciousness: awake  Airway patency: patent  Nausea & Vomiting: no nausea  Cardiovascular status: hemodynamically stable  Respiratory status: acceptable  Hydration status: euvolemic  Multimodal analgesia pain management approach  Pain management: adequate    No notable events documented.

## 2025-03-19 NOTE — OP NOTE
Colonoscopy Procedure Note      Devang Piper Jr.  1960  [id number]                Date of Procedure: 03/19/25    Preoperative diagnosis: History of polyps    Postoperative diagnosis: Normal    :  Aristeo Hudson MD    Assistant(s): Circulator: Matt Hood RN  Endoscopy Technician: Mary Chaudhary    Sedation: MAC    Complications: None    Implants: None    Procedure Details:  Prior to the procedure, a history and physical were performed. The patient’s medications, allergies and sensitivities were reviewed and all questions were answered.  After informed consent was obtained for the procedure, with all risks and benefits of procedure explained. The patient was taken to the endoscopy suite and placed in the left lateral decubitus position.  Patient identification and proposed procedure were verified prior to the procedure by the nurse and I. After sequential anesthesia administered by anesthesiologist, a digital rectal exam was performed and was normal.  The Olympus video colonoscope was introduced through the anus and advanced to cecum, which was identified by the ileocecal valve and appendiceal orifice.   The colonoscope was slowly withdrawn and the mucosa examined for any abnormalities.  Cecal withdrawal time was greater than 6 minutes. The patient tolerated the procedure well. There were no complications.    Bowel Prep Quality: fair.     Findings/Interventions:   Polyps none    EBL: none    Recommendations: -Repeat colonoscopy in 3 years with 2 day prep given limited prep.   Resume normal medication(s).     Discharge Disposition:  Home  Aristeo Hudson MD  3/19/2025  8:15 AM

## 2025-03-19 NOTE — H&P
(NOVOLOG) 100 UNIT/ML injection vial INJECT 100 UNITS DAILY VIA INSULIN PUMP    ipratropium (ATROVENT) 0.03 % nasal spray USE 2 SPRAY(S) IN EACH NOSTRIL 2 TO 3 TIMES DAILY    Multiple Vitamin (MULTIVITAMIN ADULT PO) 1 tablet, DAILY    olmesartan (BENICAR) 20 mg, DAILY    Omega-3 Fatty Acids (FISH OIL PO) DAILY    omeprazole (PRILOSEC) 40 mg, Oral, DAILY    OneTouch Delica Lancets 33G MISC Check BS 3x/day E10.42    polyethylene glycol (MIRALAX) 17 GM/SCOOP powder Take as directed by office for bowel prep.  Dispense:255 grams    sildenafil (REVATIO) 20 MG tablet TAKE 1-5 TABLETS BY MOUTH ONE HOUR PRIOR TO SEXUAL ACTIVITY * NOT TO EXCEED 5 TABLETS DAILY    spironolactone (ALDACTONE) 25 mg, DAILY    tadalafil (CIALIS) 5 mg, Oral, DAILY    valACYclovir (VALTREX) 1,000 mg, Oral, DAILY        No Known Allergies    ROS: negative    Vitals:    03/19/25 0732   BP: (!) 153/69   Pulse: 89   Resp: 11   Temp: 97.6 °F (36.4 °C)   SpO2: 99%       Physical Exam  Heart: RRR  Lungs: CTAB  Constitutional:       Appearance: He is well-developed.   HENT:      Head: Normocephalic and atraumatic.   Abdominal:      General: There is no distension.      Palpations: Abdomen is soft.      Tenderness: There is no abdominal tenderness.   Skin:     General: Skin is warm and dry    Assessment / Plan    colonoscopy    The diagnoses and plan were discussed with the patient. All questions answered.  Plan of care agreed to by all concerned.

## 2025-04-07 ENCOUNTER — HOSPITAL ENCOUNTER (OUTPATIENT)
Facility: HOSPITAL | Age: 65
Setting detail: SPECIMEN
Discharge: HOME | End: 2025-04-10

## 2025-04-07 LAB — SENTARA SPECIMEN COLLECTION: NORMAL

## 2025-04-07 PROCEDURE — 99001 SPECIMEN HANDLING PT-LAB: CPT

## 2025-04-08 LAB
A/G RATIO: 1.5 RATIO (ref 1.1–2.6)
ALBUMIN: 4 G/DL (ref 3.5–5)
ALP BLD-CCNC: 109 U/L (ref 40–125)
ALT SERPL-CCNC: 15 U/L (ref 5–40)
ANION GAP SERPL CALCULATED.3IONS-SCNC: 13 MMOL/L (ref 3–15)
AST SERPL-CCNC: 19 U/L (ref 10–37)
BILIRUB SERPL-MCNC: 0.7 MG/DL (ref 0.2–1.2)
BUN BLDV-MCNC: 37 MG/DL (ref 6–22)
CALCIUM SERPL-MCNC: 10.7 MG/DL (ref 8.4–10.5)
CHLORIDE BLD-SCNC: 107 MMOL/L (ref 98–110)
CHOLESTEROL, TOTAL: 143 MG/DL (ref 110–200)
CHOLESTEROL/HDL RATIO: 2.8 (ref 0–5)
CO2: 22 MMOL/L (ref 20–32)
CREAT SERPL-MCNC: 2.3 MG/DL (ref 0.8–1.6)
DIAGNOSTIC PSA: 1.73 NG/ML
ESTIMATED AVERAGE GLUCOSE: 190 MG/DL (ref 91–123)
GFR, ESTIMATED: 30.8 ML/MIN/1.73 SQ.M.
GLOBULIN: 2.7 G/DL (ref 2–4)
GLUCOSE: 106 MG/DL (ref 70–99)
HBA1C MFR BLD: 8.3 % (ref 4.8–5.6)
HDLC SERPL-MCNC: 51 MG/DL
LDL CHOLESTEROL: 78 MG/DL (ref 50–99)
LDL/HDL RATIO: 1.5
NON-HDL CHOLESTEROL: 92 MG/DL
POTASSIUM SERPL-SCNC: 5.2 MMOL/L (ref 3.5–5.5)
SODIUM BLD-SCNC: 142 MMOL/L (ref 133–145)
TOTAL PROTEIN: 6.7 G/DL (ref 6.2–8.1)
TRIGL SERPL-MCNC: 68 MG/DL (ref 40–149)
VLDLC SERPL CALC-MCNC: 14 MG/DL (ref 8–30)

## 2025-04-11 ENCOUNTER — OFFICE VISIT (OUTPATIENT)
Age: 65
End: 2025-04-11
Payer: MEDICARE

## 2025-04-11 VITALS
BODY MASS INDEX: 29.42 KG/M2 | HEART RATE: 77 BPM | WEIGHT: 222 LBS | SYSTOLIC BLOOD PRESSURE: 102 MMHG | DIASTOLIC BLOOD PRESSURE: 54 MMHG | HEIGHT: 73 IN | OXYGEN SATURATION: 97 %

## 2025-04-11 DIAGNOSIS — R00.2 PALPITATIONS: ICD-10-CM

## 2025-04-11 DIAGNOSIS — R06.02 SHORTNESS OF BREATH: Primary | ICD-10-CM

## 2025-04-11 PROCEDURE — 3078F DIAST BP <80 MM HG: CPT | Performed by: INTERNAL MEDICINE

## 2025-04-11 PROCEDURE — 93000 ELECTROCARDIOGRAM COMPLETE: CPT | Performed by: INTERNAL MEDICINE

## 2025-04-11 PROCEDURE — 3074F SYST BP LT 130 MM HG: CPT | Performed by: INTERNAL MEDICINE

## 2025-04-11 PROCEDURE — 99214 OFFICE O/P EST MOD 30 MIN: CPT | Performed by: INTERNAL MEDICINE

## 2025-04-11 RX ORDER — GABAPENTIN 300 MG/1
300 CAPSULE ORAL
COMMUNITY
Start: 2024-12-12

## 2025-04-11 ASSESSMENT — PATIENT HEALTH QUESTIONNAIRE - PHQ9
2. FEELING DOWN, DEPRESSED OR HOPELESS: NOT AT ALL
SUM OF ALL RESPONSES TO PHQ QUESTIONS 1-9: 0
1. LITTLE INTEREST OR PLEASURE IN DOING THINGS: NOT AT ALL
SUM OF ALL RESPONSES TO PHQ QUESTIONS 1-9: 0

## 2025-04-11 NOTE — PROGRESS NOTES
Devang Piper Jr. presents today for   Chief Complaint   Patient presents with    Follow-up     1 yr f/u with no concerns        Devang Piper Jr. preferred language for health care discussion is english/other.    Is someone accompanying this pt? no    Is the patient using any DME equipment during OV? no    Depression Screening:  Depression: Not at risk (4/11/2025)    PHQ-2     PHQ-2 Score: 0        Learning Assessment:  Who is the primary learner? Patient    What is the preferred language for health care of the primary learner? ENGLISH    How does the primary learner prefer to learn new concepts? DEMONSTRATION    Answered By patient    Relationship to Learner SELF           Pt currently taking Anticoagulant therapy? no    Pt currently taking Antiplatelet therapy ? no      Coordination of Care:  1. Have you been to the ER, urgent care clinic since your last visit? Hospitalized since your last visit? no    2. Have you seen or consulted any other health care providers outside of the [facility] since your last visit? Include any pap smears or colon screening. no    
BOBBI      Thank you for allowing me to participate in the care of your patient, please do not hesitate to call with questions or concerns.    Regards,    Eliane Buckley, DO

## 2025-04-14 DIAGNOSIS — E78.00 HIGH CHOLESTEROL: ICD-10-CM

## 2025-04-14 DIAGNOSIS — I10 ESSENTIAL HYPERTENSION: ICD-10-CM

## 2025-04-14 DIAGNOSIS — R97.20 RISING PSA LEVEL: ICD-10-CM

## 2025-04-14 DIAGNOSIS — E10.42 TYPE 1 DIABETES MELLITUS WITH DIABETIC POLYNEUROPATHY (HCC): ICD-10-CM

## 2025-04-22 ENCOUNTER — OFFICE VISIT (OUTPATIENT)
Facility: CLINIC | Age: 65
End: 2025-04-22
Payer: MEDICARE

## 2025-04-22 VITALS
TEMPERATURE: 97.1 F | OXYGEN SATURATION: 97 % | DIASTOLIC BLOOD PRESSURE: 60 MMHG | BODY MASS INDEX: 28.76 KG/M2 | HEART RATE: 72 BPM | SYSTOLIC BLOOD PRESSURE: 100 MMHG | WEIGHT: 217 LBS | HEIGHT: 73 IN | RESPIRATION RATE: 20 BRPM

## 2025-04-22 DIAGNOSIS — E21.3 HYPERPARATHYROIDISM: ICD-10-CM

## 2025-04-22 DIAGNOSIS — E10.42 TYPE 1 DIABETES MELLITUS WITH DIABETIC POLYNEUROPATHY (HCC): Primary | ICD-10-CM

## 2025-04-22 DIAGNOSIS — N18.32 STAGE 3B CHRONIC KIDNEY DISEASE (HCC): ICD-10-CM

## 2025-04-22 DIAGNOSIS — I10 ESSENTIAL HYPERTENSION: ICD-10-CM

## 2025-04-22 DIAGNOSIS — R97.20 RISING PSA LEVEL: ICD-10-CM

## 2025-04-22 DIAGNOSIS — E78.00 HIGH CHOLESTEROL: ICD-10-CM

## 2025-04-22 PROCEDURE — 99214 OFFICE O/P EST MOD 30 MIN: CPT | Performed by: INTERNAL MEDICINE

## 2025-04-22 PROCEDURE — 3052F HG A1C>EQUAL 8.0%<EQUAL 9.0%: CPT | Performed by: INTERNAL MEDICINE

## 2025-04-22 PROCEDURE — 3078F DIAST BP <80 MM HG: CPT | Performed by: INTERNAL MEDICINE

## 2025-04-22 PROCEDURE — 3074F SYST BP LT 130 MM HG: CPT | Performed by: INTERNAL MEDICINE

## 2025-04-22 RX ORDER — GABAPENTIN 300 MG/1
300 CAPSULE ORAL
Qty: 90 CAPSULE | Refills: 1 | Status: SHIPPED | OUTPATIENT
Start: 2025-04-22 | End: 2025-10-19

## 2025-04-22 NOTE — PROGRESS NOTES
Devang Piper Jr. presents today for   Chief Complaint   Patient presents with    Diabetes    Hypertension    Cholesterol Problem     3 month follow up     Patient is requesting a refill on Gabapentin but is unsure of which provider gave him the medication.       \"Have you been to the ER, urgent care clinic since your last visit?  Hospitalized since your last visit?\"    NO    “Have you seen or consulted any other health care providers outside of [facility] since your last visit?”    Yes, Dr. Sheridan.            
04/07/2025 01:27 PM    AST 19 04/07/2025 01:27 PM    ALT 15 04/07/2025 01:27 PM     HgBA1c:    Lab Results   Component Value Date/Time    LABA1C 8.3 04/07/2025 01:27 PM     FLP:    Lab Results   Component Value Date/Time    TRIG 68 04/07/2025 01:27 PM    HDL 51 04/07/2025 01:27 PM        PSA:   Lab Results   Component Value Date/Time    PSA 1.730 04/07/2025 01:27 PM    PSA 1.290 10/07/2024 01:48 PM           Assessment / Plan     Diabetes uncontrolled  pt remains on insulin pump and will follow up with Endocrine for adjustment of insulin.  He will continue to work on cutting back starches and sugar in his diet,   Patient is on Farxiga for his chronic kidney disease.  He has upcoming appt with  Renal for further management since renal function has decreased since recent right partial nephrectomy.   Hypertension well controlled on Benicar 40 mg daily and  Norvasc 10 mg  Hyperlipidemia  well controlled on Lipitor 20 mg.    ICD-10-CM    1. Type 1 diabetes mellitus with diabetic polyneuropathy (HCC)  E10.42 gabapentin (NEURONTIN) 300 MG capsule     Hemoglobin A1C      2. Essential hypertension  I10 Comprehensive Metabolic Panel      3. High cholesterol  E78.00 Lipid Panel      4. Stage 3b chronic kidney disease (HCC)  N18.32 Pt has had some progression due to poor hydration. He will continue on Farxiga and f/u with Renal for further management       5. Rising PSA level  R97.20 Will continue to monitor PSA, Diagnostic and refer back to urology if >1,7 in the next 3 months       6. Hyperparathyroidism  E21.3 Pt is being managed by Endocrine. He continues on Sensipar 60 mg/day with plan for surgery in the future.                     Diabetic issues reviewed with him: diabetic diet discussed in detail, and low cholesterol diet, weight control and daily exercise discussed.     Return in about 3 months (around 7/22/2025) for labs 1 week before.               Reviewed plan of care. Patient has provided input and agrees with

## 2025-05-02 ENCOUNTER — TELEPHONE (OUTPATIENT)
Age: 65
End: 2025-05-02

## 2025-05-02 NOTE — TELEPHONE ENCOUNTER
Per the last office note    \"Asked nurse to help obtain final report from [facility] (I was unable to find it)  Taught him how to monitor and when to call  I think we can wait to repeat [facility] curtis if [facility] we did before without significant arrhythmias\"    Final report from [facility] was obtained and reviewed by provider.   Verbal order and read back per Eliane Buckley,   Monitor was WNL with very rare PAC's. Nothing to do differently.     This has been fully explained to the patient, who verbalized understanding.

## 2025-05-21 DIAGNOSIS — E10.42 TYPE 1 DIABETES MELLITUS WITH DIABETIC POLYNEUROPATHY (HCC): ICD-10-CM

## 2025-05-21 RX ORDER — INSULIN ASPART 100 [IU]/ML
INJECTION, SOLUTION INTRAVENOUS; SUBCUTANEOUS
Qty: 90 ML | Refills: 3 | Status: SHIPPED | OUTPATIENT
Start: 2025-05-21

## 2025-07-15 ENCOUNTER — HOSPITAL ENCOUNTER (OUTPATIENT)
Facility: HOSPITAL | Age: 65
Setting detail: SPECIMEN
Discharge: HOME | End: 2025-07-18

## 2025-07-15 ENCOUNTER — LAB (OUTPATIENT)
Facility: CLINIC | Age: 65
End: 2025-07-15

## 2025-07-15 DIAGNOSIS — R97.20 RISING PSA LEVEL: ICD-10-CM

## 2025-07-15 DIAGNOSIS — E78.00 HIGH CHOLESTEROL: ICD-10-CM

## 2025-07-15 DIAGNOSIS — I10 ESSENTIAL HYPERTENSION: ICD-10-CM

## 2025-07-15 DIAGNOSIS — E10.42 TYPE 1 DIABETES MELLITUS WITH DIABETIC POLYNEUROPATHY (HCC): ICD-10-CM

## 2025-07-15 LAB
CHOLESTEROL, TOTAL: 120 MG/DL (ref 110–200)
CHOLESTEROL/HDL RATIO: 3 (ref 0–5)
ESTIMATED AVERAGE GLUCOSE: 182 MG/DL (ref 91–123)
HBA1C MFR BLD: 8 % (ref 4.8–5.6)
HDLC SERPL-MCNC: 40 MG/DL
LDL CHOLESTEROL: 68 MG/DL (ref 50–99)
LDL/HDL RATIO: 1.7
NON-HDL CHOLESTEROL: 80 MG/DL
TRIGL SERPL-MCNC: 58 MG/DL (ref 40–149)
VLDLC SERPL CALC-MCNC: 12 MG/DL (ref 8–30)

## 2025-07-15 PROCEDURE — 99001 SPECIMEN HANDLING PT-LAB: CPT

## 2025-07-16 ENCOUNTER — TELEPHONE (OUTPATIENT)
Facility: CLINIC | Age: 65
End: 2025-07-16

## 2025-07-16 LAB
A/G RATIO: 1.4 RATIO (ref 1.1–2.6)
ALBUMIN: 3.8 G/DL (ref 3.5–5)
ALP BLD-CCNC: 112 U/L (ref 40–125)
ALT SERPL-CCNC: 21 U/L (ref 5–40)
ANION GAP SERPL CALCULATED.3IONS-SCNC: 10 MMOL/L (ref 3–15)
AST SERPL-CCNC: 17 U/L (ref 10–37)
BILIRUB SERPL-MCNC: 0.3 MG/DL (ref 0.2–1.2)
BUN BLDV-MCNC: 32 MG/DL (ref 6–22)
CALCIUM SERPL-MCNC: 9.9 MG/DL (ref 8.4–10.5)
CHLORIDE BLD-SCNC: 106 MMOL/L (ref 98–110)
CO2: 22 MMOL/L (ref 20–32)
CREAT SERPL-MCNC: 2.3 MG/DL (ref 0.8–1.6)
DIAGNOSTIC PSA: 1.66 NG/ML
GFR, ESTIMATED: 30.8 ML/MIN/1.73 SQ.M.
GLOBULIN: 2.8 G/DL (ref 2–4)
GLUCOSE: 121 MG/DL (ref 70–99)
POTASSIUM SERPL-SCNC: 6.1 MMOL/L (ref 3.5–5.5)
SODIUM BLD-SCNC: 138 MMOL/L (ref 133–145)
TOTAL PROTEIN: 6.6 G/DL (ref 6.2–8.1)

## 2025-07-16 NOTE — TELEPHONE ENCOUNTER
Received call from the lab regarding critically high potassium level of 6.1 with elevated serum creatinine of 2.3.  Called the patient at his residence number on [phone], informed the patient about critically high potassium level and suggested the patient to go to the ER for further evaluation and management.  Expressed understanding and plans to go to [facility] immediately.

## 2025-07-17 ENCOUNTER — OFFICE VISIT (OUTPATIENT)
Facility: CLINIC | Age: 65
End: 2025-07-17
Payer: MEDICARE

## 2025-07-17 VITALS
RESPIRATION RATE: 20 BRPM | OXYGEN SATURATION: 98 % | SYSTOLIC BLOOD PRESSURE: 121 MMHG | DIASTOLIC BLOOD PRESSURE: 64 MMHG | TEMPERATURE: 98.2 F | HEART RATE: 72 BPM | HEIGHT: 73 IN | BODY MASS INDEX: 27.17 KG/M2 | WEIGHT: 205 LBS

## 2025-07-17 DIAGNOSIS — E78.00 HIGH CHOLESTEROL: ICD-10-CM

## 2025-07-17 DIAGNOSIS — I10 ESSENTIAL HYPERTENSION: ICD-10-CM

## 2025-07-17 DIAGNOSIS — E21.3 HYPERPARATHYROIDISM: ICD-10-CM

## 2025-07-17 DIAGNOSIS — N18.32 STAGE 3B CHRONIC KIDNEY DISEASE (HCC): ICD-10-CM

## 2025-07-17 DIAGNOSIS — E10.42 TYPE 1 DIABETES MELLITUS WITH DIABETIC POLYNEUROPATHY (HCC): Primary | ICD-10-CM

## 2025-07-17 DIAGNOSIS — E87.5 HYPERKALEMIA: ICD-10-CM

## 2025-07-17 PROCEDURE — 3078F DIAST BP <80 MM HG: CPT | Performed by: INTERNAL MEDICINE

## 2025-07-17 PROCEDURE — 3074F SYST BP LT 130 MM HG: CPT | Performed by: INTERNAL MEDICINE

## 2025-07-17 PROCEDURE — 3052F HG A1C>EQUAL 8.0%<EQUAL 9.0%: CPT | Performed by: INTERNAL MEDICINE

## 2025-07-17 PROCEDURE — 99214 OFFICE O/P EST MOD 30 MIN: CPT | Performed by: INTERNAL MEDICINE

## 2025-07-17 NOTE — PROGRESS NOTES
Subjective:       Chief Complaint  The patient presents for follow up of diabetes, hypertension and high cholesterol.        HPI  Devang Piper Jr. is a 64 y.o. male seen for follow up of diabetes.   Healso has hypertension and hyperlipidemia. Diabetes Type 1 since 18 years old, no significant medication side effects noted, uncontrolled on insulin pump.  He is now on a continuous glucose monitor since he checks his blood sugars 6-8 times a day, he is followed by Endo (Dr Wooten) and they are adjusting his insulin.  Patient educated on trying to cut back on drinking his calories and eating more protein and high fiber foods especially at night to decrease risk of hypoglycemia. .    Patient has microalbuminuria and  chronic kidney disease stage IIIb.  He is currently on Farxiga 10 mg..  He  was referred to Renal for further evaluation and has upcoming appt.. Pt was recently hospitalized for dehydration and hypokalemia. He admits his SBP was low in the 90's and he was having dizziness. He was taken off aldactone and benicar. Hypertension well controlled on just Norvasc 10 mg currently.    hyperlipidemia well controlled, no significant medication side effects noted, on Lipitor 20 mg    Diet and Lifestyle: generally follows a low fat low cholesterol diet, does not rigorously follow a diabetic diet, exercises sporadically    Home BP Monitoring: is controlled at home.    Diabetic Review of Systems - home glucose monitoring: is performed regularly, 6x-8x/day. He now has CGM.    Other symptoms and concerns: pt will try to exercise more. He has problems with his feet which limits him. Pt is candidate for diabetic shoes due to peripheral neuropathy and ulcerative callus. He is followed by Podiatry(Dr Astudillo). Patient uses Neurontin as needed for his peripheral neuropathy.  Patient has hypercalcemia and has parathyroid adenoma for which he needs surgery.  He is currently being managed on Sensipar 60 mg/day with endocrine.

## 2025-07-17 NOTE — PROGRESS NOTES
Devang Piper Jr. presents today for   Chief Complaint   Patient presents with    Diabetes    Hypertension    Cholesterol Problem     3 month follow up     Follow-up     ER follow up Marcela Holm for High Potassium      Patient states he was told to stop olmesartan and spironolactone when he went to the pharmacy.       \"Have you been to the ER, urgent care clinic since your last visit?  Hospitalized since your last visit?\"    Yes, Marcela Holm    “Have you seen or consulted any other health care providers outside of [facility] since your last visit?”    Yes, Dr. Sheridan Endocrine.

## 2025-07-18 LAB — SENTARA SPECIMEN COLLECTION: NORMAL

## (undated) DEVICE — GAUZE,SPONGE,4"X4",16PLY,STRL,LF,10/TRAY: Brand: MEDLINE

## (undated) DEVICE — FLEX ADVANTAGE 1500CC: Brand: FLEX ADVANTAGE

## (undated) DEVICE — FORCEPS BX L240CM JAW DIA2.8MM L CAP W/ NDL MIC MESH TOOTH

## (undated) DEVICE — SYRINGE MED 10ML LUERLOCK TIP W/O SFTY DISP

## (undated) DEVICE — CATHETER THOR 36FR DIA10.7MM POLYVI CHL TRCR TIP STR SFT

## (undated) DEVICE — FCPS BX HOT RJ4 2.2MMX240CM -- RADIAL JAW 4 BX/40

## (undated) DEVICE — YANKAUER,SMOOTH HANDLE,HIGH CAPACITY: Brand: MEDLINE INDUSTRIES, INC.

## (undated) DEVICE — CATH IV SAFE STR 22GX1IN BLU -- PROTECTIV PLUS

## (undated) DEVICE — CANNULA ORIG TL CLR W FOAM CUSHIONS AND 14FT SUPL TB 3 CHN

## (undated) DEVICE — SOLUTION IRRIG 1000ML H2O STRL BLT

## (undated) DEVICE — CATHETER SUCT TR FL TIP 14FR W/ O CTRL

## (undated) DEVICE — Device

## (undated) DEVICE — ENDOSCOPY PUMP TUBING/ CAP SET: Brand: ERBE

## (undated) DEVICE — FLUFF AND POLYMER UNDERPAD,EXTRA HEAVY: Brand: WINGS

## (undated) DEVICE — MEDI-VAC NON-CONDUCTIVE SUCTION TUBING: Brand: CARDINAL HEALTH

## (undated) DEVICE — UNDERPAD INCONT W23XL36IN STD BLU POLYPR BK FLUF SFT

## (undated) DEVICE — SYRINGE MED 50ML LUERSLIP TIP

## (undated) DEVICE — LINER SUCT CANSTR 3000CC PLAS SFT PRE ASSEMB W/OUT TBNG W/

## (undated) DEVICE — CANNULA NSL AD TBNG L14FT STD PVC O2 CRV CONN NONFLARED NSL

## (undated) DEVICE — REM POLYHESIVE ADULT PATIENT RETURN ELECTRODE: Brand: VALLEYLAB

## (undated) DEVICE — SYRINGE 20ML LL S/C 50

## (undated) DEVICE — GOWN PLASTIC FILM THMBHKS UNIV BLUE: Brand: CARDINAL HEALTH

## (undated) DEVICE — SYRINGE MED 25GA 3ML L5/8IN SUBQ PLAS W/ DETACH NDL SFTY

## (undated) DEVICE — SNARE POLYP M W27MMXL240CM OVL STIFF DISP CAPTIVATOR